# Patient Record
Sex: FEMALE | Race: WHITE | NOT HISPANIC OR LATINO | Employment: OTHER | URBAN - METROPOLITAN AREA
[De-identification: names, ages, dates, MRNs, and addresses within clinical notes are randomized per-mention and may not be internally consistent; named-entity substitution may affect disease eponyms.]

---

## 2017-01-03 RX ORDER — LOSARTAN POTASSIUM 100 MG/1
100 TABLET ORAL DAILY
Qty: 90 TABLET | Refills: 0 | Status: SHIPPED | OUTPATIENT
Start: 2017-01-03 | End: 2017-03-30 | Stop reason: SDUPTHER

## 2017-01-05 ENCOUNTER — RESULTS ENCOUNTER (OUTPATIENT)
Dept: FAMILY MEDICINE CLINIC | Facility: CLINIC | Age: 67
End: 2017-01-05

## 2017-01-05 ENCOUNTER — HOSPITAL ENCOUNTER (OUTPATIENT)
Dept: CARDIOLOGY | Facility: HOSPITAL | Age: 67
Discharge: HOME | End: 2017-01-05
Attending: INTERNAL MEDICINE | Admitting: INTERNAL MEDICINE

## 2017-01-05 ENCOUNTER — APPOINTMENT (OUTPATIENT)
Dept: LAB | Facility: HOSPITAL | Age: 67
End: 2017-01-05

## 2017-01-05 VITALS
HEIGHT: 69 IN | SYSTOLIC BLOOD PRESSURE: 114 MMHG | BODY MASS INDEX: 26.66 KG/M2 | HEART RATE: 80 BPM | WEIGHT: 180 LBS | DIASTOLIC BLOOD PRESSURE: 72 MMHG

## 2017-01-05 DIAGNOSIS — I26.02 ACUTE SADDLE PULMONARY EMBOLISM WITH ACUTE COR PULMONALE (HCC): ICD-10-CM

## 2017-01-05 DIAGNOSIS — I10 HTN (HYPERTENSION), BENIGN: ICD-10-CM

## 2017-01-05 LAB
ANION GAP SERPL CALCULATED.3IONS-SCNC: 13.8 MMOL/L
BH CV ECHO MEAS - ACS: 1.7 CM
BH CV ECHO MEAS - AO MAX PG (FULL): 5.9 MMHG
BH CV ECHO MEAS - AO MAX PG: 9.5 MMHG
BH CV ECHO MEAS - AO MEAN PG (FULL): 3.5 MMHG
BH CV ECHO MEAS - AO MEAN PG: 5.5 MMHG
BH CV ECHO MEAS - AO ROOT AREA (BSA CORRECTED): 1.2
BH CV ECHO MEAS - AO ROOT AREA: 4.2 CM^2
BH CV ECHO MEAS - AO ROOT DIAM: 2.3 CM
BH CV ECHO MEAS - AO V2 MAX: 154.5 CM/SEC
BH CV ECHO MEAS - AO V2 MEAN: 109 CM/SEC
BH CV ECHO MEAS - AO V2 VTI: 30.4 CM
BH CV ECHO MEAS - AVA(I,A): 1.3 CM^2
BH CV ECHO MEAS - AVA(I,D): 1.3 CM^2
BH CV ECHO MEAS - AVA(V,A): 1.6 CM^2
BH CV ECHO MEAS - AVA(V,D): 1.6 CM^2
BH CV ECHO MEAS - BSA(HAYCOCK): 2 M^2
BH CV ECHO MEAS - BSA: 2 M^2
BH CV ECHO MEAS - BZI_BMI: 26.6 KILOGRAMS/M^2
BH CV ECHO MEAS - BZI_METRIC_HEIGHT: 175.3 CM
BH CV ECHO MEAS - BZI_METRIC_WEIGHT: 81.6 KG
BH CV ECHO MEAS - CONTRAST EF (2CH): 59.2 ML/M^2
BH CV ECHO MEAS - CONTRAST EF 4CH: 58.8 ML/M^2
BH CV ECHO MEAS - EDV(CUBED): 97.3 ML
BH CV ECHO MEAS - EDV(MOD-SP2): 76 ML
BH CV ECHO MEAS - EDV(MOD-SP4): 80 ML
BH CV ECHO MEAS - EDV(TEICH): 97.3 ML
BH CV ECHO MEAS - EF(CUBED): 72.3 %
BH CV ECHO MEAS - EF(MOD-SP2): 59.2 %
BH CV ECHO MEAS - EF(MOD-SP4): 58.8 %
BH CV ECHO MEAS - EF(TEICH): 64 %
BH CV ECHO MEAS - ESV(CUBED): 27 ML
BH CV ECHO MEAS - ESV(MOD-SP2): 31 ML
BH CV ECHO MEAS - ESV(MOD-SP4): 33 ML
BH CV ECHO MEAS - ESV(TEICH): 35 ML
BH CV ECHO MEAS - FS: 34.8 %
BH CV ECHO MEAS - IVS/LVPW: 1.1
BH CV ECHO MEAS - IVSD: 1 CM
BH CV ECHO MEAS - LAT PEAK E' VEL: 5 CM/SEC
BH CV ECHO MEAS - LV DIASTOLIC VOL/BSA (35-75): 40.5 ML/M^2
BH CV ECHO MEAS - LV MASS(C)D: 148.1 GRAMS
BH CV ECHO MEAS - LV MASS(C)DI: 75 GRAMS/M^2
BH CV ECHO MEAS - LV MAX PG: 3.6 MMHG
BH CV ECHO MEAS - LV MEAN PG: 2 MMHG
BH CV ECHO MEAS - LV SYSTOLIC VOL/BSA (12-30): 16.7 ML/M^2
BH CV ECHO MEAS - LV V1 MAX: 95.5 CM/SEC
BH CV ECHO MEAS - LV V1 MEAN: 60.2 CM/SEC
BH CV ECHO MEAS - LV V1 VTI: 15.9 CM
BH CV ECHO MEAS - LVIDD: 4.6 CM
BH CV ECHO MEAS - LVIDS: 3 CM
BH CV ECHO MEAS - LVLD AP2: 7.2 CM
BH CV ECHO MEAS - LVLD AP4: 7.8 CM
BH CV ECHO MEAS - LVLS AP2: 6.3 CM
BH CV ECHO MEAS - LVLS AP4: 6.8 CM
BH CV ECHO MEAS - LVOT AREA (M): 2.5 CM^2
BH CV ECHO MEAS - LVOT AREA: 2.5 CM^2
BH CV ECHO MEAS - LVOT DIAM: 1.8 CM
BH CV ECHO MEAS - LVPWD: 0.9 CM
BH CV ECHO MEAS - MED PEAK E' VEL: 4 CM/SEC
BH CV ECHO MEAS - MV A DUR: 0.1 SEC
BH CV ECHO MEAS - MV A MAX VEL: 72.6 CM/SEC
BH CV ECHO MEAS - MV DEC SLOPE: 212 CM/SEC^2
BH CV ECHO MEAS - MV DEC TIME: 0.26 SEC
BH CV ECHO MEAS - MV E MAX VEL: 53.8 CM/SEC
BH CV ECHO MEAS - MV E/A: 0.74
BH CV ECHO MEAS - MV MAX PG: 3.3 MMHG
BH CV ECHO MEAS - MV MEAN PG: 1 MMHG
BH CV ECHO MEAS - MV P1/2T MAX VEL: 56.3 CM/SEC
BH CV ECHO MEAS - MV P1/2T: 77.8 MSEC
BH CV ECHO MEAS - MV V2 MAX: 91.4 CM/SEC
BH CV ECHO MEAS - MV V2 MEAN: 49.3 CM/SEC
BH CV ECHO MEAS - MV V2 VTI: 22 CM
BH CV ECHO MEAS - MVA P1/2T LCG: 3.9 CM^2
BH CV ECHO MEAS - MVA(P1/2T): 2.8 CM^2
BH CV ECHO MEAS - MVA(VTI): 1.8 CM^2
BH CV ECHO MEAS - PA MAX PG (FULL): 4.1 MMHG
BH CV ECHO MEAS - PA MAX PG: 6.2 MMHG
BH CV ECHO MEAS - PA V2 MAX: 124 CM/SEC
BH CV ECHO MEAS - PULM A REVS DUR: 0.1 SEC
BH CV ECHO MEAS - PULM A REVS VEL: 41 CM/SEC
BH CV ECHO MEAS - PULM DIAS VEL: 34.1 CM/SEC
BH CV ECHO MEAS - PULM S/D: 1.4
BH CV ECHO MEAS - PULM SYS VEL: 48.4 CM/SEC
BH CV ECHO MEAS - PVA(V,A): 1.8 CM^2
BH CV ECHO MEAS - PVA(V,D): 1.8 CM^2
BH CV ECHO MEAS - QP/QS: 1
BH CV ECHO MEAS - RAP SYSTOLE: 3 MMHG
BH CV ECHO MEAS - RV MAX PG: 2.1 MMHG
BH CV ECHO MEAS - RV MEAN PG: 1 MMHG
BH CV ECHO MEAS - RV V1 MAX: 72.2 CM/SEC
BH CV ECHO MEAS - RV V1 MEAN: 48.8 CM/SEC
BH CV ECHO MEAS - RV V1 VTI: 13.2 CM
BH CV ECHO MEAS - RVOT AREA: 3.1 CM^2
BH CV ECHO MEAS - RVOT DIAM: 2 CM
BH CV ECHO MEAS - RVSP: 17.3 MMHG
BH CV ECHO MEAS - SI(AO): 63.9 ML/M^2
BH CV ECHO MEAS - SI(CUBED): 35.6 ML/M^2
BH CV ECHO MEAS - SI(LVOT): 20.5 ML/M^2
BH CV ECHO MEAS - SI(MOD-SP2): 22.8 ML/M^2
BH CV ECHO MEAS - SI(MOD-SP4): 23.8 ML/M^2
BH CV ECHO MEAS - SI(TEICH): 31.6 ML/M^2
BH CV ECHO MEAS - SV(AO): 126.3 ML
BH CV ECHO MEAS - SV(CUBED): 70.3 ML
BH CV ECHO MEAS - SV(LVOT): 40.5 ML
BH CV ECHO MEAS - SV(MOD-SP2): 45 ML
BH CV ECHO MEAS - SV(MOD-SP4): 47 ML
BH CV ECHO MEAS - SV(RVOT): 41.5 ML
BH CV ECHO MEAS - SV(TEICH): 62.3 ML
BH CV ECHO MEAS - TAPSE (>1.6): 1.9 CM2
BH CV ECHO MEAS - TR MAX VEL: 189 CM/SEC
BH CV XLRA - RV BASE: 3 CM
BH CV XLRA - TDI S': 12 CM/SEC
BUN BLD-MCNC: 15 MG/DL (ref 8–23)
BUN/CREAT SERPL: 22.1 (ref 7–25)
CALCIUM SPEC-SCNC: 9.9 MG/DL (ref 8.6–10.5)
CHLORIDE SERPL-SCNC: 94 MMOL/L (ref 98–107)
CO2 SERPL-SCNC: 28.2 MMOL/L (ref 22–29)
CREAT BLD-MCNC: 0.68 MG/DL (ref 0.57–1)
E/E' RATIO: 11.5
GFR SERPL CREATININE-BSD FRML MDRD: 87 ML/MIN/1.73
GLUCOSE BLD-MCNC: 104 MG/DL (ref 65–99)
LEFT ATRIUM VOLUME INDEX: 12 ML/M2
POTASSIUM BLD-SCNC: 3.8 MMOL/L (ref 3.5–5.2)
SODIUM BLD-SCNC: 136 MMOL/L (ref 136–145)

## 2017-01-05 PROCEDURE — 93306 TTE W/DOPPLER COMPLETE: CPT

## 2017-01-05 PROCEDURE — 93306 TTE W/DOPPLER COMPLETE: CPT | Performed by: INTERNAL MEDICINE

## 2017-01-05 PROCEDURE — 80048 BASIC METABOLIC PNL TOTAL CA: CPT | Performed by: INTERNAL MEDICINE

## 2017-01-05 PROCEDURE — 36415 COLL VENOUS BLD VENIPUNCTURE: CPT | Performed by: INTERNAL MEDICINE

## 2017-01-05 RX ORDER — CHLORTHALIDONE 25 MG/1
25 TABLET ORAL DAILY
Qty: 90 TABLET | Refills: 3 | Status: SHIPPED | OUTPATIENT
Start: 2017-01-05 | End: 2018-01-03 | Stop reason: SDUPTHER

## 2017-01-05 NOTE — MR AVS SNAPSHOT
[facility] OUTPATIENT ECHOCARDIOGRAPHY  [phone]                    Juany Carlin   1/5/2017 10:00 AM   ECHOCARDIOGRAM 2D COMPLETE WITH CONTRAST VISIT    Dept Phone:  [phone]   Encounter #:  [id number]    Provider:  JUDE MILES ECHO/VAS FRONT RM   Department:  [facility] OUTPATIENT ECHOCARDIOGRAPHY                Your Full Care Plan              Where to Get Your Medications      These medications were sent to [address] - [address] [place] [address] AT [facility] & ([place]) - [phone]  - [phone] FX  [address] [place] [address]     Phone:  [phone]     chlorthalidone 25 MG tablet            Your Updated Medication List      ASK your doctor about these medications     apixaban 5 MG tablet tablet   Commonly known as:  ELIQUIS   Take 1 tablet by mouth Every 12 (Twelve) Hours for 90 days. Start Eliquis 10 mg twice daily for 12 doses then 5 mg twice daily       B-100 COMPLEX PO       CALCIUM PO       chlorthalidone 25 MG tablet   Commonly known as:  HYGROTON   Take 1 tablet by mouth Daily.       CRANBERRY PO       famotidine 10 MG tablet   Commonly known as:  PEPCID       gabapentin 400 MG capsule   Commonly known as:  NEURONTIN       losartan 100 MG tablet   Commonly known as:  COZAAR   Take 1 tablet by mouth Daily.       MULTIVITAMIN PO       MYRBETRIQ 50 MG tablet sustained-release 24 hour   Generic drug:  Mirabegron ER       Vitamin D3 2000 UNITS tablet               We Performed the Following     Adult Transthoracic Echo Complete       You Were Diagnosed With        Codes Comments    Acute saddle pulmonary embolism with acute cor pulmonale     ICD-10-CM: I26.02  ICD-9-CM: 415.13, 415.0       Instructions     None    Patient Instructions History      Upcoming Appointments     Visit Type Date Time Department     JUDE ECHO 2D COMPLETE WITH CONTRAST VT 1/5/2017 10:00 AM  [facility]    LAB 1/5/2017 10:40 AM  [facility]     OFFICE V[facility] 1[facility]2017  [facility]    LAB NEW PT CBC JUDE 1/12/2017  7:20 AM [facility] ONC CBC LAB KRE    NEW PT - HEMATOLOGY 1/12/2017  8:20 AM [facility] ONC CBC [facility]    [facility]ISIT 2/2/2017 11:00 [facility]    FOLLOW UP 6/15/2017  1:40 PM [facility] [place]      MyChart Signup     Our records indicate that you have an active [employer] account.    You can view your After Visit Summary by going to [employer] and logging in with your Sky[employer] username and password.  If you don't have a SkyPhrase username and password but a parent or guardian has access to your record, the parent or guardian should login with their own SkyPhrase username and password and access your record to view the After Visit Summary.    If you have questions, you can email [email or web address] or call [phone] to talk to our [employer] staff.  Remember, [employer] is NOT to be used for urgent needs.  For medical emergencies, dial 911.               Other Info from Your Visit           Your Appointments     Jan 09, 2017  2:40 PM EST   Office Visit with Garo Bravo MD   [facility] INTERNAL MEDICINE (--)    [address] [place], [place] [id number]   [phone]           Arrive 15 minutes prior to appointment.            Jan 12, 2017  7:20 AM EST   LAB with LAB CHAIR 5 [facility] ONCOLOGY CBC LAB ([place])    [address] [place] [address]   [phone]            Jan 12, 2017  8:20 AM EST   NEW HEMATOLOGY with Vickie Bernstein MD   [facility] CBC GROUP: CONSULTANTS IN BLOOD DISORDERS AND CANCER (CBC [place])    [address] [place] [address]   [phone]            Feb 02, 2017 11:00 AM EST   Office Visit with Garo Bravo MD   [facility] INTERNAL MEDICINE (--)    [address] [place], [place] [address] [place] [address]   [phone]            "Arrive 15 minutes prior to appointment.            Richard 15, 2017  1:40 PM EDT   Follow Up with Real Ledesma MD   [facility] CARDIOLOGY (--)    [address]  [facility] [address] [phone]           Arrive 15 minutes prior to appointment.              Allergies     Contrast Dye      Suprax [Cefixime]  Diarrhea      Vital Signs     Blood Pressure Pulse Height Weight Body Mass Index Smoking Status    114/72 80 69\" (175.3 cm) 180 lb (81.6 kg) 26.58 kg/m2 Former Smoker      Problems and Diagnoses Noted     Acute saddle pulmonary embolism with acute cor pulmonale          Results     Adult Transthoracic Echo Complete      Component Value Standard Range & Units    TDI S' 12.00 cm/sec    RV Base 3.00 cm    E/E' ratio 11.5     LA Volume Index 12.0 mL/m2    Lat Peak E' Gilmer 5.0 cm/sec    Med Peak E' Gilmer 4.00 cm/sec    RAP systole 3.0 mmHg    RVSP(TR) 17.3 mmHg    TR max gilmer 189.0 cm/sec    TAPSE (>1.6) 1.90 cm2    BSA 2.0 m^2     CV ECHO MARZENA - BZI_BMI 26.6 kilograms/m^2     CV ECHO MARZENA - BSA(HAYCOCK) 2.0 m^2     CV ECHO MARZENA - BZI_METRIC_WEIGHT 81.6 kg     CV ECHO MARZENA - BZI_METRIC_HEIGHT 175.3 cm    IVSd 1.0 cm    LVIDd 4.6 cm    LVIDs 3.0 cm    LVPWd 0.9 cm    IVS/LVPW 1.1     FS 34.8 %    EDV(Teich) 97.3 ml    ESV(Teich) 35.0 ml    EF(Teich) 64.0 %    EDV(cubed) 97.3 ml    ESV(cubed) 27.0 ml    EF(cubed) 72.3 %    LV mass(C)d 148.1 grams    LV mass(C)dI 75.0 grams/m^2    SV(Teich) 62.3 ml    SI(Teich) 31.6 ml/m^2    SV(cubed) 70.3 ml    SI(cubed) 35.6 ml/m^2    Ao root diam 2.3 cm    Ao root area 4.2 cm^2    ACS 1.7 cm    LVOT diam 1.8 cm    LVOT area 2.5 cm^2    LVOT area(traced) 2.5 cm^2    RVOT diam 2.0 cm    RVOT area 3.1 cm^2    LVLd ap4 7.8 cm    EDV(MOD-sp4) 80.0 ml    LVLs ap4 6.8 cm    ESV(MOD-sp4) 33.0 ml    EF(MOD-sp4) 58.8 %    LVLd ap2 7.2 cm    EDV(MOD-sp2) 76.0 ml    LVLs ap2 6.3 cm    ESV(MOD-sp2) 31.0 ml    EF(MOD-sp2) 59.2 %    SV(MOD-sp4) " 47.0 ml    SI(MOD-sp4) 23.8 ml/m^2    SV(MOD-sp2) 45.0 ml    SI(MOD-sp2) 22.8 ml/m^2    Ao root area (BSA corrected) 1.2     Ao root area (BSA corrected) 59.2 ml/m^2    CONTRAST EF 4CH 58.8 ml/m^2    LV Diastolic corrected for BSA 40.5 ml/m^2    LV Systolic corrected for BSA 16.7 ml/m^2    MV A dur 0.1 sec    MV E max gilmer 53.8 cm/sec    MV A max gilmer 72.6 cm/sec    MV E/A 0.74     MV V2 max 91.4 cm/sec    MV max PG 3.3 mmHg    MV V2 mean 49.3 cm/sec    MV mean PG 1.0 mmHg    MV V2 VTI 22.0 cm    MVA(VTI) 1.8 cm^2    MV P1/2t max gilmer 56.3 cm/sec    MV P1/2t 77.8 msec    MVA(P1/2t) 2.8 cm^2    MV dec slope 212.0 cm/sec^2    MV dec time 0.26 sec    Ao pk gilmer 154.5 cm/sec    Ao max PG 9.5 mmHg    Ao max PG (full) 5.9 mmHg    Ao V2 mean 109.0 cm/sec    Ao mean PG 5.5 mmHg    Ao mean PG (full) 3.5 mmHg    Ao V2 VTI 30.4 cm    WILBERT(I,A) 1.3 cm^2    WILBERT(I,D) 1.3 cm^2    WILBERT(V,A) 1.6 cm^2    WILBERT(V,D) 1.6 cm^2    LV V1 max PG 3.6 mmHg    LV V1 mean PG 2.0 mmHg    LV V1 max 95.5 cm/sec    LV V1 mean 60.2 cm/sec    LV V1 VTI 15.9 cm    SV(Ao) 126.3 ml    SI(Ao) 63.9 ml/m^2    SV(LVOT) 40.5 ml    SV(RVOT) 41.5 ml    SI(LVOT) 20.5 ml/m^2    PA V2 max 124.0 cm/sec    PA max PG 6.2 mmHg    PA max PG (full) 4.1 mmHg    BH CV ECHO MARZENA - PVA(V,A) 1.8 cm^2    BH CV ECHO MARZENA - PVA(V,D) 1.8 cm^2    RV V1 max PG 2.1 mmHg    RV V1 mean PG 1.0 mmHg    RV V1 max 72.2 cm/sec    RV V1 mean 48.8 cm/sec    RV V1 VTI 13.2 cm    Pulm Sys Gilmer 48.4 cm/sec    Pulm Sanderson Gilmer 34.1 cm/sec    Pulm S/D 1.4     Qp/Qs 1.0     Pulm A Revs Dur 0.1 sec    Pulm A Revs Gilmer 41.0 cm/sec    MVA P1/2T LCG 3.9 cm^2

## 2017-01-09 ENCOUNTER — TELEPHONE (OUTPATIENT)
Dept: CARDIOLOGY | Facility: CLINIC | Age: 67
End: 2017-01-09

## 2017-01-09 ENCOUNTER — OFFICE VISIT (OUTPATIENT)
Dept: FAMILY MEDICINE CLINIC | Facility: CLINIC | Age: 67
End: 2017-01-09

## 2017-01-09 VITALS
DIASTOLIC BLOOD PRESSURE: 76 MMHG | OXYGEN SATURATION: 98 % | BODY MASS INDEX: 26.81 KG/M2 | WEIGHT: 181 LBS | SYSTOLIC BLOOD PRESSURE: 112 MMHG | HEIGHT: 69 IN | HEART RATE: 86 BPM

## 2017-01-09 DIAGNOSIS — I10 HTN (HYPERTENSION), BENIGN: Primary | ICD-10-CM

## 2017-01-09 PROCEDURE — 99212 OFFICE O/P EST SF 10 MIN: CPT | Performed by: INTERNAL MEDICINE

## 2017-01-09 NOTE — PROGRESS NOTES
Subjective   Juany Carlin is a 66 y.o. female who presents today for:    Hypertension (3 week f/u)    Hypertension   This is a chronic problem. The current episode started more than 1 year ago. Pertinent negatives include no chest pain, headaches or palpitations. Agents associated with hypertension include NSAIDs. Risk factors for coronary artery disease include family history and post-menopausal state.      She has chronic, essential, benign, arterial HTN for which she has been taking losartan with good benefit; an ACE-I had caused a cough.  Chlorthalidone was added at a cardiology follow-up visit in December, 2016.  Since then, she has noticed orthostatic symptoms on a regular basis.  PMH is significant for a saddle embolus diagnosed about 2 months ago.    Ms. Carlin  reports that she has quit smoking. Her smoking use included Cigarettes. She has a 5.00 pack-year smoking history. She has never used smokeless tobacco. She reports that she drinks about 1.2 oz of alcohol per week  She reports that she does not use illicit drugs.         Current Outpatient Prescriptions:   •  apixaban (ELIQUIS) 5 MG tablet tablet, Take 1 tablet by mouth Every 12 (Twelve) Hours for 90 days. Start Eliquis 10 mg twice daily for 12 doses then 5 mg twice daily, Disp: 180 tablet, Rfl: 0  •  B Complex-Biotin-FA (B-100 COMPLEX PO), Take 1 tablet by mouth Daily., Disp: , Rfl:   •  CALCIUM PO, Take 600 mg by mouth Daily., Disp: , Rfl:   •  chlorthalidone (HYGROTON) 25 MG tablet, Take 1 tablet by mouth Daily., Disp: 90 tablet, Rfl: 3  •  Cholecalciferol (VITAMIN D3) 2000 UNITS tablet, Take 2,000 Units by mouth Daily., Disp: , Rfl:   •  CRANBERRY PO, Take 1 tablet by mouth Daily., Disp: , Rfl:   •  famotidine (PEPCID) 10 MG tablet, Take 10 mg by mouth Every Night., Disp: , Rfl:   •  gabapentin (NEURONTIN) 400 MG capsule, Take 400 mg by mouth 3 (three) times a day as needed., Disp: , Rfl:   •  losartan (COZAAR) 100 MG tablet, Take 1 tablet by mouth  "Daily., Disp: 90 tablet, Rfl: 0  •  Multiple Vitamins-Minerals (MULTIVITAMIN PO), Take 1 tablet by mouth Daily., Disp: , Rfl:   •  MYRBETRIQ 50 MG tablet sustained-release 24 hour, Take 50 mg by mouth daily., Disp: , Rfl:       The following portions of the patient's history were reviewed and updated as appropriate: allergies, current medications, past social history and problem list.    Review of Systems   Constitutional: Negative for diaphoresis.   Eyes: Negative for visual disturbance.   Respiratory: Negative for cough and chest tightness.    Cardiovascular: Negative for chest pain, palpitations and leg swelling.   Gastrointestinal: Negative for abdominal pain.   Musculoskeletal: Positive for arthralgias (right hip, neck, knees; worse off Aleve). Negative for myalgias.   Neurological: Negative for dizziness, syncope, numbness and headaches.   Hematological: Does not bruise/bleed easily.         Objective   Vitals:    01/09/17 1500   BP: 112/76   BP Location: Left arm   Patient Position: Sitting   Cuff Size: Adult   Pulse: 86   SpO2: 98%   Weight: 181 lb (82.1 kg)   Height: 69\" (175.3 cm)     106/58 sitting; left arm.  98/58 standing; left arm    Physical Exam   Constitutional: She appears well-developed and well-nourished. No distress.   Cardiovascular: Normal rate, regular rhythm and normal heart sounds.    Pulmonary/Chest: Effort normal and breath sounds normal.       Vascular Status -  Her exam exhibits no right foot edema. Her exam exhibits no left foot edema.  With compression stockings in place      Assessment/Plan   Juany was seen today for hypertension.    Diagnoses and all orders for this visit:    HTN (hypertension), benign  Comments:  Decrease chlorthalidone to 1/2 tablet dialy and see of the orthostatic symptoms improve.    She will be seeing the hematologists and then her cardiologist in follow-up over the next several months.  We will keep tabs on her blood pressure through those office visits.  " Her recent labs showed normal renal function and a potassium level that was slightly lower than before, but acceptable at 3.8.     She should stay off the Aleve now that she is on Eliquis, and only take Tylenol or use topical remedies for her joint pains.

## 2017-01-09 NOTE — TELEPHONE ENCOUNTER
Patient left a message on the Results Line asking for the results of the echo done 1/5/17.  Results in EPIC. /LACI    Patient's phone number is [phone]

## 2017-01-09 NOTE — MR AVS SNAPSHOT
Juany Carlin   1/9/2017 2:40 PM   Office Visit    Dept Phone:  [phone]   Encounter #:  [id number]    Provider:  Garo Bravo MD   Department:  [facility] INTERNAL MEDICINE                Your Full Care Plan              Your Updated Medication List          This list is accurate as of: 1/9/17  3:49 PM.  Always use your most recent med list.                apixaban 5 MG tablet tablet   Commonly known as:  ELIQUIS   Take 1 tablet by mouth Every 12 (Twelve) Hours for 90 days. Start Eliquis 10 mg twice daily for 12 doses then 5 mg twice daily       B-100 COMPLEX PO       CALCIUM PO       chlorthalidone 25 MG tablet   Commonly known as:  HYGROTON   Take 1 tablet by mouth Daily.       CRANBERRY PO       famotidine 10 MG tablet   Commonly known as:  PEPCID       gabapentin 400 MG capsule   Commonly known as:  NEURONTIN       losartan 100 MG tablet   Commonly known as:  COZAAR   Take 1 tablet by mouth Daily.       MULTIVITAMIN PO       MYRBETRIQ 50 MG tablet sustained-release 24 hour   Generic drug:  Mirabegron ER       Vitamin D3 2000 UNITS tablet               You Were Diagnosed With        Codes Comments    HTN (hypertension), benign    -  Primary ICD-10-CM: I10  ICD-9-CM: 401.1 Decrease chlorthalidone to 1/2 tablet dialy and see of the orthostatic symptoms improve.      Instructions     None    Patient Instructions History      Upcoming Appointments     Visit Type Date Time Department    OFFICE VISIT 1/9/2017  2:40 PM [facility]    LAB NEW PT CBC JUDE 1/12/2017  7:20 AM [facility] ONC CBC LAB K[facility]    NEW PT - HEMATOLOGY 1/12/2017  8:20 AM [facility] ONC CBC [facility]    OFFICE VISIT 4/10/2017 10:40 AM [facility]    FOLLOW UP 6/15/2017  1:40 PM [facility] Signup     Our records indicate that you have an active [employer] [facility] [employer] account.    You can view your After Visit Summary by going to [employer] ([facility]) and logging in with  "your "[employer] username and password.  If you don't have a "[employer] username and password but a parent or guardian has access to your record, the parent or guardian should login with their own "[employer] username and password and access your record to view the After Visit Summary.    If you have questions, you can email [email or web address] or call [phone] to talk to our "[employer] staff.  Remember, "[employer] is NOT to be used for urgent needs.  For medical emergencies, dial 911.               Other Info from Your Visit           Your Appointments     Jan 12, 2017  7:20 AM EST   LAB with LAB CHAIR 5 [facility] ONCOLOGY CBC LAB ([place]    [address] [place] [address]   [phone]            Jan 12, 2017  8:20 AM EST   NEW HEMATOLOGY with Vickie Bernstein MD   [facility] CBC GROUP: CONSULTANTS IN BLOOD DISORDERS AND CANCER (CBC [place])    [address] [address]   [phone]            Apr 10, 2017 10:40 AM EDT   Office Visit with Garo Bravo MD   [facility] INTERNAL MEDICINE (--)    [address] [place] [address] [address]  [facility] [address] [phone]           Arrive 15 minutes prior to appointment.            Richard 15, 2017  1:40 PM EDT   Follow Up with Real Ledesma MD   [facility] CARDIOLOGY (--)    [address] [facility]  [facility] [address]   [phone]           Arrive 15 minutes prior to appointment.              Allergies     Contrast Dye      Suprax [Cefixime]  Diarrhea      Reason for Visit     Hypertension 3 week f/u      Vital Signs     Blood Pressure Pulse Height Weight Oxygen Saturation Breastfeeding?    112/76 (BP Location: Left arm, Patient Position: Sitting, Cuff Size: Adult) 86 69\" (175.3 cm) 181 lb (82.1 kg) 98% No    Body Mass Index Smoking Status                26.73 kg/m2 Former Smoker          Problems and Diagnoses Noted     " HTN (hypertension), benign

## 2017-01-12 ENCOUNTER — CONSULT (OUTPATIENT)
Dept: ONCOLOGY | Facility: CLINIC | Age: 67
End: 2017-01-12

## 2017-01-12 ENCOUNTER — LAB (OUTPATIENT)
Dept: LAB | Facility: HOSPITAL | Age: 67
End: 2017-01-12

## 2017-01-12 VITALS
RESPIRATION RATE: 12 BRPM | DIASTOLIC BLOOD PRESSURE: 90 MMHG | HEART RATE: 72 BPM | OXYGEN SATURATION: 98 % | BODY MASS INDEX: 27.16 KG/M2 | TEMPERATURE: 99 F | SYSTOLIC BLOOD PRESSURE: 148 MMHG | HEIGHT: 69 IN | WEIGHT: 183.4 LBS

## 2017-01-12 DIAGNOSIS — Z82.49 FAMILY HISTORY OF THROMBOSIS: ICD-10-CM

## 2017-01-12 DIAGNOSIS — I26.92 ACUTE SADDLE PULMONARY EMBOLISM WITHOUT ACUTE COR PULMONALE (HCC): Primary | ICD-10-CM

## 2017-01-12 LAB
BASOPHILS # BLD AUTO: 0.09 10*3/MM3 (ref 0–0.1)
BASOPHILS NFR BLD AUTO: 1.4 % (ref 0–1.1)
DEPRECATED RDW RBC AUTO: 38.1 FL (ref 37–49)
EOSINOPHIL # BLD AUTO: 0.14 10*3/MM3 (ref 0–0.36)
EOSINOPHIL NFR BLD AUTO: 2.2 % (ref 1–5)
ERYTHROCYTE [DISTWIDTH] IN BLOOD BY AUTOMATED COUNT: 12.5 % (ref 11.7–14.5)
HCT VFR BLD AUTO: 37.8 % (ref 34–45)
HGB BLD-MCNC: 13.3 G/DL (ref 11.5–14.9)
IMM GRANULOCYTES # BLD: 0.04 10*3/MM3 (ref 0–0.03)
IMM GRANULOCYTES NFR BLD: 0.6 % (ref 0–0.5)
LYMPHOCYTES # BLD AUTO: 2.91 10*3/MM3 (ref 1–3.5)
LYMPHOCYTES NFR BLD AUTO: 45.2 % (ref 20–49)
MCH RBC QN AUTO: 29.7 PG (ref 27–33)
MCHC RBC AUTO-ENTMCNC: 35.2 G/DL (ref 32–35)
MCV RBC AUTO: 84.4 FL (ref 83–97)
MONOCYTES # BLD AUTO: 0.73 10*3/MM3 (ref 0.25–0.8)
MONOCYTES NFR BLD AUTO: 11.3 % (ref 4–12)
NEUTROPHILS # BLD AUTO: 2.53 10*3/MM3 (ref 1.5–7)
NEUTROPHILS NFR BLD AUTO: 39.3 % (ref 39–75)
NRBC BLD MANUAL-RTO: 0 /100 WBC (ref 0–0)
PLATELET # BLD AUTO: 195 10*3/MM3 (ref 150–375)
PMV BLD AUTO: 10.7 FL (ref 8.9–12.1)
RBC # BLD AUTO: 4.48 10*6/MM3 (ref 3.9–5)
WBC NRBC COR # BLD: 6.44 10*3/MM3 (ref 4–10)

## 2017-01-12 PROCEDURE — 85025 COMPLETE CBC W/AUTO DIFF WBC: CPT

## 2017-01-12 PROCEDURE — 36416 COLLJ CAPILLARY BLOOD SPEC: CPT

## 2017-01-12 PROCEDURE — 99204 OFFICE O/P NEW MOD 45 MIN: CPT | Performed by: INTERNAL MEDICINE

## 2017-01-12 NOTE — PROGRESS NOTES
History:     Reason For Consultation:   Acute saddle pulmonary embolism 2016    Referring Provider:   Real Ledesma MD  [address] [address]    Records Obtained: Records of the patients history including those obtained from the referring provider were reviewed and summarized in detail.    HPI:   Juany Carlin presents for consultation of acute pulmonary embolism.  Patient presented to the emergency department on November 10, 2016 with complaints of progressive shortness of air.  She describes a 1 month history of swelling in her right leg and progressive shortness of air while walking up stairs.  When she presented to the hospital a CT angiogram revealed a bilateral saddle pulmonary embolism, considered submassive.  She was able to maintain oxygen saturations greater than 90%.  She also was diagnosed with a right lower extremity DVT.  She was given TPA, hypercoagulable workup was performed that included factor V Leyden, prothrombin gene mutation, lupus anticoagulant, protein C, protein S, anticardiolipin and antithrombin III.  The only abnormality was a slightly low antithrombin 3 that was felt potentially due to consumption.  She was initiated on Eliquis on discharge and has been on Eliquis since then without trouble.  She also reports that she was taking aspirin 2-3 times per week when she developed her thrombosis.  She notes that she did have some mild immobility related to vertigo but it sounds like she still was up and about throughout the day and was immobile at most 2-3 hours at a time.  Interestingly she has a very strong family history with both mother and father with thrombosis and a sister that  in her sleep and it was felt that she had a pulmonary embolism.  Patient does not have history of prior deep vein thrombosis; she has had superficial thrombosis and has had 2 superficial vein procedures performed in  and . Again, she's doing well on anticoagulation.  No bleeding or easy bruising. She is wearing compression hose bilaterally with nice decrease in the swelling. She is anxious about having another thrombosis. Her vertigo has resolved. She's up to date on her cancer screenings.     Past Medical   Past Medical History   Diagnosis Date   • Adrenal nodule    • Arthritis    • BPPV (benign paroxysmal positional vertigo) 5/10/2016   • Cervical stenosis of spine    • Chronic pain disorder    • DVT, lower extremity      right   • GERD without esophagitis 5/10/2016   • HTN (hypertension), benign 5/10/2016   • Neck pain    • Neuropathy    • PONV (postoperative nausea and vomiting)    • Pulmonary emboli      bilateral extensive PE with saddle emboli.   • Urge incontinence 5/10/2016   • Vitamin D deficiency 5/10/2016     Patient Active Problem List   Diagnosis   • HTN (hypertension), benign   • BPPV (benign paroxysmal positional vertigo)   • Vitamin D deficiency   • Urge incontinence   • GERD without esophagitis   • Cervical spinal stenosis   • Saddle pulmonary embolus   • Family history of thrombosis     Social History   Social History     Social History   • Marital status:      Spouse name: N/A   • Number of children: N/A   • Years of education: N/A     Occupational History   • Not on file.     Social History Main Topics   • Smoking status: Former Smoker     Packs/day: 0.50     Years: 10.00     Types: Cigarettes   • Smokeless tobacco: Former User   • Alcohol use 1.2 oz/week     2 Glasses of wine per week   • Drug use: No   • Sexual activity: Not on file     Other Topics Concern   • Not on file     Social History Narrative     Family History  Family History   Problem Relation Age of Onset   • Macular degeneration Mother    • Deep vein thrombosis Mother    • Heart failure Father    • Deep vein thrombosis Father    • Chiari malformation Sister    • Heart failure Sister    • Heart disease Maternal Grandfather    • Heart disease Paternal Grandfather    • Deep vein thrombosis  Sister      Medications    Current Outpatient Prescriptions:   •  apixaban (ELIQUIS) 5 MG tablet tablet, Take 1 tablet by mouth Every 12 (Twelve) Hours for 90 days. Start Eliquis 10 mg twice daily for 12 doses then 5 mg twice daily, Disp: 180 tablet, Rfl: 0  •  B Complex-Biotin-FA (B-100 COMPLEX PO), Take 1 tablet by mouth Daily., Disp: , Rfl:   •  CALCIUM PO, Take 600 mg by mouth Daily., Disp: , Rfl:   •  chlorthalidone (HYGROTON) 25 MG tablet, Take 1 tablet by mouth Daily., Disp: 90 tablet, Rfl: 3  •  Cholecalciferol (VITAMIN D3) 2000 UNITS tablet, Take 2,000 Units by mouth Daily., Disp: , Rfl:   •  CRANBERRY PO, Take 1 tablet by mouth Daily., Disp: , Rfl:   •  famotidine (PEPCID) 10 MG tablet, Take 10 mg by mouth Every Night., Disp: , Rfl:   •  gabapentin (NEURONTIN) 400 MG capsule, Take 400 mg by mouth 3 (three) times a day as needed., Disp: , Rfl:   •  losartan (COZAAR) 100 MG tablet, Take 1 tablet by mouth Daily., Disp: 90 tablet, Rfl: 0  •  Multiple Vitamins-Minerals (MULTIVITAMIN PO), Take 1 tablet by mouth Daily., Disp: , Rfl:   •  MYRBETRIQ 50 MG tablet sustained-release 24 hour, Take 50 mg by mouth daily., Disp: , Rfl:   Allergies  Allergies   Allergen Reactions   • Ciprocinonide [Fluocinolone]    • Contrast Dye    • Suprax [Cefixime] Diarrhea     Review of Systems  GENERAL: No change in appetite or weight; No fevers, chills, sweats.  Fatigue.   SKIN: No nonhealing lesions. No rashes.  HEME/LYMPH: No easy bruising, bleeding. No swollen nodes.   EYES: No vision changes or diplopia.   ENT: No tinnitus, hearing loss, gum bleeding, epistaxis, hoarseness or dysphagia.   RESPIRATORY: shortness of breath improving.  CVS: No chest pain, palpitations, orthopnea, dyspnea on exertion or PND.   GI: No melena or hematochezia. No abdominal pain. No nausea, vomiting, constipation, diarrhea; hemorrhoids  : urinary frequency  MUSCULOSKELETAL: joint pain  NEUROLOGICAL: No global weakness, loss of consciousness or  "seizures. dizziness  PSYCHIATRIC: No increased nervousness, mood changes or depression.      Objective     Objective:     Vitals:    01/12/17 0756   BP: 148/90   Pulse: 72   Resp: 12   Temp: 99 °F (37.2 °C)   TempSrc: Oral   SpO2: 98%   Weight: 183 lb 6.4 oz (83.2 kg)   Height: 68.9\" (175 cm)  Comment: new height   PainSc: 0-No pain     Current Status 1/12/2017   ECOG score 0     GENERAL:  Well-developed, well-nourished female in no acute distress.   SKIN:  Warm, dry without rashes, purpura or petechiae.  HEAD:  Normocephalic.  EYES:  Pupils equal, round and reactive to light.  EOMs intact.  Conjunctivae normal.  EARS:  Hearing intact.  NOSE:  Septum midline.  No excoriations or nasal discharge.  MOUTH:  Tongue is well-papillated; no stomatitis or ulcers.  Lips normal.  THROAT:  Oropharynx without lesions or exudates.  NECK:  Supple with good range of motion; no thyromegaly or masses  LYMPHATICS:  No cervical, supraclavicular, axillary adenopathy.  CHEST:  Lungs clear to percussion and auscultation. Good airflow.  CARDIAC:  Regular rate and rhythm without murmurs, rubs or gallops. Normal S1,S2.  ABDOMEN:  Soft, nontender, no hepatosplenomegaly, normal bowel sounds  EXTREMITIES:  No clubbing, cyanosis or edema. Bilateral compression stockings in place.  NEUROLOGICAL:  Cranial Nerves II-XII grossly intact.  No focal neurological deficits.  PSYCHIATRIC:  Normal affect and mood.     Labs and Imaging  Results for orders placed or performed in visit on 01/12/17   CBC Auto Differential   Result Value Ref Range    WBC 6.44 4.00 - 10.00 10*3/mm3    RBC 4.48 3.90 - 5.00 10*6/mm3    Hemoglobin 13.3 11.5 - 14.9 g/dL    Hematocrit 37.8 34.0 - 45.0 %    MCV 84.4 83.0 - 97.0 fL    MCH 29.7 27.0 - 33.0 pg    MCHC 35.2 (H) 32.0 - 35.0 g/dL    RDW 12.5 11.7 - 14.5 %    RDW-SD 38.1 37.0 - 49.0 fl    MPV 10.7 8.9 - 12.1 fL    Platelets 195 150 - 375 10*3/mm3    Neutrophil % 39.3 39.0 - 75.0 %    Lymphocyte % 45.2 20.0 - 49.0 %    " Monocyte % 11.3 4.0 - 12.0 %    Eosinophil % 2.2 1.0 - 5.0 %    Basophil % 1.4 (H) 0.0 - 1.1 %    Immature Grans % 0.6 (H) 0.0 - 0.5 %    Neutrophils, Absolute 2.53 1.50 - 7.00 10*3/mm3    Lymphocytes, Absolute 2.91 1.00 - 3.50 10*3/mm3    Monocytes, Absolute 0.73 0.25 - 0.80 10*3/mm3    Eosinophils, Absolute 0.14 0.00 - 0.36 10*3/mm3    Basophils, Absolute 0.09 0.00 - 0.10 10*3/mm3    Immature Grans, Absolute 0.04 (H) 0.00 - 0.03 10*3/mm3    nRBC 0.0 0.0 - 0.0 /100 WBC       Hypercoagulable workup detailed in HPI    Assessment/Plan   Assessment/Plan:   1. Acute saddle pulmonary embolism without cor pulmonale with right LE DVT diagnosed 11/10/2016   * Not real clear if there is a truly specific inciting event. She had some immobility, but I'm not convinced that it was enough to contribute to thrombosis. Her family history is very concerning with both parents with thrombosis history and a sister that potentially  from PE. Her AT III was low, but likely related to consumption during active thrombosis.    * Given all of the above circumstances, we agreed that at least one year of anticoagulation is warranted, but I lean towards lifelong AC if she does well on Eliquis because of the possibility that this was spontaneous, life threatening and she has very strong family history.     Follow-up in 9 months. I asked the patient to call for any questions, concerns, or new symptoms.

## 2017-03-30 RX ORDER — LOSARTAN POTASSIUM 100 MG/1
TABLET ORAL
Qty: 90 TABLET | Refills: 0 | Status: SHIPPED | OUTPATIENT
Start: 2017-03-30 | End: 2017-06-28 | Stop reason: SDUPTHER

## 2017-04-21 ENCOUNTER — OFFICE VISIT (OUTPATIENT)
Dept: FAMILY MEDICINE CLINIC | Facility: CLINIC | Age: 67
End: 2017-04-21

## 2017-04-21 VITALS
WEIGHT: 187.4 LBS | OXYGEN SATURATION: 98 % | SYSTOLIC BLOOD PRESSURE: 120 MMHG | HEIGHT: 69 IN | HEART RATE: 91 BPM | DIASTOLIC BLOOD PRESSURE: 78 MMHG | BODY MASS INDEX: 27.76 KG/M2

## 2017-04-21 DIAGNOSIS — I10 HTN (HYPERTENSION), BENIGN: Primary | ICD-10-CM

## 2017-04-21 DIAGNOSIS — G89.29 CHRONIC PAIN OF BOTH KNEES: ICD-10-CM

## 2017-04-21 DIAGNOSIS — M25.561 CHRONIC PAIN OF BOTH KNEES: ICD-10-CM

## 2017-04-21 DIAGNOSIS — I26.92 ACUTE SADDLE PULMONARY EMBOLISM WITHOUT ACUTE COR PULMONALE (HCC): ICD-10-CM

## 2017-04-21 DIAGNOSIS — M25.562 CHRONIC PAIN OF BOTH KNEES: ICD-10-CM

## 2017-04-21 PROCEDURE — 73562 X-RAY EXAM OF KNEE 3: CPT | Performed by: INTERNAL MEDICINE

## 2017-04-21 PROCEDURE — 99213 OFFICE O/P EST LOW 20 MIN: CPT | Performed by: INTERNAL MEDICINE

## 2017-05-01 ENCOUNTER — OFFICE VISIT (OUTPATIENT)
Dept: FAMILY MEDICINE CLINIC | Facility: CLINIC | Age: 67
End: 2017-05-01

## 2017-05-01 VITALS
OXYGEN SATURATION: 97 % | WEIGHT: 187.1 LBS | BODY MASS INDEX: 27.71 KG/M2 | HEIGHT: 69 IN | SYSTOLIC BLOOD PRESSURE: 126 MMHG | HEART RATE: 93 BPM | DIASTOLIC BLOOD PRESSURE: 84 MMHG

## 2017-05-01 DIAGNOSIS — M17.11 PRIMARY OSTEOARTHRITIS OF RIGHT KNEE: Primary | ICD-10-CM

## 2017-05-01 PROCEDURE — 20610 DRAIN/INJ JOINT/BURSA W/O US: CPT | Performed by: INTERNAL MEDICINE

## 2017-05-01 RX ORDER — TRIAMCINOLONE ACETONIDE 40 MG/ML
80 INJECTION, SUSPENSION INTRA-ARTICULAR; INTRAMUSCULAR ONCE
Status: COMPLETED | OUTPATIENT
Start: 2017-05-01 | End: 2017-05-01

## 2017-05-01 RX ADMIN — TRIAMCINOLONE ACETONIDE 80 MG: 40 INJECTION, SUSPENSION INTRA-ARTICULAR; INTRAMUSCULAR at 14:59

## 2017-06-26 ENCOUNTER — OFFICE VISIT (OUTPATIENT)
Dept: CARDIOLOGY | Facility: CLINIC | Age: 67
End: 2017-06-26

## 2017-06-26 VITALS
HEIGHT: 69 IN | SYSTOLIC BLOOD PRESSURE: 138 MMHG | WEIGHT: 186 LBS | HEART RATE: 88 BPM | DIASTOLIC BLOOD PRESSURE: 100 MMHG | BODY MASS INDEX: 27.55 KG/M2

## 2017-06-26 DIAGNOSIS — I26.09 OTHER ACUTE PULMONARY EMBOLISM WITH ACUTE COR PULMONALE (HCC): Primary | ICD-10-CM

## 2017-06-26 DIAGNOSIS — I10 HTN (HYPERTENSION), BENIGN: ICD-10-CM

## 2017-06-26 PROCEDURE — 93000 ELECTROCARDIOGRAM COMPLETE: CPT | Performed by: INTERNAL MEDICINE

## 2017-06-26 PROCEDURE — 99214 OFFICE O/P EST MOD 30 MIN: CPT | Performed by: INTERNAL MEDICINE

## 2017-06-26 NOTE — PROGRESS NOTES
Juany WALSH Carlin  1950  Date of Office Visit: 12/15/2016  Encounter Provider: Real Ledesma MD  Place of Service: [facility] CARDIOLOGY      CHIEF COMPLAINT:  Intermediate-high risk pulmonary embolus, submassive  Acute cor pulmonale  Pulmonary hypertension    HISTORY OF PRESENT ILLNESS: Dr. Bravo,   I had the pleasure of seeing your patient back in followup.  She had presented November 2016 with shortness of breath, lightheadedness and syncope.  On arrival to the emergency department she was found to have saddle pulmonary emboli and right ventricular dilation.  Her echocardiogram also showed severe right ventricular dilation and dysfunction.  Her right ventricular systolic pressure was 45-55 mmHg. at that time she was documented to have     Hypoxia and tachycardia along with severe right ventricular dilation and dysfunction.  She underwent catheter-directed localized tPA infusion through an Ekos catheter.  She clinically improved.  She was also documented on imaging to have a right lower extremity deep venous thrombosis involving the distal femoral, popliteal, and below-the-knee veins.  The left side just showed venous incompetence without evidence of thrombus.  She was seen back in follow-up and, overall, was doing much better.  She had a repeat transthoracic echocardiogram performed in 01/2017 with evidence of only mild dilation of her right ventricular cavity with normal systolic function.  Her right ventricular systolic pressure was only 17 mmHg at that point in time.       Since our last visit she has been doing very well.  She denies any significant lower extremity edema.  No dyspnea on exertion.  She is tolerating her medical regimen well without any bleeding complications         Review of Systems   Constitution: Positive for malaise/fatigue. Negative for fever and weakness.   HENT: Negative for nosebleeds and sore throat.    Eyes: Negative for blurred vision  and double vision.   Cardiovascular: Negative for chest pain, claudication, dyspnea on exertion, leg swelling, palpitations and syncope.   Respiratory: Positive for snoring. Negative for cough and shortness of breath.    Endocrine: Negative for cold intolerance, heat intolerance and polydipsia.   Skin: Negative for itching, poor wound healing and rash.   Musculoskeletal: Positive for joint pain and joint swelling. Negative for muscle weakness and myalgias.   Gastrointestinal: Negative for abdominal pain, melena, nausea and vomiting.   Neurological: Negative for light-headedness, loss of balance, seizures and vertigo.   Psychiatric/Behavioral: Negative for altered mental status and depression. The patient is nervous/anxious.           Past Medical History:   Diagnosis Date   • Adrenal nodule    • Arthritis    • BPPV (benign paroxysmal positional vertigo) 5/10/2016   • Cervical stenosis of spine    • Chronic pain disorder    • DVT, lower extremity     right   • GERD without esophagitis 5/10/2016   • HTN (hypertension), benign 5/10/2016   • Neck pain    • Neuropathy    • PONV (postoperative nausea and vomiting)    • Pulmonary emboli     bilateral extensive PE with saddle emboli.   • Urge incontinence 5/10/2016   • Vitamin D deficiency 5/10/2016       The following portions of the patient's history were reviewed and updated as appropriate: Social history , Family history and Surgical history     Current Outpatient Prescriptions on File Prior to Visit   Medication Sig Dispense Refill   • apixaban (ELIQUIS) 5 MG tablet tablet Take 1 tablet by mouth Every 12 (Twelve) Hours. 180 tablet 1   • B Complex-Biotin-FA (B-100 COMPLEX PO) Take 1 tablet by mouth Daily.     • CALCIUM PO Take 600 mg by mouth Daily.     • chlorthalidone (HYGROTON) 25 MG tablet Take 1 tablet by mouth Daily. 90 tablet 3   • Cholecalciferol (VITAMIN D3) 2000 UNITS tablet Take 2,000 Units by mouth Daily.     • CRANBERRY PO Take 1 tablet by mouth Daily.    "  • famotidine (PEPCID) 10 MG tablet Take 20 mg by mouth Every Night.     • gabapentin (NEURONTIN) 400 MG capsule Take 400 mg by mouth 3 (three) times a day as needed.     • losartan (COZAAR) 100 MG tablet TAKE ONE TABLET BY MOUTH DAILY 90 tablet 0   • Multiple Vitamins-Minerals (MULTIVITAMIN PO) Take 1 tablet by mouth Daily.     • MYRBETRIQ 50 MG tablet sustained-release 24 hour Take 50 mg by mouth daily.       No current facility-administered medications on file prior to visit.        Allergies   Allergen Reactions   • Ciprocinonide [Fluocinolone]    • Contrast Dye    • Suprax [Cefixime] Diarrhea       Vitals:    06/26/17 1154   BP: 138/100   Pulse: 88   Weight: 186 lb (84.4 kg)   Height: 69\" (175.3 cm)     Physical Exam   Constitutional: She is oriented to person, place, and time. She appears well-developed and well-nourished.   HENT:   Head: Normocephalic and atraumatic.   Eyes: Conjunctivae and EOM are normal. No scleral icterus.   Neck: Normal range of motion. Neck supple. Normal carotid pulses, no hepatojugular reflux and no JVD present. Carotid bruit is not present. No tracheal deviation present. No thyromegaly present.   Cardiovascular: Normal rate and regular rhythm.  Exam reveals no gallop and no friction rub.    No murmur heard.  Pulses:       Carotid pulses are 2+ on the right side, and 2+ on the left side.       Radial pulses are 2+ on the right side, and 2+ on the left side.        Femoral pulses are 2+ on the right side, and 2+ on the left side.       Dorsalis pedis pulses are 2+ on the right side, and 2+ on the left side.        Posterior tibial pulses are 2+ on the right side, and 2+ on the left side.   Pulmonary/Chest: Breath sounds normal. No respiratory distress. She has no decreased breath sounds. She has no wheezes. She has no rhonchi. She has no rales. She exhibits no tenderness.   Abdominal: Soft. Bowel sounds are normal. She exhibits no distension. There is no tenderness. There is no " rebound.   Musculoskeletal: She exhibits edema (trace bilateral LE). She exhibits no deformity.   Neurological: She is alert and oriented to person, place, and time. She has normal strength. No sensory deficit.   Skin: No rash noted. No erythema.   Psychiatric: She has a normal mood and affect. Her behavior is normal.           No components found for: CBC  No results found for: CMP  No components found for: LIPID  No results found for: BMP      ECG 12 Lead  Date/Time: 6/26/2017 12:04 PM  Performed by: CARYL CHARLTON  Authorized by: CARYL CHARLTON   Comparison: compared with previous ECG from 12/15/2016  Similar to previous ECG  Rhythm: sinus rhythm  Rate: normal  Conduction: conduction normal  ST Segments: ST segments normal  T Waves: T waves normal  QRS axis: normal  Clinical impression: normal ECG             · 1/5/17  · TTE  · Left ventricular function is normal. Calculated EF = 58.8%.  · Left ventricular diastolic dysfunction is noted (grade I) consistent with impaired relaxation  · Normal systolic function noted. Right ventricular cavity is mildly dilated.  · Left atrial cavity size is mildly dilated.  · Mild aortic valve regurgitation is present  · Trace tricuspid valve regurgitation is present.  · The calculated RVSP is 17 mm Hg (normal).  · There is a trivial pericardial effusion.        DISCUSSION/SUMMARY67-year-old female with a medical history of intermediate-high risk pulmonary embolus, submassive, right lower extremity DVT, acute cor pulmonale, pulmonary hypertension and essential hypertension who presents back for followup.   Since our last visit she is documented now to only have mild right ventricular dilation with normal systolic function and no evidence of elevation of her right ventricular systolic pressure.     1. Pulmonary embolus, bilateral; with acute cor pulmonale.  Status post localized catheter directed thrombolysis  2. Essential hypertension.  Continue her low-dose  chlorthalidone.  At this point in time I would not recommend increases in that therapy.  I have recommended that she continue to monitor her blood pressure and if she notices multiple measurements in the 140s to 150 mmHg systolic range, she can give us a call or you a call.  3. Right ventricular dysfunction; Mild.  No additional evaluation indicated at this point in time    Follow-up as needed

## 2017-06-28 RX ORDER — LOSARTAN POTASSIUM 100 MG/1
TABLET ORAL
Qty: 90 TABLET | Refills: 0 | Status: SHIPPED | OUTPATIENT
Start: 2017-06-28 | End: 2017-09-21 | Stop reason: SDUPTHER

## 2017-07-12 ENCOUNTER — OFFICE VISIT (OUTPATIENT)
Dept: FAMILY MEDICINE CLINIC | Facility: CLINIC | Age: 67
End: 2017-07-12

## 2017-07-12 VITALS
TEMPERATURE: 98.6 F | HEIGHT: 69 IN | WEIGHT: 185.5 LBS | DIASTOLIC BLOOD PRESSURE: 70 MMHG | HEART RATE: 106 BPM | OXYGEN SATURATION: 95 % | SYSTOLIC BLOOD PRESSURE: 86 MMHG | BODY MASS INDEX: 27.47 KG/M2

## 2017-07-12 DIAGNOSIS — R07.9 CHEST PAIN, UNSPECIFIED TYPE: Primary | ICD-10-CM

## 2017-07-12 PROCEDURE — 93000 ELECTROCARDIOGRAM COMPLETE: CPT | Performed by: NURSE PRACTITIONER

## 2017-07-12 PROCEDURE — 99214 OFFICE O/P EST MOD 30 MIN: CPT | Performed by: NURSE PRACTITIONER

## 2017-07-12 NOTE — PROGRESS NOTES
Subjective   Juany Carlin is a 67 y.o. female who presents today for:    Chest Pain (That radiated around to back and lasted about 15 sec) and Ankle Problem (Bump on Lt ankle pt thinks is a ganglion cyst)    Chest Pain    This is a new problem. The current episode started yesterday. The onset quality is sudden (only lasted a few seconds). The problem has been resolved. The pain is present in the epigastric region and substernal region. The pain is at a severity of 6/10. The pain is moderate. The quality of the pain is described as tightness, pressure and squeezing. The pain radiates to the mid back. Pertinent negatives include no dizziness, fever, irregular heartbeat, malaise/fatigue, near-syncope, numbness, palpitations, shortness of breath, syncope or vomiting. Associated symptoms comments: Arms felt heavy and she could not use them. Risk factors include post-menopausal.   Her past medical history is significant for DVT (November 2016) and PE (November 2016).      I have reviewed the patient's medical history in detail and updated the computerized patient record.    Ms. Carlin  reports that she has quit smoking. Her smoking use included Cigarettes. She has a 5.00 pack-year smoking history. She has never used smokeless tobacco. She reports that she drinks about 1.2 oz of alcohol per week  She reports that she does not use illicit drugs.         Current Outpatient Prescriptions:   •  apixaban (ELIQUIS) 5 MG tablet tablet, Take 1 tablet by mouth Every 12 (Twelve) Hours., Disp: 180 tablet, Rfl: 1  •  B Complex-Biotin-FA (B-100 COMPLEX PO), Take 1 tablet by mouth Daily., Disp: , Rfl:   •  CALCIUM PO, Take 600 mg by mouth Daily., Disp: , Rfl:   •  chlorthalidone (HYGROTON) 25 MG tablet, Take 1 tablet by mouth Daily., Disp: 90 tablet, Rfl: 3  •  Cholecalciferol (VITAMIN D3) 2000 UNITS tablet, Take 2,000 Units by mouth Daily., Disp: , Rfl:   •  CRANBERRY PO, Take 1 tablet by mouth Daily., Disp: , Rfl:   •  famotidine  "(PEPCID) 10 MG tablet, Take 20 mg by mouth Every Night., Disp: , Rfl:   •  gabapentin (NEURONTIN) 400 MG capsule, Take 400 mg by mouth 3 (three) times a day as needed., Disp: , Rfl:   •  losartan (COZAAR) 100 MG tablet, TAKE ONE TABLET BY MOUTH DAILY, Disp: 90 tablet, Rfl: 0  •  Multiple Vitamins-Minerals (MULTIVITAMIN PO), Take 1 tablet by mouth Daily., Disp: , Rfl:   •  MYRBETRIQ 50 MG tablet sustained-release 24 hour, Take 50 mg by mouth daily., Disp: , Rfl:       The following portions of the patient's history were reviewed and updated as appropriate: allergies, current medications, past social history and problem list.    Review of Systems   Constitutional: Negative.  Negative for fever and malaise/fatigue.   Respiratory: Negative for shortness of breath.    Cardiovascular: Positive for chest pain. Negative for palpitations, syncope and near-syncope.   Gastrointestinal: Negative for vomiting.   Neurological: Negative for dizziness and numbness.   All other systems reviewed and are negative.        Objective   Vitals:    07/12/17 1445   BP: (!) 86/70   BP Location: Left arm   Patient Position: Sitting   Cuff Size: Adult   Pulse: 106   Temp: 98.6 °F (37 °C)   TempSrc: Oral   SpO2: 95%   Weight: 185 lb 8 oz (84.1 kg)   Height: 69\" (175.3 cm)     Physical Exam   Constitutional: She is oriented to person, place, and time. She appears well-developed and well-nourished.   Cardiovascular: Normal rate, regular rhythm, S1 normal, S2 normal, normal heart sounds and intact distal pulses.  Exam reveals no gallop and no friction rub.    No murmur heard.  Pulmonary/Chest: Effort normal and breath sounds normal. No respiratory distress. She has no wheezes. She has no rales.   Musculoskeletal: She exhibits no edema.   Neurological: She is alert and oriented to person, place, and time.   Skin: Skin is warm and dry.   Psychiatric:   tearful   Vitals reviewed.        Assessment/Plan   Juany was seen today for chest pain and ankle " problem.    Diagnoses and all orders for this visit:    Chest pain, unspecified type  -     ECG 12 Lead    1. I have reviewed her EKG that was done today and compared it to the EKG done on 6/26/17. There are no changes noted, both are showing normal sinus rhythm.   2. I have reassured Ms. Carlin that her heart is good and that her lungs are clear.  I have instructed her that if she should have a similar episode again she should go to the ER.   3. She had started taking a full tablet of Chlorthalidone 2 days ago because her blood pressure was elevated at the cardiologist office. Today her blood pressure was 86/70. I advised her to go back to taking the Chlorthalidone the way Dr. Bravo had instructed her. I also advised her to see Dr. Bravo sooner than her next scheduled appointment about her blood pressure. But she said she will wait until her September appointment.

## 2017-08-28 ENCOUNTER — TELEPHONE (OUTPATIENT)
Dept: CARDIOLOGY | Facility: CLINIC | Age: 67
End: 2017-08-28

## 2017-08-28 NOTE — TELEPHONE ENCOUNTER
Pt states she has been released from care back to PCP with FU if needed. she needs to know what she should do about her med refills. She is taking Eliquis and Chlorthalidone should these be filled by PCP...Silvana

## 2017-08-29 ENCOUNTER — TELEPHONE (OUTPATIENT)
Dept: ONCOLOGY | Facility: HOSPITAL | Age: 67
End: 2017-08-29

## 2017-08-29 RX ORDER — APIXABAN 5 MG/1
TABLET, FILM COATED ORAL
Qty: 180 TABLET | Refills: 0 | OUTPATIENT
Start: 2017-08-29

## 2017-08-29 NOTE — TELEPHONE ENCOUNTER
Needing rf of eliquis. Doesn't see MD anymore that prescribed it,  asked her to stay on it and she will make call when she can stop. Sent in RF.

## 2017-09-01 ENCOUNTER — OFFICE VISIT (OUTPATIENT)
Dept: FAMILY MEDICINE CLINIC | Facility: CLINIC | Age: 67
End: 2017-09-01

## 2017-09-01 VITALS
OXYGEN SATURATION: 97 % | BODY MASS INDEX: 28.1 KG/M2 | HEART RATE: 90 BPM | HEIGHT: 69 IN | DIASTOLIC BLOOD PRESSURE: 80 MMHG | WEIGHT: 189.7 LBS | SYSTOLIC BLOOD PRESSURE: 122 MMHG

## 2017-09-01 DIAGNOSIS — R25.2 NOCTURNAL FOOT CRAMPS: ICD-10-CM

## 2017-09-01 DIAGNOSIS — I10 HTN (HYPERTENSION), BENIGN: Primary | ICD-10-CM

## 2017-09-01 DIAGNOSIS — R20.2 PARESTHESIA OF BOTH FEET: ICD-10-CM

## 2017-09-01 PROCEDURE — 99213 OFFICE O/P EST LOW 20 MIN: CPT | Performed by: INTERNAL MEDICINE

## 2017-09-01 RX ORDER — MULTIVITAMIN WITH IRON
2 TABLET ORAL NIGHTLY
COMMUNITY

## 2017-09-01 NOTE — PROGRESS NOTES
Subjective   Juany Carlin is a 67 y.o. female who presents today for:    Hypertension (4 month f/u ) and Leg Problem (Pain & cramps in legs, feet falling asleep)    History of Present Illness   She has chronic, essential, benign, arterial HTN for which she has been taking losartan with good benefit; an ACE-I had caused a cough. Chlorthalidone was added at a cardiology follow-up visit in December, 2016. She then noticed orthostatic symptoms on a regular basis, so we cut the chlorthalidone back and she has had fewer episodes.  Because of elevations, she now takes 3/4 tablet of chlorthalidone.    She developed the insidious onset of bilateral leg cramps, always at night.  She tried magnesium starting a month ago and has noticed a significant decline in her symptoms.    She still has intermittent numbness and tingling sensations to go down her legs, with a sensation that her feet are falling asleep.  This occurs while she is at rest; it improves with ambulation.  She denies any symptoms in her upper extremities.    Ms. Carlin  reports that she has quit smoking. Her smoking use included Cigarettes. She has a 5.00 pack-year smoking history. She has never used smokeless tobacco. She reports that she drinks about 1.2 oz of alcohol per week  She reports that she does not use illicit drugs.     Allergies   Allergen Reactions   • Ciprocinonide [Fluocinolone]    • Contrast Dye    • Suprax [Cefixime] Diarrhea       Current Outpatient Prescriptions:   •  apixaban (ELIQUIS) 5 MG tablet tablet, Take 1 tablet by mouth Every 12 (Twelve) Hours., Disp: 180 tablet, Rfl: 0  •  B Complex-Biotin-FA (B-100 COMPLEX PO), Take 1 tablet by mouth Daily., Disp: , Rfl:   •  CALCIUM PO, Take 600 mg by mouth Daily., Disp: , Rfl:   •  chlorthalidone (HYGROTON) 25 MG tablet, Take 1 tablet by mouth Daily. (Patient taking differently: Take 25 mg by mouth Daily. Patient is taking 3/4 tablet daily), Disp: 90 tablet, Rfl: 3  •  Cholecalciferol (VITAMIN D3)  "2000 UNITS tablet, Take 2,000 Units by mouth Daily., Disp: , Rfl:   •  CRANBERRY PO, Take 1 tablet by mouth Daily., Disp: , Rfl:   •  famotidine (PEPCID) 10 MG tablet, Take 20 mg by mouth Every Night., Disp: , Rfl:   •  losartan (COZAAR) 100 MG tablet, TAKE ONE TABLET BY MOUTH DAILY, Disp: 90 tablet, Rfl: 0  •  Magnesium 250 MG tablet, Take 1 tablet by mouth Daily., Disp: , Rfl:   •  Multiple Vitamins-Minerals (MULTIVITAMIN PO), Take 1 tablet by mouth Daily., Disp: , Rfl:   •  MYRBETRIQ 50 MG tablet sustained-release 24 hour, Take 50 mg by mouth daily., Disp: , Rfl:       Review of Systems   Constitutional: Negative for diaphoresis.   Eyes: Negative for visual disturbance.   Respiratory: Negative for cough and chest tightness.    Cardiovascular: Negative for chest pain, palpitations and leg swelling.   Gastrointestinal: Negative for abdominal pain, constipation and diarrhea.   Musculoskeletal: Positive for arthralgias. Negative for myalgias.        Muscle cramps as above.   Neurological: Negative for dizziness, syncope, numbness and headaches.   Hematological: Bruises/bleeds easily.       Objective   Vitals:    09/01/17 1310   BP: 122/80   BP Location: Right arm   Patient Position: Sitting   Cuff Size: Adult   Pulse: 90   SpO2: 97%   Weight: 189 lb 11.2 oz (86 kg)   Height: 69\" (175.3 cm)     Physical Exam     Overweight female in no acute distress.  Alert and oriented ×4, and answers all questions appropriately.  Mood is relatively upbeat, but sad when she discusses her sister's recent death.  In this regard, her affect is appropriate.  Sclerae are anicteric and the conjunctivae are pink.  No periorbital edema.  No thyromegaly or mass.  No carotid bruit.  Regular rate and rhythm.  No murmur.  No abdominal bruit.  No pedal edema.  Popliteal pulses and dorsalis pedis pulses are full bilaterally.  Feet are warm, dry.  Moderate bilateral lower extremity varicosities are present.  Station, gait, and coordination are " normal.  There is no gross motor neurologic deficit appreciated.    Assessment/Plan   Juany was seen today for hypertension and leg problem.    Diagnoses and all orders for this visit:    HTN (hypertension), benign  -     Comprehensive Metabolic Panel    Nocturnal foot cramps  -     Comprehensive Metabolic Panel  -     Magnesium  -     TSH    Paresthesia of both feet  -     Magnesium  -     TSH    Blood pressure looks very good today, so we will make no medication changes.  We will have her get labs drawn at the time of her CBC labs in September.    We will include a magnesium level to assess the foot cramps.  It is okay for her to continue taking a magnesium supplement once nightly.    For the paresthesias in her feet, I recommend she increase her activity.  She recognizes this is needed for other issues as well, and we'll try to do so this fall.

## 2017-09-21 ENCOUNTER — LAB (OUTPATIENT)
Dept: LAB | Facility: HOSPITAL | Age: 67
End: 2017-09-21

## 2017-09-21 DIAGNOSIS — I26.92 ACUTE SADDLE PULMONARY EMBOLISM WITHOUT ACUTE COR PULMONALE (HCC): ICD-10-CM

## 2017-09-21 LAB
ANION GAP SERPL CALCULATED.3IONS-SCNC: 13 MMOL/L
BASOPHILS # BLD AUTO: 0.07 10*3/MM3 (ref 0–0.1)
BASOPHILS NFR BLD AUTO: 1.1 % (ref 0–1.1)
BUN BLD-MCNC: 12 MG/DL (ref 6–20)
BUN/CREAT SERPL: 18.8 (ref 7.3–30)
CALCIUM SPEC-SCNC: 9.3 MG/DL (ref 8.5–10.2)
CHLORIDE SERPL-SCNC: 96 MMOL/L (ref 98–107)
CO2 SERPL-SCNC: 27 MMOL/L (ref 22–29)
CREAT BLD-MCNC: 0.64 MG/DL (ref 0.6–1.1)
DEPRECATED RDW RBC AUTO: 41.1 FL (ref 37–49)
EOSINOPHIL # BLD AUTO: 0.14 10*3/MM3 (ref 0–0.36)
EOSINOPHIL NFR BLD AUTO: 2.1 % (ref 1–5)
ERYTHROCYTE [DISTWIDTH] IN BLOOD BY AUTOMATED COUNT: 12.5 % (ref 11.7–14.5)
GFR SERPL CREATININE-BSD FRML MDRD: 93 ML/MIN/1.73
GLUCOSE BLD-MCNC: 99 MG/DL (ref 74–124)
HCT VFR BLD AUTO: 39.4 % (ref 34–45)
HGB BLD-MCNC: 13.5 G/DL (ref 11.5–14.9)
IMM GRANULOCYTES # BLD: 0.02 10*3/MM3 (ref 0–0.03)
IMM GRANULOCYTES NFR BLD: 0.3 % (ref 0–0.5)
LYMPHOCYTES # BLD AUTO: 2.62 10*3/MM3 (ref 1–3.5)
LYMPHOCYTES NFR BLD AUTO: 39.8 % (ref 20–49)
MAGNESIUM SERPL-MCNC: 1.6 MG/DL (ref 1.8–2.5)
MCH RBC QN AUTO: 30.6 PG (ref 27–33)
MCHC RBC AUTO-ENTMCNC: 34.3 G/DL (ref 32–35)
MCV RBC AUTO: 89.3 FL (ref 83–97)
MONOCYTES # BLD AUTO: 0.62 10*3/MM3 (ref 0.25–0.8)
MONOCYTES NFR BLD AUTO: 9.4 % (ref 4–12)
NEUTROPHILS # BLD AUTO: 3.12 10*3/MM3 (ref 1.5–7)
NEUTROPHILS NFR BLD AUTO: 47.3 % (ref 39–75)
NRBC BLD MANUAL-RTO: 0 /100 WBC (ref 0–0)
PLATELET # BLD AUTO: 223 10*3/MM3 (ref 150–375)
PMV BLD AUTO: 9.9 FL (ref 8.9–12.1)
POTASSIUM BLD-SCNC: 4 MMOL/L (ref 3.5–4.7)
RBC # BLD AUTO: 4.41 10*6/MM3 (ref 3.9–5)
SODIUM BLD-SCNC: 136 MMOL/L (ref 134–145)
WBC NRBC COR # BLD: 6.59 10*3/MM3 (ref 4–10)

## 2017-09-21 PROCEDURE — 85025 COMPLETE CBC W/AUTO DIFF WBC: CPT | Performed by: INTERNAL MEDICINE

## 2017-09-21 PROCEDURE — 80048 BASIC METABOLIC PNL TOTAL CA: CPT | Performed by: INTERNAL MEDICINE

## 2017-09-21 PROCEDURE — 36415 COLL VENOUS BLD VENIPUNCTURE: CPT | Performed by: INTERNAL MEDICINE

## 2017-09-21 PROCEDURE — 83735 ASSAY OF MAGNESIUM: CPT | Performed by: INTERNAL MEDICINE

## 2017-09-21 PROCEDURE — 85300 ANTITHROMBIN III ACTIVITY: CPT | Performed by: INTERNAL MEDICINE

## 2017-09-21 RX ORDER — LOSARTAN POTASSIUM 100 MG/1
TABLET ORAL
Qty: 90 TABLET | Refills: 0 | Status: SHIPPED | OUTPATIENT
Start: 2017-09-21 | End: 2017-12-30 | Stop reason: SDUPTHER

## 2017-09-22 LAB
AT III PPP CHRO-ACNC: 96 % (ref 90–134)
TSH SERPL-ACNC: 1.33 UIU/ML (ref 0.45–4.5)

## 2017-09-24 LAB
B2 GLYCOPROT1 IGA SER-ACNC: <9 GPI IGA UNITS (ref 0–25)
B2 GLYCOPROT1 IGG SER-ACNC: <9 GPI IGG UNITS (ref 0–20)
B2 GLYCOPROT1 IGM SER-ACNC: <9 GPI IGM UNITS (ref 0–32)

## 2017-09-28 ENCOUNTER — APPOINTMENT (OUTPATIENT)
Dept: LAB | Facility: HOSPITAL | Age: 67
End: 2017-09-28

## 2017-09-28 ENCOUNTER — OFFICE VISIT (OUTPATIENT)
Dept: ONCOLOGY | Facility: CLINIC | Age: 67
End: 2017-09-28

## 2017-09-28 VITALS
HEART RATE: 88 BPM | TEMPERATURE: 97.9 F | SYSTOLIC BLOOD PRESSURE: 156 MMHG | DIASTOLIC BLOOD PRESSURE: 86 MMHG | OXYGEN SATURATION: 98 % | BODY MASS INDEX: 28.89 KG/M2 | HEIGHT: 68 IN | RESPIRATION RATE: 16 BRPM | WEIGHT: 190.6 LBS

## 2017-09-28 DIAGNOSIS — Z79.01 CHRONIC ANTICOAGULATION: ICD-10-CM

## 2017-09-28 DIAGNOSIS — I26.92 SADDLE EMBOLUS OF PULMONARY ARTERY WITHOUT ACUTE COR PULMONALE, UNSPECIFIED CHRONICITY (HCC): Primary | ICD-10-CM

## 2017-09-28 PROCEDURE — G0463 HOSPITAL OUTPT CLINIC VISIT: HCPCS | Performed by: INTERNAL MEDICINE

## 2017-09-28 PROCEDURE — 99214 OFFICE O/P EST MOD 30 MIN: CPT | Performed by: INTERNAL MEDICINE

## 2017-09-28 NOTE — PROGRESS NOTES
History:     Reason for follow up:   Acute saddle pulmonary embolism 11/2016, on Eliquis       HPI:  Juany Carlin presents for follow-up of Pulmonary embolism.  She has been on Eliquis and tolerating it well.  She had a repeat antithrombin III and beta-2 glycoprotein studies performed that were normal. She is feeling well today without any new complaints.  She was having some leg cramping but is been initiated on magnesium and increase her diet recently which is helping.  No shortness of breath or chest pain.  No leg swelling.    Past Medical   Past Medical History:   Diagnosis Date   • Adrenal nodule    • Arthritis    • BPPV (benign paroxysmal positional vertigo) 5/10/2016   • Cervical stenosis of spine    • Chronic pain disorder    • DVT, lower extremity     right   • GERD without esophagitis 5/10/2016   • HTN (hypertension), benign 5/10/2016   • Neck pain    • Neuropathy    • PONV (postoperative nausea and vomiting)    • Pulmonary emboli     bilateral extensive PE with saddle emboli.   • Urge incontinence 5/10/2016   • Vitamin D deficiency 5/10/2016    and   Patient Active Problem List   Diagnosis   • HTN (hypertension), benign   • BPPV (benign paroxysmal positional vertigo)   • Vitamin D deficiency   • Urge incontinence   • GERD without esophagitis   • Cervical spinal stenosis   • Saddle pulmonary embolus   • Family history of thrombosis   • Pulmonary embolism with acute cor pulmonale     Social History   Social History     Social History   • Marital status:      Spouse name: Yasir   • Number of children: N/A   • Years of education: College     Occupational History   •  Retired     [employer]     Social History Main Topics   • Smoking status: Former Smoker     Packs/day: 0.50     Years: 10.00     Types: Cigarettes   • Smokeless tobacco: Never Used   • Alcohol use 1.2 oz/week     2 Glasses of wine per week   • Drug use: No   • Sexual activity: Not on file     Other Topics Concern   • Not  "on file     Social History Narrative     Family History  Family History   Problem Relation Age of Onset   • Macular degeneration Mother    • Deep vein thrombosis Mother    • Heart failure Father    • Deep vein thrombosis Father    • Chiari malformation Sister    • Heart disease Maternal Grandfather    • Heart disease Paternal Grandfather    • Deep vein thrombosis Sister    • Heart failure Sister    • Pancreatic cancer Sister      Allergies  Allergies   Allergen Reactions   • Ciprocinonide [Fluocinolone]    • Contrast Dye    • Suprax [Cefixime] Diarrhea       Medications: The current medication list was reviewed in the EMR.    Review of Systems  GENERAL: No change in appetite or weight; No fevers, chills, sweats.    SKIN: No nonhealing lesions. No rashes.  HEME/LYMPH: No easy bruising, bleeding. No swollen nodes.   EYES: No vision changes or diplopia.   RESPIRATORY: No cough, shortness of breath, hemoptysis or wheezing.   MUSCULOSKELETAL: No bone pain.No joint stiffness. Cramping in legs  NEUROLOGICAL: No global weakness, loss of consciousness or seizures.   PSYCHIATRIC: No increased nervousness, mood changes or depression.     Objective    Objective:     Vitals:    09/28/17 0924   BP: 156/86   Pulse: 88   Resp: 16   Temp: 97.9 °F (36.6 °C)   TempSrc: Oral   SpO2: 98%   Weight: 190 lb 9.6 oz (86.5 kg)   Height: 68.11\" (173 cm)  Comment: new height without shoes   PainSc: 0-No pain     Current Status 9/28/2017   ECOG score 0     GENERAL:  Well-developed, well-nourished female in no acute distress.   SKIN:  Warm, dry without rashes, purpura or petechiae.  HEAD:  Normocephalic.  EYES:  EOMs intact.  Conjunctivae normal.  EARS:  Hearing intact.  CHEST:  Lungs clear to percussion and auscultation. Good airflow.  CARDIAC:  Regular rate and rhythm without murmurs, rubs or gallops. Normal S1,S2.  ABDOMEN:  Soft, nontender, normal bowel sounds  EXTREMITIES:  No clubbing, cyanosis or edema.  NEUROLOGICAL: No focal neurological " deficits.  PSYCHIATRIC:  Normal affect and mood.         Labs and Imaging  Results for orders placed or performed in visit on 09/21/17   Basic Metabolic Panel   Result Value Ref Range    Glucose 99 74 - 124 mg/dL    BUN 12 6 - 20 mg/dL    Creatinine 0.64 0.60 - 1.10 mg/dL    Sodium 136 134 - 145 mmol/L    Potassium 4.0 3.5 - 4.7 mmol/L    Chloride 96 (L) 98 - 107 mmol/L    CO2 27.0 22.0 - 29.0 mmol/L    Calcium 9.3 8.5 - 10.2 mg/dL    eGFR Non African Amer 93 >60 mL/min/1.73    BUN/Creatinine Ratio 18.8 7.3 - 30.0    Anion Gap 13.0 mmol/L   Beta-2 Glycoprotein Antibodies   Result Value Ref Range    Beta-2 Glyco 1 IgG <9 0 - 20 GPI IgG units    Beta-2 Glyco 1 IgA <9 0 - 25 GPI IgA units    Beta-2 Glyco 1 IgM <9 0 - 32 GPI IgM units   Antithrombin III   Result Value Ref Range    Antithrombin Activity 96 90 - 134 %   CBC Auto Differential   Result Value Ref Range    WBC 6.59 4.00 - 10.00 10*3/mm3    RBC 4.41 3.90 - 5.00 10*6/mm3    Hemoglobin 13.5 11.5 - 14.9 g/dL    Hematocrit 39.4 34.0 - 45.0 %    MCV 89.3 83.0 - 97.0 fL    MCH 30.6 27.0 - 33.0 pg    MCHC 34.3 32.0 - 35.0 g/dL    RDW 12.5 11.7 - 14.5 %    RDW-SD 41.1 37.0 - 49.0 fl    MPV 9.9 8.9 - 12.1 fL    Platelets 223 150 - 375 10*3/mm3    Neutrophil % 47.3 39.0 - 75.0 %    Lymphocyte % 39.8 20.0 - 49.0 %    Monocyte % 9.4 4.0 - 12.0 %    Eosinophil % 2.1 1.0 - 5.0 %    Basophil % 1.1 0.0 - 1.1 %    Immature Grans % 0.3 0.0 - 0.5 %    Neutrophils, Absolute 3.12 1.50 - 7.00 10*3/mm3    Lymphocytes, Absolute 2.62 1.00 - 3.50 10*3/mm3    Monocytes, Absolute 0.62 0.25 - 0.80 10*3/mm3    Eosinophils, Absolute 0.14 0.00 - 0.36 10*3/mm3    Basophils, Absolute 0.07 0.00 - 0.10 10*3/mm3    Immature Grans, Absolute 0.02 0.00 - 0.03 10*3/mm3    nRBC 0.0 0.0 - 0.0 /100 WBC   Magnesium   Result Value Ref Range    Magnesium 1.6 (L) 1.8 - 2.5 mg/dL         Assessment/Plan   Assessment/Plan:   1. Acute saddle pulmonary embolism without cor pulmonale with right LE DVT diagnosed  11/10/2016   * Not real clear if there is a truly specific inciting event. She had some immobility, but I'm not convinced that it was enough to contribute to thrombosis. Her family history is very concerning with both parents with thrombosis history and a sister that potentially  from PE. Her hypercoagulable eval is negative. Repeat AT III normal.    * I have recommended lifelong AC because of the possibility that this was spontaneous, life threatening and she has very strong family history.    * Will change anticoagulation to Eliquis 2.5 mg bid.  We reviewed the risk of anticoagulation including significant bleeding.  She understands this is not reversible as well.      Follow-up in 12 months. I asked the patient to call for any questions, concerns, or new symptoms.

## 2017-11-06 ENCOUNTER — APPOINTMENT (OUTPATIENT)
Dept: WOMENS IMAGING | Facility: HOSPITAL | Age: 67
End: 2017-11-06

## 2017-11-06 PROCEDURE — G0202 SCR MAMMO BI INCL CAD: HCPCS | Performed by: RADIOLOGY

## 2018-01-02 RX ORDER — LOSARTAN POTASSIUM 100 MG/1
TABLET ORAL
Qty: 90 TABLET | Refills: 0 | Status: SHIPPED | OUTPATIENT
Start: 2018-01-02 | End: 2018-03-20 | Stop reason: SDUPTHER

## 2018-01-03 ENCOUNTER — OFFICE VISIT (OUTPATIENT)
Dept: FAMILY MEDICINE CLINIC | Facility: CLINIC | Age: 68
End: 2018-01-03

## 2018-01-03 VITALS
DIASTOLIC BLOOD PRESSURE: 68 MMHG | HEART RATE: 80 BPM | OXYGEN SATURATION: 97 % | HEIGHT: 68 IN | WEIGHT: 191.6 LBS | BODY MASS INDEX: 29.04 KG/M2 | TEMPERATURE: 98.4 F | SYSTOLIC BLOOD PRESSURE: 116 MMHG

## 2018-01-03 DIAGNOSIS — Z23 NEED FOR VACCINATION FOR PNEUMOCOCCUS: Primary | ICD-10-CM

## 2018-01-03 DIAGNOSIS — Z00.00 MEDICARE ANNUAL WELLNESS VISIT, SUBSEQUENT: ICD-10-CM

## 2018-01-03 PROCEDURE — 90732 PPSV23 VACC 2 YRS+ SUBQ/IM: CPT | Performed by: NURSE PRACTITIONER

## 2018-01-03 PROCEDURE — 96160 PT-FOCUSED HLTH RISK ASSMT: CPT | Performed by: NURSE PRACTITIONER

## 2018-01-03 PROCEDURE — G0009 ADMIN PNEUMOCOCCAL VACCINE: HCPCS | Performed by: NURSE PRACTITIONER

## 2018-01-03 PROCEDURE — G0439 PPPS, SUBSEQ VISIT: HCPCS | Performed by: NURSE PRACTITIONER

## 2018-01-03 NOTE — PATIENT INSTRUCTIONS
Medicare Wellness  Personal Prevention Plan of Service     Date of Office Visit:  2018  Encounter Provider:  ALEX Bragg  Place of Service:  [facility] INTERNAL MEDICINE  Patient Name: Juany Carlin  :  1950    As part of the Medicare Wellness portion of your visit today, we are providing you with this personalized preventive plan of services (PPPS). This plan is based upon recommendations of the [place] Preventive Services Task Force (USPSTF) and the Advisory Committee on Immunization Practices (ACIP).    This lists the preventive care services that should be considered, and provides dates of when you are due. Items listed as completed are up-to-date and do not require any further intervention.    Health Maintenance   Topic Date Due   • TDAP/TD VACCINES (1 - Tdap) 2008   • HEPATITIS C SCREENING  05/10/2016   • PNEUMOCOCCAL VACCINES (65+ LOW/MEDIUM RISK) (2 of 2 - PPSV23) 10/29/2016   • MEDICARE ANNUAL WELLNESS  2017   • MAMMOGRAM  2019   • COLONOSCOPY  2023   • INFLUENZA VACCINE  Completed   • ZOSTER VACCINE  Completed       Orders Placed This Encounter   Procedures   • Pneumococcal Polysaccharide Vaccine 23-Valent Greater Than or Equal To 3yo Subcutaneous / IM       No Follow-up on file.

## 2018-01-03 NOTE — PROGRESS NOTES
QUICK REFERENCE INFORMATION:  The ABCs of the Annual Wellness Visit    Subsequent Medicare Wellness Visit    HEALTH RISK ASSESSMENT    1950    Recent Hospitalizations:  No hospitalization(s) within the last year..        Current Medical Providers:  Patient Care Team:  Garo Bravo MD as PCP - General (Internal Medicine)  Andrea Farmer MD as Consulting Physician (Orthopedic Surgery)  Anitha Daugherty MD as Consulting Physician (Obstetrics and Gynecology)  Gera Rausch MD as Consulting Physician (Ophthalmology)  Joe Aaron MD as Surgeon (Vascular Surgery)  Ansley Rios MD as Consulting Physician (Otolaryngology)  Dada Lewis MD as Surgeon (General Surgery)  Vickie Bernstein MD as Consulting Physician (Hematology and Oncology)  Real Ledesma MD as Referring Physician (Cardiology)        Smoking Status:  History   Smoking Status   • Former Smoker   • Packs/day: 0.50   • Years: 10.00   • Types: Cigarettes   Smokeless Tobacco   • Never Used       Alcohol Consumption:  History   Alcohol Use   • 1.2 oz/week   • 2 Glasses of wine per week       Depression Screen:   PHQ-2/PHQ-9 Depression Screening 1/3/2018   Little interest or pleasure in doing things 0   Feeling down, depressed, or hopeless 0   Total Score 0       Health Habits and Functional and Cognitive Screening:  Functional & Cognitive Status 1/3/2018   Do you have difficulty preparing food and eating? No   Do you have difficulty bathing yourself, getting dressed or grooming yourself? No   Do you have difficulty using the toilet? No   Do you have difficulty moving around from place to place? No   Do you have trouble with steps or getting out of a bed or a chair? No   In the past year have you fallen or experienced a near fall? No   Current Diet Well Balanced Diet   Dental Exam Up to date   Eye Exam Up to date   Exercise (times per week) 5 times per week   Current Exercise Activities Include Housecleaning   Do you  need help using the phone?  No   Are you deaf or do you have serious difficulty hearing?  No   Do you need help with transportation? No   Do you need help shopping? No   Do you need help preparing meals?  No   Do you need help with housework?  No   Do you need help with laundry? No   Do you need help taking your medications? No   Do you need help managing money? No   Have you felt unusual stress, anger or loneliness in the last month? No   Who do you live with? Spouse   If you need help, do you have trouble finding someone available to you? No   Have you been bothered in the last four weeks by sexual problems? No   Do you have difficulty concentrating, remembering or making decisions? No           Does the patient have evidence of cognitive impairment? No    Aspirin use counseling: Contraindicated from taking ASA      Recent Lab Results:  CMP:  Lab Results   Component Value Date    GLU 90 10/25/2016    BUN 12 09/21/2017    CREATININE 0.64 09/21/2017    EGFRIFNONA 93 09/21/2017    EGFRIFAFRI 86 10/25/2016    BCR 18.8 09/21/2017     09/21/2017    K 4.0 09/21/2017    CO2 27.0 09/21/2017    CALCIUM 9.3 09/21/2017    PROTENTOTREF 6.5 10/25/2016    ALBUMIN 4.50 11/09/2016    LABGLOBREF 2.1 10/25/2016    LABIL2 1.7 11/09/2016    BILITOT 0.4 11/09/2016    ALKPHOS 79 11/09/2016    AST 26 11/09/2016    ALT 22 11/09/2016     Lipid Panel:  Lab Results   Component Value Date    TRIG 72 10/25/2016    HDL 76 10/25/2016    VLDL 14 10/25/2016     HbA1c:       Visual Acuity:  No exam data present    Age-appropriate Screening Schedule:  Refer to the list below for future screening recommendations based on patient's age, sex and/or medical conditions. Orders for these recommended tests are listed in the plan section. The patient has been provided with a written plan.    Health Maintenance   Topic Date Due   • TDAP/TD VACCINES (1 - Tdap) 01/02/2008   • MAMMOGRAM  05/10/2016   • PNEUMOCOCCAL VACCINES (65+ LOW/MEDIUM RISK) (2 of 2 -  PPSV23) 10/29/2016   • INFLUENZA VACCINE  08/01/2017   • COLONOSCOPY  11/07/2023   • ZOSTER VACCINE  Completed        Subjective   History of Present Illness    Juany Carlin is a 67 y.o. female who presents for an Subsequent Wellness Visit.    The following portions of the patient's history were reviewed and updated as appropriate: allergies, current medications, past family history, past medical history, past social history, past surgical history and problem list.    Outpatient Medications Prior to Visit   Medication Sig Dispense Refill   • apixaban (ELIQUIS) 2.5 MG tablet tablet Take 1 tablet by mouth 2 (Two) Times a Day. 60 tablet 11   • B Complex-Biotin-FA (B-100 COMPLEX PO) Take 1 tablet by mouth Daily.     • CALCIUM PO Take 600 mg by mouth Daily.     • chlorthalidone (HYGROTON) 25 MG tablet Take 1 tablet by mouth Daily. (Patient taking differently: Take 12.5 mg by mouth Daily. Patient is taking 3/4 tablet daily) 90 tablet 3   • Cholecalciferol (VITAMIN D3) 2000 UNITS tablet Take 2,000 Units by mouth Daily.     • CRANBERRY PO Take 1 tablet by mouth Daily.     • famotidine (PEPCID) 10 MG tablet Take 20 mg by mouth Every Night.     • gabapentin (NEURONTIN) 400 MG capsule Take 400 mg by mouth 3 (three) times a day as needed.     • losartan (COZAAR) 100 MG tablet TAKE ONE TABLET BY MOUTH DAILY 90 tablet 0   • Magnesium 250 MG tablet Take 2 tablets by mouth Daily.     • Multiple Vitamins-Minerals (MULTIVITAMIN PO) Take 1 tablet by mouth Daily.     • MYRBETRIQ 50 MG tablet sustained-release 24 hour Take 50 mg by mouth daily.       No facility-administered medications prior to visit.        Patient Active Problem List   Diagnosis   • HTN (hypertension), benign   • BPPV (benign paroxysmal positional vertigo)   • Vitamin D deficiency   • Urge incontinence   • GERD without esophagitis   • Cervical spinal stenosis   • Saddle pulmonary embolus   • Family history of thrombosis   • Pulmonary embolism with acute cor  "pulmonale       Advance Care Planning:  has NO advance directive - not interested in additional information    Identification of Risk Factors:  Risk factors include: weight , unhealthy diet and cardiovascular risk.    Review of Systems   Constitutional: Negative.    HENT: Negative.    Eyes: Negative.    Respiratory: Negative.    Cardiovascular: Negative.    Gastrointestinal: Negative.    Endocrine: Negative.    Genitourinary: Negative.    Musculoskeletal: Negative.    Skin: Negative.    Neurological: Negative.    Psychiatric/Behavioral: Negative.        Compared to one year ago, the patient feels her physical health is the same.  Compared to one year ago, the patient feels her mental health is the same.    Objective     Physical Exam   Constitutional: She is oriented to person, place, and time. She appears well-developed and well-nourished.   Neurological: She is alert and oriented to person, place, and time.   Skin: Skin is warm and dry.   Psychiatric:   No acute distress   Vitals reviewed.      Vitals:    01/03/18 1306   BP: 116/68   BP Location: Left arm   Patient Position: Sitting   Cuff Size: Adult   Pulse: 80   Temp: 98.4 °F (36.9 °C)   TempSrc: Oral   SpO2: 97%   Weight: 86.9 kg (191 lb 9.6 oz)   Height: 173 cm (68.11\")   PainSc: 0-No pain       Body mass index is 29.04 kg/(m^2).  Discussed the patient's BMI with her. BMI is above normal parameters. Follow-up plan includes:  exercise counseling.    Assessment/Plan   Patient Self-Management and Personalized Health Advice  The patient has been provided with information about: diet, exercise, weight management and prevention of cardiac or vascular disease and preventive services including:   · Advance directive, Counseling for cardiovascular disease risk reduction, Exercise counseling provided.    Visit Diagnoses:  No diagnosis found.    No orders of the defined types were placed in this encounter.      Outpatient Encounter Prescriptions as of 1/3/2018 "   Medication Sig Dispense Refill   • apixaban (ELIQUIS) 2.5 MG tablet tablet Take 1 tablet by mouth 2 (Two) Times a Day. 60 tablet 11   • B Complex-Biotin-FA (B-100 COMPLEX PO) Take 1 tablet by mouth Daily.     • CALCIUM PO Take 600 mg by mouth Daily.     • chlorthalidone (HYGROTON) 25 MG tablet Take 1 tablet by mouth Daily. (Patient taking differently: Take 12.5 mg by mouth Daily. Patient is taking 3/4 tablet daily) 90 tablet 3   • Cholecalciferol (VITAMIN D3) 2000 UNITS tablet Take 2,000 Units by mouth Daily.     • CRANBERRY PO Take 1 tablet by mouth Daily.     • famotidine (PEPCID) 10 MG tablet Take 20 mg by mouth Every Night.     • gabapentin (NEURONTIN) 400 MG capsule Take 400 mg by mouth 3 (three) times a day as needed.     • losartan (COZAAR) 100 MG tablet TAKE ONE TABLET BY MOUTH DAILY 90 tablet 0   • Magnesium 250 MG tablet Take 2 tablets by mouth Daily.     • Multiple Vitamins-Minerals (MULTIVITAMIN PO) Take 1 tablet by mouth Daily.     • MYRBETRIQ 50 MG tablet sustained-release 24 hour Take 50 mg by mouth daily.       No facility-administered encounter medications on file as of 1/3/2018.        Reviewed use of high risk medication in the elderly: yes  Reviewed for potential of harmful drug interactions in the elderly: yes    Follow Up:  No Follow-up on file.     An After Visit Summary and PPPS with all of these plans were given to the patient.

## 2018-01-04 RX ORDER — CHLORTHALIDONE 25 MG/1
25 TABLET ORAL DAILY
Qty: 90 TABLET | Refills: 0 | Status: SHIPPED | OUTPATIENT
Start: 2018-01-04 | End: 2018-10-22 | Stop reason: SDUPTHER

## 2018-01-22 ENCOUNTER — TELEPHONE (OUTPATIENT)
Dept: ONCOLOGY | Facility: HOSPITAL | Age: 68
End: 2018-01-22

## 2018-01-22 NOTE — TELEPHONE ENCOUNTER
Pt calling to see if she can split her Eliquis in half. She has an abundance of 5mg tablets and she would like to be able to split them in half. Called and s/w pt's pharmacist and they said there shouldn't be a problem with splitting them in half. Pt does have a pill splitter. Informed pt that she could split her tablets in half. She v/u.

## 2018-03-11 ENCOUNTER — APPOINTMENT (OUTPATIENT)
Dept: CT IMAGING | Facility: HOSPITAL | Age: 68
End: 2018-03-11

## 2018-03-11 ENCOUNTER — HOSPITAL ENCOUNTER (EMERGENCY)
Facility: HOSPITAL | Age: 68
Discharge: HOME | End: 2018-03-11
Attending: EMERGENCY MEDICINE | Admitting: EMERGENCY MEDICINE

## 2018-03-11 VITALS
OXYGEN SATURATION: 98 % | SYSTOLIC BLOOD PRESSURE: 147 MMHG | BODY MASS INDEX: 27.99 KG/M2 | DIASTOLIC BLOOD PRESSURE: 87 MMHG | WEIGHT: 189 LBS | HEIGHT: 69 IN | TEMPERATURE: 98.3 F | HEART RATE: 121 BPM | RESPIRATION RATE: 14 BRPM

## 2018-03-11 DIAGNOSIS — S09.90XA CLOSED HEAD INJURY, INITIAL ENCOUNTER: Primary | ICD-10-CM

## 2018-03-11 DIAGNOSIS — S01.01XA LACERATION OF SCALP, INITIAL ENCOUNTER: ICD-10-CM

## 2018-03-11 PROCEDURE — 70450 CT HEAD/BRAIN W/O DYE: CPT

## 2018-03-11 PROCEDURE — 99283 EMERGENCY DEPT VISIT LOW MDM: CPT

## 2018-03-11 RX ORDER — LIDOCAINE HYDROCHLORIDE 10 MG/ML
10 INJECTION, SOLUTION INFILTRATION; PERINEURAL ONCE
Status: COMPLETED | OUTPATIENT
Start: 2018-03-11 | End: 2018-03-11

## 2018-03-11 RX ADMIN — LIDOCAINE HYDROCHLORIDE 10 ML: 10 INJECTION, SOLUTION INFILTRATION; PERINEURAL at 19:35

## 2018-03-11 NOTE — ED PROVIDER NOTES
FAST TRACK EMERGENCY DEPARTMENT ENCOUNTER    CHIEF COMPLAINT  Chief Complaint: Head injury  History given by: Pt  History limited by: Nothing  CDU Room Number: 48/48  PMD: Garo Bravo MD      HPI:  Pt is a 68 y.o. female who presents complaining of head injury secondary to a slip and fall and running into a screen door. She also complains of headache, L elbow pain, and dizziness but denies LOC, or any other symptoms at this time.    Onset : sudden  Duration: PTA  Severity: moderate  Associated symptoms: headache and dizziness  Previous treatment: none    PAST MEDICAL HISTORY  Active Ambulatory Problems     Diagnosis Date Noted   • HTN (hypertension), benign 05/10/2016   • BPPV (benign paroxysmal positional vertigo) 05/10/2016   • Vitamin D deficiency 05/10/2016   • Urge incontinence 05/10/2016   • GERD without esophagitis 05/10/2016   • Cervical spinal stenosis 05/10/2016   • Saddle pulmonary embolus 11/09/2016   • Family history of thrombosis 01/12/2017   • Pulmonary embolism with acute cor pulmonale 06/26/2017     Resolved Ambulatory Problems     Diagnosis Date Noted   • No Resolved Ambulatory Problems     Past Medical History:   Diagnosis Date   • Adrenal nodule    • Arthritis    • BPPV (benign paroxysmal positional vertigo) 5/10/2016   • Cervical stenosis of spine    • Chronic pain disorder    • DVT, lower extremity    • GERD without esophagitis 5/10/2016   • HTN (hypertension), benign 5/10/2016   • Neck pain    • Neuropathy    • PONV (postoperative nausea and vomiting)    • Pulmonary emboli    • Urge incontinence 5/10/2016   • Vitamin D deficiency 5/10/2016       PAST SURGICAL HISTORY  Past Surgical History:   Procedure Laterality Date   • CARDIAC CATHETERIZATION N/A 11/10/2016    Procedure: Right Heart Cath;  Surgeon: Johnna Agrawal MD;  Location: [facility] INVASIVE LOCATION;  Service:    • CARDIAC CATHETERIZATION N/A 11/10/2016    Procedure: Thrombolytic Therapy;  Surgeon: Johnna Agrawal MD;   Location:  [facility] CATH INVASIVE LOCATION;  Service:    • CATARACT EXTRACTION Bilateral    • COLONOSCOPY W/ BIOPSIES  11/2013    hyperplastic polyp   • DILATATION AND CURETTAGE     • EPIDURAL BLOCK     • HEMORROIDECTOMY  2013   • HYSTERECTOMY  2001   • HYSTERECTOMY     • INTERVENTIONAL RADIOLOGY PROCEDURE Bilateral 11/10/2016    Procedure: Sp Smiley Cath Place Pulmonary Art;  Surgeon: Johnna Agrawal MD;  Location:  [facility] CATH INVASIVE LOCATION;  Service:    • VAGINAL DELIVERY  1985 and 1990   • VEIN SURGERY  [id number]    leg   • VENTRAL HERNIA REPAIR  1987       FAMILY HISTORY  Family History   Problem Relation Age of Onset   • Macular degeneration Mother    • Deep vein thrombosis Mother    • Heart failure Father    • Deep vein thrombosis Father    • Chiari malformation Sister    • Heart disease Maternal Grandfather    • Heart disease Paternal Grandfather    • Deep vein thrombosis Sister    • Heart failure Sister    • Pancreatic cancer Sister        SOCIAL HISTORY  Social History     Social History   • Marital status:      Spouse name: Yasir   • Number of children: N/A   • Years of education: College     Occupational History   •  Retired     [employer]     Social History Main Topics   • Smoking status: Former Smoker     Packs/day: 0.50     Years: 10.00     Types: Cigarettes   • Smokeless tobacco: Never Used   • Alcohol use 1.2 oz/week     2 Glasses of wine per week   • Drug use: No   • Sexual activity: Not on file     Other Topics Concern   • Not on file     Social History Narrative   • No narrative on file       ALLERGIES  Ciprocinonide [fluocinolone]; Contrast dye; and Suprax [cefixime]    REVIEW OF SYSTEMS  Review of Systems   Constitutional: Negative for fever.   Respiratory: Negative for shortness of breath.    Cardiovascular: Negative for chest pain.   Musculoskeletal: Positive for arthralgias (L elbow).   Skin: Positive for wound (head injury).   Neurological: Positive for dizziness and  headaches.       PHYSICAL EXAM  ED Triage Vitals [03/11/18 1849]   Temp Heart Rate Resp BP SpO2   98.3 °F (36.8 °C) (!) 121 14 147/87 98 %      Temp src Heart Rate Source Patient Position BP Location FiO2 (%)   Tympanic Monitor -- -- --       Physical Exam   Constitutional: She is oriented to person, place, and time and well-developed, well-nourished, and in no distress.   Eyes: EOM are normal.   Neck: Normal range of motion.   Cardiovascular: Normal rate and regular rhythm.    Pulmonary/Chest: Effort normal and breath sounds normal. No respiratory distress.   Neurological: She is alert and oriented to person, place, and time. She has normal sensation and normal strength. GCS score is 15.   Skin:   Pt has a 4cm L parietal scalp laceration. She has a scalp hematoma to the L side of her forehead at the hairline. There is soft tissue swelling to the L olecranon.   Nursing note and vitals reviewed.      RADIOLOGY  CT Head Without Contrast   Final Result   Left frontal scalp hematoma without evidence for an   underlying bony abnormality of the skull. There is no evidence for an   intracranial abnormality.       Radiation dose reduction techniques were utilized, including automated   exposure control and exposure modulation based on body size.       This report was finalized on 3/11/2018 7:54 PM by Dr. Satya Alcantar MD.               I ordered the above noted radiological studies. Interpreted by radiologist. Reviewed by me in PACS.       PROCEDURES  Laceration Repair  Date/Time: 3/11/2018 7:58 PM  Performed by: IBRAHIMA BARON  Authorized by: IBRAHIMA BARON     Consent:     Consent obtained:  Verbal    Consent given by:  Patient  Anesthesia (see MAR for exact dosages):     Anesthesia method:  Local infiltration    Local anesthetic:  Lidocaine 1% w/o epi (4ml)  Laceration details:     Location:  Scalp    Scalp location:  L parietal    Length (cm):  4  Pre-procedure details:     Preparation:  Patient was prepped and  draped in usual sterile fashion  Skin repair:     Repair method:  Staples    Number of staples:  5  Approximation:     Approximation:  Close    Vermilion border: well-aligned    Post-procedure details:     Patient tolerance of procedure:  Tolerated well, no immediate complications            PROGRESS AND CONSULTS  ED Course     1848 Ordered CT Head for further evaluation. CT was performed due to trauma to the head, pt on eliquis and has dizziness.    1945 Informed pt of negative imaging results. Will discharge with suggested PCP follow up. Suggested pt keep the effected area clean, warm, and dry. Pt understands and agrees with treatment. All questions and concerns were addressed at this time.    MEDICAL DECISION MAKING  Results were reviewed/discussed with the patient and they were also made aware of online access. Pt also made aware that some labs, such as cultures, will not be resulted during ER visit and follow up with PMD is necessary.     MDM  Number of Diagnoses or Management Options  Closed head injury, initial encounter:   Laceration of scalp, initial encounter:      Amount and/or Complexity of Data Reviewed  Tests in the radiology section of CPT®: reviewed and ordered (CT Head shows NAD.)  Decide to obtain previous medical records or to obtain history from someone other than the patient: yes  Review and summarize past medical records: yes  Independent visualization of images, tracings, or specimens: yes    Patient Progress  Patient progress: stable         DIAGNOSIS  Final diagnoses:   Closed head injury, initial encounter   Laceration of scalp, initial encounter       DISPOSITION  DISCHARGE    Patient discharged in stable condition.    Reviewed implications of results, diagnosis, meds, responsibility to follow up, warning signs and symptoms of possible worsening, potential complications and reasons to return to ER, including new or worsening symptoms.    Patient/Family voiced understanding of above  instructions.    Discussed plan for discharge, as there is no emergent indication for admission. Patient referred to primary care provider for BP management due to today's BP. Pt/family is agreeable and understands need for follow up and repeat testing.  Pt is aware that discharge does not mean that nothing is wrong but it indicates no emergency is present that requires admission and they must continue care with follow-up as given below or physician of their choice.     FOLLOW-UP  Garo Bravo MD  [address]  [address] [place], [place] [id number] [phone]    Call            Medication List      Changed    chlorthalidone 25 MG tablet  Commonly known as:  HYGROTON  Take 1 tablet by mouth Daily.  What changed:  how much to take            Latest Documented Vital Signs:  As of 8:06 PM  BP- 147/87 HR- (!) 121 Temp- 98.3 °F (36.8 °C) (Tympanic) O2 sat- 98%    --  Documentation assistance provided by reji Max for Dr. Beyer.  Information recorded by the scribe was done at my direction and has been verified and validated by me.     Susi Max  03/11/18 2006       Theodore Beyer MD  03/11/18 6921

## 2018-03-11 NOTE — ED TRIAGE NOTES
1.5 cm laceration to scalp - appears to havebeen caused by the barette that was in her hair - tripped & fell into a door frame

## 2018-03-11 NOTE — ED NOTES
Pt fell and hit head while jogging. On eliquis. No LOC. Pt with towel on head at this time.     Flora Byrne RN  03/11/18 5907

## 2018-03-14 ENCOUNTER — HOSPITAL ENCOUNTER (EMERGENCY)
Facility: HOSPITAL | Age: 68
Discharge: HOME | End: 2018-03-14
Attending: EMERGENCY MEDICINE | Admitting: EMERGENCY MEDICINE

## 2018-03-14 ENCOUNTER — TELEPHONE (OUTPATIENT)
Dept: FAMILY MEDICINE CLINIC | Facility: CLINIC | Age: 68
End: 2018-03-14

## 2018-03-14 VITALS
DIASTOLIC BLOOD PRESSURE: 96 MMHG | BODY MASS INDEX: 27.99 KG/M2 | SYSTOLIC BLOOD PRESSURE: 134 MMHG | HEIGHT: 69 IN | HEART RATE: 69 BPM | RESPIRATION RATE: 16 BRPM | WEIGHT: 189 LBS | TEMPERATURE: 97.9 F | OXYGEN SATURATION: 97 %

## 2018-03-14 DIAGNOSIS — S09.90XD CLOSED HEAD INJURY, SUBSEQUENT ENCOUNTER: ICD-10-CM

## 2018-03-14 DIAGNOSIS — S00.12XA PERIORBITAL ECCHYMOSIS, LEFT, INITIAL ENCOUNTER: Primary | ICD-10-CM

## 2018-03-14 PROCEDURE — 99283 EMERGENCY DEPT VISIT LOW MDM: CPT

## 2018-03-14 NOTE — ED NOTES
Pt c/o periorbital bruising that is new post fall. Pt is concerned because she is on blood thinners. Pt also c/o headache and ringing in the head.      Anat Miner RN  03/14/18 7927

## 2018-03-14 NOTE — TELEPHONE ENCOUNTER
"Patient called stating that she went to the ER on 03/11/2018. Diagnosed with a closed head injury. Patient stated that last night she started noticing a black eye and her headache increasing. I spoke with our APRN Keeley Mccain. She instructed patient to go to the emergency room to follow up on this. Patient stated that she is on a blood thinner and is feeling \"off\" in her mind. Patient agreed that she will go to [facility] for evaluation.   "

## 2018-03-14 NOTE — ED PROVIDER NOTES
" FAST TRACK EMERGENCY DEPARTMENT ENCOUNTER    CHIEF COMPLAINT  Chief Complaint: unexplained bruising  History given by: patient, spouse  History limited by: nothing  CDU Room Number: 48/48  PMD: Garo Bravo MD      HPI:  Pt is a 68 y.o. female who presents complaining of bruising along her left upper eyelid that she first noticed last night. Pt also complains of a stinging discomfort along her scalp hematoma. Pt also complains of a HA and \"ringing in her head\" since her head injury on 3/11/18. Pt is on Eliquis for history of P.E. in November 2016.    Onset: gradual  Duration: one day  Severity: moderate  Associated symptoms: HA, \"ringing in her head\", stinging discomfort along scalp hematoma  Previous treatment: Pt states that she was seen in the ED on 3/11/18 for these same symptoms.    PAST MEDICAL HISTORY  Active Ambulatory Problems     Diagnosis Date Noted   • HTN (hypertension), benign 05/10/2016   • BPPV (benign paroxysmal positional vertigo) 05/10/2016   • Vitamin D deficiency 05/10/2016   • Urge incontinence 05/10/2016   • GERD without esophagitis 05/10/2016   • Cervical spinal stenosis 05/10/2016   • Saddle pulmonary embolus 11/09/2016   • Family history of thrombosis 01/12/2017   • Pulmonary embolism with acute cor pulmonale 06/26/2017     Resolved Ambulatory Problems     Diagnosis Date Noted   • No Resolved Ambulatory Problems     Past Medical History:   Diagnosis Date   • Adrenal nodule    • Arthritis    • BPPV (benign paroxysmal positional vertigo) 5/10/2016   • Cervical stenosis of spine    • Chronic pain disorder    • DVT, lower extremity    • GERD without esophagitis 5/10/2016   • HTN (hypertension), benign 5/10/2016   • Neck pain    • Neuropathy    • PONV (postoperative nausea and vomiting)    • Pulmonary emboli    • Urge incontinence 5/10/2016   • Vitamin D deficiency 5/10/2016       PAST SURGICAL HISTORY  Past Surgical History:   Procedure Laterality Date   • CARDIAC CATHETERIZATION N/A " 11/10/2016    Procedure: Right Heart Cath;  Surgeon: Johnna Agrawal MD;  Location:  [facility] CATH INVASIVE LOCATION;  Service:    • CARDIAC CATHETERIZATION N/A 11/10/2016    Procedure: Thrombolytic Therapy;  Surgeon: Johnan Agrawal MD;  Location:  [facility] C[facility] INVASIVE LOCATION;  Service:    • CATARACT EXTRACTION Bilateral    • COLONOSCOPY W/ BIOPSIES  11/2013    hyperplastic polyp   • DILATATION AND CURETTAGE     • EPIDURAL BLOCK     • HEMORROIDECTOMY  2013   • HYSTERECTOMY  2001   • HYSTERECTOMY     • INTERVENTIONAL RADIOLOGY PROCEDURE Bilateral 11/10/2016    Procedure: Sp Smiley Cath Place Pulmonary Art;  Surgeon: Johnna Agrawal MD;  Location:  [facility] CATH INVASIVE LOCATION;  Service:    • VAGINAL DELIVERY  1985 and 1990   • VEIN SURGERY  [id number]    leg   • VENTRAL HERNIA REPAIR  1987       FAMILY HISTORY  Family History   Problem Relation Age of Onset   • Macular degeneration Mother    • Deep vein thrombosis Mother    • Heart failure Father    • Deep vein thrombosis Father    • Chiari malformation Sister    • Heart disease Maternal Grandfather    • Heart disease Paternal Grandfather    • Deep vein thrombosis Sister    • Heart failure Sister    • Pancreatic cancer Sister        SOCIAL HISTORY  Social History     Social History   • Marital status:      Spouse name: Yasir   • Number of children: N/A   • Years of education: College     Occupational History   •  Retired     [employer]     Social History Main Topics   • Smoking status: Former Smoker     Packs/day: 0.50     Years: 10.00     Types: Cigarettes   • Smokeless tobacco: Never Used   • Alcohol use 1.2 oz/week     2 Glasses of wine per week   • Drug use: No   • Sexual activity: Not on file     Other Topics Concern   • Not on file     Social History Narrative   • No narrative on file       ALLERGIES  Ciprocinonide [fluocinolone]; Contrast dye; and Suprax [cefixime]    REVIEW OF SYSTEMS  Review of Systems   Constitutional: Negative for chills  "and fever.   HENT: Negative for sore throat.    Respiratory: Negative for cough.    Gastrointestinal: Negative for nausea and vomiting.   Genitourinary: Negative for dysuria.   Musculoskeletal: Negative for back pain.        Stinging discomfort along scalp hematoma   Skin: Positive for color change (bruising on left upper eyelid).   Neurological: Positive for headaches.        \"ringing in head\"   Psychiatric/Behavioral: The patient is not nervous/anxious.        PHYSICAL EXAM  ED Triage Vitals   Temp Heart Rate Resp BP SpO2   03/14/18 1057 03/14/18 1057 03/14/18 1132 03/14/18 1135 03/14/18 1057   98.6 °F (37 °C) 89 18 150/89 97 %      Temp src Heart Rate Source Patient Position BP Location FiO2 (%)   -- 03/14/18 1057 03/14/18 1132 03/14/18 1132 --    Monitor Lying Left arm        Physical Exam   Constitutional: She is oriented to person, place, and time. No distress.   HENT:   Head: Normocephalic.   Right Ear: Tympanic membrane normal.   Left Ear: Tympanic membrane normal.   There is a 3x4cm hematoma on the L parietal scalp with mild infraorbital ecchymosis.    Eyes: EOM are normal. Pupils are equal, round, and reactive to light.   No hyphema   Neurological: She is alert and oriented to person, place, and time.   Normal neurologic exam   Skin:   Healing bruising to left triceps area     PROCEDURES  Procedures      PROGRESS AND CONSULTS  ED Course     1133: Notified pt and family that the pt's bruising is completely normal after the pt's head injury. D/w pt and family that the pt's bruising will likely become worse and move down her face in the next several days since she is on Eliquis. D/w pt and family that the pt's additional symptoms are c/w a mild concussion. I instructed the pt to ice her injuries and to take Tylenol as needed for HA. Discussed the plan to discharge the pt home. Pt understands and agrees with the plan, all questions answered.    MEDICAL DECISION MAKING  Results were reviewed/discussed with the " patient and they were also made aware of online access. Pt also made aware that follow up with PMD is necessary.     MDM  Number of Diagnoses or Management Options     Amount and/or Complexity of Data Reviewed  Decide to obtain previous medical records or to obtain history from someone other than the patient: yes  Obtain history from someone other than the patient: yes (spouse)  Review and summarize past medical records: yes (Pt was last seen in the ED on 3/11/18 for a head injury with a 4cm left parietal scalp laceration. Pt's CT Head was negative at that time. Pt called her PCP earlier today and was told to come to the ED for evaluation.)    Patient Progress  Patient progress: stable         DIAGNOSIS  Final diagnoses:   Periorbital ecchymosis, left, initial encounter   Closed head injury, subsequent encounter       DISPOSITION  DISCHARGE    Patient discharged in stable condition.    Reviewed implications of results, diagnosis, meds, responsibility to follow up, warning signs and symptoms of possible worsening, potential complications and reasons to return to ER, including fever, worsening pain or any concerns.    Patient/Family voiced understanding of above instructions.    Discussed plan for discharge, as there is no emergent indication for admission. Patient referred to primary care provider for BP management due to today's BP. Pt/family is agreeable and understands need for follow up and repeat testing.  Pt is aware that discharge does not mean that nothing is wrong but it indicates no emergency is present that requires admission and they must continue care with follow-up as given below or physician of their choice.     FOLLOW-UP  Garo Bravo MD  [address]  [address] [place] [id number] [phone]    In 3 days  If symptoms worsen         Medication List      Changed    chlorthalidone 25 MG tablet  Commonly known as:  HYGROTON  Take 1 tablet by mouth Daily.  What changed:  how much to  take              Latest Documented Vital Signs:  As of 11:55 AM  BP- 150/89 HR- 82 Temp- 98.6 °F (37 °C) O2 sat- 98%    --  Documentation assistance provided by reji Patel for Dr. Brizuela.  Information recorded by the scribe was done at my direction and has been verified and validated by me.     Nanda Patel  03/14/18 7867       Marcellus Brizuela MD  03/14/18 4584

## 2018-03-19 ENCOUNTER — OFFICE VISIT (OUTPATIENT)
Dept: FAMILY MEDICINE CLINIC | Facility: CLINIC | Age: 68
End: 2018-03-19

## 2018-03-19 VITALS
BODY MASS INDEX: 28.02 KG/M2 | HEIGHT: 69 IN | HEART RATE: 86 BPM | OXYGEN SATURATION: 99 % | TEMPERATURE: 98.5 F | SYSTOLIC BLOOD PRESSURE: 138 MMHG | DIASTOLIC BLOOD PRESSURE: 106 MMHG | WEIGHT: 189.2 LBS

## 2018-03-19 DIAGNOSIS — Z48.02: Primary | ICD-10-CM

## 2018-03-19 PROCEDURE — 99213 OFFICE O/P EST LOW 20 MIN: CPT | Performed by: NURSE PRACTITIONER

## 2018-03-19 NOTE — PROGRESS NOTES
Subjective   Juany Carlin is a 68 y.o. female who presents today for:    Suture / Staple Removal (Head) and Dizziness (Has had this before.  Woke up with this morning)    History of Present Illness       Ms. Carlin  reports that she has quit smoking. Her smoking use included Cigarettes. She has a 5.00 pack-year smoking history. She has never used smokeless tobacco. She reports that she drinks about 1.2 oz of alcohol per week . She reports that she does not use drugs.     Allergies   Allergen Reactions   • Ciprocinonide [Fluocinolone]    • Contrast Dye    • Suprax [Cefixime] Diarrhea       Current Outpatient Prescriptions:   •  apixaban (ELIQUIS) 2.5 MG tablet tablet, Take 1 tablet by mouth 2 (Two) Times a Day., Disp: 60 tablet, Rfl: 11  •  B Complex-Biotin-FA (B-100 COMPLEX PO), Take 1 tablet by mouth Daily., Disp: , Rfl:   •  CALCIUM PO, Take 600 mg by mouth Daily., Disp: , Rfl:   •  chlorthalidone (HYGROTON) 25 MG tablet, Take 1 tablet by mouth Daily. (Patient taking differently: Take 12.5 mg by mouth Daily.), Disp: 90 tablet, Rfl: 0  •  Cholecalciferol (VITAMIN D3) 2000 UNITS tablet, Take 2,000 Units by mouth Daily., Disp: , Rfl:   •  ciclopirox (PENLAC) 8 % solution, , Disp: , Rfl:   •  CRANBERRY PO, Take 1 tablet by mouth Daily., Disp: , Rfl:   •  famotidine (PEPCID) 10 MG tablet, Take 20 mg by mouth Every Night., Disp: , Rfl:   •  gabapentin (NEURONTIN) 400 MG capsule, Take 400 mg by mouth 3 (three) times a day as needed., Disp: , Rfl:   •  losartan (COZAAR) 100 MG tablet, TAKE ONE TABLET BY MOUTH DAILY, Disp: 90 tablet, Rfl: 0  •  Magnesium 250 MG tablet, Take 2 tablets by mouth Daily., Disp: , Rfl:   •  Multiple Vitamins-Minerals (MULTIVITAMIN PO), Take 1 tablet by mouth Daily., Disp: , Rfl:   •  MYRBETRIQ 50 MG tablet sustained-release 24 hour, Take 50 mg by mouth daily., Disp: , Rfl:       Review of Systems      Objective   Vitals:    03/19/18 1100   BP: (!) 138/106   BP Location: Left arm   Patient  "Position: Sitting   Cuff Size: Adult   Pulse: 86   Temp: 98.5 °F (36.9 °C)   TempSrc: Oral   SpO2: 99%   Weight: 85.8 kg (189 lb 3.2 oz)   Height: 175.3 cm (69.02\")     Physical Exam          {Assess/PlanSmartLinks:42746}  "

## 2018-03-19 NOTE — PROGRESS NOTES
Procedure   Staple removal   Date/Time: 3/19/2018 11:00 AM  Performed by: FRANCISCO ARRINGTON  Authorized by: FRANCISCO ARRINGTON   Body area: head/neck  Location details: scalp  Wound Appearance: clean  Staples Removed: 5  Post-removal: antibiotic ointment applied  Patient tolerance: Patient tolerated the procedure well with no immediate complications            .

## 2018-03-26 RX ORDER — LOSARTAN POTASSIUM 100 MG/1
TABLET ORAL
Qty: 90 TABLET | Refills: 0 | Status: SHIPPED | OUTPATIENT
Start: 2018-03-26 | End: 2018-07-05 | Stop reason: SDUPTHER

## 2018-04-10 DIAGNOSIS — K21.9 GERD WITHOUT ESOPHAGITIS: ICD-10-CM

## 2018-04-10 DIAGNOSIS — E55.9 VITAMIN D DEFICIENCY: ICD-10-CM

## 2018-04-10 DIAGNOSIS — E83.42 HYPOMAGNESEMIA: ICD-10-CM

## 2018-04-10 DIAGNOSIS — I10 HTN (HYPERTENSION), BENIGN: Primary | ICD-10-CM

## 2018-04-12 DIAGNOSIS — I10 HTN (HYPERTENSION), BENIGN: ICD-10-CM

## 2018-04-12 DIAGNOSIS — E55.9 VITAMIN D DEFICIENCY: Primary | ICD-10-CM

## 2018-04-18 LAB
25(OH)D3+25(OH)D2 SERPL-MCNC: 39 NG/ML (ref 30–100)
ALBUMIN SERPL-MCNC: 4.6 G/DL (ref 3.6–4.8)
ALBUMIN/GLOB SERPL: 2.1 {RATIO} (ref 1.2–2.2)
ALP SERPL-CCNC: 70 IU/L (ref 39–117)
ALT SERPL-CCNC: 17 IU/L (ref 0–32)
AST SERPL-CCNC: 18 IU/L (ref 0–40)
BASOPHILS # BLD AUTO: 0.1 X10E3/UL (ref 0–0.2)
BASOPHILS NFR BLD AUTO: 1 %
BILIRUB SERPL-MCNC: 0.6 MG/DL (ref 0–1.2)
BUN SERPL-MCNC: 14 MG/DL (ref 8–27)
BUN/CREAT SERPL: 18 (ref 12–28)
CALCIUM SERPL-MCNC: 9.9 MG/DL (ref 8.7–10.3)
CHLORIDE SERPL-SCNC: 99 MMOL/L (ref 96–106)
CO2 SERPL-SCNC: 25 MMOL/L (ref 18–29)
CREAT SERPL-MCNC: 0.77 MG/DL (ref 0.57–1)
EOSINOPHIL # BLD AUTO: 0.2 X10E3/UL (ref 0–0.4)
EOSINOPHIL NFR BLD AUTO: 3 %
ERYTHROCYTE [DISTWIDTH] IN BLOOD BY AUTOMATED COUNT: 13.6 % (ref 12.3–15.4)
GFR SERPLBLD CREATININE-BSD FMLA CKD-EPI: 80 ML/MIN/1.73
GFR SERPLBLD CREATININE-BSD FMLA CKD-EPI: 92 ML/MIN/1.73
GLOBULIN SER CALC-MCNC: 2.2 G/DL (ref 1.5–4.5)
GLUCOSE SERPL-MCNC: 94 MG/DL (ref 65–99)
HCT VFR BLD AUTO: 41.4 % (ref 34–46.6)
HGB BLD-MCNC: 13.8 G/DL (ref 11.1–15.9)
IMM GRANULOCYTES # BLD: 0 X10E3/UL (ref 0–0.1)
IMM GRANULOCYTES NFR BLD: 0 %
LYMPHOCYTES # BLD AUTO: 2.9 X10E3/UL (ref 0.7–3.1)
LYMPHOCYTES NFR BLD AUTO: 44 %
MCH RBC QN AUTO: 30.7 PG (ref 26.6–33)
MCHC RBC AUTO-ENTMCNC: 33.3 G/DL (ref 31.5–35.7)
MCV RBC AUTO: 92 FL (ref 79–97)
MONOCYTES # BLD AUTO: 0.7 X10E3/UL (ref 0.1–0.9)
MONOCYTES NFR BLD AUTO: 10 %
NEUTROPHILS # BLD AUTO: 2.7 X10E3/UL (ref 1.4–7)
NEUTROPHILS NFR BLD AUTO: 42 %
PLATELET # BLD AUTO: 219 X10E3/UL (ref 150–379)
POTASSIUM SERPL-SCNC: 4.6 MMOL/L (ref 3.5–5.2)
PROT SERPL-MCNC: 6.8 G/DL (ref 6–8.5)
RBC # BLD AUTO: 4.49 X10E6/UL (ref 3.77–5.28)
SODIUM SERPL-SCNC: 140 MMOL/L (ref 134–144)
WBC # BLD AUTO: 6.4 X10E3/UL (ref 3.4–10.8)

## 2018-04-25 ENCOUNTER — OFFICE VISIT (OUTPATIENT)
Dept: FAMILY MEDICINE CLINIC | Facility: CLINIC | Age: 68
End: 2018-04-25

## 2018-04-25 VITALS
HEART RATE: 90 BPM | SYSTOLIC BLOOD PRESSURE: 136 MMHG | OXYGEN SATURATION: 98 % | HEIGHT: 69 IN | BODY MASS INDEX: 28.61 KG/M2 | WEIGHT: 193.2 LBS | DIASTOLIC BLOOD PRESSURE: 86 MMHG

## 2018-04-25 DIAGNOSIS — M19.90 ARTHRITIS: ICD-10-CM

## 2018-04-25 DIAGNOSIS — I26.92 SADDLE EMBOLUS OF PULMONARY ARTERY WITHOUT ACUTE COR PULMONALE, UNSPECIFIED CHRONICITY (HCC): ICD-10-CM

## 2018-04-25 DIAGNOSIS — E55.9 VITAMIN D DEFICIENCY: ICD-10-CM

## 2018-04-25 DIAGNOSIS — R42 DIZZINESS: ICD-10-CM

## 2018-04-25 DIAGNOSIS — I10 HTN (HYPERTENSION), BENIGN: Primary | ICD-10-CM

## 2018-04-25 PROCEDURE — 99214 OFFICE O/P EST MOD 30 MIN: CPT | Performed by: INTERNAL MEDICINE

## 2018-05-04 PROBLEM — M19.90 ARTHRITIS: Status: ACTIVE | Noted: 2018-05-04

## 2018-05-25 ENCOUNTER — OFFICE VISIT (OUTPATIENT)
Dept: FAMILY MEDICINE CLINIC | Facility: CLINIC | Age: 68
End: 2018-05-25

## 2018-05-25 VITALS
OXYGEN SATURATION: 99 % | BODY MASS INDEX: 28.51 KG/M2 | HEIGHT: 69 IN | WEIGHT: 192.5 LBS | SYSTOLIC BLOOD PRESSURE: 134 MMHG | HEART RATE: 84 BPM | DIASTOLIC BLOOD PRESSURE: 72 MMHG

## 2018-05-25 DIAGNOSIS — M54.2 NECK PAIN: Primary | ICD-10-CM

## 2018-05-25 DIAGNOSIS — Z87.820 HISTORY OF CONCUSSION: ICD-10-CM

## 2018-05-25 PROCEDURE — 99213 OFFICE O/P EST LOW 20 MIN: CPT | Performed by: NURSE PRACTITIONER

## 2018-05-25 NOTE — PROGRESS NOTES
"Subjective   Juany Carlin is a 68 y.o. female.     Neck Pain     Ms. Carlin presents today because she had an episode a week ago were she became very weak in her arms and legs and she was dizzy. The episode only lasted a few minuetes and then she was back to normal. Just prior to this episode she reports doing neck exercises for her chronic neck pain. She reports that since that episode she feels like \"tiny electric shocks\" are in her forearms and hands. This is intermittent. She reports a history of cervical stenosis and it was recommended by Dr. Boston that she have cervical neck surgery. She declined because she was afraid to have it done. This was several years ago. She has a recent history of a concussion from a fall back in March of this year.     I have reviewed the patient's medical history in detail and updated the computerized patient record.    The following portions of the patient's history were reviewed and updated as appropriate: allergies, current medications, past family history, past medical history, past social history, past surgical history and problem list.       Current Outpatient Prescriptions:   •  apixaban (ELIQUIS) 2.5 MG tablet tablet, Take 1 tablet by mouth 2 (Two) Times a Day., Disp: 60 tablet, Rfl: 11  •  B Complex-Biotin-FA (B-100 COMPLEX PO), Take 1 tablet by mouth Daily., Disp: , Rfl:   •  CALCIUM PO, Take 600 mg by mouth Daily., Disp: , Rfl:   •  chlorthalidone (HYGROTON) 25 MG tablet, Take 1 tablet by mouth Daily. (Patient taking differently: Take 12.5 mg by mouth Daily.), Disp: 90 tablet, Rfl: 0  •  Cholecalciferol (VITAMIN D3) 2000 UNITS tablet, Take  by mouth Daily., Disp: , Rfl:   •  ciclopirox (PENLAC) 8 % solution, , Disp: , Rfl:   •  CRANBERRY PO, Take 1 tablet by mouth Daily., Disp: , Rfl:   •  famotidine (PEPCID) 10 MG tablet, Take 20 mg by mouth Every Night., Disp: , Rfl:   •  losartan (COZAAR) 100 MG tablet, TAKE ONE TABLET BY MOUTH DAILY, Disp: 90 tablet, Rfl: 0  •  " Magnesium 250 MG tablet, Take 2 tablets by mouth Daily., Disp: , Rfl:   •  Multiple Vitamins-Minerals (MULTIVITAMIN PO), Take 1 tablet by mouth Daily., Disp: , Rfl:   •  MYRBETRIQ 50 MG tablet sustained-release 24 hour, Take 50 mg by mouth daily., Disp: , Rfl:   •  gabapentin (NEURONTIN) 400 MG capsule, Take 400 mg by mouth 3 (three) times a day as needed., Disp: , Rfl:     Review of Systems   Constitutional: Negative.    Respiratory: Negative.    Cardiovascular: Negative.    Musculoskeletal: Positive for neck pain.   Neurological:        Tingling in forearms and hands       Objective    Vitals:    05/25/18 1459   BP: 134/72   Pulse: 84   SpO2: 99%     Physical Exam    Assessment/Plan   Juany was seen today for neck pain.    Diagnoses and all orders for this visit:    Neck pain  -     Ambulatory Referral to Neurosurgery    History of concussion    1. Her assessment is within normal limits today. She does have some bilateral weakness in her hands.   2. She would like to go to another neurosurgeon for a second opinion. I am referring her to Dr. Lake's office.   3. She is to go to the ER if her symptoms worsen.

## 2018-06-18 ENCOUNTER — OFFICE VISIT (OUTPATIENT)
Dept: NEUROSURGERY | Facility: CLINIC | Age: 68
End: 2018-06-18

## 2018-06-18 VITALS
HEART RATE: 77 BPM | SYSTOLIC BLOOD PRESSURE: 123 MMHG | WEIGHT: 193.8 LBS | DIASTOLIC BLOOD PRESSURE: 80 MMHG | HEIGHT: 69 IN | BODY MASS INDEX: 28.71 KG/M2

## 2018-06-18 DIAGNOSIS — M50.30 DEGENERATION OF CERVICAL INTERVERTEBRAL DISC: ICD-10-CM

## 2018-06-18 DIAGNOSIS — M48.02 CERVICAL SPINAL STENOSIS: Primary | ICD-10-CM

## 2018-06-18 PROCEDURE — 99203 OFFICE O/P NEW LOW 30 MIN: CPT | Performed by: PHYSICIAN ASSISTANT

## 2018-06-18 RX ORDER — CHLORAL HYDRATE 500 MG
1000 CAPSULE ORAL
COMMUNITY
End: 2019-10-03

## 2018-06-18 NOTE — PROGRESS NOTES
Subjective   Patient ID: Juany Carlin is a 68 y.o. female is being seen for consultation today at the request of ALEX Bragg  for neck and bilateral arm pain. She states she has chronic neck pain, however after fall last month pain became worse.  She had PT in the past with short term relief.  She takes Tylenol 500 mg PRN for pain.      Neck Pain    This is a chronic problem. The current episode started more than 1 year ago. The problem occurs constantly. The problem has been gradually worsening. The pain is associated with a fall. The quality of the pain is described as aching, shooting and stabbing. The pain is at a severity of 5/10. The pain is moderate. The symptoms are aggravated by position (turning head ). The pain is worse during the day. Associated symptoms include numbness and tingling. She has tried home exercises (PT ) for the symptoms. The treatment provided mild relief.   Arm Pain    The pain is present in the left elbow, right fingers and left fingers. The pain radiates to the left hand and right hand. The pain is at a severity of 3/10. The pain is mild. The pain has been intermittent since the incident. Associated symptoms include numbness and tingling. Treatments tried: PT, HEP        The following portions of the patient's history were reviewed and updated as appropriate: allergies, current medications, past family history, past medical history, past social history, past surgical history and problem list.    Review of Systems   Constitutional: Positive for fatigue.   HENT: Positive for tinnitus.    Genitourinary: Positive for urgency.   Musculoskeletal: Positive for arthralgias, myalgias, neck pain and neck stiffness.   Neurological: Positive for tingling and numbness.   All other systems reviewed and are negative.      Objective   Physical Exam   Constitutional: She is oriented to person, place, and time. She appears well-developed and well-nourished.   HENT:   Head: Normocephalic and  atraumatic.   Right Ear: External ear normal.   Left Ear: External ear normal.   Eyes: Conjunctivae and EOM are normal. Pupils are equal, round, and reactive to light. Right eye exhibits no discharge. Left eye exhibits no discharge.   Neck: Normal range of motion. Neck supple. No tracheal deviation present.   Pulmonary/Chest: Effort normal and breath sounds normal. No stridor. No respiratory distress.   Musculoskeletal: Normal range of motion. She exhibits no edema, tenderness or deformity.   Neurological: She is alert and oriented to person, place, and time. She has normal strength and normal reflexes. She displays no atrophy, no tremor and normal reflexes. No cranial nerve deficit or sensory deficit. She exhibits normal muscle tone. She displays a negative Romberg sign. She displays no seizure activity. Coordination and gait normal.   No long tract signs   Skin: Skin is warm and dry.   Psychiatric: She has a normal mood and affect. Her behavior is normal. Judgment and thought content normal.   Nursing note and vitals reviewed.    Neurologic Exam     Mental Status   Oriented to person, place, and time.     Cranial Nerves     CN III, IV, VI   Pupils are equal, round, and reactive to light.  Extraocular motions are normal.     Motor Exam     Strength   Strength 5/5 throughout.       Assessment/Plan   Independent Review of Radiographic Studies:    I did review the cervical spine MRI from February 2016 at [facility].  It did show multilevel degeneration as well as moderate to severe canal stenosis at C5-C6 and more moderate stenosis at C4-C5 and C6-C7.  There is left sided disc bulging and facet arthropathy at C4-C5 and left sided disc bulging at C2-C3 and C3-C4 is well, all contributing to left foraminal narrowing.  Medical Decision Making:    Ms Carlin was referred to us by ALEX Quiñones Ms. for an episode of acute weakness and numbness and tingling that occurred in both arms and hands about 5 weeks ago.  She has  a history of cervical degenerative disc disease and cervical stenosis and did see Dr. Cardona is ago.  She had physical therapy as well as epidural injections back in 2016.  She fell in March after tripping at home and did have some increased neck pain at the time.  However she went to the emergency room for laceration and was so distracted by that she did not discuss the neck pain.  She then had this episode of acute weakness and numbness and tingling after sitting on her porch with her arms over her head for about 20 minutes.  The symptoms lasted a brief period of time, perhaps 10 minutes and then resolved and has not recurred since.  She currently has only mild neck soreness but no real pain and no weakness or numbness or tingling.  She admits to mild chronic gait difficulties but nothing recent.  No incontinence.  Her exam does not reveal any deficits or long tract signs.    She is on Eliquis for an acute DVT that was diagnosed in November 2016.  She is followed by Dr. Bernstein with hematology for a coagulopathy and will be on the Eliquis lifelong.    We will go ahead and send her for a new cervical MRI and x-ray.  She will follow-up thereafter.      Juany was seen today for neck pain and arm pain.    Diagnoses and all orders for this visit:    Cervical spinal stenosis  -     MRI Cervical Spine Without Contrast; Future  -     XR spine cervical complete w flex ext; Future    Degeneration of cervical intervertebral disc  -     MRI Cervical Spine Without Contrast; Future  -     XR spine cervical complete w flex ext; Future      Return for follow up after radiology test.

## 2018-06-21 ENCOUNTER — OFFICE VISIT (OUTPATIENT)
Dept: FAMILY MEDICINE CLINIC | Facility: CLINIC | Age: 68
End: 2018-06-21

## 2018-06-21 VITALS
DIASTOLIC BLOOD PRESSURE: 82 MMHG | SYSTOLIC BLOOD PRESSURE: 130 MMHG | WEIGHT: 192.8 LBS | HEIGHT: 69 IN | HEART RATE: 96 BPM | OXYGEN SATURATION: 98 % | BODY MASS INDEX: 28.56 KG/M2

## 2018-06-21 DIAGNOSIS — M17.12 PRIMARY OSTEOARTHRITIS OF LEFT KNEE: Primary | ICD-10-CM

## 2018-06-21 DIAGNOSIS — Z79.01 LONG TERM CURRENT USE OF ANTICOAGULANT: ICD-10-CM

## 2018-06-21 DIAGNOSIS — I26.09 OTHER CHRONIC PULMONARY EMBOLISM WITH ACUTE COR PULMONALE (HCC): ICD-10-CM

## 2018-06-21 DIAGNOSIS — I27.82 OTHER CHRONIC PULMONARY EMBOLISM WITH ACUTE COR PULMONALE (HCC): ICD-10-CM

## 2018-06-21 PROCEDURE — 20610 DRAIN/INJ JOINT/BURSA W/O US: CPT | Performed by: INTERNAL MEDICINE

## 2018-06-21 PROCEDURE — 99213 OFFICE O/P EST LOW 20 MIN: CPT | Performed by: INTERNAL MEDICINE

## 2018-06-21 RX ORDER — TRIAMCINOLONE ACETONIDE 40 MG/ML
80 INJECTION, SUSPENSION INTRA-ARTICULAR; INTRAMUSCULAR ONCE
Status: COMPLETED | OUTPATIENT
Start: 2018-06-21 | End: 2018-06-21

## 2018-06-21 RX ADMIN — TRIAMCINOLONE ACETONIDE 80 MG: 40 INJECTION, SUSPENSION INTRA-ARTICULAR; INTRAMUSCULAR at 15:45

## 2018-06-21 NOTE — PROGRESS NOTES
Procedure: Steroid Injection of the left Knee    Indication: Persistent pain; osteoarthritis of the left knee    Diagnosis:    Encounter Diagnoses   Name Primary?   • Primary osteoarthritis of left knee Yes   • Other chronic pulmonary embolism with acute cor pulmonale    • Long term current use of anticoagulant          Description of procedure:    The risks, benefits, and alternatives of a steroid injection of the left knee were discussed with the patient and consent was obtained.  The anteromedial aspect of the left knee was prepped with Betadine ×3 and alcohol ×1.  Ethyl chloride spray was used for topical analgesia.  Under aseptic technique, the left knee was injected with 3 cc of Lidocaine 1 % without Epi and 2 cc of Kenalog- 40 mg/mL via the anteromedial approach.  The site was cleansed of Betadine and a bandage was applied.    The patient tolerated the procedure well and there were no complications.    The patient was instructed to use ice for postinjection flare and to return to the office for signs or symptoms of an infection.

## 2018-06-21 NOTE — PROGRESS NOTES
Subjective   Juany Carlin is a 68 y.o. female who presents today for:    Knee Pain (Left)    History of Present Illness     She has chronic bilateral knee pain that goes back at least 18 months.  When we saw her in April, 2017, we x-rayed both knees and saw moderate degenerative changes.  Her right knee was worse, so we injected it, and she has had good benefit since then.  Over the last 2-3 weeks, she has had worsening left knee pain.  It is stiff and sore first thing in the morning and after prolonged inactivity throughout the day.  Steps are particularly bothersome for her as well.  She denies any trauma, recent or remote.  She has not taken any NSAIDs for it.    Ms. Carlin  reports that she has quit smoking. Her smoking use included Cigarettes. She has a 5.00 pack-year smoking history. She has never used smokeless tobacco. She reports that she drinks about 1.2 oz of alcohol per week . She reports that she does not use drugs.     Allergies   Allergen Reactions   • Ciprocinonide [Fluocinolone] Other (See Comments)     Achilles tendonitis   • Suprax [Cefixime] Diarrhea   • Contrast Dye Rash     Rash many years ago when shell-fish derived contrast dye was used       Current Outpatient Prescriptions:   •  apixaban (ELIQUIS) 2.5 MG tablet tablet, Take 1 tablet by mouth 2 (Two) Times a Day., Disp: 60 tablet, Rfl: 11  •  B Complex-Biotin-FA (B-100 COMPLEX PO), Take 1 tablet by mouth Daily., Disp: , Rfl:   •  CALCIUM PO, Take 600 mg by mouth Daily., Disp: , Rfl:   •  chlorthalidone (HYGROTON) 25 MG tablet, Take 1 tablet by mouth Daily. (Patient taking differently: Take 12.5 mg by mouth Daily.), Disp: 90 tablet, Rfl: 0  •  Cholecalciferol (VITAMIN D3) 2000 UNITS tablet, Take  by mouth Daily., Disp: , Rfl:   •  ciclopirox (PENLAC) 8 % solution, , Disp: , Rfl:   •  CRANBERRY PO, Take 1 tablet by mouth Daily., Disp: , Rfl:   •  famotidine (PEPCID) 10 MG tablet, Take 20 mg by mouth Every Night., Disp: , Rfl:   •  gabapentin  "(NEURONTIN) 400 MG capsule, Take 400 mg by mouth 3 (three) times a day as needed., Disp: , Rfl:   •  losartan (COZAAR) 100 MG tablet, TAKE ONE TABLET BY MOUTH DAILY, Disp: 90 tablet, Rfl: 0  •  Magnesium 250 MG tablet, Take 2 tablets by mouth Daily., Disp: , Rfl:   •  Multiple Vitamins-Minerals (MULTIVITAMIN PO), Take 1 tablet by mouth Daily., Disp: , Rfl:   •  MYRBETRIQ 50 MG tablet sustained-release 24 hour, Take 50 mg by mouth daily., Disp: , Rfl:   •  Omega-3 Fatty Acids (FISH OIL) 1000 MG capsule capsule, Take  by mouth Daily With Breakfast., Disp: , Rfl:       Review of Systems   Constitutional: Negative for fever.   Cardiovascular: Negative for leg swelling.   Musculoskeletal: Positive for arthralgias.   Skin: Negative for color change.         Objective   Vitals:    06/21/18 1446   BP: 130/82   BP Location: Right arm   Patient Position: Sitting   Cuff Size: Adult   Pulse: 96   SpO2: 98%   Weight: 87.5 kg (192 lb 12.8 oz)   Height: 175.3 cm (69\")     Physical Exam  Overweight female in no acute distress.  Mood is upbeat and affect is appropriate.  No ecchymoses about the lower extremities.  No erythema, warmth, or effusion about either knee.  There is moderate crepitus with flexion and extension of both knees.  Palpation of the right knee reveals no pain.  There is tenderness at the margins of the patella and at the medial joint line of the left knee.  There is no ligamentous instability in either knee.       Juany was seen today for knee pain.    Diagnoses and all orders for this visit:    Primary osteoarthritis of left knee    Other chronic pulmonary embolism with acute cor pulmonale    Long term current use of anticoagulant    Review of the x-rays dated 4/21/2017 show medial and patellofemoral joint space narrowing and mild spurring at the margins c/w mild-moderate OA.  Again, because of her anticoagulant (Eliquis) use,     Oral NSAIDs are not a good option at this point.  We will try an intra-articular " steroid injection.  It seemed to have done well for her right knee in 4/2017.    Treatment options were discussed with the patient today, and she elects to proceed with an intra-articular steroid injection to see if it gives her the same benefit she had from her shot in the right knee 14 months ago.

## 2018-07-05 ENCOUNTER — HOSPITAL ENCOUNTER (OUTPATIENT)
Dept: MRI IMAGING | Facility: HOSPITAL | Age: 68
Discharge: HOME | End: 2018-07-05
Admitting: PHYSICIAN ASSISTANT

## 2018-07-05 ENCOUNTER — HOSPITAL ENCOUNTER (OUTPATIENT)
Dept: GENERAL RADIOLOGY | Facility: HOSPITAL | Age: 68
Discharge: HOME | End: 2018-07-05

## 2018-07-05 DIAGNOSIS — M48.02 CERVICAL SPINAL STENOSIS: ICD-10-CM

## 2018-07-05 DIAGNOSIS — M50.30 DEGENERATION OF CERVICAL INTERVERTEBRAL DISC: ICD-10-CM

## 2018-07-05 PROCEDURE — 72141 MRI NECK SPINE W/O DYE: CPT

## 2018-07-05 PROCEDURE — 72052 X-RAY EXAM NECK SPINE 6/>VWS: CPT

## 2018-07-05 RX ORDER — LOSARTAN POTASSIUM 100 MG/1
TABLET ORAL
Qty: 90 TABLET | Refills: 1 | Status: SHIPPED | OUTPATIENT
Start: 2018-07-05 | End: 2018-12-23 | Stop reason: SDUPTHER

## 2018-07-09 ENCOUNTER — TELEPHONE (OUTPATIENT)
Dept: ONCOLOGY | Facility: CLINIC | Age: 68
End: 2018-07-09

## 2018-07-09 NOTE — TELEPHONE ENCOUNTER
----- Message from Karuna Cummins RN sent at 7/9/2018  2:44 PM EDT -----      ----- Message -----  From: Vickie Bernstein MD  Sent: 7/9/2018   2:37 PM  To: [facility]    OK for patient to come off of Eliquis for epidurals, but she should hold Eliquis for 3 days prior to epidural and restart 24 hours after epidural, unless epidural was traumatic puncture, and then patient should wait 48 hours before resuming.    ThanksVickie  ----- Message -----  From: Amberly Decker RN  Sent: 7/9/2018   1:26 PM  To: Vickie Bernstein MD    Pt's Neurologists would like to know if she can come off eliquist if needed for an Epidural. If so, how many days before and when should she restart.     Thanks    Amberly Decker  ----- Message -----  From: Yolanda Panchal  Sent: 7/9/2018  12:40 PM  To: [facility]    [phone]  Ref: going off my eliquis for a short time

## 2018-07-09 NOTE — PROGRESS NOTES
Subjective   Patient ID: Juany Carlin is a 68 y.o. female is here today for follow-up for neck pain and one episode of arm pain after MRI and xray. She states her symptoms are unchanged since last visit. She takes Tylenol 500 mg PRN for pain .     Neck Pain    This is a chronic problem. The current episode started more than 1 year ago. The problem occurs constantly. The problem has been gradually worsening. The pain is associated with a fall. The quality of the pain is described as aching, shooting and stabbing. The pain is at a severity of 5/10. The pain is moderate. The symptoms are aggravated by position (turning head ). The pain is worse during the day. Associated symptoms include numbness and tingling. She has tried home exercises (PT ) for the symptoms. The treatment provided mild relief.   Arm Pain    The pain is present in the left elbow, right fingers and left fingers. The pain radiates to the left hand and right hand. The pain is at a severity of 3/10. The pain is mild. The pain has been intermittent since the incident. Associated symptoms include numbness and tingling. Treatments tried: PT, HEP        The following portions of the patient's history were reviewed and updated as appropriate: allergies, current medications, past family history, past medical history, past social history, past surgical history and problem list.    Review of Systems   Musculoskeletal: Positive for neck pain.   Neurological: Positive for tingling and numbness.   All other systems reviewed and are negative.      Objective   Physical Exam   Constitutional: She is oriented to person, place, and time. She appears well-developed and well-nourished.   HENT:   Head: Normocephalic and atraumatic.   Right Ear: External ear normal.   Left Ear: External ear normal.   Eyes: Conjunctivae and EOM are normal. Pupils are equal, round, and reactive to light. Right eye exhibits no discharge. Left eye exhibits no discharge.   Neck: Normal range  of motion. Neck supple. No tracheal deviation present.   Cardiovascular: Normal rate.    Pulmonary/Chest: Effort normal. No stridor. No respiratory distress.   Abdominal: Soft.   Musculoskeletal: Normal range of motion. She exhibits no edema, tenderness or deformity.   Neurological: She is alert and oriented to person, place, and time. She has normal strength and normal reflexes. She displays no atrophy, no tremor and normal reflexes. No cranial nerve deficit or sensory deficit. She exhibits normal muscle tone. She displays a negative Romberg sign. She displays no seizure activity. Coordination and gait normal.   No long tract signs   Skin: Skin is warm and dry.   Psychiatric: She has a normal mood and affect. Her behavior is normal. Judgment and thought content normal.   Nursing note and vitals reviewed.    Neurologic Exam     Mental Status   Oriented to person, place, and time.     Cranial Nerves     CN III, IV, VI   Pupils are equal, round, and reactive to light.  Extraocular motions are normal.     Motor Exam     Strength   Strength 5/5 throughout.       Assessment/Plan   Independent Review of Radiographic Studies:    I did review the cervical spine MRI from July 5.  It does show mild anterolisthesis at C4-C5 with a mild degenerative retrolisthesis at C5-C6 and anterolisthesis at C7-T1 with multilevel spondylosis.  She does have moderate stenosis at C5-C6 with milder stenosis at C6-C7 and moderate to severe foraminal narrowing at C5-C6 bilaterally.  Medical Decision Making:    Ms. Loco continues to complain of some neck pain.  No radicular pain or numbness or tingling or weakness.  Again she had had a one time very transient episode of arm numbness and weakness after holding her arms above her head for prolonged period of time.  That has not recurred since.  She denies any gait or balance issues or incontinence.    I did review the MRI findings with her.  I discussed the fact that her neck pain is more  degenerative.  She is currently really not symptomatic from the stenosis.  We talked about the possibility of injections or physical therapy.  She did have therapy in the past and did not find it very helpful.  She is going to think about pain management and will call back if she is interested in a referral.  Otherwise she intends on using her 's TENS unit or getting her own at a medical supply store in the interim.  She was also told to call immediately if she develops any recurrent arm weakness or numbness or tingling or gait or balance issues or incontinence.  Juany was seen today for neck pain and arm pain.    Diagnoses and all orders for this visit:    Degeneration of cervical intervertebral disc    Cervical spinal stenosis      Return if symptoms worsen or fail to improve.

## 2018-07-09 NOTE — TELEPHONE ENCOUNTER
Pt informed how to hold and restart Eliquis based on Dr. Bernstein's parameters. Pt had no further questions or concerns.

## 2018-07-09 NOTE — TELEPHONE ENCOUNTER
----- Message from Yolanda Panchal sent at 7/9/2018 12:40 PM EDT -----  [phone]  Ref: going off my eliquis for a short time

## 2018-07-10 ENCOUNTER — OFFICE VISIT (OUTPATIENT)
Dept: NEUROSURGERY | Facility: CLINIC | Age: 68
End: 2018-07-10

## 2018-07-10 VITALS
SYSTOLIC BLOOD PRESSURE: 133 MMHG | DIASTOLIC BLOOD PRESSURE: 79 MMHG | HEIGHT: 69 IN | HEART RATE: 83 BPM | WEIGHT: 192 LBS | BODY MASS INDEX: 28.44 KG/M2

## 2018-07-10 DIAGNOSIS — M48.02 CERVICAL SPINAL STENOSIS: ICD-10-CM

## 2018-07-10 DIAGNOSIS — M50.30 DEGENERATION OF CERVICAL INTERVERTEBRAL DISC: Primary | ICD-10-CM

## 2018-07-10 PROCEDURE — 99213 OFFICE O/P EST LOW 20 MIN: CPT | Performed by: PHYSICIAN ASSISTANT

## 2018-09-28 ENCOUNTER — OFFICE VISIT (OUTPATIENT)
Dept: ONCOLOGY | Facility: CLINIC | Age: 68
End: 2018-09-28

## 2018-09-28 ENCOUNTER — LAB (OUTPATIENT)
Dept: LAB | Facility: HOSPITAL | Age: 68
End: 2018-09-28

## 2018-09-28 VITALS
DIASTOLIC BLOOD PRESSURE: 80 MMHG | BODY MASS INDEX: 28.88 KG/M2 | WEIGHT: 195 LBS | OXYGEN SATURATION: 97 % | SYSTOLIC BLOOD PRESSURE: 130 MMHG | HEART RATE: 78 BPM | HEIGHT: 69 IN | RESPIRATION RATE: 14 BRPM | TEMPERATURE: 98.6 F

## 2018-09-28 DIAGNOSIS — Z79.01 CHRONIC ANTICOAGULATION: ICD-10-CM

## 2018-09-28 DIAGNOSIS — I26.92 SADDLE EMBOLUS OF PULMONARY ARTERY WITHOUT ACUTE COR PULMONALE, UNSPECIFIED CHRONICITY (HCC): Primary | ICD-10-CM

## 2018-09-28 DIAGNOSIS — I26.92 SADDLE EMBOLUS OF PULMONARY ARTERY WITHOUT ACUTE COR PULMONALE, UNSPECIFIED CHRONICITY (HCC): ICD-10-CM

## 2018-09-28 LAB
ANION GAP SERPL CALCULATED.3IONS-SCNC: 12 MMOL/L
BASOPHILS # BLD AUTO: 0.08 10*3/MM3 (ref 0–0.1)
BASOPHILS NFR BLD AUTO: 1.2 % (ref 0–1.1)
BUN BLD-MCNC: 14 MG/DL (ref 6–20)
BUN/CREAT SERPL: 24.1 (ref 7.3–30)
CALCIUM SPEC-SCNC: 9.2 MG/DL (ref 8.5–10.2)
CHLORIDE SERPL-SCNC: 96 MMOL/L (ref 98–107)
CO2 SERPL-SCNC: 28 MMOL/L (ref 22–29)
CREAT BLD-MCNC: 0.58 MG/DL (ref 0.6–1.1)
DEPRECATED RDW RBC AUTO: 40.5 FL (ref 37–49)
EOSINOPHIL # BLD AUTO: 0.12 10*3/MM3 (ref 0–0.36)
EOSINOPHIL NFR BLD AUTO: 1.8 % (ref 1–5)
ERYTHROCYTE [DISTWIDTH] IN BLOOD BY AUTOMATED COUNT: 12.3 % (ref 11.7–14.5)
GFR SERPL CREATININE-BSD FRML MDRD: 103 ML/MIN/1.73
GLUCOSE BLD-MCNC: 83 MG/DL (ref 74–124)
HCT VFR BLD AUTO: 40.6 % (ref 34–45)
HGB BLD-MCNC: 13.9 G/DL (ref 11.5–14.9)
IMM GRANULOCYTES # BLD: 0.02 10*3/MM3 (ref 0–0.03)
IMM GRANULOCYTES NFR BLD: 0.3 % (ref 0–0.5)
LYMPHOCYTES # BLD AUTO: 2.56 10*3/MM3 (ref 1–3.5)
LYMPHOCYTES NFR BLD AUTO: 38.8 % (ref 20–49)
MCH RBC QN AUTO: 30.5 PG (ref 27–33)
MCHC RBC AUTO-ENTMCNC: 34.2 G/DL (ref 32–35)
MCV RBC AUTO: 89 FL (ref 83–97)
MONOCYTES # BLD AUTO: 0.71 10*3/MM3 (ref 0.25–0.8)
MONOCYTES NFR BLD AUTO: 10.8 % (ref 4–12)
NEUTROPHILS # BLD AUTO: 3.11 10*3/MM3 (ref 1.5–7)
NEUTROPHILS NFR BLD AUTO: 47.1 % (ref 39–75)
NRBC BLD MANUAL-RTO: 0 /100 WBC (ref 0–0)
PLATELET # BLD AUTO: 222 10*3/MM3 (ref 150–375)
PMV BLD AUTO: 10.1 FL (ref 8.9–12.1)
POTASSIUM BLD-SCNC: 4.1 MMOL/L (ref 3.5–4.7)
RBC # BLD AUTO: 4.56 10*6/MM3 (ref 3.9–5)
SODIUM BLD-SCNC: 136 MMOL/L (ref 134–145)
WBC NRBC COR # BLD: 6.6 10*3/MM3 (ref 4–10)

## 2018-09-28 PROCEDURE — 99213 OFFICE O/P EST LOW 20 MIN: CPT | Performed by: INTERNAL MEDICINE

## 2018-09-28 PROCEDURE — 85025 COMPLETE CBC W/AUTO DIFF WBC: CPT | Performed by: INTERNAL MEDICINE

## 2018-09-28 PROCEDURE — 80048 BASIC METABOLIC PNL TOTAL CA: CPT | Performed by: INTERNAL MEDICINE

## 2018-09-28 PROCEDURE — 36415 COLL VENOUS BLD VENIPUNCTURE: CPT | Performed by: INTERNAL MEDICINE

## 2018-09-28 NOTE — PROGRESS NOTES
History:     Reason for follow up:   Acute saddle pulmonary embolism 11/2016, on Eliquis       HPI:  Juany Carlin presents for follow-up of Pulmonary embolism.  She remains on low dose Eliquis and tolerating it well without bleeding. No recurrent clotting; no bleeding. No chest pain, shortness of breath.       Reviewed, confirmed and updated history (past medical, social and family)   Past Medical   Past Medical History:   Diagnosis Date   • Adrenal nodule (CMS/HCC)    • Arthritis    • BPPV (benign paroxysmal positional vertigo) 5/10/2016   • Cervical stenosis of spine    • Chronic pain disorder    • DVT, lower extremity (CMS/HCC)     right   • GERD without esophagitis 5/10/2016   • Head injury 03/2018    [facility] HEMATOMA, [facility]   • HTN (hypertension), benign 5/10/2016   • Neck pain    • Neuropathy    • PONV (postoperative nausea and vomiting)    • Pulmonary emboli (CMS/HCC)     bilateral extensive PE with saddle emboli.   • Urge incontinence 5/10/2016   • Vitamin D deficiency 5/10/2016    and   Patient Active Problem List   Diagnosis   • HTN (hypertension), benign   • BPPV (benign paroxysmal positional vertigo)   • Vitamin D deficiency   • Urge incontinence   • GERD without esophagitis   • Cervical spinal stenosis   • Saddle pulmonary embolus (CMS/HCC)   • Family history of thrombosis   • Pulmonary embolism with acute cor pulmonale (CMS/HCC)   • Arthritis   • Degeneration of cervical intervertebral disc     Social History   Social History     Social History   • Marital status:      Spouse name: Yasir   • Number of children: N/A   • Years of education: College     Occupational History   •  Retired     [employer]     Social History Main Topics   • Smoking status: Former Smoker     Packs/day: 0.50     Years: 10.00     Types: Cigarettes   • Smokeless tobacco: Never Used   • Alcohol use 1.2 oz/week     2 Glasses of wine per week   • Drug use: No   • Sexual activity: Defer     Other Topics  "Concern   • Not on file     Social History Narrative    LIVES WITH SPOUSE     Family History  Family History   Problem Relation Age of Onset   • Macular degeneration Mother    • Deep vein thrombosis Mother    • Heart failure Father    • Deep vein thrombosis Father    • Chiari malformation Sister    • Heart disease Maternal Grandfather    • Heart disease Paternal Grandfather    • Deep vein thrombosis Sister    • Heart failure Sister    • Pancreatic cancer Sister      Allergies  Allergies   Allergen Reactions   • Ciprocinonide [Fluocinolone] Other (See Comments)     Achilles tendonitis   • Suprax [Cefixime] Diarrhea   • Contrast Dye Rash     Rash many years ago when shell-fish derived contrast dye was used       Medications: The current medication list was reviewed in the EMR.    Review of Systems  Review of Systems   Constitutional: Negative for activity change, appetite change, chills, diaphoresis, fatigue, fever and unexpected weight change.   Respiratory: Negative for cough, chest tightness and shortness of breath.    Cardiovascular: Negative for chest pain, palpitations and leg swelling.   Gastrointestinal: Negative for abdominal pain, blood in stool, constipation, diarrhea, nausea and vomiting.   Musculoskeletal: Negative for arthralgias, joint swelling and myalgias.   Hematological: Negative for adenopathy. Does not bruise/bleed easily.     Objective    Objective:     Vitals:    09/28/18 1043   BP: 130/80   Pulse: 78   Resp: 14   Temp: 98.6 °F (37 °C)   SpO2: 97%  Comment: at rest   Weight: 88.5 kg (195 lb)   Height: 176 cm (69.29\")  Comment: new ht.   PainSc: 0-No pain     Current Status 9/28/2018   ECOG score 0     GENERAL: female comfortable, no acute distress  SKIN:  Warm, without rashes, purpura or petechiae.   EYES:  EOMs intact.  Conjunctivae normal. Pupils equal and reactive to light.   EARS:  Hearing intact.  LYMPHATICS:  No cervical, supraclavicular, axillary adenopathy.  RESP:  Lungs clear to " percussion and auscultation. Good airflow. Normal effort.   CARDIAC:  Regular rate and rhythm without murmurs, rubs or gallops. Normal S1,S2. Lower extremity edema:  No  GI:  Soft, nontender, normal bowel sounds  PSYCHIATRIC:  Normal affect and mood; alert and oriented x 3; Insight and judgement appropriate         Labs and Imaging  Results for orders placed or performed in visit on 18   CBC Auto Differential   Result Value Ref Range    WBC 6.60 4.00 - 10.00 10*3/mm3    RBC 4.56 3.90 - 5.00 10*6/mm3    Hemoglobin 13.9 11.5 - 14.9 g/dL    Hematocrit 40.6 34.0 - 45.0 %    MCV 89.0 83.0 - 97.0 fL    MCH 30.5 27.0 - 33.0 pg    MCHC 34.2 32.0 - 35.0 g/dL    RDW 12.3 11.7 - 14.5 %    RDW-SD 40.5 37.0 - 49.0 fl    MPV 10.1 8.9 - 12.1 fL    Platelets 222 150 - 375 10*3/mm3    Neutrophil % 47.1 39.0 - 75.0 %    Lymphocyte % 38.8 20.0 - 49.0 %    Monocyte % 10.8 4.0 - 12.0 %    Eosinophil % 1.8 1.0 - 5.0 %    Basophil % 1.2 (H) 0.0 - 1.1 %    Immature Grans % 0.3 0.0 - 0.5 %    Neutrophils, Absolute 3.11 1.50 - 7.00 10*3/mm3    Lymphocytes, Absolute 2.56 1.00 - 3.50 10*3/mm3    Monocytes, Absolute 0.71 0.25 - 0.80 10*3/mm3    Eosinophils, Absolute 0.12 0.00 - 0.36 10*3/mm3    Basophils, Absolute 0.08 0.00 - 0.10 10*3/mm3    Immature Grans, Absolute 0.02 0.00 - 0.03 10*3/mm3    nRBC 0.0 0.0 - 0.0 /100 WBC         Assessment/Plan   Assessment/Plan:   1. Acute saddle pulmonary embolism without cor pulmonale with right LE DVT diagnosed 11/10/2016   * Not real clear if there is a truly specific inciting event. She had some immobility, but I'm not convinced that it was enough to contribute to thrombosis. Her family history is very concerning with both parents with thrombosis history and a sister that potentially  from PE. Her hypercoagulable eval is negative. Repeat AT III normal.    * I have recommended lifelong AC because of the possibility that this was spontaneous, life threatening and she has very strong family  history.    * Continue Eliquis 2.5 mg bid - tolerating well.      Follow-up in 12 months or if patient wants PCP to take over prescribing Eliquis we can see as needed. I asked the patient to call for any questions, concerns, or new symptoms.

## 2018-10-17 DIAGNOSIS — I26.92 SADDLE EMBOLUS OF PULMONARY ARTERY WITHOUT ACUTE COR PULMONALE, UNSPECIFIED CHRONICITY (HCC): ICD-10-CM

## 2018-10-17 DIAGNOSIS — E83.42 HYPOMAGNESEMIA: ICD-10-CM

## 2018-10-20 LAB
ALBUMIN SERPL-MCNC: 4.2 G/DL (ref 3.6–4.8)
ALBUMIN/GLOB SERPL: 1.9 {RATIO} (ref 1.2–2.2)
ALP SERPL-CCNC: 65 IU/L (ref 39–117)
ALT SERPL-CCNC: 21 IU/L (ref 0–32)
AMBIG ABBREV CMP14 DEFAULT: NORMAL
AST SERPL-CCNC: 23 IU/L (ref 0–40)
BASOPHILS # BLD AUTO: 0.1 X10E3/UL (ref 0–0.2)
BASOPHILS NFR BLD AUTO: 1 %
BILIRUB SERPL-MCNC: 0.6 MG/DL (ref 0–1.2)
BUN SERPL-MCNC: 13 MG/DL (ref 8–27)
BUN/CREAT SERPL: 19 (ref 12–28)
CALCIUM SERPL-MCNC: 9.7 MG/DL (ref 8.7–10.3)
CHLORIDE SERPL-SCNC: 99 MMOL/L (ref 96–106)
CO2 SERPL-SCNC: 24 MMOL/L (ref 20–29)
CREAT SERPL-MCNC: 0.69 MG/DL (ref 0.57–1)
EOSINOPHIL # BLD AUTO: 0.2 X10E3/UL (ref 0–0.4)
EOSINOPHIL NFR BLD AUTO: 2 %
ERYTHROCYTE [DISTWIDTH] IN BLOOD BY AUTOMATED COUNT: 13.3 % (ref 12.3–15.4)
GLOBULIN SER CALC-MCNC: 2.2 G/DL (ref 1.5–4.5)
GLUCOSE SERPL-MCNC: 100 MG/DL (ref 65–99)
HCT VFR BLD AUTO: 39 % (ref 34–46.6)
HGB BLD-MCNC: 13.2 G/DL (ref 11.1–15.9)
IMM GRANULOCYTES # BLD: 0 X10E3/UL (ref 0–0.1)
IMM GRANULOCYTES NFR BLD: 0 %
LYMPHOCYTES # BLD AUTO: 2.8 X10E3/UL (ref 0.7–3.1)
LYMPHOCYTES NFR BLD AUTO: 39 %
MAGNESIUM SERPL-MCNC: 1.8 MG/DL (ref 1.6–2.3)
MCH RBC QN AUTO: 30.7 PG (ref 26.6–33)
MCHC RBC AUTO-ENTMCNC: 33.8 G/DL (ref 31.5–35.7)
MCV RBC AUTO: 91 FL (ref 79–97)
MONOCYTES # BLD AUTO: 0.8 X10E3/UL (ref 0.1–0.9)
MONOCYTES NFR BLD AUTO: 11 %
NEUTROPHILS # BLD AUTO: 3.3 X10E3/UL (ref 1.4–7)
NEUTROPHILS NFR BLD AUTO: 47 %
PLATELET # BLD AUTO: 248 X10E3/UL (ref 150–379)
POTASSIUM SERPL-SCNC: 4.7 MMOL/L (ref 3.5–5.2)
PROT SERPL-MCNC: 6.4 G/DL (ref 6–8.5)
RBC # BLD AUTO: 4.3 X10E6/UL (ref 3.77–5.28)
SODIUM SERPL-SCNC: 137 MMOL/L (ref 134–144)
WBC # BLD AUTO: 7 X10E3/UL (ref 3.4–10.8)

## 2018-10-23 RX ORDER — CHLORTHALIDONE 25 MG/1
TABLET ORAL
Qty: 90 TABLET | Refills: 1 | Status: SHIPPED | OUTPATIENT
Start: 2018-10-23 | End: 2019-11-21 | Stop reason: SDUPTHER

## 2018-10-25 ENCOUNTER — OFFICE VISIT (OUTPATIENT)
Dept: FAMILY MEDICINE CLINIC | Facility: CLINIC | Age: 68
End: 2018-10-25

## 2018-10-25 VITALS
BODY MASS INDEX: 28.88 KG/M2 | OXYGEN SATURATION: 96 % | DIASTOLIC BLOOD PRESSURE: 88 MMHG | HEIGHT: 69 IN | WEIGHT: 195 LBS | HEART RATE: 98 BPM | SYSTOLIC BLOOD PRESSURE: 134 MMHG

## 2018-10-25 DIAGNOSIS — R10.9 LEFT FLANK PAIN: Primary | ICD-10-CM

## 2018-10-25 DIAGNOSIS — R35.0 URINARY FREQUENCY: ICD-10-CM

## 2018-10-25 DIAGNOSIS — E27.8 ADRENAL NODULE (HCC): ICD-10-CM

## 2018-10-25 PROBLEM — E27.9 ADRENAL NODULE: Status: ACTIVE | Noted: 2018-10-25

## 2018-10-25 LAB
BILIRUB BLD-MCNC: NEGATIVE MG/DL
CLARITY, POC: CLEAR
COLOR UR: YELLOW
GLUCOSE UR STRIP-MCNC: NEGATIVE MG/DL
KETONES UR QL: NEGATIVE
LEUKOCYTE EST, POC: NEGATIVE
NITRITE UR-MCNC: NEGATIVE MG/ML
PH UR: 7 [PH] (ref 5–8)
PROT UR STRIP-MCNC: NEGATIVE MG/DL
RBC # UR STRIP: NEGATIVE /UL
SP GR UR: 1.01 (ref 1–1.03)
UROBILINOGEN UR QL: NORMAL

## 2018-10-25 PROCEDURE — 99214 OFFICE O/P EST MOD 30 MIN: CPT | Performed by: INTERNAL MEDICINE

## 2018-10-25 PROCEDURE — 81003 URINALYSIS AUTO W/O SCOPE: CPT | Performed by: INTERNAL MEDICINE

## 2018-11-19 ENCOUNTER — HOSPITAL ENCOUNTER (OUTPATIENT)
Dept: CT IMAGING | Facility: HOSPITAL | Age: 68
Discharge: HOME | End: 2018-11-19
Attending: INTERNAL MEDICINE | Admitting: INTERNAL MEDICINE

## 2018-11-19 PROCEDURE — 74176 CT ABD & PELVIS W/O CONTRAST: CPT

## 2018-11-28 ENCOUNTER — APPOINTMENT (OUTPATIENT)
Dept: WOMENS IMAGING | Facility: HOSPITAL | Age: 68
End: 2018-11-28

## 2018-11-28 PROCEDURE — 77063 BREAST TOMOSYNTHESIS BI: CPT | Performed by: RADIOLOGY

## 2018-11-28 PROCEDURE — 77067 SCR MAMMO BI INCL CAD: CPT | Performed by: RADIOLOGY

## 2018-12-26 RX ORDER — LOSARTAN POTASSIUM 100 MG/1
TABLET ORAL
Qty: 90 TABLET | Refills: 3 | Status: SHIPPED | OUTPATIENT
Start: 2018-12-26 | End: 2019-12-26 | Stop reason: SDUPTHER

## 2019-01-21 ENCOUNTER — OFFICE VISIT (OUTPATIENT)
Dept: FAMILY MEDICINE CLINIC | Facility: CLINIC | Age: 69
End: 2019-01-21

## 2019-01-21 VITALS
DIASTOLIC BLOOD PRESSURE: 84 MMHG | OXYGEN SATURATION: 98 % | HEIGHT: 69 IN | SYSTOLIC BLOOD PRESSURE: 122 MMHG | BODY MASS INDEX: 29.18 KG/M2 | HEART RATE: 64 BPM | WEIGHT: 197 LBS | TEMPERATURE: 98.3 F

## 2019-01-21 DIAGNOSIS — J40 BRONCHITIS: Primary | ICD-10-CM

## 2019-01-21 DIAGNOSIS — J01.40 ACUTE NON-RECURRENT PANSINUSITIS: ICD-10-CM

## 2019-01-21 PROCEDURE — 99214 OFFICE O/P EST MOD 30 MIN: CPT | Performed by: NURSE PRACTITIONER

## 2019-01-21 RX ORDER — GUAIFENESIN AND CODEINE PHOSPHATE 100; 10 MG/5ML; MG/5ML
5 SOLUTION ORAL 4 TIMES DAILY PRN
Qty: 236 ML | Refills: 0 | Status: SHIPPED | OUTPATIENT
Start: 2019-01-21 | End: 2019-02-18

## 2019-01-21 RX ORDER — PREDNISONE 20 MG/1
20 TABLET ORAL 2 TIMES DAILY
Qty: 10 TABLET | Refills: 0 | Status: SHIPPED | OUTPATIENT
Start: 2019-01-21 | End: 2019-01-26

## 2019-01-21 RX ORDER — AMOXICILLIN 500 MG/1
1000 CAPSULE ORAL 2 TIMES DAILY
Qty: 40 CAPSULE | Refills: 0 | Status: SHIPPED | OUTPATIENT
Start: 2019-01-21 | End: 2019-01-31

## 2019-01-21 NOTE — PROGRESS NOTES
Subjective   Juany Carlin is a 69 y.o. female.     Chief Complaint   Patient presents with   • URI     Cough  Ear Pain   Sx X's 9 days     She is a patient of Dr. Bravo and this is a new problem to me.      URI    This is a new problem. The current episode started 1 to 4 weeks ago (started 9 days ago). The maximum temperature recorded prior to her arrival was 100.4 - 100.9 F. Associated symptoms include congestion, coughing, headaches, a plugged ear sensation, rhinorrhea, sinus pain and wheezing. Pertinent negatives include no sore throat. Treatments tried: OTC cough/cold medications. The treatment provided no relief.     I have reviewed the patient's medical history in detail and updated the computerized patient record.     The following portions of the patient's history were reviewed and updated as appropriate: allergies, current medications, past family history, past medical history, past social history, past surgical history and problem list.    Current Outpatient Medications on File Prior to Visit   Medication Sig   • apixaban (ELIQUIS) 2.5 MG tablet tablet Take 1 tablet by mouth Every 12 (Twelve) Hours.   • B Complex-Biotin-FA (B-100 COMPLEX PO) Take 1 tablet by mouth Daily.   • CALCIUM PO Take 600 mg by mouth Daily.   • chlorthalidone (HYGROTON) 25 MG tablet TAKE ONE TABLET BY MOUTH DAILY   • Cholecalciferol (VITAMIN D3) 2000 UNITS tablet Take 1 tablet by mouth Daily.   • ciclopirox (PENLAC) 8 % solution    • CRANBERRY PO Take 1 tablet by mouth Daily.   • famotidine (PEPCID) 10 MG tablet Take 20 mg by mouth Every Night.   • gabapentin (NEURONTIN) 400 MG capsule Take 400 mg by mouth 3 (three) times a day as needed.   • losartan (COZAAR) 100 MG tablet TAKE ONE TABLET BY MOUTH DAILY   • Magnesium 250 MG tablet Take 2 tablets by mouth Daily.   • Multiple Vitamins-Minerals (MULTIVITAMIN PO) Take 1 tablet by mouth Daily.   • MYRBETRIQ 50 MG tablet sustained-release 24 hour Take 50 mg by mouth daily.   •  Omega-3 Fatty Acids (FISH OIL) 1000 MG capsule capsule Take 1,000 mg by mouth Daily With Breakfast.     No current facility-administered medications on file prior to visit.      Review of Systems   Constitutional: Positive for fever (low grade at onset). Negative for chills.   HENT: Positive for congestion, postnasal drip and rhinorrhea. Negative for sore throat.    Respiratory: Positive for cough and wheezing.    Musculoskeletal: Negative for myalgias.   Neurological: Positive for headache. Negative for weakness.       Objective    Vitals:    01/21/19 1407   BP: 122/84   Pulse: 64   Temp: 98.3 °F (36.8 °C)   SpO2: 98%     Physical Exam   Constitutional: She is oriented to person, place, and time. She appears well-developed and well-nourished. She appears ill.   HENT:   Right Ear: Tympanic membrane is erythematous. Tympanic membrane is not bulging. A middle ear effusion is present.   Left Ear: Tympanic membrane is erythematous. Tympanic membrane is not bulging. A middle ear effusion is present.   Nose: Mucosal edema (red and swollen), rhinorrhea (clear) and congestion present. Right sinus exhibits maxillary sinus tenderness and frontal sinus tenderness. Left sinus exhibits maxillary sinus tenderness and frontal sinus tenderness.   Neck: Normal range of motion. Neck supple.   Cardiovascular: Normal rate, regular rhythm and normal heart sounds.   Pulmonary/Chest: Effort normal. No stridor. She has no wheezes. She has rhonchi in the right middle field and the right lower field. She has no rales.   Lymphadenopathy:     She has no cervical adenopathy.   Neurological: She is alert and oriented to person, place, and time.   Skin: Skin is warm and dry.   Vitals reviewed.        Assessment/Plan   Juany was seen today for uri.    Diagnoses and all orders for this visit:    Bronchitis    Acute non-recurrent pansinusitis    Other orders  -     guaifenesin-codeine (GUAIFENESIN AC) 100-10 MG/5ML liquid; Take 5 mL by mouth 4  (Four) Times a Day As Needed for Cough or Congestion.  -     predniSONE (DELTASONE) 20 MG tablet; Take 1 tablet by mouth 2 (Two) Times a Day for 5 days.  -     amoxicillin (AMOXIL) 500 MG capsule; Take 2 capsules by mouth 2 (Two) Times a Day for 10 days.      1. She has acute bronchitis. She is to start Guaifenesin -10 mg/5ml she can take 5 ml every 6 hours as needed for cough/congestion. She is to start Prednisone 20 mg twice daily x 5 days. She does not want an inhaler because it makes her feel anxious.  2. She also has acute sinusitis. She is to start Amoxicillin 1000 mg twice a day x 10 days.  3. Follow up as needed or if symptoms worsen or do not resolve.

## 2019-02-04 ENCOUNTER — OFFICE VISIT (OUTPATIENT)
Dept: FAMILY MEDICINE CLINIC | Facility: CLINIC | Age: 69
End: 2019-02-04

## 2019-02-04 VITALS
DIASTOLIC BLOOD PRESSURE: 68 MMHG | TEMPERATURE: 98.6 F | BODY MASS INDEX: 28.58 KG/M2 | WEIGHT: 193 LBS | RESPIRATION RATE: 14 BRPM | SYSTOLIC BLOOD PRESSURE: 118 MMHG | OXYGEN SATURATION: 98 % | HEIGHT: 69 IN | HEART RATE: 84 BPM

## 2019-02-04 DIAGNOSIS — R19.7 DIARRHEA, UNSPECIFIED TYPE: Primary | ICD-10-CM

## 2019-02-04 PROCEDURE — 99213 OFFICE O/P EST LOW 20 MIN: CPT | Performed by: NURSE PRACTITIONER

## 2019-02-04 NOTE — PROGRESS NOTES
Subjective   Juany Carlin is a 69 y.o. female.     Chief Complaint   Patient presents with   • Diarrhea     Diarrhea    This is a new problem. The current episode started today. The problem occurs less than 2 times per day. The problem has been resolved. The stool consistency is described as watery. The patient states that diarrhea does not awaken her from sleep. Associated symptoms include bloating (yesterday). Pertinent negatives include no abdominal pain, fever, sweats or vomiting. Nothing aggravates the symptoms. There are no known risk factors. She has tried nothing for the symptoms.      I have reviewed the patient's medical history in detail and updated the computerized patient record.    The following portions of the patient's history were reviewed and updated as appropriate: allergies, current medications, past family history, past medical history, past social history, past surgical history and problem list.       Current Outpatient Medications:   •  apixaban (ELIQUIS) 2.5 MG tablet tablet, Take 1 tablet by mouth Every 12 (Twelve) Hours., Disp: 180 tablet, Rfl: 3  •  B Complex-Biotin-FA (B-100 COMPLEX PO), Take 1 tablet by mouth Daily., Disp: , Rfl:   •  CALCIUM PO, Take 600 mg by mouth Daily., Disp: , Rfl:   •  chlorthalidone (HYGROTON) 25 MG tablet, TAKE ONE TABLET BY MOUTH DAILY, Disp: 90 tablet, Rfl: 1  •  Cholecalciferol (VITAMIN D3) 2000 UNITS tablet, Take 1 tablet by mouth Daily., Disp: , Rfl:   •  ciclopirox (PENLAC) 8 % solution, , Disp: , Rfl:   •  CRANBERRY PO, Take 1 tablet by mouth Daily., Disp: , Rfl:   •  famotidine (PEPCID) 10 MG tablet, Take 20 mg by mouth Every Night., Disp: , Rfl:   •  gabapentin (NEURONTIN) 400 MG capsule, Take 400 mg by mouth 3 (three) times a day as needed., Disp: , Rfl:   •  guaifenesin-codeine (GUAIFENESIN AC) 100-10 MG/5ML liquid, Take 5 mL by mouth 4 (Four) Times a Day As Needed for Cough or Congestion., Disp: 236 mL, Rfl: 0  •  losartan (COZAAR) 100 MG tablet,  "TAKE ONE TABLET BY MOUTH DAILY, Disp: 90 tablet, Rfl: 3  •  Magnesium 250 MG tablet, Take 2 tablets by mouth Daily., Disp: , Rfl:   •  Multiple Vitamins-Minerals (MULTIVITAMIN PO), Take 1 tablet by mouth Daily., Disp: , Rfl:   •  MYRBETRIQ 50 MG tablet sustained-release 24 hour, Take 50 mg by mouth daily., Disp: , Rfl:   •  Omega-3 Fatty Acids (FISH OIL) 1000 MG capsule capsule, Take 1,000 mg by mouth Daily With Breakfast., Disp: , Rfl:     Review of Systems   Constitutional: Negative.  Negative for fever.   Cardiovascular: Negative.    Gastrointestinal: Positive for bloating (yesterday) and diarrhea. Negative for abdominal distention, abdominal pain, nausea and vomiting.   Musculoskeletal: Negative.    Neurological: Negative.         Vitals:    02/04/19 0955   BP: 118/68   BP Location: Right arm   Patient Position: Sitting   Cuff Size: Adult   Pulse: 84   Resp: 14   Temp: 98.6 °F (37 °C)   TempSrc: Oral   SpO2: 98%   Weight: 87.5 kg (193 lb)   Height: 175.3 cm (69\")       Objective   Physical Exam   Constitutional: She is oriented to person, place, and time. She appears well-developed and well-nourished.   Abdominal: Soft. Bowel sounds are normal. She exhibits no distension. There is no tenderness. There is no guarding.   Neurological: She is alert and oriented to person, place, and time.   Skin: Skin is warm and dry.   Psychiatric:   No acute distress   Vitals reviewed.        Assessment/Plan   Juany was seen today for diarrhea.    Diagnoses and all orders for this visit:    Diarrhea, unspecified type    1. We discussed that the episode of diarrhea this am could be related to her eating out last night or that she has an intestinal virus. She does not have a fever.   2. She has been instructed to take an antidiarrheal medication only if she is having frequent watery stools. She has been instructed to eat a BRAT diet and advanced as tolerated.   3. Follow up as needed.            "

## 2019-02-18 ENCOUNTER — OFFICE VISIT (OUTPATIENT)
Dept: FAMILY MEDICINE CLINIC | Facility: CLINIC | Age: 69
End: 2019-02-18

## 2019-02-18 VITALS
HEART RATE: 84 BPM | HEIGHT: 69 IN | WEIGHT: 192.3 LBS | BODY MASS INDEX: 28.48 KG/M2 | SYSTOLIC BLOOD PRESSURE: 146 MMHG | DIASTOLIC BLOOD PRESSURE: 94 MMHG | OXYGEN SATURATION: 98 %

## 2019-02-18 DIAGNOSIS — R35.0 URINARY FREQUENCY: ICD-10-CM

## 2019-02-18 DIAGNOSIS — M25.561 CHRONIC PAIN OF BOTH KNEES: ICD-10-CM

## 2019-02-18 DIAGNOSIS — M25.562 CHRONIC PAIN OF BOTH KNEES: ICD-10-CM

## 2019-02-18 DIAGNOSIS — R53.83 MALAISE AND FATIGUE: ICD-10-CM

## 2019-02-18 DIAGNOSIS — I10 HTN (HYPERTENSION), BENIGN: Primary | ICD-10-CM

## 2019-02-18 DIAGNOSIS — R53.81 MALAISE AND FATIGUE: ICD-10-CM

## 2019-02-18 DIAGNOSIS — G89.29 CHRONIC PAIN OF BOTH KNEES: ICD-10-CM

## 2019-02-18 LAB
BILIRUB BLD-MCNC: NEGATIVE MG/DL
CLARITY, POC: CLEAR
COLOR UR: YELLOW
GLUCOSE UR STRIP-MCNC: NEGATIVE MG/DL
KETONES UR QL: NEGATIVE
LEUKOCYTE EST, POC: NEGATIVE
Lab: 7.5
NITRITE UR-MCNC: NEGATIVE MG/ML
PROT UR STRIP-MCNC: NEGATIVE MG/DL
RBC # UR STRIP: NEGATIVE /UL
SP GR UR: 1.02 (ref 1–1.03)
UROBILINOGEN UR QL: NORMAL

## 2019-02-18 PROCEDURE — 99214 OFFICE O/P EST MOD 30 MIN: CPT | Performed by: INTERNAL MEDICINE

## 2019-02-18 PROCEDURE — 81003 URINALYSIS AUTO W/O SCOPE: CPT | Performed by: INTERNAL MEDICINE

## 2019-02-19 LAB
ERYTHROCYTE [DISTWIDTH] IN BLOOD BY AUTOMATED COUNT: 13.4 % (ref 12.3–15.4)
HCT VFR BLD AUTO: 40.1 % (ref 34–46.6)
HGB BLD-MCNC: 13.4 G/DL (ref 11.1–15.9)
MCH RBC QN AUTO: 29.9 PG (ref 26.6–33)
MCHC RBC AUTO-ENTMCNC: 33.4 G/DL (ref 31.5–35.7)
MCV RBC AUTO: 90 FL (ref 79–97)
PLATELET # BLD AUTO: 233 X10E3/UL (ref 150–379)
RBC # BLD AUTO: 4.48 X10E6/UL (ref 3.77–5.28)
TSH SERPL DL<=0.005 MIU/L-ACNC: 2.03 UIU/ML (ref 0.45–4.5)
WBC # BLD AUTO: 6.8 X10E3/UL (ref 3.4–10.8)

## 2019-05-17 ENCOUNTER — OFFICE VISIT (OUTPATIENT)
Dept: ORTHOPEDIC SURGERY | Facility: CLINIC | Age: 69
End: 2019-05-17

## 2019-05-17 VITALS — HEIGHT: 69 IN | TEMPERATURE: 98.5 F | WEIGHT: 190 LBS | BODY MASS INDEX: 28.14 KG/M2

## 2019-05-17 DIAGNOSIS — M25.562 PAIN IN BOTH KNEES, UNSPECIFIED CHRONICITY: Primary | ICD-10-CM

## 2019-05-17 DIAGNOSIS — M25.561 PAIN IN BOTH KNEES, UNSPECIFIED CHRONICITY: Primary | ICD-10-CM

## 2019-05-17 DIAGNOSIS — M17.10 ARTHRITIS OF KNEE: ICD-10-CM

## 2019-05-17 PROCEDURE — 73562 X-RAY EXAM OF KNEE 3: CPT | Performed by: ORTHOPAEDIC SURGERY

## 2019-05-17 PROCEDURE — 20610 DRAIN/INJ JOINT/BURSA W/O US: CPT | Performed by: ORTHOPAEDIC SURGERY

## 2019-05-17 PROCEDURE — 99213 OFFICE O/P EST LOW 20 MIN: CPT | Performed by: ORTHOPAEDIC SURGERY

## 2019-05-17 RX ADMIN — METHYLPREDNISOLONE ACETATE 80 MG: 80 INJECTION, SUSPENSION INTRA-ARTICULAR; INTRALESIONAL; INTRAMUSCULAR; SOFT TISSUE at 17:07

## 2019-05-17 NOTE — PROGRESS NOTES
Patient: Juany Carlin    YOB: 1950    Medical Record Number: [id number]    Chief Complaints:  Bilateral knee pain    History of Present Illness:     69 y.o. female patient who presents for evaluation of both knees.  She reports a long history of worsening pain and dysfunction, dating back at least 3 years.  Current pain is described as moderate, constant and aching.  She has intermittent grinding pain as well.  She has noticed associated swelling.  Symptoms are worse with standing and walking.  Rest, anti-inflammatories and injections have helped in the past.  Her primary care physician was previously doing her injections for her.    Allergies:   Allergies   Allergen Reactions   • Ciprocinonide [Fluocinolone] Other (See Comments)     Achilles tendonitis   • Suprax [Cefixime] Diarrhea   • Contrast Dye Rash     Rash many years ago when shell-fish derived contrast dye was used       Medications:   Home Medications    Current Outpatient Medications:   •  apixaban (ELIQUIS) 2.5 MG tablet tablet, Take 1 tablet by mouth Every 12 (Twelve) Hours., Disp: 180 tablet, Rfl: 3  •  B Complex-Biotin-FA (B-100 COMPLEX PO), Take 1 tablet by mouth Daily., Disp: , Rfl:   •  CALCIUM PO, Take 600 mg by mouth Daily., Disp: , Rfl:   •  chlorthalidone (HYGROTON) 25 MG tablet, TAKE ONE TABLET BY MOUTH DAILY, Disp: 90 tablet, Rfl: 1  •  Cholecalciferol (VITAMIN D3) 2000 UNITS tablet, Take 1 tablet by mouth Daily., Disp: , Rfl:   •  ciclopirox (PENLAC) 8 % solution, , Disp: , Rfl:   •  CRANBERRY PO, Take 1 tablet by mouth Daily., Disp: , Rfl:   •  famotidine (PEPCID) 10 MG tablet, Take 20 mg by mouth Every Night., Disp: , Rfl:   •  losartan (COZAAR) 100 MG tablet, TAKE ONE TABLET BY MOUTH DAILY, Disp: 90 tablet, Rfl: 3  •  Magnesium 250 MG tablet, Take 2 tablets by mouth Daily., Disp: , Rfl:   •  Multiple Vitamins-Minerals (MULTIVITAMIN PO), Take 1 tablet by mouth Daily., Disp: , Rfl:   •  Omega-3 Fatty Acids (FISH OIL) 1000 MG  capsule capsule, Take 1,000 mg by mouth Daily With Breakfast., Disp: , Rfl:   •  MYRBETRIQ 50 MG tablet sustained-release 24 hour, Take 50 mg by mouth daily., Disp: , Rfl:     Past Medical History:   Diagnosis Date   • Adrenal nodule (CMS/HCC)    • Arthritis    • BPPV (benign paroxysmal positional vertigo) 5/10/2016   • Cervical stenosis of spine    • Chronic pain disorder    • DVT, lower extremity (CMS/HCC)     right   • GERD without esophagitis 5/10/2016   • Head injury 03/2018    SCALP HEMATOMA, [facility]   • HTN (hypertension), benign 5/10/2016   • Neck pain    • Neuropathy    • PONV (postoperative nausea and vomiting)    • Pulmonary emboli (CMS/HCC)     bilateral extensive PE with saddle emboli.   • Urge incontinence 5/10/2016   • Vitamin D deficiency 5/10/2016       Past Surgical History:   Procedure Laterality Date   • CARDIAC CATHETERIZATION N/A 11/10/2016    Procedure: Right Heart Cath;  Surgeon: Johnna Agrawal MD;  Location:  [facility];  Service:    • CARDIAC CATHETERIZATION N/A 11/10/2016    Procedure: Thrombolytic Therapy;  Surgeon: Johnna Agrawal MD;  Location:  [facility];  Service:    • CATARACT EXTRACTION Bilateral    • COLONOSCOPY W/ BIOPSIES  11/2013    hyperplastic polyp   • DILATATION AND CURETTAGE     • EPIDURAL BLOCK     • HEMORROIDECTOMY  2013   • HYSTERECTOMY  2001   • HYSTERECTOMY     • INTERVENTIONAL RADIOLOGY PROCEDURE Bilateral 11/10/2016    Procedure: Sp [facility] Cath Place Pulmonary Art;  Surgeon: Johnna Agrawal MD;  Location:  [facility];  Service:    • VAGINAL DELIVERY  1985 and 1990   • VEIN SURGERY  [id number]    leg   • VENTRAL HERNIA REPAIR  1987       Social History     Occupational History   • Occupation:      Employer: RETIRED     Comment: [employer]   Tobacco Use   • Smoking status: Former Smoker     Packs/day: 0.50     Years: 10.00     Pack years: 5.00     Types: Cigarettes   • Smokeless tobacco: Never Used  "  Substance and Sexual Activity   • Alcohol use: Yes     Alcohol/week: 1.2 oz     Types: 2 Glasses of wine per week   • Drug use: No   • Sexual activity: Defer      Social History     Social History Narrative    LIVES WITH SPOUSE       Family History   Problem Relation Age of Onset   • Macular degeneration Mother    • Deep vein thrombosis Mother    • Heart failure Father    • Deep vein thrombosis Father    • Chiari malformation Sister    • Heart disease Maternal Grandfather    • Heart disease Paternal Grandfather    • Deep vein thrombosis Sister    • Heart failure Sister    • Pancreatic cancer Sister        Review of Systems:      Constitutional: Denies fever, shaking or chills   Eyes: Denies change in visual acuity   HEENT: Denies nasal congestion or sore throat   Respiratory: Denies cough or shortness of breath   Cardiovascular: Denies chest pain or edema  Endocrine: Denies tremors, palpitations, intolerance of heat or cold, polyuria, polydipsia.  GI: Denies abdominal pain, nausea, vomiting, bloody stools or diarrhea  : Denies frequency, urgency, incontinence, retention, or nocturia.  Musculoskeletal: Denies numbness, tingling or loss of motor function except as above  Integument: Denies rash, lesion or ulceration   Neurologic: Denies headache or focal weakness, deficits  Heme: Denies spontaneous or excessive bleeding, epistaxis, hematuria, melena, fatigue, enlarged or tender lymph nodes.      All other pertinent positives and negatives as noted above in HPI.    Physical Exam: 69 y.o. female  Vitals:    05/17/19 0812   Temp: 98.5 °F (36.9 °C)   TempSrc: Temporal   Weight: 86.2 kg (190 lb)   Height: 175.3 cm (69\")     General:  Patient is awake and alert.  Appears in no acute distress or discomfort.    Psych:  Affect and demeanor are appropriate.    Eyes:  Conjunctiva and sclera appear grossly normal.  Eyes track well and EOM seem to be intact.    Ears:  No gross abnormalities.  Hearing adequate for the " exam.    Cardiovascular:  Regular rate and rhythm.    Lungs:  Good chest expansion.  Breathing unlabored.    Spine:  Back appears grossly normal.  No palpable masses or adenopathy.  Good motion.  Straight leg raise and crossed straight leg raise manuever are both negative for lower leg and/or knee pain.    Extremities:  Skin is benign.  No obvious gross abnormalities of the right knee.  No palpable masses or adenopathy.  Moderate tenderness noted over medial joint line.  Motion is to 115 degrees of flexion, 5 degrees shy of full extension.  No instability.  Strength is well preserved including hip flexion, knee extension, ankle and toe plantarflexion, ankle inversion and eversion.  Good sensory function throughout the leg and foot.  Palpable pulses.  Brisk capillary refill.  Good skin turgor.    Left knee exam is similar.  Moderate medial tenderness.  There is no flexion contracture on the left and she has full extension.  She has normal neurovascular function throughout the extremity.         Radiology:   Bilateral standing AP views, bilateral merchants views and a lateral view of both knees are ordered by myself and reviewed to evaluate the patient's complaint.  No comparison films are immediately available.  The x-rays show significant degenerative arthritis including joint space narrowing, osteophyte formation, and subchondral sclerosis.  The majority of the degenerative changes appear to involve the medial compartment bilaterally.    Assessment/Plan:  Bilateral knee osteoarthritis    We discussed treatment options in detail including the risks, benefits, and alternatives of conservative treatment versus surgical options.  Regarding conservative treatment, we discussed appropriate activity modifications, anti-inflammatories, injections (including both corticosteroids and viscosupplementation), and physical therapy.  We also discussed the option of an arthroplasty and all that would entail.  I have recommended  that we start with a conservative approach and the patient agrees.    The patient has acknowledged understanding of the information and elected for injections of both knees.  The risks, benefits and alternatives were discussed.  She consented.  The injections were performed without complication.  She will follow-up as needed.    Andrea Farmer MD    05/17/2019    CC to Garo Bravo MD    Large Joint Arthrocentesis: R knee  Date/Time: 5/17/2019 5:07 PM  Consent given by: patient  Site marked: site marked  Timeout: Immediately prior to procedure a time out was called to verify the correct patient, procedure, equipment, support staff and site/side marked as required   Supporting Documentation  Indications: pain and joint swelling   Procedure Details  Location: knee - R knee  Preparation: Patient was prepped and draped in the usual sterile fashion  Needle gauge: 21 G.  Approach: anterolateral  Medications administered: 2 mL lidocaine (cardiac); 80 mg methylPREDNISolone acetate 80 MG/ML  Patient tolerance: patient tolerated the procedure well with no immediate complications    Large Joint Arthrocentesis: L knee  Date/Time: 5/17/2019 5:07 PM  Consent given by: patient  Site marked: site marked  Timeout: Immediately prior to procedure a time out was called to verify the correct patient, procedure, equipment, support staff and site/side marked as required   Supporting Documentation  Indications: pain and joint swelling   Procedure Details  Location: knee - L knee  Preparation: Patient was prepped and draped in the usual sterile fashion  Needle gauge: 21 G.  Approach: anterolateral  Medications administered: 80 mg methylPREDNISolone acetate 80 MG/ML; 2 mL lidocaine (cardiac)  Patient tolerance: patient tolerated the procedure well with no immediate complications

## 2019-05-20 RX ORDER — METHYLPREDNISOLONE ACETATE 80 MG/ML
80 INJECTION, SUSPENSION INTRA-ARTICULAR; INTRALESIONAL; INTRAMUSCULAR; SOFT TISSUE
Status: COMPLETED | OUTPATIENT
Start: 2019-05-17 | End: 2019-05-17

## 2019-08-06 ENCOUNTER — OFFICE VISIT (OUTPATIENT)
Dept: FAMILY MEDICINE CLINIC | Facility: CLINIC | Age: 69
End: 2019-08-06

## 2019-08-06 VITALS
OXYGEN SATURATION: 98 % | SYSTOLIC BLOOD PRESSURE: 122 MMHG | DIASTOLIC BLOOD PRESSURE: 78 MMHG | WEIGHT: 188.5 LBS | HEART RATE: 90 BPM | RESPIRATION RATE: 14 BRPM | BODY MASS INDEX: 27.92 KG/M2 | HEIGHT: 69 IN

## 2019-08-06 DIAGNOSIS — I27.82 OTHER CHRONIC PULMONARY EMBOLISM WITH ACUTE COR PULMONALE (HCC): Primary | ICD-10-CM

## 2019-08-06 DIAGNOSIS — I26.09 OTHER CHRONIC PULMONARY EMBOLISM WITH ACUTE COR PULMONALE (HCC): Primary | ICD-10-CM

## 2019-08-06 PROCEDURE — 99213 OFFICE O/P EST LOW 20 MIN: CPT | Performed by: NURSE PRACTITIONER

## 2019-08-06 NOTE — PROGRESS NOTES
Subjective   Juany Carlin is a 69 y.o. female.     Chief Complaint   Patient presents with   • Hypertension     Ms. Carlin presents today to get refills on Eliquis. She states that she asked her cardiologist if her PCP could prescribe the Eliquis for her and also monitor her INR yearly.     I have reviewed the patient's medical history in detail and updated the computerized patient record.     The following portions of the patient's history were reviewed and updated as appropriate: allergies, current medications, past family history, past medical history, past social history, past surgical history and problem list.       Current Outpatient Medications:   •  apixaban (ELIQUIS) 2.5 MG tablet tablet, Take 1 tablet by mouth Every 12 (Twelve) Hours., Disp: 180 tablet, Rfl: 3  •  B Complex-Biotin-FA (B-100 COMPLEX PO), Take 1 tablet by mouth Daily., Disp: , Rfl:   •  CALCIUM PO, Take 600 mg by mouth Daily., Disp: , Rfl:   •  chlorthalidone (HYGROTON) 25 MG tablet, TAKE ONE TABLET BY MOUTH DAILY, Disp: 90 tablet, Rfl: 1  •  Cholecalciferol (VITAMIN D3) 2000 UNITS tablet, Take 1 tablet by mouth Daily., Disp: , Rfl:   •  ciclopirox (PENLAC) 8 % solution, , Disp: , Rfl:   •  CRANBERRY PO, Take 1 tablet by mouth Daily., Disp: , Rfl:   •  famotidine (PEPCID) 10 MG tablet, Take 20 mg by mouth Every Night., Disp: , Rfl:   •  losartan (COZAAR) 100 MG tablet, TAKE ONE TABLET BY MOUTH DAILY, Disp: 90 tablet, Rfl: 3  •  Magnesium 250 MG tablet, Take 2 tablets by mouth Daily., Disp: , Rfl:   •  Multiple Vitamins-Minerals (MULTIVITAMIN PO), Take 1 tablet by mouth Daily., Disp: , Rfl:   •  MYRBETRIQ 50 MG tablet sustained-release 24 hour, Take 50 mg by mouth daily., Disp: , Rfl:   •  Omega-3 Fatty Acids (FISH OIL) 1000 MG capsule capsule, Take 1,000 mg by mouth Daily With Breakfast., Disp: , Rfl:     Review of Systems   Constitutional: Negative.    Respiratory: Negative.    Cardiovascular: Negative.    Hematological: Negative.      "    Vitals:    08/06/19 1408   BP: 122/78   BP Location: Left arm   Patient Position: Sitting   Cuff Size: Adult   Pulse: 90   Resp: 14   SpO2: 98%   Weight: 85.5 kg (188 lb 8 oz)   Height: 175.3 cm (69\")       Objective   Physical Exam   Constitutional: She is oriented to person, place, and time. She appears well-developed and well-nourished.   Cardiovascular: Normal rate, regular rhythm, normal heart sounds and intact distal pulses.   Pulmonary/Chest: Effort normal and breath sounds normal.   Neurological: She is alert and oriented to person, place, and time.   Skin: Skin is warm and dry.   Psychiatric:   No acute distress   Vitals reviewed.        Assessment/Plan   Juany was seen today for hypertension.    Diagnoses and all orders for this visit:    Other chronic pulmonary embolism with acute cor pulmonale (CMS/HCC)    Other orders  -     apixaban (ELIQUIS) 2.5 MG tablet tablet; Take 1 tablet by mouth Every 12 (Twelve) Hours.      Ms Carlin presents today to get refills on Eliquis. She is to continue Eliquis 2.5 mg evry 12 hours as prescribed. She is to follow up as needed for now. She has an appointment with a new PCP in October.          "

## 2019-08-30 ENCOUNTER — OFFICE VISIT (OUTPATIENT)
Dept: FAMILY MEDICINE CLINIC | Facility: CLINIC | Age: 69
End: 2019-08-30

## 2019-08-30 VITALS
HEIGHT: 69 IN | SYSTOLIC BLOOD PRESSURE: 120 MMHG | HEART RATE: 89 BPM | WEIGHT: 188 LBS | BODY MASS INDEX: 27.85 KG/M2 | DIASTOLIC BLOOD PRESSURE: 70 MMHG | OXYGEN SATURATION: 96 %

## 2019-08-30 DIAGNOSIS — M25.512 LEFT SHOULDER PAIN, UNSPECIFIED CHRONICITY: Primary | ICD-10-CM

## 2019-08-30 PROCEDURE — 99213 OFFICE O/P EST LOW 20 MIN: CPT | Performed by: NURSE PRACTITIONER

## 2019-09-05 ENCOUNTER — OFFICE VISIT (OUTPATIENT)
Dept: FAMILY MEDICINE CLINIC | Facility: CLINIC | Age: 69
End: 2019-09-05

## 2019-09-05 VITALS
OXYGEN SATURATION: 98 % | HEART RATE: 83 BPM | DIASTOLIC BLOOD PRESSURE: 86 MMHG | WEIGHT: 192 LBS | SYSTOLIC BLOOD PRESSURE: 122 MMHG | HEIGHT: 69 IN | RESPIRATION RATE: 16 BRPM | BODY MASS INDEX: 28.44 KG/M2

## 2019-09-05 DIAGNOSIS — F41.8 ANTICIPATORY ANXIETY: Primary | ICD-10-CM

## 2019-09-05 PROCEDURE — 99213 OFFICE O/P EST LOW 20 MIN: CPT | Performed by: NURSE PRACTITIONER

## 2019-09-05 RX ORDER — ALPRAZOLAM 0.25 MG/1
0.25 TABLET ORAL 2 TIMES DAILY PRN
Qty: 12 TABLET | Refills: 0 | Status: SHIPPED | OUTPATIENT
Start: 2019-09-05 | End: 2020-08-07

## 2019-09-05 NOTE — PROGRESS NOTES
Subjective   Juany Carlin is a 69 y.o. female.     Chief Complaint   Patient presents with   • Anxiety     associated with traveling     Ms. Carlin presents today to get something for her anxiety. She states she becomes very anxious when traveling especially in a car for long periods of time. She reports she and her  are driving to [place] next week and she becomes very anxious when she travels. She has tried OTC products such as Dramamine but that does nothing for calming her down.     I have reviewed the patient's medical history in detail and updated the computerized patient record.     The following portions of the patient's history were reviewed and updated as appropriate: allergies, current medications, past family history, past medical history, past social history, past surgical history and problem list.       Current Outpatient Medications:   •  apixaban (ELIQUIS) 2.5 MG tablet tablet, Take 1 tablet by mouth Every 12 (Twelve) Hours., Disp: 180 tablet, Rfl: 3  •  B Complex-Biotin-FA (B-100 COMPLEX PO), Take 1 tablet by mouth Daily., Disp: , Rfl:   •  CALCIUM PO, Take 600 mg by mouth Daily., Disp: , Rfl:   •  chlorthalidone (HYGROTON) 25 MG tablet, TAKE ONE TABLET BY MOUTH DAILY, Disp: 90 tablet, Rfl: 1  •  Cholecalciferol (VITAMIN D3) 2000 UNITS tablet, Take 1 tablet by mouth Daily., Disp: , Rfl:   •  ciclopirox (PENLAC) 8 % solution, , Disp: , Rfl:   •  CRANBERRY PO, Take 1 tablet by mouth Daily., Disp: , Rfl:   •  diclofenac (VOLTAREN) 1 % gel gel, Apply 4 g topically to the appropriate area as directed 3 (Three) Times a Day., Disp: 100 g, Rfl: 1  •  famotidine (PEPCID) 10 MG tablet, Take 20 mg by mouth Every Night., Disp: , Rfl:   •  losartan (COZAAR) 100 MG tablet, TAKE ONE TABLET BY MOUTH DAILY, Disp: 90 tablet, Rfl: 3  •  Magnesium 250 MG tablet, Take 2 tablets by mouth Daily., Disp: , Rfl:   •  Multiple Vitamins-Minerals (MULTIVITAMIN PO), Take 1 tablet by mouth Daily., Disp: , Rfl:   •  Omega-3  "Fatty Acids (FISH OIL) 1000 MG capsule capsule, Take 1,000 mg by mouth Daily With Breakfast., Disp: , Rfl:   •  ALPRAZolam (XANAX) 0.25 MG tablet, Take 1 tablet by mouth 2 (Two) Times a Day As Needed for Anxiety., Disp: 12 tablet, Rfl: 0    Review of Systems   Constitutional: Negative.    Respiratory: Negative.    Cardiovascular: Negative.    Psychiatric/Behavioral: The patient is nervous/anxious (when traveling).         Vitals:    09/05/19 1128   BP: 122/86   BP Location: Right arm   Patient Position: Sitting   Cuff Size: Adult   Pulse: 83   Resp: 16   SpO2: 98%   Weight: 87.1 kg (192 lb)   Height: 175.3 cm (69\")       Objective   Physical Exam   Constitutional: She is oriented to person, place, and time. She appears well-developed and well-nourished.   Neurological: She is alert and oriented to person, place, and time.   Skin: Skin is warm and dry.   Psychiatric:   No acute distress   Vitals reviewed.        Assessment/Plan   Juany was seen today for anxiety.    Diagnoses and all orders for this visit:    Anticipatory anxiety    Other orders  -     ALPRAZolam (XANAX) 0.25 MG tablet; Take 1 tablet by mouth 2 (Two) Times a Day As Needed for Anxiety.      Ms. Carlin presents today to get medication she can take for her anxiety while she travels. She is to take Alprazolam 0.25 mg as needed twice a day as needed while she is traveling.   She is to follow up with her new PCP as scheduled next month.          "

## 2019-09-10 NOTE — PROGRESS NOTES
Subjective   Juany Carlin is a 69 y.o. female.     Chief Complaint   Patient presents with   • Shoulder Pain     Lt     Ms. Carlin presents today with complaints of left shoulder pain that has been going on for at least a month.  She denies injury and reports it is painful to at times with movement. She is here because she is limited in the use of OTC pain medications due to being on Elequis. She reports that Acetaminophen does not always help to relieve the pain.  She does have a history of cervical rediculopathy.     I have reviewed the patient's medical history in detail and updated the computerized patient record.     The following portions of the patient's history were reviewed and updated as appropriate: allergies, current medications, past family history, past medical history, past social history, past surgical history and problem list.      Current Outpatient Medications:   •  apixaban (ELIQUIS) 2.5 MG tablet tablet, Take 1 tablet by mouth Every 12 (Twelve) Hours., Disp: 180 tablet, Rfl: 3  •  B Complex-Biotin-FA (B-100 COMPLEX PO), Take 1 tablet by mouth Daily., Disp: , Rfl:   •  CALCIUM PO, Take 600 mg by mouth Daily., Disp: , Rfl:   •  chlorthalidone (HYGROTON) 25 MG tablet, TAKE ONE TABLET BY MOUTH DAILY, Disp: 90 tablet, Rfl: 1  •  Cholecalciferol (VITAMIN D3) 2000 UNITS tablet, Take 1 tablet by mouth Daily., Disp: , Rfl:   •  ciclopirox (PENLAC) 8 % solution, , Disp: , Rfl:   •  CRANBERRY PO, Take 1 tablet by mouth Daily., Disp: , Rfl:   •  famotidine (PEPCID) 10 MG tablet, Take 20 mg by mouth Every Night., Disp: , Rfl:   •  losartan (COZAAR) 100 MG tablet, TAKE ONE TABLET BY MOUTH DAILY, Disp: 90 tablet, Rfl: 3  •  Magnesium 250 MG tablet, Take 2 tablets by mouth Daily., Disp: , Rfl:   •  Multiple Vitamins-Minerals (MULTIVITAMIN PO), Take 1 tablet by mouth Daily., Disp: , Rfl:   •  ALPRAZolam (XANAX) 0.25 MG tablet, Take 1 tablet by mouth 2 (Two) Times a Day As Needed for Anxiety., Disp: 12 tablet,  Rfl: 0  •  diclofenac (VOLTAREN) 1 % gel gel, Apply 4 g topically to the appropriate area as directed 3 (Three) Times a Day., Disp: 100 g, Rfl: 1  •  Omega-3 Fatty Acids (FISH OIL) 1000 MG capsule capsule, Take 1,000 mg by mouth Daily With Breakfast., Disp: , Rfl:      Review of Systems   Constitutional: Negative.    Musculoskeletal: Positive for arthralgias (of left shoulder). Negative for joint swelling, myalgias, neck pain and neck stiffness.   Skin: Negative.    Neurological: Negative.  Negative for weakness and numbness.       Objective    Vitals:    08/30/19 1401   BP: 120/70   Pulse: 89   SpO2: 96%     Physical Exam   Constitutional: She is oriented to person, place, and time. She appears well-developed and well-nourished.   Neck: Normal range of motion.   Cardiovascular: Normal rate, regular rhythm, normal heart sounds and intact distal pulses.   Pulmonary/Chest: Effort normal and breath sounds normal.   Musculoskeletal:        Left shoulder: She exhibits decreased range of motion. She exhibits no tenderness, no swelling, no effusion, no pain, no spasm and normal strength.        Cervical back: Normal.   Neurological: She is alert and oriented to person, place, and time.   Skin: Skin is warm and dry.   Psychiatric:   No acute distress   Vitals reviewed.        Assessment/Plan   Juany was seen today for shoulder pain.    Diagnoses and all orders for this visit:    Left shoulder pain, unspecified chronicity    Other orders  -     diclofenac (VOLTAREN) 1 % gel gel; Apply 4 g topically to the appropriate area as directed 3 (Three) Times a Day.      Ms. Carlin presents today with complaints of left shoulder pain. She is unable to take oral NSAIDs due to being on Eliquis. She is wanting another alternative to help with the pain other than acetaminophen. I will start her on Diclofenac 1 % gel to apply 4 times a day to the affected area. She is to follow up with her new PCP if her symptoms worsen or do not resolve.

## 2019-09-11 ENCOUNTER — OFFICE VISIT (OUTPATIENT)
Dept: ORTHOPEDIC SURGERY | Facility: CLINIC | Age: 69
End: 2019-09-11

## 2019-09-11 VITALS — BODY MASS INDEX: 27.99 KG/M2 | TEMPERATURE: 97.9 F | WEIGHT: 189 LBS | HEIGHT: 69 IN

## 2019-09-11 DIAGNOSIS — M25.512 LEFT SHOULDER PAIN, UNSPECIFIED CHRONICITY: Primary | ICD-10-CM

## 2019-09-11 PROCEDURE — 99214 OFFICE O/P EST MOD 30 MIN: CPT | Performed by: ORTHOPAEDIC SURGERY

## 2019-09-11 PROCEDURE — 73030 X-RAY EXAM OF SHOULDER: CPT | Performed by: ORTHOPAEDIC SURGERY

## 2019-09-11 PROCEDURE — 20610 DRAIN/INJ JOINT/BURSA W/O US: CPT | Performed by: ORTHOPAEDIC SURGERY

## 2019-09-11 RX ADMIN — METHYLPREDNISOLONE ACETATE 80 MG: 80 INJECTION, SUSPENSION INTRA-ARTICULAR; INTRALESIONAL; INTRAMUSCULAR; SOFT TISSUE at 09:05

## 2019-09-11 NOTE — PROGRESS NOTES
Patient: Juany Carlin    YOB: 1950    Medical Record Number: [id number]    Chief Complaints:   Left shoulder pain    History of Present Illness:     69 y.o. female patient who comes in today for her left shoulder.  She reports a 3-week history of pain.  She does not recall any injury, precipitating event or factor.  Pain is moderate, constant and aching.  She gets occasional stabbing pains as well as occasional clicking and popping.  She has not noticed any alleviating factors.  Symptoms are somewhat worse with reaching and lifting.    Allergies:   Allergies   Allergen Reactions   • Ciprocinonide [Fluocinolone] Other (See Comments)     Achilles tendonitis   • Suprax [Cefixime] Diarrhea   • Contrast Dye Rash     Rash many years ago when shell-fish derived contrast dye was used       Home Medications:    Current Outpatient Medications:   •  ALPRAZolam (XANAX) 0.25 MG tablet, Take 1 tablet by mouth 2 (Two) Times a Day As Needed for Anxiety., Disp: 12 tablet, Rfl: 0  •  apixaban (ELIQUIS) 2.5 MG tablet tablet, Take 1 tablet by mouth Every 12 (Twelve) Hours., Disp: 180 tablet, Rfl: 3  •  B Complex-Biotin-FA (B-100 COMPLEX PO), Take 1 tablet by mouth Daily., Disp: , Rfl:   •  CALCIUM PO, Take 600 mg by mouth Daily., Disp: , Rfl:   •  chlorthalidone (HYGROTON) 25 MG tablet, TAKE ONE TABLET BY MOUTH DAILY, Disp: 90 tablet, Rfl: 1  •  Cholecalciferol (VITAMIN D3) 2000 UNITS tablet, Take 1 tablet by mouth Daily., Disp: , Rfl:   •  ciclopirox (PENLAC) 8 % solution, , Disp: , Rfl:   •  CRANBERRY PO, Take 1 tablet by mouth Daily., Disp: , Rfl:   •  diclofenac (VOLTAREN) 1 % gel gel, Apply 4 g topically to the appropriate area as directed 3 (Three) Times a Day., Disp: 100 g, Rfl: 1  •  famotidine (PEPCID) 10 MG tablet, Take 20 mg by mouth Every Night., Disp: , Rfl:   •  losartan (COZAAR) 100 MG tablet, TAKE ONE TABLET BY MOUTH DAILY, Disp: 90 tablet, Rfl: 3  •  Magnesium 250 MG tablet, Take 2 tablets by mouth  Daily., Disp: , Rfl:   •  Multiple Vitamins-Minerals (MULTIVITAMIN PO), Take 1 tablet by mouth Daily., Disp: , Rfl:   •  Omega-3 Fatty Acids (FISH OIL) 1000 MG capsule capsule, Take 1,000 mg by mouth Daily With Breakfast., Disp: , Rfl:     Past Medical History:   Diagnosis Date   • Adrenal nodule (CMS/HCC)    • Arthritis    • BPPV (benign paroxysmal positional vertigo) 5/10/2016   • Cervical stenosis of spine    • Chronic pain disorder    • DVT, lower extremity (CMS/HCC)     right   • GERD without esophagitis 5/10/2016   • Head injury 03/2018    [facility]P HEMATOMA, [facility]   • HTN (hypertension), benign 5/10/2016   • Neck pain    • Neuropathy    • PONV (postoperative nausea and vomiting)    • Pulmonary emboli (CMS/HCC)     bilateral extensive PE with saddle emboli.   • Urge incontinence 5/10/2016   • Vitamin D deficiency 5/10/2016       Past Surgical History:   Procedure Laterality Date   • CARDIAC CATHETERIZATION N/A 11/10/2016    Procedure: Right Heart Cath;  Surgeon: Johnna Agrawal MD;  Location:  [facility];  Service:    • CARDIAC CATHETERIZATION N/A 11/10/2016    Procedure: Thrombolytic Therapy;  Surgeon: Johnna Agrawal MD;  Location:  [facility];  Service:    • CATARACT EXTRACTION Bilateral    • COLONOSCOPY W/ BIOPSIES  11/2013    hyperplastic polyp   • DILATATION AND CURETTAGE     • EPIDURAL BLOCK     • HEMORROIDECTOMY  2013   • HYSTERECTOMY  2001   • HYSTERECTOMY     • INTERVENTIONAL RADIOLOGY PROCEDURE Bilateral 11/10/2016    Procedure: Sp Sm[facility] Cath Place Pulmonary Art;  Surgeon: Johnna Agrawal MD;  Location:  [facility];  Service:    • VAGINAL DELIVERY  1985 and 1990   • VEIN SURGERY  [id number]    leg   • VENTRAL HERNIA REPAIR  1987       Social History     Occupational History   • Occupation:      Employer: RETIRED     Comment: [employer]   Tobacco Use   • Smoking status: Former Smoker     Packs/day: 0.50     Years: 10.00     Pack years:  "5.00     Types: Cigarettes   • Smokeless tobacco: Never Used   Substance and Sexual Activity   • Alcohol use: Yes     Alcohol/week: 1.2 oz     Types: 2 Glasses of wine per week   • Drug use: No   • Sexual activity: Defer      Social History     Social History Narrative    LIVES WITH SPOUSE       Family History   Problem Relation Age of Onset   • Macular degeneration Mother    • Deep vein thrombosis Mother    • Heart failure Father    • Deep vein thrombosis Father    • Chiari malformation Sister    • Heart disease Maternal Grandfather    • Heart disease Paternal Grandfather    • Deep vein thrombosis Sister    • Heart failure Sister    • Pancreatic cancer Sister        Review of Systems:      Constitutional: Denies fever, shaking or chills   Eyes: Denies change in visual acuity   HEENT: Denies nasal congestion or sore throat   Respiratory: Denies cough or shortness of breath   Cardiovascular: Denies chest pain or edema  Endocrine: Denies tremors, palpitations, intolerance of heat or cold, polyuria, polydipsia.  GI: Denies abdominal pain, nausea, vomiting, bloody stools or diarrhea  : Denies frequency, urgency, incontinence, retention, or nocturia.  Musculoskeletal: Denies numbness, tingling or loss of motor function except as above  Integument: Denies rash, lesion or ulceration   Neurologic: Denies headache or focal weakness, deficits  Heme: Denies spontaneous or excessive bleeding, epistaxis, hematuria, melena, fatigue, enlarged or tender lymph nodes.      All other pertinent positives and negatives as noted above in HPI.    Physical Exam: 69 y.o. female    Vitals:    09/11/19 0832   Temp: 97.9 °F (36.6 °C)   TempSrc: Temporal   Weight: 85.7 kg (189 lb)   Height: 175.3 cm (69\")     General:  Patient is awake and alert.  Appears in no acute distress or discomfort.    Psych:  Affect and demeanor are appropriate.    Eyes:  Conjunctiva and sclera appear grossly normal.  Eyes track well and EOM seem to be " intact.    Ears:  No gross abnormalities.  Hearing adequate for the exam.    Cardiovascular:  Regular rate and rhythm.    Lungs:  Good chest expansion.  Breathing unlabored.    Lymph:  No palpable adenopathy about neck or axilla.    Neck:  Supple.  Normal ROM.  Negative Spurling's for shoulder or arm pain.    Left upper extremity:  Skin is benign.  No gross abnormalities on inspection including any atrophy, swellings, or masses.  No palpable masses or adenopathy.  Mild tenderness over the acromioclavicular joint.  Full shoulder motion.  No evident instability or apprehension.  Positive Mcdermott.  Mild discomfort with and active compression maneuver.  Negative Speeds and Yergasons maneuver.  Negative belly press maneuver.  Some discomfort and subtle weakness with elevation in the scapular plane.  Good strength with internal and external rotation.  Good strength in the deltoid, biceps, triceps, and .  Intact sensation throughout the arm.  Brisk cap refill.  Palpable radial pulse.  Good skin turgor.         Radiology:   AP, scapular Y, and axillary views of the left shoulder are ordered by myself and reviewed to evaluate the patient's complaint.  No comparison films are immediately available.  The x-rays show mild to moderate acromioclavicular arthritis.  There are no obvious acute abnormalities, lesions, masses, significant glenohumeral degenerative changes, or other concerning findings.  The acromiohumeral interval is normal.  Glenoid version appears normal as well.    Assessment/Plan:   Left shoulder impingement and mildly symptomatic acromioclavicular arthritis    Discussed options in detail including conservative versus surgical options. I have recommended that we start with a conservative approach. With regards to conservative options, we discussed appropriate activity modifications, icing as needed, anti-inflammatories, physical therapy, and injections.  Patient has acknowledged understanding of this  information and elected for an injection.  The risks, benefits and alternatives were discussed.  She consented and the injection was performed as described below without complication.  I have also referred her to therapy.  I recommended that she follow-up with me if the symptoms persist or recur.    Andrea Farmer MD    09/11/2019    CC to Garo Bravo MD    Large Joint Arthrocentesis: L subacromial bursa  Date/Time: 9/11/2019 9:05 AM  Consent given by: patient  Site marked: site marked  Timeout: Immediately prior to procedure a time out was called to verify the correct patient, procedure, equipment, support staff and site/side marked as required   Supporting Documentation  Indications: pain and joint swelling   Procedure Details  Location: shoulder - L subacromial bursa  Preparation: Patient was prepped and draped in the usual sterile fashion  Needle gauge: 21g.  Approach: posterior  Medications administered: 80 mg methylPREDNISolone acetate 80 MG/ML; 4 mL lidocaine (cardiac)  Patient tolerance: patient tolerated the procedure well with no immediate complications

## 2019-09-12 RX ORDER — METHYLPREDNISOLONE ACETATE 80 MG/ML
80 INJECTION, SUSPENSION INTRA-ARTICULAR; INTRALESIONAL; INTRAMUSCULAR; SOFT TISSUE
Status: COMPLETED | OUTPATIENT
Start: 2019-09-11 | End: 2019-09-11

## 2019-09-30 ENCOUNTER — HOSPITAL ENCOUNTER (OUTPATIENT)
Dept: PHYSICAL THERAPY | Facility: HOSPITAL | Age: 69
Setting detail: THERAPIES SERIES
Discharge: HOME | End: 2019-09-30

## 2019-09-30 DIAGNOSIS — M25.512 ACUTE PAIN OF LEFT SHOULDER: Primary | ICD-10-CM

## 2019-09-30 DIAGNOSIS — R29.898 SHOULDER WEAKNESS: ICD-10-CM

## 2019-10-02 ENCOUNTER — HOSPITAL ENCOUNTER (OUTPATIENT)
Dept: PHYSICAL THERAPY | Facility: HOSPITAL | Age: 69
Setting detail: THERAPIES SERIES
Discharge: HOME | End: 2019-10-02

## 2019-10-02 DIAGNOSIS — R29.898 SHOULDER WEAKNESS: ICD-10-CM

## 2019-10-02 DIAGNOSIS — M25.512 ACUTE PAIN OF LEFT SHOULDER: Primary | ICD-10-CM

## 2019-10-02 PROCEDURE — 97140 MANUAL THERAPY 1/> REGIONS: CPT

## 2019-10-02 PROCEDURE — 97110 THERAPEUTIC EXERCISES: CPT

## 2019-10-02 NOTE — THERAPY TREATMENT NOTE
Outpatient Physical Therapy Ortho Treatment Note  [facility]     Patient Name: Juany Carlin  : 1950  MRN: [id number]  Today's Date: 10/2/2019      Visit Date: 10/02/2019    Visit Dx:    ICD-10-CM ICD-9-CM   1. Acute pain of left shoulder M25.512 719.41   2. Shoulder weakness R29.898 719.61       Patient Active Problem List   Diagnosis   • HTN (hypertension), benign   • BPPV (benign paroxysmal positional vertigo)   • Vitamin D deficiency   • Urge incontinence   • GERD without esophagitis   • Cervical spinal stenosis   • Saddle pulmonary embolus (CMS/HCC)   • Family history of thrombosis   • Pulmonary embolism with acute cor pulmonale (CMS/HCC)   • Arthritis   • Degeneration of cervical intervertebral disc   • Adrenal nodule (CMS/HCC)        Past Medical History:   Diagnosis Date   • Adrenal nodule (CMS/HCC)    • Arthritis    • BPPV (benign paroxysmal positional vertigo) 5/10/2016   • Cervical stenosis of spine    • Chronic pain disorder    • DVT, lower extremity (CMS/HCC)     right   • GERD without esophagitis 5/10/2016   • Head injury 2018    SC[facility] HEMATOMA, [facility]   • HTN (hypertension), benign 5/10/2016   • Neck pain    • Neuropathy    • PONV (postoperative nausea and vomiting)    • Pulmonary emboli (CMS/HCC)     bilateral extensive PE with saddle emboli.   • Urge incontinence 5/10/2016   • Vitamin D deficiency 5/10/2016        Past Surgical History:   Procedure Laterality Date   • CARDIAC CATHETERIZATION N/A 11/10/2016    Procedure: Right Heart Cath;  Surgeon: Johnna Agrawal MD;  Location: [facility] INVASIVE LOCATION;  Service:    • CARDIAC CATHETERIZATION N/A 11/10/2016    Procedure: Thrombolytic Therapy;  Surgeon: Johnna Agrawal MD;  Location:  [facility] CATH INVASIVE LOCATION;  Service:    • CATARACT EXTRACTION Bilateral    • COLONOSCOPY W/ BIOPSIES  2013    hyperplastic polyp   • DILATATION AND CURETTAGE     • EPIDURAL BLOCK     • HEMORROIDECTOMY     • HYSTERECTOMY     •  HYSTERECTOMY     • INTERVENTIONAL RADIOLOGY PROCEDURE Bilateral 11/10/2016    Procedure: Sp [facility] Cath Place Pulmonary Art;  Surgeon: Johnna Agrawal MD;  Location:  [facility] CATH INVASIVE LOCATION;  Service:    • VAGINAL DELIVERY  1985 and 1990   • VEIN SURGERY  [id number]    leg   • VENTRAL HERNIA REPAIR  1987       PT Ortho     Row Name 09/30/19 0800       Posture/Observations    Alignment Options  Cervical lordosis;Rounded shoulders  -RS    Cervical Lordosis  Moderate  -RS    Rounded Shoulders  Moderate  -RS       Cervical/Shoulder ROM Screen    Cervical flexion  Normal  -RS    Cervical extension  Normal no tingling or pain, approx 70% WNL  -RS    Cervical lateral flexion  Impaired decreased ROM bbilaterally by 50%, has increased stretch in   -RS    Cervical rotation  Impaired 40% rotation L, 70% R, increased upper trap stretch no tingl  -RS       Special Tests/Palpation    Special Tests/Palpation  Shoulder  -RS       Shoulder Girdle Accessory Motions    Shoulder Girdle Accessory Motions Tested?  Yes  -RS    Posterior glide of humerus  Left:;Hypomobile;Left pain  -RS    Inferior glide of humerus  Left:;Hypomobile  -RS    A-P glide of AC joint  Left:;Hypomobile;Left pain  -RS    A-P glide of SC joint  Left:;Hypomobile;Left pain  -RS       Shoulder Impingement/Rotator Cuff Special Tests    Mcdermott-Pwaan Test (RC Lesion vs. Bursitis)  Left:;Positive  -RS    Neer Impingement Test (RC Lesion vs. Bursitis)  Left:;Positive  -RS    Drop Arm Test (Full Thickness RC Lesion)  Left:;Negative  -RS    Lift-Off Test (Subscapularis Lesion)  Left:;Positive painful/weakunable to lift  -RS    Belly Press (Subscapularis Lesion)  Left:;Negative  -RS    Speed's Test (LH of Biceps Lesion)  Left:;Negative  -RS    Yergason Test (LH of Biceps Lesion)  Left:;Negative  -RS       Shoulder Girdle Palpation    Shoulder Girdle Palpation?  Yes  -RS    Supraspinatus Insertion  Left:;Tender  -RS    AC Joint  Left:;Tender  -RS    Infraspinatus   Left:;Tender  -RS       General ROM    LT Upper Ext  Lt Shoulder ABduction;Lt Shoulder Flexion;Lt Shoulder External Rotation;Lt Shoulder Internal Rotation  -RS    GENERAL ROM COMMENTS  R UE WNL based on observed functional mobility  -RS       Left Upper Ext    Lt Shoulder Abduction AROM  0-160 with elbow flexed and reports of pain  -RS    Lt Shoulder Flexion AROM  0-180 with elbow flexed and reports of pain  -RS    Lt Shoulder External Rotation AROM  0-80  -RS    Lt Shoulder Internal Rotation AROM  0-55 with pain  -RS       MMT (Manual Muscle Testing)    Rt Upper Ext  Rt Shoulder Flexion;Rt Shoulder ABduction;Rt Shoulder Internal Rotation;Rt Shoulder External Rotation;Rt Elbow/Forearm WNL  -RS    Lt Upper Ext  Lt Shoulder Flexion;Lt Shoulder ABduction;Lt Shoulder Internal Rotation;Lt Shoulder External Rotation;Lt Elbow Extension;Lt Elbow Flexion  -RS       MMT Right Upper Ext    Rt Shoulder Flexion MMT, Gross Movement  (4+/5) good plus  -RS    Rt Shoulder ABduction MMT, Gross Movement  (4/5) good  -RS    Rt Shoulder Internal Rotation MMT, Gross Movement  (4+/5) good plus  -RS    Rt Shoulder External Rotation MMT, Gross Movement  (4+/5) good plus  -RS       MMT Left Upper Ext    Lt Shoulder Flexion MMT, Gross Movement  (4-/5) good minus  -RS    Lt Shoulder ABduction MMT, Gross Movement  (3+/5) fair plus  -RS    Lt Shoulder Internal Rotation MMT, Gross Movement  (4/5) good  -RS    Lt Shoulder External Rotation MMT, Gross Movement  (4-/5) good minus  -RS    Lt Elbow Flexion MMT, Gross Movement  (4+/5) good plus  -RS    Lt Elbow Extension MMT, Gross Movement  (4+/5) good plus  -RS       Sensation    Sensation WNL?  WNL  -RS      User Key  (r) = Recorded By, (t) = Taken By, (c) = Cosigned By    Initials Name Provider Type    RS Melissa Booth, PT Physical Therapist                      PT Assessment/Plan     Row Name 10/02/19 1000          PT Assessment    Assessment Comments  Ms. Carlin returns for first follow up  after initial eval with reports of some pain withs/l ER, reviewed technique and instructed to perfor in a smaller ROM with good tolerance. Added scap clock, supie HA, shoulder extension, low load/ long duration ER/IR isometrics with good tolerance. Also, initiated manual therapy focused on posterior glide of GH joint with good tolerance.  -RS        PT Plan    PT Plan Comments  Update HEP, assess tolerance for HEP progression and manual therapy.  -RS       User Key  (r) = Recorded By, (t) = Taken By, (c) = Cosigned By    Initials Name Provider Type    RS Melissa Booth, PT Physical Therapist            OP Exercises     Row Name 10/02/19 1000 10/02/19 0900          Subjective Comments    Subjective Comments  The pt reports she had marlo pain uring the rtating exercise.  -RS  --        Subjective Pain    Able to rate subjective pain?  yes  -RS  --     Pre-Treatment Pain Level  3  -RS  --        Total Minutes    27637 - PT Therapeutic Exercise Minutes  25  -RS  --     95736 - PT Manual Therapy Minutes  --  15  -RS        Exercise 1    Exercise Name 1  s/l ER  -RS  --     Cueing 1  Verbal  -RS  --     Sets 1  2  -RS  --     Reps 1  10  -RS  --     Additional Comments  small ROM  -RS  --        Exercise 2    Exercise Name 2  rows  -RS  --     Cueing 2  Verbal  -RS  --     Reps 2  10  -RS  --     Additional Comments  RTB  -RS  --        Exercise 3    Exercise Name 3  shoulder rolls  -RS  --     Cueing 3  Demo  -RS  --     Reps 3  15  -RS  --        Exercise 4    Exercise Name 4  chin tuck  -RS  --     Cueing 4  Verbal  -RS  --     Reps 4  10  -RS  --     Time 4  5s  -RS  --        Exercise 5    Exercise Name 5  supine HA  -RS  --     Cueing 5  Demo;Verbal  -RS  --     Reps 5  10  -RS  --     Additional Comments  YTB  -RS  --        Exercise 6    Exercise Name 6  s/l scap clock  -RS  --     Cueing 6  Tactile;Verbal  -RS  --     Reps 6  5 CW/CCW  -RS  --        Exercise 7    Exercise Name 7  shoulder extension with TB  -RS   --     Cueing 7  Demo  -RS  --     Reps 7  10  -RS  --     Additional Comments  RTB  -RS  --        Exercise 8    Exercise Name 8  ER/IR isometric walkout with TB  -RS  --     Cueing 8  Demo  -RS  --     Reps 8  4 laps each  -RS  --     Additional Comments  RTB  -RS  --       User Key  (r) = Recorded By, (t) = Taken By, (c) = Cosigned By    Initials Name Provider Type    RS Melissa Booth, PT Physical Therapist                      Manual Rx (last 36 hours)      Manual Treatments     Row Name 10/02/19 0900             Total Minutes    68934 - PT Manual Therapy Minutes  15  -RS         Manual Rx 1    Manual Rx 1 Location  sidelying scapulothoracic mobs   -RS      Manual Rx 1 Type  post/ inferior  -RS         Manual Rx 2    Manual Rx 2 Location  ST pecs, supra ifra  -RS         Manual Rx 3    Manual Rx 3 Location  posterior GH mobs  -RS         Manual Rx 4    Manual Rx 4 Location  sup/inf clavicular mobs  -RS        User Key  (r) = Recorded By, (t) = Taken By, (c) = Cosigned By    Initials Name Provider Type    RS Melissa Booth, PT Physical Therapist          PT OP Goals     Row Name 10/02/19 1000          PT Short Term Goals    STG Date to Achieve  10/21/19  -RS     STG 1  Patient will be independent with initial HEP.  -RS     STG 1 Progress  Ongoing  -RS     STG 2  Patient will report >/=at least 70% improvement in sleeping for improved QOL.  -RS     STG 2 Progress  Ongoing  -RS     STG 3  The pt will demonstrate L shoulder AROM 0-160 flex and abduction without pain for improved functional reach.  -RS     STG 3 Progress  Ongoing  -RS        Long Term Goals    LTG Date to Achieve  11/24/19  -RS     LTG 1  Patient will be independent with progressive HEP for long term management of current condition.  -RS     LTG 1 Progress  Ongoing  -RS     LTG 2  The pt will score less than or equal to 20 disability on the QuickDASH to indicate improedperceived performance of ADLs.  -RS     LTG 2 Progress  Ongoing  -RS      LTG 3  The pt will return to self care ADLs including dressing, donning bra, and washing hair with pain no greater than 2/10 .  -RS     LTG 3 Progress  Ongoing  -RS     LTG 4  The pt will demonstrate L shoulder strength to at least 4/5 for improved functional strength and reach.  -RS     LTG 4 Progress  Ongoing  -RS       User Key  (r) = Recorded By, (t) = Taken By, (c) = Cosigned By    Initials Name Provider Type    RS Melissa Booth, PT Physical Therapist                         Time Calculation:   Start Time: 1000  Stop Time: 1045  Time Calculation (min): 45 min  Therapy Charges for Today     Code Description Service Date Service Provider Modifiers Qty    [id number]  PT THER PROC EA 15 MIN 10/2/2019 Melissa Booth, PT GP 2    [id number]  PT MANUAL THERAPY EA 15 MIN 10/2/2019 Melissa Booth, PT GP 1                    Melissa Booth PT  10/2/2019

## 2019-10-03 ENCOUNTER — OFFICE VISIT (OUTPATIENT)
Dept: INTERNAL MEDICINE | Facility: CLINIC | Age: 69
End: 2019-10-03

## 2019-10-03 VITALS
BODY MASS INDEX: 27.99 KG/M2 | HEIGHT: 69 IN | RESPIRATION RATE: 14 BRPM | WEIGHT: 189 LBS | DIASTOLIC BLOOD PRESSURE: 84 MMHG | SYSTOLIC BLOOD PRESSURE: 128 MMHG

## 2019-10-03 DIAGNOSIS — K21.9 GERD WITHOUT ESOPHAGITIS: ICD-10-CM

## 2019-10-03 DIAGNOSIS — I10 HTN (HYPERTENSION), BENIGN: ICD-10-CM

## 2019-10-03 DIAGNOSIS — Z00.00 HEALTH CARE MAINTENANCE: Primary | ICD-10-CM

## 2019-10-03 DIAGNOSIS — Z23 NEED FOR VACCINATION: ICD-10-CM

## 2019-10-03 DIAGNOSIS — E55.9 VITAMIN D DEFICIENCY: ICD-10-CM

## 2019-10-03 DIAGNOSIS — E83.42 HYPOMAGNESEMIA: ICD-10-CM

## 2019-10-03 PROBLEM — M17.0 PRIMARY OSTEOARTHRITIS OF BOTH KNEES: Status: ACTIVE | Noted: 2019-10-03

## 2019-10-03 PROBLEM — M19.012 LOCALIZED OSTEOARTHRITIS OF LEFT SHOULDER: Status: ACTIVE | Noted: 2019-10-03

## 2019-10-03 PROBLEM — M19.90 ARTHRITIS: Status: RESOLVED | Noted: 2018-05-04 | Resolved: 2019-10-03

## 2019-10-03 PROBLEM — I26.09 PULMONARY EMBOLISM WITH ACUTE COR PULMONALE: Status: RESOLVED | Noted: 2017-06-26 | Resolved: 2019-10-03

## 2019-10-03 PROCEDURE — 90662 IIV NO PRSV INCREASED AG IM: CPT | Performed by: INTERNAL MEDICINE

## 2019-10-03 PROCEDURE — 99397 PER PM REEVAL EST PAT 65+ YR: CPT | Performed by: INTERNAL MEDICINE

## 2019-10-03 PROCEDURE — 96160 PT-FOCUSED HLTH RISK ASSMT: CPT | Performed by: INTERNAL MEDICINE

## 2019-10-03 PROCEDURE — G0008 ADMIN INFLUENZA VIRUS VAC: HCPCS | Performed by: INTERNAL MEDICINE

## 2019-10-03 PROCEDURE — G0439 PPPS, SUBSEQ VISIT: HCPCS | Performed by: INTERNAL MEDICINE

## 2019-10-03 NOTE — PROGRESS NOTES
"Subjective   Juany Carlin is a 69 y.o. female.     History of Present Illness     Juany Carlin 69 y.o. female who is here to establish as a new patient. In a past had been to see .  Past medical and surgical history, family and social history was obtained by me in the interview and recorded in the EHR. In a past patient had been to see another [facility] provider. [facility] records reviewed, discussed with patient, and updated.    /84 (BP Location: Left arm, Patient Position: Sitting, Cuff Size: Adult)   Resp 14   Ht 175.3 cm (69\")   Wt 85.7 kg (189 lb)   BMI 27.91 kg/m²       Current Outpatient Medications:   •  apixaban (ELIQUIS) 2.5 MG tablet tablet, Take 1 tablet by mouth Every 12 (Twelve) Hours., Disp: 180 tablet, Rfl: 3  •  B Complex-Biotin-FA (B-100 COMPLEX PO), Take 1 tablet by mouth Daily., Disp: , Rfl:   •  CALCIUM PO, Take 600 mg by mouth Daily., Disp: , Rfl:   •  chlorthalidone (HYGROTON) 25 MG tablet, TAKE ONE TABLET BY MOUTH DAILY (Patient taking differently: 12.5 mg every other day), Disp: 90 tablet, Rfl: 1  •  Cholecalciferol (VITAMIN D3) 2000 UNITS tablet, Take 1 tablet by mouth Daily., Disp: , Rfl:   •  ciclopirox (PENLAC) 8 % solution, , Disp: , Rfl:   •  CRANBERRY PO, Take 1 tablet by mouth Daily., Disp: , Rfl:   •  famotidine (PEPCID) 10 MG tablet, Take 20 mg by mouth Every Night., Disp: , Rfl:   •  losartan (COZAAR) 100 MG tablet, TAKE ONE TABLET BY MOUTH DAILY, Disp: 90 tablet, Rfl: 3  •  Magnesium 250 MG tablet, Take 2 tablets by mouth Daily., Disp: , Rfl:   •  Multiple Vitamins-Minerals (MULTIVITAMIN PO), Take 1 tablet by mouth Daily., Disp: , Rfl:   •  ALPRAZolam (XANAX) 0.25 MG tablet, Take 1 tablet by mouth 2 (Two) Times a Day As Needed for Anxiety., Disp: 12 tablet, Rfl: 0  /84 (BP Location: Left arm, Patient Position: Sitting, Cuff Size: Adult)   Resp 14   Ht 175.3 cm (69\")   Wt 85.7 kg (189 lb)   BMI 27.91 kg/m²        Patient is being seen for " subsequent wellness visit.  Hospitalizations in a past: 3, none last year.  Once per lifetime Medicare screening tests: ECG - 07/12/2017    AAA risk assessment: 1. FH: none. 2.H/o tobacco smoking: in remote past, as per . 3. CV or PAD: none.    Tobacco use: in remote past, as per    Alcohol use: 2-3 days a week  Illicit drugs use: none  Diet: healthy heart  Exercise: walking, stationary bike and swimming    Anxiety screening: How many days in the last 2 weeks you were  1. Feeling anxious, nervous, on edge: none  2. Unable to stop worrying: none  3. Worrying too much about different things: none  4. Having problems relaxing:none  5. Feeling restless or unable to sit still:none  6. Feeling irritable or easely annoyed: none  7. Being afraid that something awful might happen: none    Depression screening PHQ9:  Over the past 2 weeks how often have you been bothered by  1. Lack of interest or pleasuer in usual activities: none  2. Feeling down, depressed, hopeless:none  3. Troubles falling asleep/sleeping too long:none  4. Feeling tired, having little energy: none  5. Poor appetite or overeating: none  6. Feeling bad about yourself:none.  7. Trouble concentrating: at the baseline.  8. Moving or speaking too slow or much faster than usual: none  9. Thoughts about harming yourself:none.    Cognitive impairment screening:   Do you have difficulties with:  1. Language: no  2. Behavior: no  3. Learning/retaining new information: no  4. Handling complex tasks: no  5. Reasoning: no  6. Spacial ability and orientation: no    Hearing: normal.  Driving: unrestricted  ADL: independent  ADL details: Does patient needs help with:  telephone use: no  Transportation: no  Housework: no  Shopping: no  meal preparation: no  laundry: no  managing medication:no  Participate in the social activities: Y    Fall risk factors:   1.Use of alcohol: no    2.Polypharmacy: no  3.H/o of previous fall:  no  4. Mobility impairment:  no  5. Visual  impairment:  no  6. Deconditioning: no  7. Use of antihypertensive medication:  yes  8. Use of sedatives: no  9. Use of antidepressant: no    Home safety:   1.loose rugs: no   2.unfamiliar environment: no   3. Uneven floors: no   4. No grab bars in the bathroom: yes  5. No banister on stairs: no      Advanced directives: has forms at home, but had never gotten to complete those. Encouraged to discuss with family and complete Living Will. Encouraged to contact us if any questions.    Co-managers: ophthalmology , dermatology  on [place], ortho , neurosurgery . In a past she used to see , but now will let me manage her hypercoagulable state.                                          The following portions of the patient's history were reviewed and updated as appropriate: allergies, current medications, past family history, past medical history, past social history, past surgical history and problem list.    Review of Systems   Constitutional: Negative for chills and fever.   HENT: Negative for postnasal drip, sinus pressure and sore throat.    Eyes: Negative for pain and itching.   Respiratory: Negative for cough and chest tightness.    Cardiovascular: Negative for chest pain and leg swelling.   Gastrointestinal: Negative for abdominal pain and blood in stool.   Endocrine: Negative for cold intolerance and heat intolerance.   Genitourinary: Negative for difficulty urinating and flank pain.   Musculoskeletal: Positive for neck pain. Negative for back pain.   Skin: Negative for color change and rash.   Neurological: Negative for dizziness, weakness and headaches.   Hematological: Negative for adenopathy. Does not bruise/bleed easily.   Psychiatric/Behavioral: Negative for sleep disturbance. The patient is not nervous/anxious.        Objective   Physical Exam   Constitutional: She is oriented to person, place, and time. Vital signs are normal. She appears well-developed  and well-nourished. No distress.   HENT:   Head: Normocephalic and atraumatic.   Right Ear: External ear normal.   Left Ear: External ear normal.   Nose: Nose normal. No mucosal edema. Right sinus exhibits no maxillary sinus tenderness and no frontal sinus tenderness. Left sinus exhibits no maxillary sinus tenderness and no frontal sinus tenderness.   Mouth/Throat: Oropharynx is clear and moist. No oropharyngeal exudate.   Eyes: Conjunctivae, EOM and lids are normal. Pupils are equal, round, and reactive to light. Right eye exhibits no discharge. Left eye exhibits no discharge. Right conjunctiva is not injected. Left conjunctiva is not injected. No scleral icterus. Right eye exhibits normal extraocular motion. Left eye exhibits normal extraocular motion.   Neck: Normal range of motion and full passive range of motion without pain. Neck supple. No JVD present. Carotid bruit is not present. No thyromegaly present.   Cardiovascular: Normal rate, regular rhythm, S1 normal, S2 normal, normal heart sounds and intact distal pulses. PMI is not displaced. Exam reveals no gallop and no friction rub.   No murmur heard.  Pulmonary/Chest: Effort normal and breath sounds normal. No accessory muscle usage. No respiratory distress. She has no decreased breath sounds. She has no wheezes. She has no rhonchi. She has no rales.   Abdominal: Soft. Bowel sounds are normal. She exhibits no distension, no pulsatile liver, no fluid wave, no abdominal bruit, no ascites and no mass. There is no tenderness. There is no rebound and no guarding.   Musculoskeletal: She exhibits no edema or deformity.   Lymphadenopathy:        Head (right side): No submental, no submandibular, no preauricular, no posterior auricular and no occipital adenopathy present.        Head (left side): No submental, no submandibular, no tonsillar, no preauricular, no posterior auricular and no occipital adenopathy present.     She has no cervical adenopathy.        Right  cervical: No superficial cervical, no deep cervical and no posterior cervical adenopathy present.       Left cervical: No superficial cervical, no deep cervical and no posterior cervical adenopathy present.        Right: No supraclavicular adenopathy present.        Left: No supraclavicular adenopathy present.   Neurological: She is alert and oriented to person, place, and time. She has normal strength and normal reflexes. She displays normal reflexes. No cranial nerve deficit. She exhibits normal muscle tone. Coordination normal.   Reflex Scores:       Bicep reflexes are 2+ on the right side and 2+ on the left side.       Patellar reflexes are 2+ on the right side and 2+ on the left side.  Skin: Skin is warm and dry. No rash noted. She is not diaphoretic. No erythema.   Psychiatric: She has a normal mood and affect. Her speech is normal and behavior is normal. Thought content normal.   Vitals reviewed.      Assessment/Plan   Juany was seen today for [facility], medicare wellness-subsequent and annual exam.    Diagnoses and all orders for this visit:    Health care maintenance  -     CBC & Differential; Future  -     Comprehensive Metabolic Panel; Future  -     Lipid Panel; Future  -     TSH; Future  -     Hepatitis C Antibody; Future    HTN (hypertension), benign  -     Comprehensive Metabolic Panel; Future  -     TSH; Future  -     Urinalysis With Microscopic If Indicated (No Culture) - Urine, Clean Catch; Future    Vitamin D deficiency  -     Vitamin D 25 Hydroxy; Future    GERD without esophagitis    Hypomagnesemia  -     Magnesium; Future         Annual wellness visit with preventive exam as well as age and risk appropriate councelling completed.    Cardiovascular disease councelling: current. Recommended: will check cholesterol   Diabetes screening and councelling: current. Recommended: Not at risk for diabetes.  Breast cancer screening: up to date. Recommended frequency and time of the next screening per  GYN..  Osteoporosis screening: up to date. Recommendations per GYN..  Glaucoma screening: up to date.   AAA screening: not needed..  Hep C screening (born between [birth year]40-9966) discussed and recommended, will proceed with testing..  Colorectal cancer screening: up to date. Recommended every 10 years. Next screening is due in 2023..    Immunizations:   1. Influenza vaccine  is up to date and recommended yearly and will be administered today.   2. Pneumococcal vaccines: completed.   3. Shingles prevention: completed.      Advanced directives planning: has forms at home, but had never gotten to complete those. Encouraged to discuss with family and complete Living Will. Encouraged to contact us if any questions..    Medications reviewed and medication list updated.   Potential harmful drug-disease interactions in the elderly:none.  High risk medication in elderly: none  ASA use: anticoagulated.    HTN - well controlled  with current medication. Reminded of the importance of low salt diet. Will check kidney tests and electrolytes. Continue same treatment.  Knee osteoarthritis - she is under the care of ortho, who had given her steroid injections.  Vit D deficiency - will check blood level. Patient had been taking Vit D3 OTC 2000 IU a day.  H/o massive PE/DVT - continue Eliquis.  H/o hypomagnesemia - on Mg supplement. Will check blood level.

## 2019-10-03 NOTE — PATIENT INSTRUCTIONS
Annual wellness visit with preventive exam as well as age and risk appropriate councelling completed.    Cardiovascular disease councelling: current. Recommended: will check cholesterol   Diabetes screening and councelling: current. Recommended: Not at risk for diabetes.  Breast cancer screening: up to date. Recommended frequency and time of the next screening per GYN..  Osteoporosis screening: up to date. Recommendations per GYN..  Glaucoma screening: up to date.   AAA screening: not needed..  Hep C screening (born between 4[birth year]-4435) discussed and recommended, will proceed with testing..  Colorectal cancer screening: up to date. Recommended every 10 years. Next screening is due in 2023..    Immunizations:   1. Influenza vaccine  is up to date and recommended yearly and will be administered today.   2. Pneumococcal vaccines: completed.   3. Shingles prevention: completed.      Advanced directives planning: has forms at home, but had never gotten to complete those. Encouraged to discuss with family and complete Living Will. Encouraged to contact us if any questions..    Medications reviewed and medication list updated.   Potential harmful drug-disease interactions in the elderly:none.  High risk medication in elderly: none  ASA use: anticoagulated.    HTN - well controlled  with current medication. Reminded of the importance of low salt diet. Will check kidney tests and electrolytes. Continue same treatment.  Knee osteoarthritis - she is under the care of ortho, who had given her steroid injections.  Vit D deficiency - will check blood level. Patient had been taking Vit D3 OTC 2000 IU a day.  H/o massive PE/DVT - continue Eliquis.  H/o hypomagnesemia - on Mg supplement. Will check blood level.

## 2019-10-07 ENCOUNTER — LAB (OUTPATIENT)
Dept: INTERNAL MEDICINE | Facility: CLINIC | Age: 69
End: 2019-10-07

## 2019-10-07 DIAGNOSIS — E83.42 HYPOMAGNESEMIA: ICD-10-CM

## 2019-10-07 DIAGNOSIS — Z00.00 HEALTH CARE MAINTENANCE: ICD-10-CM

## 2019-10-07 DIAGNOSIS — E55.9 VITAMIN D DEFICIENCY: ICD-10-CM

## 2019-10-07 DIAGNOSIS — I10 HTN (HYPERTENSION), BENIGN: ICD-10-CM

## 2019-10-07 LAB
BASOPHILS # BLD AUTO: 0.07 10*3/MM3 (ref 0–0.2)
BASOPHILS NFR BLD AUTO: 1.2 % (ref 0–1.5)
BILIRUB UR QL STRIP: NEGATIVE
CLARITY UR: CLEAR
COLOR UR: YELLOW
EOSINOPHIL # BLD AUTO: 0.2 10*3/MM3 (ref 0–0.4)
EOSINOPHIL # BLD AUTO: 3.4 % (ref 0.3–6.2)
ERYTHROCYTE [DISTWIDTH] IN BLOOD BY AUTOMATED COUNT: 11.8 % (ref 12.3–15.4)
GLUCOSE UR STRIP-MCNC: NEGATIVE MG/DL
HCT VFR BLD AUTO: 37.3 % (ref 34–46.6)
HGB BLD-MCNC: 12.4 G/DL (ref 12–15.9)
HGB UR QL STRIP.AUTO: NEGATIVE
IMM GRANULOCYTES # BLD: 0.04 10*3/MM3 (ref 0–0.05)
IMM GRANULOCYTES NFR BLD: 0.7 % (ref 0–0.5)
KETONES UR QL STRIP: NEGATIVE
LEUKOCYTE ESTERASE UR QL STRIP.AUTO: NEGATIVE
LYMPHOCYTES # BLD AUTO: 2.22 10*3/MM3 (ref 0.7–3.1)
LYMPHOCYTES NFR BLD AUTO: 37.9 % (ref 19.6–45.3)
MCH RBC QN AUTO: 30.9 PG (ref 26.6–33)
MCHC RBC AUTO-ENTMCNC: 33.2 G/DL (ref 31.5–35.7)
MCV RBC AUTO: 93 FL (ref 79–97)
MONOCYTES # BLD AUTO: 0.63 10*3/MM3 (ref 0.1–0.9)
MONOCYTES NFR BLD AUTO: 10.8 % (ref 5–12)
NEUTROPHILS # BLD AUTO: 2.69 10*3/MM3 (ref 1.7–7)
NEUTROPHILS NFR BLD AUTO: 46 % (ref 42.7–76)
NITRITE UR QL STRIP: NEGATIVE
NRBC BLD AUTO-RTO: 0 /100 WBC (ref 0–0.2)
PH UR STRIP.AUTO: 8.5 [PH] (ref 5–8)
PLATELET # BLD AUTO: 215 10*3/MM3 (ref 140–450)
PROT UR QL STRIP: NEGATIVE
RBC # BLD AUTO: 4.01 10*6/MM3 (ref 3.77–5.28)
SP GR UR STRIP: 1.01 (ref 1–1.03)
UROBILINOGEN UR QL STRIP: ABNORMAL
WBC # BLD AUTO: 5.85 10*3/MM3 (ref 3.4–10.8)

## 2019-10-07 PROCEDURE — 81003 URINALYSIS AUTO W/O SCOPE: CPT | Performed by: INTERNAL MEDICINE

## 2019-10-08 LAB
25(OH)D3 SERPL-MCNC: 47.9 NG/ML (ref 30–100)
ALBUMIN SERPL-MCNC: 4.3 G/DL (ref 3.5–5.2)
ALBUMIN/GLOB SERPL: 1.6 G/DL
ALP SERPL-CCNC: 62 U/L (ref 39–117)
ALT SERPL-CCNC: 18 U/L (ref 1–33)
AST SERPL-CCNC: 19 U/L (ref 1–32)
BILIRUB SERPL-MCNC: 0.8 MG/DL (ref 0.2–1.2)
BUN SERPL-MCNC: 14 MG/DL (ref 8–23)
BUN/CREAT SERPL: 18.7 (ref 7–25)
CALCIUM SERPL-MCNC: 9.8 MG/DL (ref 8.6–10.5)
CHLORIDE SERPL-SCNC: 99 MMOL/L (ref 98–107)
CHOLEST SERPL-MCNC: 189 MG/DL (ref 0–200)
CO2 SERPL-SCNC: 26.9 MMOL/L (ref 22–29)
CREAT SERPL-MCNC: 0.75 MG/DL (ref 0.57–1)
GLOBULIN SER CALC-MCNC: 2.7 GM/DL
GLUCOSE SERPL-MCNC: 88 MG/DL (ref 65–99)
HCV AB S/CO SERPL IA: <0.1 S/CO RATIO (ref 0–0.9)
HDLC SERPL-MCNC: 84 MG/DL (ref 40–60)
LDLC SERPL CALC-MCNC: 93 MG/DL (ref 0–100)
MAGNESIUM SERPL-MCNC: 1.9 MG/DL (ref 1.6–2.4)
POTASSIUM SERPL-SCNC: 4.4 MMOL/L (ref 3.5–5.2)
PROT SERPL-MCNC: 7 G/DL (ref 6–8.5)
SODIUM SERPL-SCNC: 140 MMOL/L (ref 136–145)
TRIGL SERPL-MCNC: 59 MG/DL (ref 0–150)
TSH SERPL-ACNC: 1.22 UIU/ML (ref 0.27–4.2)
VLDLC SERPL-MCNC: 11.8 MG/DL

## 2019-10-15 ENCOUNTER — APPOINTMENT (OUTPATIENT)
Dept: PHYSICAL THERAPY | Facility: HOSPITAL | Age: 69
End: 2019-10-15

## 2019-10-17 ENCOUNTER — HOSPITAL ENCOUNTER (OUTPATIENT)
Dept: PHYSICAL THERAPY | Facility: HOSPITAL | Age: 69
Setting detail: THERAPIES SERIES
Discharge: HOME | End: 2019-10-17

## 2019-10-17 DIAGNOSIS — R29.898 SHOULDER WEAKNESS: ICD-10-CM

## 2019-10-17 DIAGNOSIS — M25.512 ACUTE PAIN OF LEFT SHOULDER: Primary | ICD-10-CM

## 2019-10-17 PROCEDURE — 97140 MANUAL THERAPY 1/> REGIONS: CPT

## 2019-10-17 PROCEDURE — 97110 THERAPEUTIC EXERCISES: CPT

## 2019-10-17 NOTE — THERAPY TREATMENT NOTE
Outpatient Physical Therapy Ortho Treatment Note  [facility]     Patient Name: Juany Carlin  : 1950  MRN: [id number]  Today's Date: 10/17/2019      Visit Date: 10/17/2019    Visit Dx:    ICD-10-CM ICD-9-CM   1. Acute pain of left shoulder M25.512 719.41   2. Shoulder weakness R29.898 719.61       Patient Active Problem List   Diagnosis   • HTN (hypertension), benign   • Vitamin D deficiency   • Urge incontinence   • GERD without esophagitis   • Cervical spinal stenosis   • Saddle pulmonary embolus (CMS/HCC)   • Family history of thrombosis   • Degeneration of cervical intervertebral disc   • Adrenal nodule (CMS/HCC)   • Health care maintenance   • Localized osteoarthritis of left shoulder   • Primary osteoarthritis of both knees   • Hypomagnesemia        Past Medical History:   Diagnosis Date   • Adrenal nodule (CMS/HCC)    • BPPV (benign paroxysmal positional vertigo) 5/10/2016   • Cervical stenosis of spine    • DVT, lower extremity (CMS/HCC)     right   • GERD without esophagitis 5/10/2016   • Hyperplastic colon polyp 2013   • PONV (postoperative nausea and vomiting)    • Pregnancy     , miscarrige x1   • Pulmonary emboli (CMS/HCC)     bilateral extensive PE with saddle emboli.Negative hypercoagulable state w/u   • Pulmonary embolism with acute cor pulmonale (CMS/HCC) 2017   • Urge incontinence 5/10/2016   • Vitamin D deficiency 5/10/2016        Past Surgical History:   Procedure Laterality Date   • CARDIAC CATHETERIZATION N/A 11/10/2016    Procedure: Right Heart Cath;  Surgeon: Johnna Agrawal MD;  Location: [facility] CATH INVASIVE LOCATION;  Service:    • CARDIAC CATHETERIZATION N/A 11/10/2016    Procedure: Thrombolytic Therapy;  Surgeon: Johnna Agrawal MD;  Location:  [facility] CATH INVASIVE LOCATION;  Service:    • CATARACT EXTRACTION Bilateral    • COLONOSCOPY W/ BIOPSIES  2013    hyperplastic polyp, Dr.Russel Lewis   • DILATATION AND CURETTAGE     • EPIDURAL BLOCK     •  HEMORROIDECTOMY  2013   • INTERVENTIONAL RADIOLOGY PROCEDURE Bilateral 11/10/2016    Procedure: Sp [facility] Pulmonary Art;  Surgeon: Johnna Agrawal MD;  Location:  [facility] CATH INVASIVE LOCATION;  Service:    • TONSILLECTOMY     • TOTAL LAPAROSCOPIC HYSTERECTOMY  2001   • VEIN SURGERY Left [id number]    leg,    • VENTRAL HERNIA REPAIR  1987                       PT Assessment/Plan     Row Name 10/17/19 1100          PT Assessment    Assessment Comments  Ms. Carlin reports slight decrease in pain, continues to have L anterior shoudler pain. Noted continued TTP L pectoral and periclavicular muscles this date, added low, gentle doorway stretch with good tolerance. Updated HEP to include supine HA, low doorway stretch, and sidelying scap clock with reports of good understanding/ Reviewed tissue healing times and anatomy. Continues to be a good candidate for skilled PT.  -RS        PT Plan    PT Plan Comments  Assess tolerance for therex and HEP progression, continue to progress as tolerance allows.  -RS       User Key  (r) = Recorded By, (t) = Taken By, (c) = Cosigned By    Initials Name Provider Type    RS Melissa Booth, PT Physical Therapist            OP Exercises     Row Name 10/17/19 1000 10/17/19 0900          Subjective Comments    Subjective Comments  I think it is a little better but still hurts.  -RS  --        Subjective Pain    Able to rate subjective pain?  yes  -RS  --     Pre-Treatment Pain Level  3  -RS  --        Total Minutes    88404 - PT Therapeutic Exercise Minutes  25  -RS  --     13649 - PT Manual Therapy Minutes  --  15  -RS        Exercise 1    Exercise Name 1  s/l ER  -RS  --     Cueing 1  Verbal  -RS  --     Sets 1  2  -RS  --     Reps 1  10  -RS  --     Additional Comments  small ROM  -RS  --        Exercise 2    Exercise Name 2  rows  -RS  --     Cueing 2  Verbal  -RS  --     Reps 2  10  -RS  --     Additional Comments  RTB  -RS  --        Exercise 3    Exercise Name 3   shoulder rolls  -RS  --     Cueing 3  Demo  -RS  --     Reps 3  15  -RS  --        Exercise 4    Exercise Name 4  chin tuck  -RS  --     Cueing 4  Verbal  -RS  --     Reps 4  10  -RS  --     Time 4  5s  -RS  --     Additional Comments  supine  -RS  --        Exercise 5    Exercise Name 5  supine HA  -RS  --     Cueing 5  Demo;Verbal  -RS  --     Reps 5  10  -RS  --     Additional Comments  YTB  -RS  --        Exercise 6    Exercise Name 6  s/l scap clock  -RS  --     Cueing 6  Tactile;Verbal  -RS  --     Reps 6  5 CW/CCW  -RS  --        Exercise 7    Exercise Name 7  shoulder extension with TB  -RS  --     Cueing 7  Demo  -RS  --     Reps 7  10  -RS  --     Additional Comments  RTB  -RS  --        Exercise 8    Exercise Name 8  ER/IR isometric walkout with TB  -RS  --     Cueing 8  Demo  -RS  --     Reps 8  4 laps each  -RS  --     Additional Comments  RTB  -RS  --        Exercise 9    Exercise Name 9  low doorway stretch  -RS  --     Cueing 9  Demo  -RS  --     Reps 9  3  -RS  --     Time 9  20s  -RS  --       User Key  (r) = Recorded By, (t) = Taken By, (c) = Cosigned By    Initials Name Provider Type    RS Melissa Booth, PT Physical Therapist                      Manual Rx (last 36 hours)      Manual Treatments     Row Name 10/17/19 0900             Total Minutes    50376 - PT Manual Therapy Minutes  15  -RS         Manual Rx 1    Manual Rx 1 Location  sidelying scapulothoracic mobs   -RS      Manual Rx 1 Type  post/ inferior  -RS         Manual Rx 2    Manual Rx 2 Location  STM pecs, supra ifra  -RS         Manual Rx 3    Manual Rx 3 Location  posterior GH mobs  -RS      Manual Rx 3 Type  inf glides with flex  -RS         Manual Rx 4    Manual Rx 4 Location  sup/inf clavicular mobs  -RS        User Key  (r) = Recorded By, (t) = Taken By, (c) = Cosigned By    Initials Name Provider Type    RS Melissa Booth, PT Physical Therapist          PT OP Goals     Row Name 10/17/19 1100          PT Short Term Goals     STG Date to Achieve  10/21/19  -RS     STG 1  Patient will be independent with initial HEP.  -RS     STG 1 Progress  Met  -RS     STG 2  Patient will report >/=at least 70% improvement in sleeping for improved QOL.  -RS     STG 2 Progress  Ongoing  -RS     STG 3  The pt will demonstrate L shoulder AROM 0-160 flex and abduction without pain for improved functional reach.  -RS     STG 3 Progress  Ongoing  -RS        Long Term Goals    LTG Date to Achieve  11/24/19  -RS     LTG 1  Patient will be independent with progressive HEP for long term management of current condition.  -RS     LTG 1 Progress  Ongoing  -RS     LTG 2  The pt will score less than or equal to 20 disability on the QuickDASH to indicate improedperceived performance of ADLs.  -RS     LTG 2 Progress  Ongoing  -RS     LTG 3  The pt will return to self care ADLs including dressing, donning bra, and washing hair with pain no greater than 2/10 .  -RS     LTG 3 Progress  Ongoing  -RS     LTG 4  The pt will demonstrate L shoulder strength to at least 4/5 for improved functional strength and reach.  -RS     LTG 4 Progress  Ongoing  -RS       User Key  (r) = Recorded By, (t) = Taken By, (c) = Cosigned By    Initials Name Provider Type    RS Melissa Booth PT Physical Therapist          Therapy Education  Given: HEP  Program: Progressed  How Provided: Verbal, Demonstration, Written  Provided to: Patient  Level of Understanding: Verbalized              Time Calculation:   Start Time: 1045  Stop Time: 1130  Time Calculation (min): 45 min  Therapy Charges for Today     Code Description Service Date Service Provider Modifiers Qty    61312900593  PT THER PROC EA 15 MIN 10/17/2019 Melissa Booth, PT GP 2    [id number]  PT MANUAL THERAPY EA 15 MIN 10/17/2019 Melissa Booth PT GP 1                    Melissa Booth PT  10/17/2019

## 2019-10-22 ENCOUNTER — HOSPITAL ENCOUNTER (OUTPATIENT)
Dept: PHYSICAL THERAPY | Facility: HOSPITAL | Age: 69
Setting detail: THERAPIES SERIES
Discharge: HOME | End: 2019-10-22

## 2019-10-22 DIAGNOSIS — R29.898 SHOULDER WEAKNESS: ICD-10-CM

## 2019-10-22 DIAGNOSIS — M25.512 ACUTE PAIN OF LEFT SHOULDER: Primary | ICD-10-CM

## 2019-10-22 PROCEDURE — 97110 THERAPEUTIC EXERCISES: CPT

## 2019-10-22 PROCEDURE — 97140 MANUAL THERAPY 1/> REGIONS: CPT

## 2019-10-22 NOTE — THERAPY TREATMENT NOTE
Outpatient Physical Therapy Ortho Treatment Note  [facility]     Patient Name: Juany Carlin  : 1950  MRN: [id number]  Today's Date: 10/22/2019      Visit Date: 10/22/2019    Visit Dx:    ICD-10-CM ICD-9-CM   1. Acute pain of left shoulder M25.512 719.41   2. Shoulder weakness R29.898 719.61       Patient Active Problem List   Diagnosis   • HTN (hypertension), benign   • Vitamin D deficiency   • Urge incontinence   • GERD without esophagitis   • Cervical spinal stenosis   • Saddle pulmonary embolus (CMS/HCC)   • Family history of thrombosis   • Degeneration of cervical intervertebral disc   • Adrenal nodule (CMS/HCC)   • Health care maintenance   • Localized osteoarthritis of left shoulder   • Primary osteoarthritis of both knees   • Hypomagnesemia        Past Medical History:   Diagnosis Date   • Adrenal nodule (CMS/HCC)    • BPPV (benign paroxysmal positional vertigo) 5/10/2016   • Cervical stenosis of spine    • DVT, lower extremity (CMS/HCC)     right   • GERD without esophagitis 5/10/2016   • Hyperplastic colon polyp 2013   • PONV (postoperative nausea and vomiting)    • Pregnancy     , miscarrige x1   • Pulmonary emboli (CMS/HCC)     bilateral extensive PE with saddle emboli.Negative hypercoagulable state w/u   • Pulmonary embolism with acute cor pulmonale (CMS/HCC) 2017   • Urge incontinence 5/10/2016   • Vitamin D deficiency 5/10/2016        Past Surgical History:   Procedure Laterality Date   • CARDIAC CATHETERIZATION N/A 11/10/2016    Procedure: Right Heart Cath;  Surgeon: Johnna Agrawal MD;  Location: [facility] CATH INVASIVE LOCATION;  Service:    • CARDIAC CATHETERIZATION N/A 11/10/2016    Procedure: Thrombolytic Therapy;  Surgeon: Johnna Agrawal MD;  Location:  [facility] CATH INVASIVE LOCATION;  Service:    • CATARACT EXTRACTION Bilateral    • COLONOSCOPY W/ BIOPSIES  2013    hyperplastic polyp, Dr.Russel Lewis   • DILATATION AND CURETTAGE     • EPIDURAL BLOCK     •  HEMORROIDECTOMY  2013   • INTERVENTIONAL RADIOLOGY PROCEDURE Bilateral 11/10/2016    Procedure: Sp [address] [address] Pulmonary Art;  Surgeon: Johnna Agrawal MD;  Location:  [facility] CATH INVASIVE LOCATION;  Service:    • TONSILLECTOMY     • TOTAL LAPAROSCOPIC HYSTERECTOMY  2001   • VEIN SURGERY Left [id number]    leg,    • VENTRAL HERNIA REPAIR  1987                       PT Assessment/Plan     Row Name 10/22/19 1200          PT Assessment    Assessment Comments  Ms. Carlin reports decreased pain this date compared to previous dates. She has been performing HEP with god tolerance and some noted pain relief. Added supine serratus punch, supine alphabet, beach chair position with good tolerance. Also, increased resistance during rows and HA. She continues to progress toward goals.   -RS        PT Plan    PT Plan Comments  Assess tolerance for therex progression, consider standing rhythmic stab at 90 with ball, ER/ IR with TB. In 1-2 weeks consider overhead activities as progress allows.  -RS       User Key  (r) = Recorded By, (t) = Taken By, (c) = Cosigned By    Initials Name Provider Type    RS Melissa Booth, PT Physical Therapist            OP Exercises     Row Name 10/22/19 1200 10/22/19 1100          Subjective Comments    Subjective Comments  I think it is feeling better, acheing less today.  -RS  --        Subjective Pain    Able to rate subjective pain?  yes  -RS  --     Pre-Treatment Pain Level  2  -RS  --        Total Minutes    46548 - PT Therapeutic Exercise Minutes  28  -RS  --     98417 - PT Manual Therapy Minutes  --  12  -RS        Exercise 1    Exercise Name 1  s/l ER  -RS  --     Cueing 1  Verbal  -RS  --     Sets 1  2  -RS  --     Reps 1  10  -RS  --     Additional Comments  small ROM 1#  -RS  --        Exercise 2    Exercise Name 2  rows  -RS  --     Cueing 2  Verbal  -RS  --     Reps 2  15  -RS  --     Additional Comments  GTB  -RS  --        Exercise 3    Exercise Name 3  shoulder rolls  -RS   --     Cueing 3  Demo  -RS  --     Reps 3  15  -RS  --     Additional Comments  2#  -RS  --        Exercise 4    Exercise Name 4  chin tuck  -RS  --     Cueing 4  Verbal  -RS  --     Reps 4  10  -RS  --     Time 4  5s  -RS  --     Additional Comments  supine  -RS  --        Exercise 5    Exercise Name 5  supine HA  -RS  --     Cueing 5  Demo;Verbal  -RS  --     Sets 5  2  -RS  --     Reps 5  10  -RS  --     Additional Comments  RTB  -RS  --        Exercise 6    Exercise Name 6  s/l scap clock  -RS  --     Cueing 6  Tactile;Verbal  -RS  --     Reps 6  5 CW/CCW  -RS  --        Exercise 7    Exercise Name 7  shoulder extension with TB  -RS  --     Cueing 7  Demo  -RS  --     Reps 7  10  -RS  --     Additional Comments  GTB  -RS  --        Exercise 8    Exercise Name 8  ER/IR isometric walkout with TB  -RS  --     Cueing 8  Demo  -RS  --     Reps 8  4 laps each  -RS  --     Additional Comments  RTB  -RS  --        Exercise 9    Exercise Name 9  low doorway stretch  -RS  --     Cueing 9  Demo  -RS  --     Reps 9  3  -RS  --     Time 9  20s  -RS  --        Exercise 10    Exercise Name 10  beachchair ER/IR  -RS  --     Cueing 10  Demo  -RS  --     Reps 10  5  -RS  --     Time 10  5s ER hold  -RS  --        Exercise 11    Exercise Name 11  supine serratus punch  -RS  --     Cueing 11  Verbal;Demo  -RS  --     Reps 11  15  -RS  --     Additional Comments  2#  -RS  --        Exercise 12    Exercise Name 12  supine alphabet  -RS  --     Cueing 12  Verbal  -RS  --     Reps 12  1x A-Z  -RS  --     Additional Comments  2#  -RS  --       User Key  (r) = Recorded By, (t) = Taken By, (c) = Cosigned By    Initials Name Provider Type    RS Melissa Booth, PT Physical Therapist                      Manual Rx (last 36 hours)      Manual Treatments     Row Name 10/22/19 1100             Total Minutes    71050 - PT Manual Therapy Minutes  12  -RS         Manual Rx 1    Manual Rx 1 Location  --  -RS      Manual Rx 1 Type  --  -RS          Manual Rx 2    Manual Rx 2 Location  STM pecs, supra ifra  -RS         Manual Rx 3    Manual Rx 3 Location  posterior GH mobs  -RS      Manual Rx 3 Type  inf glides with flex  -RS         Manual Rx 4    Manual Rx 4 Location  sup/inf clavicular mobs  -RS        User Key  (r) = Recorded By, (t) = Taken By, (c) = Cosigned By    Initials Name Provider Type    RS Melissa Booth, PT Physical Therapist                             Time Calculation:   Start Time: 1215  Stop Time: 1258  Time Calculation (min): 43 min  Therapy Charges for Today     Code Description Service Date Service Provider Modifiers Qty    [id number]  PT THER PROC EA 15 MIN 10/22/2019 Melissa Booth, PT GP 2    [id number] HC PT MANUAL THERAPY EA 15 MIN 10/22/2019 Melissa Booth, PT GP 1                    Melissa Booth, LAXMI  10/22/2019

## 2019-10-24 ENCOUNTER — HOSPITAL ENCOUNTER (OUTPATIENT)
Dept: PHYSICAL THERAPY | Facility: HOSPITAL | Age: 69
Setting detail: THERAPIES SERIES
Discharge: HOME | End: 2019-10-24

## 2019-10-24 DIAGNOSIS — M25.512 ACUTE PAIN OF LEFT SHOULDER: Primary | ICD-10-CM

## 2019-10-24 DIAGNOSIS — R29.898 SHOULDER WEAKNESS: ICD-10-CM

## 2019-10-24 PROCEDURE — 97110 THERAPEUTIC EXERCISES: CPT | Performed by: PHYSICAL THERAPIST

## 2019-10-24 PROCEDURE — 97140 MANUAL THERAPY 1/> REGIONS: CPT | Performed by: PHYSICAL THERAPIST

## 2019-10-24 NOTE — THERAPY TREATMENT NOTE
Outpatient Physical Therapy Ortho Treatment Note  [facility]     Patient Name: Juany Carlin  : 1950  MRN: [id number]  Today's Date: 10/24/2019      Visit Date: 10/24/2019    Visit Dx:    ICD-10-CM ICD-9-CM   1. Acute pain of left shoulder M25.512 719.41   2. Shoulder weakness R29.898 719.61       Patient Active Problem List   Diagnosis   • HTN (hypertension), benign   • Vitamin D deficiency   • Urge incontinence   • GERD without esophagitis   • Cervical spinal stenosis   • Saddle pulmonary embolus (CMS/HCC)   • Family history of thrombosis   • Degeneration of cervical intervertebral disc   • Adrenal nodule (CMS/HCC)   • Health care maintenance   • Localized osteoarthritis of left shoulder   • Primary osteoarthritis of both knees   • Hypomagnesemia        Past Medical History:   Diagnosis Date   • Adrenal nodule (CMS/HCC)    • BPPV (benign paroxysmal positional vertigo) 5/10/2016   • Cervical stenosis of spine    • DVT, lower extremity (CMS/HCC)     right   • GERD without esophagitis 5/10/2016   • Hyperplastic colon polyp 2013   • PONV (postoperative nausea and vomiting)    • Pregnancy     , miscarrige x1   • Pulmonary emboli (CMS/HCC)     bilateral extensive PE with saddle emboli.Negative hypercoagulable state w/u   • Pulmonary embolism with acute cor pulmonale (CMS/HCC) 2017   • Urge incontinence 5/10/2016   • Vitamin D deficiency 5/10/2016        Past Surgical History:   Procedure Laterality Date   • CARDIAC CATHETERIZATION N/A 11/10/2016    Procedure: Right Heart Cath;  Surgeon: Johnna Agrawal MD;  Location: [facility] CATH INVASIVE LOCATION;  Service:    • CARDIAC CATHETERIZATION N/A 11/10/2016    Procedure: Thrombolytic Therapy;  Surgeon: Johnna Agrawal MD;  Location:  [facility] CATH INVASIVE LOCATION;  Service:    • CATARACT EXTRACTION Bilateral    • COLONOSCOPY W/ BIOPSIES  2013    hyperplastic polyp, Dr.Russel Lewis   • DILATATION AND CURETTAGE     • EPIDURAL BLOCK     •  HEMORROIDECTOMY  2013   • INTERVENTIONAL RADIOLOGY PROCEDURE Bilateral 11/10/2016    Procedure: Sp [facility] [address] Pulmonary Art;  Surgeon: Johnna Agrawal MD;  [facility];  Service:    • TONSILLECTOMY     • TOTAL LAPAROSCOPIC HYSTERECTOMY  2001   • VEIN SURGERY Left [id number]    leg,    • VENTRAL HERNIA REPAIR  1987       PT Ortho     Row Name 10/24/19 1000       Subjective Comments    Subjective Comments  Reports compliance with HEP.  Continued shoulder pain donning sweater/jacket, as well as during reaching. Over-all does note improvement with less pain.   -JS       Subjective Pain    Able to rate subjective pain?  yes  -JS    Pre-Treatment Pain Level  2  -JS      User Key  (r) = Recorded By, (t) = Taken By, (c) = Cosigned By    Initials Name Provider Type    Cassy Medrano, PT Physical Therapist                      PT Assessment/Plan     Row Name 10/24/19 1000          PT Assessment    Assessment Comments  Continues to reports over-all improvement in L shoulder pain, though continued symptoms with dressing & reaching.  Progression of exercises today to include theraband IR/ER without pain & Rhythmic stab with ball at mirror with mild symptoms of L shoulder pain.  Palpable tenderness remains L pecs during manual therapy.    -JS        PT Plan    PT Plan Comments  Continue PT for further shoulder/scapular strengthening & stabilization, L shoulder ROM.  Progress to overhead activities as tolerated in subsequent treatments to assist with reaching, dressing.  -JS       User Key  (r) = Recorded By, (t) = Taken By, (c) = Cosigned By    Initials Name Provider Type    Cassy Medrano, PT Physical Therapist            OP Exercises     Row Name 10/24/19 1000             Subjective Comments    Subjective Comments  Reports compliance with HEP.  Continued shoulder pain donning sweater/jacket, as well as during reaching. Over-all does note improvement with less pain.   -JS          Subjective Pain    Able to rate subjective pain?  yes  -JS      Pre-Treatment Pain Level  2  -JS         Total Minutes    36374 - PT Therapeutic Exercise Minutes  31  -JS      73068 - PT Manual Therapy Minutes  12  -JS         Exercise 1    Exercise Name 1  s/l ER  -JS      Cueing 1  Verbal  -JS      Sets 1  2  -JS      Reps 1  10  -JS      Additional Comments  1#  -JS         Exercise 2    Exercise Name 2  rows  -JS      Cueing 2  Verbal  -JS      Reps 2  15  -JS      Additional Comments  GTB  -JS         Exercise 3    Exercise Name 3  shoulder rolls  -JS      Cueing 3  Demo  -JS      Reps 3  15  -JS      Additional Comments  2#  -JS         Exercise 4    Exercise Name 4  chin tuck  -JS      Cueing 4  Verbal cues to slow movement  -JS      Reps 4  10  -JS      Time 4  5s  -JS      Additional Comments  supine  -JS         Exercise 5    Exercise Name 5  supine HA  -JS      Cueing 5  Demo;Verbal  -JS      Sets 5  2  -JS      Reps 5  10  -JS      Additional Comments  RTB  -JS         Exercise 6    Exercise Name 6  s/l scap clock  -JS      Cueing 6  Tactile;Verbal  -JS      Reps 6  5 CW/CCW  -JS         Exercise 7    Exercise Name 7  shoulder extension with TB  -JS      Cueing 7  Demo  -JS      Reps 7  15  -JS      Additional Comments  GTB  -JS         Exercise 8    Exercise Name 8  Theraband ER/IR  -JS      Cueing 8  Verbal;Demo  -JS      Reps 8  10  -JS      Additional Comments  RTB  -JS         Exercise 9    Exercise Name 9  low doorway stretch  -JS      Cueing 9  Demo  -JS      Reps 9  3  -JS      Time 9  20s  -JS         Exercise 10    Exercise Name 10  beachchair ER/IR  -JS      Cueing 10  Demo  -JS      Reps 10  5  -JS      Time 10  5s ER hold  -JS         Exercise 11    Exercise Name 11  supine serratus punch  -JS      Cueing 11  Verbal;Demo  -JS      Reps 11  15  -JS      Additional Comments  2#  -JS         Exercise 12    Exercise Name 12  supine alphabet  -JS      Cueing 12  Verbal;Demo  -JS      Reps 12  1x  A-Z  -JS      Additional Comments  2#  -JS         Exercise 13    Exercise Name 13  Rhythmic stab 90 degrees flexion with ball at mirror- small clockwise/counterclockwise motion  -JS      Cueing 13  Verbal;Demo  -JS      Reps 13  10 each direction  -JS        User Key  (r) = Recorded By, (t) = Taken By, (c) = Cosigned By    Initials Name Provider Type    JS Cassy Arzola, PT Physical Therapist                      Manual Rx (last 36 hours)      Manual Treatments     Row Name 10/24/19 1000             Total Minutes    28791 - PT Manual Therapy Minutes  12  -JS         Manual Rx 2    Manual Rx 2 Location  STM pecs, supra/infra  -JS         Manual Rx 3    Manual Rx 3 Location  posterior GH mobs, oscillation GH joint  -JS      Manual Rx 3 Type  inf glides with flex  -JS         Manual Rx 4    Manual Rx 4 Location  sup/inf clavicular mobs  -JS        User Key  (r) = Recorded By, (t) = Taken By, (c) = Cosigned By    Initials Name Provider Type    JS Cassy Arzola, PT Physical Therapist              Therapy Education  Education Details: Review HEP, with intermittent cueing for positioning & posture.  Given: HEP  Program: Reinforced  How Provided: Verbal, Demonstration  Provided to: Patient  Level of Understanding: Teach back education performed, Verbalized, Demonstrated              Time Calculation:   Start Time: 1000  Stop Time: 1043  Time Calculation (min): 43 min  Therapy Charges for Today     Code Description Service Date Service Provider Modifiers Qty    [id number]  PT THER PROC EA 15 MIN 10/24/2019 Cassy Arzola PT GP 2    [id number]  PT MANUAL THERAPY EA 15 MIN 10/24/2019 Cassy Arzola PT GP 1                    Cassy Arzola PT  10/24/2019

## 2019-10-29 ENCOUNTER — HOSPITAL ENCOUNTER (OUTPATIENT)
Dept: PHYSICAL THERAPY | Facility: HOSPITAL | Age: 69
Setting detail: THERAPIES SERIES
Discharge: HOME | End: 2019-10-29

## 2019-10-29 DIAGNOSIS — R29.898 SHOULDER WEAKNESS: ICD-10-CM

## 2019-10-29 DIAGNOSIS — M25.512 ACUTE PAIN OF LEFT SHOULDER: Primary | ICD-10-CM

## 2019-10-29 PROCEDURE — 97140 MANUAL THERAPY 1/> REGIONS: CPT

## 2019-10-29 PROCEDURE — 97110 THERAPEUTIC EXERCISES: CPT

## 2019-10-29 NOTE — THERAPY PROGRESS REPORT/RE-CERT
Outpatient Physical Therapy Ortho Progress Note  [facility]     Patient Name: Juany Carlin  : 1950  MRN: [id number]  Today's Date: 10/29/2019      Visit Date: 10/29/2019    Visit Dx:    ICD-10-CM ICD-9-CM   1. Acute pain of left shoulder M25.512 719.41   2. Shoulder weakness R29.898 719.61       Patient Active Problem List   Diagnosis   • HTN (hypertension), benign   • Vitamin D deficiency   • Urge incontinence   • GERD without esophagitis   • Cervical spinal stenosis   • Saddle pulmonary embolus (CMS/HCC)   • Family history of thrombosis   • Degeneration of cervical intervertebral disc   • Adrenal nodule (CMS/HCC)   • Health care maintenance   • Localized osteoarthritis of left shoulder   • Primary osteoarthritis of both knees   • Hypomagnesemia        Past Medical History:   Diagnosis Date   • Adrenal nodule (CMS/HCC)    • BPPV (benign paroxysmal positional vertigo) 5/10/2016   • Cervical stenosis of spine    • DVT, lower extremity (CMS/HCC)     right   • GERD without esophagitis 5/10/2016   • Hyperplastic colon polyp 2013   • PONV (postoperative nausea and vomiting)    • Pregnancy     , miscarrige x1   • Pulmonary emboli (CMS/HCC)     bilateral extensive PE with saddle emboli.Negative hypercoagulable state w/u   • Pulmonary embolism with acute cor pulmonale (CMS/HCC) 2017   • Urge incontinence 5/10/2016   • Vitamin D deficiency 5/10/2016        Past Surgical History:   Procedure Laterality Date   • CARDIAC CATHETERIZATION N/A 11/10/2016    Procedure: Right Heart Cath;  Surgeon: Johnna Agrawal MD;  Location:  [facility];  Service:    • CARDIAC CATHETERIZATION N/A 11/10/2016    Procedure: Thrombolytic Therapy;  Surgeon: Johnna Agrawal MD;  Location:  [facility];  Service:    • CATARACT EXTRACTION Bilateral    • COLONOSCOPY W/ BIOPSIES  2013    hyperplastic polyp, Dr.Russel Lewis   • DILATATION AND CURETTAGE     • EPIDURAL BLOCK     •  HEMORROIDECTOMY  2013   • INTERVENTIONAL RADIOLOGY PROCEDURE Bilateral 11/10/2016    Procedure: Sp [facility] Pulmonary Art;  Surgeon: Johnna Agrawal MD;  Location:  [facility];  Service:    • TONSILLECTOMY     • TOTAL LAPAROSCOPIC HYSTERECTOMY  2001   • VEIN SURGERY Left [id number]    leg,    • VENTRAL HERNIA REPAIR  1987       PT Ortho     Row Name 10/29/19 1000       Left Upper Ext    Lt Shoulder Abduction AROM  0-160 arm straight  -RS    Lt Shoulder Flexion AROM  0-180 with arm straight, mild pain begins around 160  -RS    Lt Shoulder External Rotation AROM  0-85  -RS    Lt Shoulder Internal Rotation AROM  0-73 with pain  -RS       MMT Left Upper Ext    Lt Shoulder Flexion MMT, Gross Movement  (4/5) good  -RS    Lt Shoulder ABduction MMT, Gross Movement  (4-/5) good minus  -RS    Lt Shoulder Internal Rotation MMT, Gross Movement  (4+/5) good plus  -RS    Lt Shoulder External Rotation MMT, Gross Movement  (4-/5) good minus  -RS    Lt Elbow Flexion MMT, Gross Movement  (4+/5) good plus  -RS    Lt Elbow Extension MMT, Gross Movement  (4+/5) good plus  -RS      User Key  (r) = Recorded By, (t) = Taken By, (c) = Cosigned By    Initials Name Provider Type    RS Melissa Booth, PT Physical Therapist                      PT Assessment/Plan     Row Name 10/29/19 1100          PT Assessment    Functional Limitations  Limitations in functional capacity and performance;Performance in self-care ADL;Limitation in home management  -RS     Impairments  Range of motion;Posture;Poor body mechanics;Pain;Muscle strength  -RS     Assessment Comments  Ms. Carlin demonstrates good progress toward all functional goals. She has met most STG with IND with initial HEP and improved sleep performance by 90%, she demonstrates improved functional reach and shoulder AROM compared to initial eval, however continues to be limited, specifically into abduction. Noted improved L shoulder flex strength, continue  weakness in abduction and ER. QuickDASH score improved from 47 to 29 disability. Overall, she continue to progress well toward functional goals and is a good candidate for continue skilled PT.  -RS     Please refer to paper survey for additional self-reported information  Yes  -RS     Rehab Potential  Good  -RS     Patient/caregiver participated in establishment of treatment plan and goals  Yes  -RS     Patient would benefit from skilled therapy intervention  Yes  -RS        PT Plan    PT Frequency  1x/week;2x/week  -RS     Predicted Duration of Therapy Intervention (Therapy Eval)  4 weeks  -RS     PT Plan Comments  Continue PT focused on scap strengthening, progress functional reach activities as tolerance allows.  -RS       User Key  (r) = Recorded By, (t) = Taken By, (c) = Cosigned By    Initials Name Provider Type    RS Melissa Booth, PT Physical Therapist            OP Exercises     Row Name 10/29/19 1000 10/29/19 0900          Subjective Comments    Subjective Comments  No pain at rest, overall doing better. Still has some pain putting on bra and reaching to the side.  -RS  --        Subjective Pain    Able to rate subjective pain?  yes  -RS  --     Pre-Treatment Pain Level  0  -RS  --     Subjective Pain Comment  at rest  -RS  --        Total Minutes    40988 - PT Therapeutic Exercise Minutes  31  -RS  --     14203 - PT Manual Therapy Minutes  --  12  -RS        Exercise 1    Exercise Name 1  s/l ER  -RS  --     Cueing 1  Verbal  -RS  --     Sets 1  2  -RS  --     Reps 1  10  -RS  --     Additional Comments  1#  -RS  --        Exercise 2    Exercise Name 2  rows  -RS  --     Cueing 2  Verbal  -RS  --     Reps 2  15  -RS  --     Additional Comments  GTB  -RS  --        Exercise 3    Exercise Name 3  shoulder rolls  -RS  --     Cueing 3  Demo  -RS  --     Reps 3  15  -RS  --     Additional Comments  2#  -RS  --        Exercise 4    Exercise Name 4  chin tuck  -RS  --     Cueing 4  Verbal cues to slow  movement  -RS  --     Reps 4  10  -RS  --     Time 4  5s  -RS  --        Exercise 5    Exercise Name 5  supine HA  -RS  --     Cueing 5  Demo;Verbal  -RS  --     Sets 5  2  -RS  --     Reps 5  10  -RS  --     Additional Comments  RTB  -RS  --        Exercise 6    Exercise Name 6  s/l scap clock  -RS  --     Cueing 6  Tactile;Verbal  -RS  --     Reps 6  5 CW/CCW  -RS  --        Exercise 7    Exercise Name 7  shoulder extension with TB  -RS  --     Cueing 7  Demo  -RS  --     Reps 7  15  -RS  --     Additional Comments  GTB  -RS  --        Exercise 8    Exercise Name 8  Theraband ER/IR  -RS  --     Cueing 8  Verbal;Demo  -RS  --     Reps 8  10  -RS  --     Additional Comments  RTB  -RS  --        Exercise 9    Exercise Name 9  low doorway stretch  -RS  --     Cueing 9  Demo  -RS  --     Reps 9  3  -RS  --     Time 9  20s  -RS  --        Exercise 10    Exercise Name 10  beachchair ER/IR  -RS  --     Cueing 10  Demo  -RS  --     Reps 10  5  -RS  --     Time 10  5s ER hold  -RS  --        Exercise 11    Exercise Name 11  --  -RS  --     Cueing 11  --  -RS  --     Reps 11  --  -RS  --        Exercise 12    Exercise Name 12  standing alphabet  -RS  --     Cueing 12  Verbal;Demo  -RS  --     Reps 12  1x A-Z  -RS  --     Additional Comments  L1 ball  -RS  --        Exercise 13    Exercise Name 13  Rhythmic stab 90 degrees flexion with ball at mirror- small clockwise/counterclockwise motion  -RS  --     Cueing 13  Verbal;Demo  -RS  --     Reps 13  10 each direction  -RS  --        Exercise 14    Exercise Name 14  wall wash flex/ scap  -RS  --     Cueing 14  Demo  -RS  --     Reps 14  10 ea  -RS  --     Time 14  5s  -RS  --     Additional Comments  plus rainbows  -RS  --        Exercise 15    Exercise Name 15  add D2 flex  -RS  --       User Key  (r) = Recorded By, (t) = Taken By, (c) = Cosigned By    Initials Name Provider Type    RS Melissa Booth PT Physical Therapist                      Manual Rx (last 36 hours)       Manual Treatments     Row Name 10/29/19 0900             Total Minutes    75592 - PT Manual Therapy Minutes  12  -RS         Manual Rx 2    Manual Rx 2 Location  STM pecs, supra/infra  -RS         Manual Rx 3    Manual Rx 3 Location  posterior GH mobs, oscillation GH joint  -RS      Manual Rx 3 Type  inf glides with flex  -RS         Manual Rx 4    Manual Rx 4 Location  sup/inf clavicular mobs  -RS        User Key  (r) = Recorded By, (t) = Taken By, (c) = Cosigned By    Initials Name Provider Type    RS Melissa Booth, PT Physical Therapist          PT OP Goals     Row Name 10/29/19 1000          PT Short Term Goals    STG Date to Achieve  10/21/19  -RS     STG 1  Patient will be independent with initial HEP.  -RS     STG 1 Progress  Met  -RS     STG 2  Patient will report >/=at least 70% improvement in sleeping for improved QOL.  -RS     STG 2 Progress  Met  -RS     STG 2 Progress Comments  90%  -RS     STG 3  The pt will demonstrate L shoulder AROM 0-160 flex and abduction without pain for improved functional reach.  -RS     STG 3 Progress  Partially Met  -RS     STG 3 Progress Comments  improved AROM, slight pain with abduction  -RS        Long Term Goals    LTG Date to Achieve  11/24/19  -RS     LTG 1  Patient will be independent with progressive HEP for long term management of current condition.  -RS     LTG 1 Progress  Progressing  -RS     LTG 2  The pt will score less than or equal to 20 disability on the QuickDASH to indicate improedperceived performance of ADLs.  -RS     LTG 2 Progress  Progressing  -RS     LTG 2 Progress Comments  scores 29 compared to 47 at eval  -RS     LTG 3  The pt will return to self care ADLs including dressing, donning bra, and washing hair with pain no greater than 2/10 .  -RS     LTG 3 Progress  Progressing  -RS     LTG 3 Progress Comments  3/10 donning bra  -RS     LTG 4  The pt will demonstrate L shoulder strength to at least 4/5 for improved functional strength and reach.   -RS     LTG 4 Progress  Progressing  -RS     LTG 4 Progress Comments  see objective  -RS       User Key  (r) = Recorded By, (t) = Taken By, (c) = Cosigned By    Initials Name Provider Type    Melissa Pretty PT Physical Therapist               Outcome Measure Options: Quick DASH  Quick DASH  Open a tight or new jar.: No Difficulty  Do heavy household chores (e.g., wash walls, wash floors): Moderate Difficulty  Carry a shopping bag or briefcase: Mild Difficulty  Wash your back: Moderate Difficulty  Use a knife to cut food: Mild Difficulty  Recreational activities in which you take some force or impact through your arm, should or hand (e.g. golf, hammering, tennis, etc.): Severe Difficulty  During the past week, to what extent has your arm, shoulder, or hand problem interfered with your normal social activites with family, friends, neighbors or groups?: Slightly  During the past week, were you limited in your work or other regular daily activities as a result of your arm, shoulder or hand problem?: Moderately Limited  Arm, Shoulder, or hand pain: Mild  Tingling (pins and needles) in your arm, shoulder, or hand: None  During the past week, how much difficulty have you had sleeping because of the pain in your arm, shoulder or hand?: No difficulty  Number of Questions Answered: 11  Quick DASH Score: 29.55         Time Calculation:   Start Time: 1045  Stop Time: 1130  Time Calculation (min): 45 min  Therapy Charges for Today     Code Description Service Date Service Provider Modifiers Qty    90052654499 HC PT THER PROC EA 15 MIN 10/29/2019 Melissa Booth, PT GP 2    67901177389 HC PT MANUAL THERAPY EA 15 MIN 10/29/2019 Melissa Booth, PT GP 1          PT G-Codes  Outcome Measure Options: Quick DASH  Quick DASH Score: 29.55         Melissa Booth PT  10/29/2019

## 2019-10-31 ENCOUNTER — HOSPITAL ENCOUNTER (OUTPATIENT)
Dept: PHYSICAL THERAPY | Facility: HOSPITAL | Age: 69
Setting detail: THERAPIES SERIES
Discharge: HOME | End: 2019-10-31

## 2019-10-31 DIAGNOSIS — M25.512 ACUTE PAIN OF LEFT SHOULDER: Primary | ICD-10-CM

## 2019-10-31 DIAGNOSIS — R29.898 SHOULDER WEAKNESS: ICD-10-CM

## 2019-10-31 PROCEDURE — 97110 THERAPEUTIC EXERCISES: CPT | Performed by: PHYSICAL THERAPIST

## 2019-10-31 PROCEDURE — 97140 MANUAL THERAPY 1/> REGIONS: CPT | Performed by: PHYSICAL THERAPIST

## 2019-11-05 ENCOUNTER — HOSPITAL ENCOUNTER (OUTPATIENT)
Dept: PHYSICAL THERAPY | Facility: HOSPITAL | Age: 69
Setting detail: THERAPIES SERIES
Discharge: HOME | End: 2019-11-05

## 2019-11-05 DIAGNOSIS — M25.512 ACUTE PAIN OF LEFT SHOULDER: Primary | ICD-10-CM

## 2019-11-05 DIAGNOSIS — R29.898 SHOULDER WEAKNESS: ICD-10-CM

## 2019-11-05 PROCEDURE — 97140 MANUAL THERAPY 1/> REGIONS: CPT

## 2019-11-05 PROCEDURE — 97110 THERAPEUTIC EXERCISES: CPT

## 2019-11-05 NOTE — THERAPY TREATMENT NOTE
Outpatient Physical Therapy Ortho Treatment Note  [facility]     Patient Name: Juany Carlin  : 1950  MRN: [id number]  Today's Date: 2019      Visit Date: 2019    Visit Dx:    ICD-10-CM ICD-9-CM   1. Acute pain of left shoulder M25.512 719.41   2. Shoulder weakness R29.898 719.61       Patient Active Problem List   Diagnosis   • HTN (hypertension), benign   • Vitamin D deficiency   • Urge incontinence   • GERD without esophagitis   • Cervical spinal stenosis   • Saddle pulmonary embolus (CMS/HCC)   • Family history of thrombosis   • Degeneration of cervical intervertebral disc   • Adrenal nodule (CMS/HCC)   • Health care maintenance   • Localized osteoarthritis of left shoulder   • Primary osteoarthritis of both knees   • Hypomagnesemia        Past Medical History:   Diagnosis Date   • Adrenal nodule (CMS/HCC)    • BPPV (benign paroxysmal positional vertigo) 5/10/2016   • Cervical stenosis of spine    • DVT, lower extremity (CMS/HCC)     right   • GERD without esophagitis 5/10/2016   • Hyperplastic colon polyp 2013   • PONV (postoperative nausea and vomiting)    • Pregnancy     , miscarrige x1   • Pulmonary emboli (CMS/HCC)     bilateral extensive PE with saddle emboli.Negative hypercoagulable state w/u   • Pulmonary embolism with acute cor pulmonale (CMS/HCC) 2017   • Urge incontinence 5/10/2016   • Vitamin D deficiency 5/10/2016        Past Surgical History:   Procedure Laterality Date   • CARDIAC CATHETERIZATION N/A 11/10/2016    Procedure: Right Heart Cath;  Surgeon: Johnna Agrawal MD;  Location: [facility] CATH INVASIVE LOCATION;  Service:    • CARDIAC CATHETERIZATION N/A 11/10/2016    Procedure: Thrombolytic Therapy;  Surgeon: Johnna Agrawal MD;  Location:  [facility];  Service:    • CATARACT EXTRACTION Bilateral    • COLONOSCOPY W/ BIOPSIES  2013    hyperplastic polyp, Dr.Russel Lewis   • DILATATION AND CURETTAGE     • EPIDURAL BLOCK     •  HEMORROIDECTOMY  2013   • INTERVENTIONAL RADIOLOGY PROCEDURE Bilateral 11/10/2016    Procedure: Sp [facility] Pulmonary Art;  Surgeon: Johnna Agrawal MD;  Location:  [facility];  Service:    • TONSILLECTOMY     • TOTAL LAPAROSCOPIC HYSTERECTOMY  2001   • VEIN SURGERY Left [id number]    leg,    • VENTRAL HERNIA REPAIR  1987                       PT Assessment/Plan     Row Name 11/05/19 1400          PT Assessment    Assessment Comments  Progressed therex program to include pront I,T,Y,wall wash with lift off, standing serratu punch, D2 with resistance all with good toleranc. Pt reports decreased pain overall, with continued pain when using LUE to cook. Noted WNL AROM without pain into flexion with improved functional reach. Overall, pt progressing well toward functional goals.  -RS        PT Plan    PT Plan Comments  Cont PT focused on improved L RC strength and endurance, decrease POC to 1x per week.  -RS       User Key  (r) = Recorded By, (t) = Taken By, (c) = Cosigned By    Initials Name Provider Type    RS Melissa Booth, PT Physical Therapist            OP Exercises     Row Name 11/05/19 1300             Subjective Comments    Subjective Comments  Been doing pretty well overall, pain is 2/10 at highest, no pain right now. If she is going to hae pain it is usually when she is cooking or something in the kitchen.  -RS         Subjective Pain    Able to rate subjective pain?  yes  -RS      Pre-Treatment Pain Level  0  -RS         Total Minutes    06947 - PT Therapeutic Exercise Minutes  35  -RS      55166 - PT Manual Therapy Minutes  10  -RS         Exercise 1    Exercise Name 1  s/l ER  -RS      Cueing 1  Verbal  -RS      Sets 1  2  -RS      Reps 1  15  -RS      Additional Comments  2# ful ROM  -RS         Exercise 2    Exercise Name 2  rows  -RS      Cueing 2  Verbal  -RS      Sets 2  2  -RS      Reps 2  15  -RS      Additional Comments  BTB  -RS         Exercise 3    Exercise  Name 3  shoulder rolls  -RS      Cueing 3  Demo  -RS      Reps 3  15  -RS      Additional Comments  2#  -RS         Exercise 4    Exercise Name 4  chin tuck  -RS      Cueing 4  Verbal cues to slow movement  -RS      Reps 4  10  -RS      Time 4  5s  -RS         Exercise 5    Exercise Name 5  supine HA  -RS      Cueing 5  Demo;Verbal  -RS      Sets 5  2  -RS      Reps 5  10  -RS      Additional Comments  GTB  -RS         Exercise 6    Exercise Name 6  prone I,T,Y  -RS      Cueing 6  Tactile;Verbal  -RS      Reps 6  10 ea  -RS         Exercise 7    Exercise Name 7  shoulder extension with TB  -RS      Cueing 7  Demo  -RS      Sets 7  2  -RS      Reps 7  15  -RS      Additional Comments  BTB  -RS         Exercise 8    Exercise Name 8  Theraband ER/IR  -RS      Cueing 8  Verbal;Demo  -RS      Sets 8  2  -RS      Reps 8  10  -RS      Additional Comments  GTB  -RS         Exercise 9    Exercise Name 9  low doorway stretch  -RS      Cueing 9  Demo  -RS         Exercise 11    Exercise Name 11  standing serratus punch  -RS      Cueing 11  Verbal;Demo  -RS      Sets 11  2  -RS      Reps 11  15  -RS      Additional Comments  10#  -RS         Exercise 12    Exercise Name 12  standing alphabet  -RS      Cueing 12  Verbal;Demo  -RS      Reps 12  1x A-Z  -RS      Additional Comments  L1 ball  -RS         Exercise 13    Exercise Name 13  Rhythmic stab 90 degrees flexion with ball at mirror- small clockwise/counterclockwise motion  -RS      Cueing 13  Verbal;Demo  -RS      Reps 13  10 each direction  -RS         Exercise 14    Exercise Name 14  wall wash flex/ scap  -RS      Cueing 14  Demo  -RS      Reps 14  10 ea  -RS      Time 14  5s  -RS      Additional Comments  plus small lift off  -RS         Exercise 15    Exercise Name 15  D2 flex L   -RS      Cueing 15  Verbal;Tactile;Demo  -RS      Reps 15  10  -RS      Additional Comments  RTB  -RS         Exercise 16    Exercise Name 16  Sidelying thoracic rotation  -RS      Cueing 16   Verbal;Demo  -RS      Reps 16  5  -RS      Time 16  5 sec   -RS        User Key  (r) = Recorded By, (t) = Taken By, (c) = Cosigned By    Initials Name Provider Type    RS Melissa Booth, PT Physical Therapist                      Manual Rx (last 36 hours)      Manual Treatments     Row Name 11/05/19 1300             Total Minutes    95044 - PT Manual Therapy Minutes  10  -RS         Manual Rx 2    Manual Rx 2 Location  STM pecs, supra/infra  -RS         Manual Rx 3    Manual Rx 3 Location  posterior GH mobs, oscillation GH joint  -RS      Manual Rx 3 Type  inf glides with flex  -RS        User Key  (r) = Recorded By, (t) = Taken By, (c) = Cosigned By    Initials Name Provider Type    RS Melissa Booth, PT Physical Therapist          PT OP Goals     Row Name 11/05/19 1300          PT Short Term Goals    STG Date to Achieve  10/21/19  -RS     STG 1  Patient will be independent with initial HEP.  -RS     STG 1 Progress  Met  -RS     STG 2  Patient will report >/=at least 70% improvement in sleeping for improved QOL.  -RS     STG 2 Progress  Met  -RS     STG 3  The pt will demonstrate L shoulder AROM 0-160 flex and abduction without pain for improved functional reach.  -RS     STG 3 Progress  Partially Met  -RS        Long Term Goals    LTG Date to Achieve  11/24/19  -RS     LTG 1  Patient will be independent with progressive HEP for long term management of current condition.  -RS     LTG 1 Progress  Progressing  -RS     LTG 2  The pt will score less than or equal to 20 disability on the QuickDASH to indicate improedperceived performance of ADLs.  -RS     LTG 2 Progress  Progressing  -RS     LTG 3  The pt will return to self care ADLs including dressing, donning bra, and washing hair with pain no greater than 2/10 .  -RS     LTG 3 Progress  Progressing  -RS     LTG 4  The pt will demonstrate L shoulder strength to at least 4/5 for improved functional strength and reach.  -RS     LTG 4 Progress  Progressing  -RS        User Key  (r) = Recorded By, (t) = Taken By, (c) = Cosigned By    Initials Name Provider Type    RS Melissa Booth, PT Physical Therapist                         Time Calculation:   Start Time: 1345  Stop Time: 1431  Time Calculation (min): 46 min  Therapy Charges for Today     Code Description Service Date Service Provider Modifiers Qty    08662914559  PT THER PROC EA 15 MIN 11/5/2019 Melissa Booth, PT GP 2    [id number]  PT MANUAL THERAPY EA 15 MIN 11/5/2019 Melissa Booth, PT GP 1                    Melissa Booth PT  11/5/2019

## 2019-11-14 ENCOUNTER — HOSPITAL ENCOUNTER (OUTPATIENT)
Dept: PHYSICAL THERAPY | Facility: HOSPITAL | Age: 69
Setting detail: THERAPIES SERIES
Discharge: HOME | End: 2019-11-14

## 2019-11-14 DIAGNOSIS — M25.512 ACUTE PAIN OF LEFT SHOULDER: Primary | ICD-10-CM

## 2019-11-14 DIAGNOSIS — R29.898 SHOULDER WEAKNESS: ICD-10-CM

## 2019-11-14 PROCEDURE — 97110 THERAPEUTIC EXERCISES: CPT

## 2019-11-14 PROCEDURE — 97140 MANUAL THERAPY 1/> REGIONS: CPT

## 2019-11-14 NOTE — THERAPY TREATMENT NOTE
Outpatient Physical Therapy Ortho Treatment Note  [facility]     Patient Name: Juany Carlin  : 1950  MRN: [id number]  Today's Date: 2019      Visit Date: 2019    Visit Dx:    ICD-10-CM ICD-9-CM   1. Acute pain of left shoulder M25.512 719.41   2. Shoulder weakness R29.898 719.61       Patient Active Problem List   Diagnosis   • HTN (hypertension), benign   • Vitamin D deficiency   • Urge incontinence   • GERD without esophagitis   • Cervical spinal stenosis   • Saddle pulmonary embolus (CMS/HCC)   • Family history of thrombosis   • Degeneration of cervical intervertebral disc   • Adrenal nodule (CMS/HCC)   • Health care maintenance   • Localized osteoarthritis of left shoulder   • Primary osteoarthritis of both knees   • Hypomagnesemia        Past Medical History:   Diagnosis Date   • Adrenal nodule (CMS/HCC)    • BPPV (benign paroxysmal positional vertigo) 5/10/2016   • Cervical stenosis of spine    • DVT, lower extremity (CMS/HCC)     right   • GERD without esophagitis 5/10/2016   • Hyperplastic colon polyp 2013   • PONV (postoperative nausea and vomiting)    • Pregnancy     , miscarrige x1   • Pulmonary emboli (CMS/HCC)     bilateral extensive PE with saddle emboli.Negative hypercoagulable state w/u   • Pulmonary embolism with acute cor pulmonale (CMS/HCC) 2017   • Urge incontinence 5/10/2016   • Vitamin D deficiency 5/10/2016        Past Surgical History:   Procedure Laterality Date   • CARDIAC CATHETERIZATION N/A 11/10/2016    Procedure: Right Heart Cath;  Surgeon: Johnna Agrawal MD;  Location: [facility] CATH INVASIVE LOCATION;  Service:    • CARDIAC CATHETERIZATION N/A 11/10/2016    Procedure: Thrombolytic Therapy;  Surgeon: Johnna Agrawal MD;  Location:  [facility] CATH INVASIVE LOCATION;  Service:    • CATARACT EXTRACTION Bilateral    • COLONOSCOPY W/ BIOPSIES  2013    hyperplastic polyp, Dr.Russel Lewis   • DILATATION AND CURETTAGE     • EPIDURAL BLOCK     •  HEMORROIDECTOMY  2013   • INTERVENTIONAL RADIOLOGY PROCEDURE Bilateral 11/10/2016    Procedure: Sp [facility] Pulmonary Art;  Surgeon: Johnna Agrawal MD;  Location:  [facility] CATH INVASIVE LOCATION;  Service:    • TONSILLECTOMY     • TOTAL LAPAROSCOPIC HYSTERECTOMY  2001   • VEIN SURGERY Left 1453-5144    leg,    • VENTRAL HERNIA REPAIR  1987                       PT Assessment/Plan     Row Name 11/14/19 1401          PT Assessment    Assessment Comments  Pt cont to tolerate progression of exercise w/o issue to focus on strength and endurance around shoulder. importance of stabilization exercises in HEP emphasized w/ pt reporting good compliance.   -        PT Plan    PT Plan Comments  cont to progress L shoulder strength and endurance as tolerated  -       User Key  (r) = Recorded By, (t) = Taken By, (c) = Cosigned By    Initials Name Provider Type    Claudia Murphy, PT Physical Therapist            OP Exercises     Row Name 11/14/19 1300             Subjective Pain    Able to rate subjective pain?  yes  -         Total Minutes    65428 - PT Therapeutic Exercise Minutes  32  -JH      80925 - PT Manual Therapy Minutes  8  -JH         Exercise 1    Exercise Name 1  s/l ER  -JH      Cueing 1  Verbal  -      Sets 1  2  -JH      Reps 1  15  -JH      Additional Comments  2#  -JH         Exercise 2    Exercise Name 2  rows  -JH      Cueing 2  Verbal  -JH      Sets 2  2  -JH      Reps 2  15  -JH      Additional Comments  BTB  -JH         Exercise 3    Exercise Name 3  shoulder rolls  -JH      Cueing 3  Demo  -JH      Reps 3  15  -JH      Additional Comments  5#  -JH         Exercise 4    Exercise Name 4  chin tuck  -JH      Cueing 4  Verbal cues to slow movement  -      Reps 4  10  -JH      Time 4  5s  -JH         Exercise 5    Exercise Name 5  supine HA  -JH      Cueing 5  Demo;Verbal  -JH      Sets 5  2  -JH      Reps 5  10  -JH      Additional Comments  GTB  -JH         Exercise 6    Exercise  Name 6  prone I,T,Y  -JH      Cueing 6  Tactile;Verbal  -JH      Reps 6  10 ea  -JH         Exercise 7    Exercise Name 7  shoulder extension with TB  -JH      Cueing 7  Demo  -JH      Sets 7  2  -JH      Reps 7  15  -JH      Additional Comments  BTB  -JH         Exercise 8    Exercise Name 8  Theraband ER/IR  -JH      Cueing 8  Verbal;Demo  -JH      Sets 8  2  -JH      Reps 8  10  -JH      Additional Comments  GTB  -JH         Exercise 9    Exercise Name 9  low doorway stretch  -JH      Cueing 9  Demo  -JH         Exercise 10    Exercise Name 10  ER walkout at CC  -JH      Reps 10  4L  -JH      Time 10  10#  -JH      Additional Comments  + punch at end  -JH         Exercise 11    Exercise Name 11  standing serratus punch  -JH      Cueing 11  Verbal;Demo  -JH      Sets 11  2  -JH      Reps 11  15  -JH      Additional Comments  10#  -JH         Exercise 12    Exercise Name 12  standing alphabet  -JH      Cueing 12  Verbal;Demo  -JH      Reps 12  1x A-Z  -JH      Additional Comments  L1 ball  -JH         Exercise 13    Exercise Name 13  Rhythmic stab 90 degrees flexion with ball at mirror- small clockwise/counterclockwise motion  -JH      Cueing 13  Verbal;Demo  -JH      Reps 13  10 each direction  -JH         Exercise 14    Exercise Name 14  wall wash flex/ scap  -JH      Cueing 14  Demo  -JH      Reps 14  10 ea  -JH      Time 14  5s  -JH      Additional Comments  lift off a top  -JH         Exercise 15    Exercise Name 15  D2 flex L   -JH      Cueing 15  Verbal;Tactile;Demo  -JH      Reps 15  15  -JH      Additional Comments  RTB  -JH         Exercise 16    Exercise Name 16  Sidelying thoracic rotation  -JH      Cueing 16  Verbal;Demo  -JH      Reps 16  5  -JH      Time 16  5 sec   -JH        User Key  (r) = Recorded By, (t) = Taken By, (c) = Cosigned By    Initials Name Provider Type    Claudia Murphy PT Physical Therapist                      Manual Rx (last 36 hours)      Manual Treatments     Row Name  11/14/19 1300             Total Minutes    17820 - PT Manual Therapy Minutes  8  -         Manual Rx 2    Manual Rx 2 Location  STM pecs, supra/infra  -         Manual Rx 3    Manual Rx 3 Location  posterior GH mobs, oscillation GH joint  -      Manual Rx 3 Type  inf glides with flex  -        User Key  (r) = Recorded By, (t) = Taken By, (c) = Cosigned By    Initials Name Provider Type    Claudia Murphy, PT Physical Therapist          PT OP Goals     Row Name 11/14/19 1300          PT Short Term Goals    STG Date to Achieve  10/21/19  -     STG 1  Patient will be independent with initial HEP.  -     STG 1 Progress  Met  -     STG 2  Patient will report >/=at least 70% improvement in sleeping for improved QOL.  -     STG 2 Progress  Met  -     STG 3  The pt will demonstrate L shoulder AROM 0-160 flex and abduction without pain for improved functional reach.  -     STG 3 Progress  Partially Met  -        Long Term Goals    LTG Date to Achieve  11/24/19  -     LTG 1  Patient will be independent with progressive HEP for long term management of current condition.  -     LTG 1 Progress  Progressing  -     LTG 2  The pt will score less than or equal to 20 disability on the QuickDASH to indicate improedperceived performance of ADLs.  -     LTG 2 Progress  Progressing  -     LTG 3  The pt will return to self care ADLs including dressing, donning bra, and washing hair with pain no greater than 2/10 .  -     LTG 3 Progress  Progressing  -     LTG 4  The pt will demonstrate L shoulder strength to at least 4/5 for improved functional strength and reach.  -     LTG 4 Progress  Progressing  -       User Key  (r) = Recorded By, (t) = Taken By, (c) = Cosigned By    Initials Name Provider Type    Claudia Murphy, PT Physical Therapist          Therapy Education  Given: HEP, Symptoms/condition management, Pain management, Posture/body mechanics  Program: Reinforced  How Provided:  Verbal, Demonstration  Provided to: Patient  Level of Understanding: Teach back education performed, Verbalized, Demonstrated              Time Calculation:   Start Time: 1345  Stop Time: 1425  Time Calculation (min): 40 min  Therapy Charges for Today     Code Description Service Date Service Provider Modifiers Qty    24490344597  PT THER PROC EA 15 MIN 11/14/2019 Claudia Howe, PT GP 2    [id number]  PT MANUAL THERAPY EA 15 MIN 11/14/2019 Claudia Howe, PT GP 1                    Claudia Howe, PT  11/14/2019

## 2019-11-21 ENCOUNTER — HOSPITAL ENCOUNTER (OUTPATIENT)
Dept: PHYSICAL THERAPY | Facility: HOSPITAL | Age: 69
Setting detail: THERAPIES SERIES
Discharge: HOME | End: 2019-11-21

## 2019-11-21 ENCOUNTER — TELEPHONE (OUTPATIENT)
Dept: INTERNAL MEDICINE | Facility: CLINIC | Age: 69
End: 2019-11-21

## 2019-11-21 DIAGNOSIS — M25.512 ACUTE PAIN OF LEFT SHOULDER: Primary | ICD-10-CM

## 2019-11-21 DIAGNOSIS — R29.898 SHOULDER WEAKNESS: ICD-10-CM

## 2019-11-21 PROCEDURE — 97140 MANUAL THERAPY 1/> REGIONS: CPT

## 2019-11-21 PROCEDURE — 97110 THERAPEUTIC EXERCISES: CPT

## 2019-11-21 RX ORDER — CHLORTHALIDONE 25 MG/1
25 TABLET ORAL DAILY
Qty: 90 TABLET | Refills: 1 | Status: SHIPPED | OUTPATIENT
Start: 2019-11-21 | End: 2020-09-21 | Stop reason: SDUPTHER

## 2019-11-21 NOTE — THERAPY TREATMENT NOTE
Outpatient Physical Therapy Ortho / Discharge/Treatment Note  [facility]     Patient Name: Juany Carlin  : 1950  MRN: [id number]  Today's Date: 2019      Visit Date: 2019    Visit Dx:    ICD-10-CM ICD-9-CM   1. Acute pain of left shoulder M25.512 719.41   2. Shoulder weakness R29.898 719.61       Patient Active Problem List   Diagnosis   • HTN (hypertension), benign   • Vitamin D deficiency   • Urge incontinence   • GERD without esophagitis   • Cervical spinal stenosis   • Saddle pulmonary embolus (CMS/HCC)   • Family history of thrombosis   • Degeneration of cervical intervertebral disc   • Adrenal nodule (CMS/HCC)   • Health care maintenance   • Localized osteoarthritis of left shoulder   • Primary osteoarthritis of both knees   • Hypomagnesemia        Past Medical History:   Diagnosis Date   • Adrenal nodule (CMS/HCC)    • BPPV (benign paroxysmal positional vertigo) 5/10/2016   • Cervical stenosis of spine    • DVT, lower extremity (CMS/HCC)     right   • GERD without esophagitis 5/10/2016   • Hyperplastic colon polyp 2013   • PONV (postoperative nausea and vomiting)    • Pregnancy     , miscarrige x1   • Pulmonary emboli (CMS/HCC)     bilateral extensive PE with saddle emboli.Negative hypercoagulable state w/u   • Pulmonary embolism with acute cor pulmonale (CMS/HCC) 2017   • Urge incontinence 5/10/2016   • Vitamin D deficiency 5/10/2016        Past Surgical History:   Procedure Laterality Date   • CARDIAC CATHETERIZATION N/A 11/10/2016    Procedure: Right Heart Cath;  Surgeon: Johnna Agrawal MD;  Location: [facility] CATH INVASIVE LOCATION;  Service:    • CARDIAC CATHETERIZATION N/A 11/10/2016    Procedure: Thrombolytic Therapy;  Surgeon: Johnna Agrawal MD;  Location:  [facility] CATH INVASIVE LOCATION;  Service:    • CATARACT EXTRACTION Bilateral    • COLONOSCOPY W/ BIOPSIES  2013    hyperplastic polyp, Dr.Russel Lewis   • DILATATION AND CURETTAGE     • EPIDURAL BLOCK      • HEMORROIDECTOMY  2013   • INTERVENTIONAL RADIOLOGY PROCEDURE Bilateral 11/10/2016    Procedure: Sp [facility] Cath Place Pulmonary Art;  Surgeon: Johnna Agrawal MD;  Location:  [facility] CATH INVASIVE LOCATION;  Service:    • TONSILLECTOMY     • TOTAL LAPAROSCOPIC HYSTERECTOMY  2001   • VEIN SURGERY Left 5438-5342    leg,    • VENTRAL HERNIA REPAIR  1987       PT Ortho     Row Name 11/21/19 1300       Left Upper Ext    Lt Shoulder Abduction AROM  0-165  -RS    Lt Shoulder Flexion AROM  0-180  -RS       MMT Left Upper Ext    Lt Shoulder Flexion MMT, Gross Movement  (4/5) good  -RS    Lt Shoulder ABduction MMT, Gross Movement  (4/5) good  -RS    Lt Shoulder Internal Rotation MMT, Gross Movement  (4+/5) good plus  -RS    Lt Shoulder External Rotation MMT, Gross Movement  (4/5) good  -RS    Lt Elbow Flexion MMT, Gross Movement  (4+/5) good plus  -RS    Lt Elbow Extension MMT, Gross Movement  (4+/5) good plus  -RS      User Key  (r) = Recorded By, (t) = Taken By, (c) = Cosigned By    Initials Name Provider Type    RS Melissa Booth, PT Physical Therapist                            OP Exercises     Row Name 11/21/19 1300             Subjective Comments    Subjective Comments  I think I am feeling ready to handle it on my own, feeling 80% better.   -RS         Subjective Pain    Able to rate subjective pain?  yes  -RS      Pre-Treatment Pain Level  0  -RS         Total Minutes    12963 - PT Therapeutic Exercise Minutes  30  -RS      97325 - PT Manual Therapy Minutes  10  -RS         Exercise 1    Exercise Name 1  s/l ER  -RS      Cueing 1  Verbal  -RS      Sets 1  2  -RS      Reps 1  15  -RS      Additional Comments  2#  -RS         Exercise 2    Exercise Name 2  rows  -RS      Cueing 2  Verbal  -RS      Sets 2  2  -RS      Reps 2  15  -RS      Additional Comments  BTB  -RS         Exercise 3    Exercise Name 3  shoulder rolls  -RS      Cueing 3  Demo  -RS      Reps 3  15  -RS      Additional Comments  5#  -RS          Exercise 4    Exercise Name 4  chin tuck  -RS      Cueing 4  Verbal cues to slow movement  -RS      Reps 4  10  -RS      Time 4  5s  -RS         Exercise 5    Exercise Name 5  supine HA  -RS      Cueing 5  Demo;Verbal  -RS      Sets 5  2  -RS      Reps 5  10  -RS      Additional Comments  GTB  -RS         Exercise 6    Exercise Name 6  prone I,T,Y  -RS      Cueing 6  Tactile;Verbal  -RS      Reps 6  10 ea  -RS         Exercise 7    Exercise Name 7  shoulder extension with TB  -RS      Cueing 7  Demo  -RS      Sets 7  2  -RS      Reps 7  15  -RS         Exercise 8    Exercise Name 8  Theraband ER/IR  -RS      Cueing 8  Verbal;Demo  -RS      Sets 8  2  -RS      Reps 8  10  -RS         Exercise 9    Exercise Name 9  low doorway stretch  -RS      Cueing 9  Demo  -RS      Reps 9  3  -RS      Time 9  20s  -RS         Exercise 10    Exercise Name 10  --  -RS      Reps 10  --  -RS      Time 10  --  -RS         Exercise 11    Exercise Name 11  standing serratus punch  -RS      Cueing 11  Verbal;Demo  -RS      Sets 11  2  -RS      Reps 11  15  -RS      Additional Comments  10#  -RS         Exercise 12    Exercise Name 12  --  -RS      Cueing 12  --  -RS      Reps 12  --  -RS         Exercise 13    Exercise Name 13  Rhythmic stab 90 degrees flexion with ball at mirror- small clockwise/counterclockwise motion  -RS      Cueing 13  Verbal;Demo  -RS      Reps 13  10 each direction  -RS         Exercise 14    Exercise Name 14  wall wash flex/ scap  -RS      Cueing 14  Demo  -RS      Reps 14  10 ea  -RS      Time 14  5s  -RS      Additional Comments  lift off a top  -RS         Exercise 15    Exercise Name 15  D2 flex L   -RS      Cueing 15  Verbal;Tactile;Demo  -RS      Reps 15  15  -RS      Additional Comments  RTB  -RS         Exercise 16    Exercise Name 16  Sidelying thoracic rotation  -RS      Cueing 16  Verbal;Demo  -RS      Reps 16  5  -RS      Time 16  5 sec   -RS        User Key  (r) = Recorded By, (t) = Taken By, (c) =  Cosigned By    Initials Name Provider Type    RS Melissa Booth, PT Physical Therapist                      Manual Rx (last 36 hours)      Manual Treatments     Row Name 11/21/19 1300             Total Minutes    37356 - PT Manual Therapy Minutes  10  -RS         Manual Rx 2    Manual Rx 2 Location  STM pecs, supra/infra  -RS         Manual Rx 3    Manual Rx 3 Location  posterior GH mobs, oscillation GH joint  -RS      Manual Rx 3 Type  inf glides with flex  -RS        User Key  (r) = Recorded By, (t) = Taken By, (c) = Cosigned By    Initials Name Provider Type    RS Melissa Booth, PT Physical Therapist          PT OP Goals     Row Name 11/21/19 1300          PT Short Term Goals    STG Date to Achieve  10/21/19  -RS     STG 1  Patient will be independent with initial HEP.  -RS     STG 1 Progress  Met  -RS     STG 2  Patient will report >/=at least 70% improvement in sleeping for improved QOL.  -RS     STG 2 Progress  Met  -RS     STG 3  The pt will demonstrate L shoulder AROM 0-160 flex and abduction without pain for improved functional reach.  -RS     STG 3 Progress  Met  -RS        Long Term Goals    LTG Date to Achieve  11/24/19  -RS     LTG 1  Patient will be independent with progressive HEP for long term management of current condition.  -RS     LTG 1 Progress  Met  -RS     LTG 2  The pt will score less than or equal to 20 disability on the QuickDASH to indicate improedperceived performance of ADLs.  -RS     LTG 2 Progress  Met  -RS     LTG 2 Progress Comments  18  -RS     LTG 3  The pt will return to self care ADLs including dressing, donning bra, and washing hair with pain no greater than 2/10 .  -RS     LTG 3 Progress  Met  -RS     LTG 4  The pt will demonstrate L shoulder strength to at least 4/5 for improved functional strength and reach.  -RS     LTG 4 Progress  Met  -RS       User Key  (r) = Recorded By, (t) = Taken By, (c) = Cosigned By    Initials Name Provider Type    Melissa Pretty PT  Physical Therapist               Outcome Measure Options: Quick DASH  Quick DASH  Open a tight or new jar.: No Difficulty  Do heavy household chores (e.g., wash walls, wash floors): Mild Difficulty  Carry a shopping bag or briefcase: Mild Difficulty  Wash your back: Mild Difficulty  Use a knife to cut food: Mild Difficulty  Recreational activities in which you take some force or impact through your arm, should or hand (e.g. golf, hammering, tennis, etc.): Moderate Difficulty  During the past week, to what extent has your arm, shoulder, or hand problem interfered with your normal social activites with family, friends, neighbors or groups?: Slightly  During the past week, were you limited in your work or other regular daily activities as a result of your arm, shoulder or hand problem?: Not limited at all  Arm, Shoulder, or hand pain: Mild  Tingling (pins and needles) in your arm, shoulder, or hand: None  During the past week, how much difficulty have you had sleeping because of the pain in your arm, shoulder or hand?: No difficulty  Number of Questions Answered: 11  Quick DASH Score: 18.18         Time Calculation:   Start Time: 1300  Stop Time: 1345  Time Calculation (min): 45 min  Therapy Charges for Today     Code Description Service Date Service Provider Modifiers Qty    30318854830 HC PT THER PROC EA 15 MIN 11/21/2019 Melissa Booth, PT GP 2    64442243166 HC PT MANUAL THERAPY EA 15 MIN 11/21/2019 Melissa Booth, PT GP 1          PT G-Codes  Outcome Measure Options: Quick DASH  Quick DASH Score: 18.18         Melissa Booth PT  11/21/2019

## 2019-11-21 NOTE — TELEPHONE ENCOUNTER
Patient LVM on Hub  requesting a refill. Patient would like to be notified when filled.     Pt call back   [phone]  Kaylynn hernandez

## 2019-12-05 ENCOUNTER — APPOINTMENT (OUTPATIENT)
Dept: WOMENS IMAGING | Facility: HOSPITAL | Age: 69
End: 2019-12-05

## 2019-12-05 PROCEDURE — 77067 SCR MAMMO BI INCL CAD: CPT | Performed by: RADIOLOGY

## 2019-12-05 PROCEDURE — 77063 BREAST TOMOSYNTHESIS BI: CPT | Performed by: RADIOLOGY

## 2019-12-24 ENCOUNTER — DOCUMENTATION (OUTPATIENT)
Dept: PHYSICAL THERAPY | Facility: HOSPITAL | Age: 69
End: 2019-12-24

## 2019-12-24 DIAGNOSIS — R29.898 SHOULDER WEAKNESS: ICD-10-CM

## 2019-12-24 DIAGNOSIS — M25.512 ACUTE PAIN OF LEFT SHOULDER: Primary | ICD-10-CM

## 2019-12-24 NOTE — THERAPY DISCHARGE NOTE
Outpatient Physical Therapy Discharge Summary         Patient Name: Juany Carlin  : 1950  MRN: [id number]    Today's Date: 2019    Visit Dx:    ICD-10-CM ICD-9-CM   1. Acute pain of left shoulder M25.512 719.41   2. Shoulder weakness R29.898 719.61       PT OP Goals     Row Name 19 0700          PT Short Term Goals    STG Date to Achieve  10/21/19  -RS     STG 1  Patient will be independent with initial HEP.  -RS     STG 1 Progress  Met  -RS     STG 2  Patient will report >/=at least 70% improvement in sleeping for improved QOL.  -RS     STG 2 Progress  Met  -RS     STG 3  The pt will demonstrate L shoulder AROM 0-160 flex and abduction without pain for improved functional reach.  -RS     STG 3 Progress  Met  -RS        Long Term Goals    LTG Date to Achieve  19  -RS     LTG 1  Patient will be independent with progressive HEP for long term management of current condition.  -RS     LTG 1 Progress  Met  -RS     LTG 2  The pt will score less than or equal to 20 disability on the QuickDASH to indicate improedperceived performance of ADLs.  -RS     LTG 2 Progress  Met  -RS     LTG 3  The pt will return to self care ADLs including dressing, donning bra, and washing hair with pain no greater than 2/10 .  -RS     LTG 3 Progress  Met  -RS     LTG 4  The pt will demonstrate L shoulder strength to at least 4/5 for improved functional strength and reach.  -RS     LTG 4 Progress  Met  -RS       User Key  (r) = Recorded By, (t) = Taken By, (c) = Cosigned By    Initials Name Provider Type    Melissa Pretty PT Physical Therapist          OP PT Discharge Summary  Date of Discharge: 19  Reason for Discharge: All goals achieved  Outcomes Achieved: Able to achieve all goals within established timeline  Discharge Destination: Home with home program      Time Calculation:                    Melissa Booth PT  2019

## 2019-12-26 ENCOUNTER — TELEPHONE (OUTPATIENT)
Dept: INTERNAL MEDICINE | Facility: CLINIC | Age: 69
End: 2019-12-26

## 2019-12-26 RX ORDER — LOSARTAN POTASSIUM 100 MG/1
100 TABLET ORAL DAILY
Qty: 90 TABLET | Refills: 3 | Status: SHIPPED | OUTPATIENT
Start: 2019-12-26 | End: 2020-12-22 | Stop reason: SDUPTHER

## 2019-12-26 NOTE — TELEPHONE ENCOUNTER
Pt called and wanted to request to refill  losartan (COZAAR) 100 MG tablet to be filled.    [facility] pharmacy confirmed.     Pt contact# [phone].

## 2020-01-27 ENCOUNTER — TELEPHONE (OUTPATIENT)
Dept: INTERNAL MEDICINE | Facility: CLINIC | Age: 70
End: 2020-01-27

## 2020-01-27 NOTE — TELEPHONE ENCOUNTER
PT  STATES SHE IS HAVING AN [employer] MIGRAINE THAT HAVE RECURRING SINCE  YESTERDAY    PT  WOULD LIKE TO KNOW WHAT SHE NEEDS TO DO ABOUT THE HEADACHES     PT  STATES IF SHE NEEDS TO MAKE AN APPOINTMENT SHE CAN BUT WANTED TO ASK FIRST     PLEASE ADVISE AND GIVE CALL [phone] (M)

## 2020-01-27 NOTE — TELEPHONE ENCOUNTER
Patient scheduled for tomorrow at 8 am to see Dr Doran, patient notified if symptoms worsen go to ER

## 2020-01-28 ENCOUNTER — OFFICE VISIT (OUTPATIENT)
Dept: INTERNAL MEDICINE | Facility: CLINIC | Age: 70
End: 2020-01-28

## 2020-01-28 VITALS
WEIGHT: 190 LBS | BODY MASS INDEX: 28.14 KG/M2 | DIASTOLIC BLOOD PRESSURE: 82 MMHG | RESPIRATION RATE: 14 BRPM | HEIGHT: 69 IN | SYSTOLIC BLOOD PRESSURE: 132 MMHG

## 2020-01-28 DIAGNOSIS — G43.109 MIGRAINE WITH AURA AND WITHOUT STATUS MIGRAINOSUS, NOT INTRACTABLE: Primary | ICD-10-CM

## 2020-01-28 PROCEDURE — 99214 OFFICE O/P EST MOD 30 MIN: CPT | Performed by: INTERNAL MEDICINE

## 2020-01-28 RX ORDER — RIZATRIPTAN BENZOATE 10 MG/1
10 TABLET, ORALLY DISINTEGRATING ORAL ONCE AS NEEDED
Qty: 12 TABLET | Refills: 5 | Status: SHIPPED | OUTPATIENT
Start: 2020-01-28 | End: 2020-07-06

## 2020-01-28 NOTE — PROGRESS NOTES
"Subjective   Juany Carlin is a 70 y.o. female.     History of Present Illness   /82 (BP Location: Left arm, Patient Position: Sitting, Cuff Size: Adult)   Resp 14   Ht 175.3 cm (69\")   Wt 86.2 kg (190 lb)   BMI 28.06 kg/m²      Juany Carlin 70 y.o. female presents for evaluation of headache. The headache is located in right-side of the head. Symptoms began about many decades ago. Patient had been havingmultiple episodes of headache recently. According to patient she had 3 headaches in the last week. The headaches are usually throbbing.  Recently, the headaches have been increasing in frequency. Precipitating factors include: none which have been determined.   The headaches are usually preceded by an aura consisting of a peculiar odor and visual scintillations. Patient complains of photo- and phonophobia, and complains of nausea.   Work attendance and other daily activities has had been affected by the headaches lately. Usually those happen in the afternoons, rest and putting a wet cloth on her head usually helps. Aura lasts about 20 min, and the  headache takes about an hour and resolved spontaneously, but leaves patient very fatigued. Sometimes she can tell a day ahead that the headache is coming.  Patient has family history of migraines in her mother, daughter, and 2 siblings.   Previously tried therapies include none.    Current Outpatient Medications:   •  ALPRAZolam (XANAX) 0.25 MG tablet, Take 1 tablet by mouth 2 (Two) Times a Day As Needed for Anxiety., Disp: 12 tablet, Rfl: 0  •  apixaban (ELIQUIS) 2.5 MG tablet tablet, Take 1 tablet by mouth Every 12 (Twelve) Hours., Disp: 180 tablet, Rfl: 3  •  B Complex-Biotin-FA (B-100 COMPLEX PO), Take 1 tablet by mouth Daily., Disp: , Rfl:   •  CALCIUM PO, Take 600 mg by mouth Daily., Disp: , Rfl:   •  chlorthalidone (HYGROTON) 25 MG tablet, Take 1 tablet by mouth Daily., Disp: 90 tablet, Rfl: 1  •  Cholecalciferol (VITAMIN D3) 2000 UNITS tablet, Take 1 " tablet by mouth Daily., Disp: , Rfl:   •  ciclopirox (PENLAC) 8 % solution, , Disp: , Rfl:   •  CRANBERRY PO, Take 1 tablet by mouth Daily., Disp: , Rfl:   •  famotidine (PEPCID) 10 MG tablet, Take 20 mg by mouth Every Night., Disp: , Rfl:   •  losartan (COZAAR) 100 MG tablet, Take 1 tablet by mouth Daily., Disp: 90 tablet, Rfl: 3  •  Magnesium 250 MG tablet, Take 2 tablets by mouth Daily., Disp: , Rfl:   •  Multiple Vitamins-Minerals (MULTIVITAMIN PO), Take 1 tablet by mouth Daily., Disp: , Rfl:       The following portions of the patient's history were reviewed and updated as appropriate: allergies, current medications, past family history, past medical history, past social history, past surgical history and problem list.    Review of Systems   Constitutional: Negative for chills and fever.   Eyes: Positive for visual disturbance. Negative for pain and redness.   Respiratory: Negative for cough and shortness of breath.    Cardiovascular: Negative for chest pain and leg swelling.   Neurological: Positive for headaches. Negative for dizziness.       Objective   Physical Exam   Constitutional: She is oriented to person, place, and time. She appears well-developed and well-nourished.   HENT:   Head: Normocephalic and atraumatic.   Right Ear: Tympanic membrane, external ear and ear canal normal.   Left Ear: Tympanic membrane, external ear and ear canal normal.   Nose: Nose normal. Right sinus exhibits no maxillary sinus tenderness and no frontal sinus tenderness. Left sinus exhibits no maxillary sinus tenderness and no frontal sinus tenderness.   Mouth/Throat: Uvula is midline, oropharynx is clear and moist and mucous membranes are normal.   Eyes: Pupils are equal, round, and reactive to light. Conjunctivae and EOM are normal. Right eye exhibits no discharge. Left eye exhibits no discharge. No scleral icterus.   Neck: Neck supple. No JVD present.   Cardiovascular: Normal rate, regular rhythm and normal heart sounds.  Exam reveals no gallop and no friction rub.   No murmur heard.  Pulmonary/Chest: Effort normal and breath sounds normal. She has no wheezes. She has no rales.   Musculoskeletal: She exhibits no edema.   Lymphadenopathy:     She has no cervical adenopathy.   Neurological: She is alert and oriented to person, place, and time. No cranial nerve deficit.   Skin: Skin is warm and dry. No rash noted.   Psychiatric: She has a normal mood and affect. Her behavior is normal.   Vitals reviewed.      Assessment/Plan   Juany was seen today for migraines.    Diagnoses and all orders for this visit:    Migraine with aura and without status migrainosus, not intractable  -     rizatriptan MLT (MAXALT-MLT) 10 MG disintegrating tablet; Take 1 tablet by mouth 1 (One) Time As Needed for Migraine for up to 1 dose. May repeat in 2 hours if needed    Migraine headaches with aura - will try to use Maxalt 10 mg at the onset of the headache.Rest and hydration recommended.

## 2020-01-30 ENCOUNTER — OFFICE VISIT (OUTPATIENT)
Dept: ORTHOPEDIC SURGERY | Facility: CLINIC | Age: 70
End: 2020-01-30

## 2020-01-30 VITALS — HEIGHT: 69 IN | WEIGHT: 190 LBS | BODY MASS INDEX: 28.14 KG/M2 | TEMPERATURE: 97.3 F

## 2020-01-30 DIAGNOSIS — M16.11 ARTHRITIS OF RIGHT HIP: Primary | ICD-10-CM

## 2020-01-30 PROCEDURE — 73502 X-RAY EXAM HIP UNI 2-3 VIEWS: CPT | Performed by: ORTHOPAEDIC SURGERY

## 2020-01-30 PROCEDURE — 99214 OFFICE O/P EST MOD 30 MIN: CPT | Performed by: ORTHOPAEDIC SURGERY

## 2020-01-30 NOTE — PROGRESS NOTES
Patient Name: Juany Carlin   YOB: 1950  Referring Primary Care Physician: Lori Doran MD  BMI: Body mass index is 28.06 kg/m².    Chief Complaint:    Chief Complaint   Patient presents with   • Right Hip - Establish Care, Pain        HPI:     Juany Carlin is a 70 y.o. female who presents today for evaluation of   Chief Complaint   Patient presents with   • Right Hip - Establish Care, Pain   .  It hurts, and posterior to the trochanter and is stiff and hard for her to get around.  Hurts her on stairs it does give out.  She is not on anti-inflammatories that she is on chronic Eliquis for DVTs and PEs in the past and it makes her limp does bother her in her sleep     this problem is new to this examiner.     Subjective   Medications:   Home Medications:  Current Outpatient Medications on File Prior to Visit   Medication Sig   • ALPRAZolam (XANAX) 0.25 MG tablet Take 1 tablet by mouth 2 (Two) Times a Day As Needed for Anxiety.   • apixaban (ELIQUIS) 2.5 MG tablet tablet Take 1 tablet by mouth Every 12 (Twelve) Hours.   • B Complex-Biotin-FA (B-100 COMPLEX PO) Take 1 tablet by mouth Daily.   • CALCIUM PO Take 600 mg by mouth Daily.   • chlorthalidone (HYGROTON) 25 MG tablet Take 1 tablet by mouth Daily.   • Cholecalciferol (VITAMIN D3) 2000 UNITS tablet Take 1 tablet by mouth Daily.   • ciclopirox (PENLAC) 8 % solution    • CRANBERRY PO Take 1 tablet by mouth Daily.   • famotidine (PEPCID) 10 MG tablet Take 20 mg by mouth Every Night.   • losartan (COZAAR) 100 MG tablet Take 1 tablet by mouth Daily.   • Magnesium 250 MG tablet Take 2 tablets by mouth Daily.   • Multiple Vitamins-Minerals (MULTIVITAMIN PO) Take 1 tablet by mouth Daily.   • rizatriptan MLT (MAXALT-MLT) 10 MG disintegrating tablet Take 1 tablet by mouth 1 (One) Time As Needed for Migraine for up to 1 dose. May repeat in 2 hours if needed     No current facility-administered medications on file prior to visit.      Current  Medications:  Scheduled Meds:  Continuous Infusions:  No current facility-administered medications for this visit.   PRN Meds:.    I have reviewed the patient's medical history in detail and updated the computerized patient record.  Review and summarization of old records includes:    Past Medical History:   Diagnosis Date   • Adrenal nodule (CMS/HCC)    • BPPV (benign paroxysmal positional vertigo) 5/10/2016   • Cervical stenosis of spine    • DVT, lower extremity (CMS/HCC)     right   • GERD without esophagitis 5/10/2016   • Hyperplastic colon polyp 2013   • PONV (postoperative nausea and vomiting)    • Pregnancy     , miscarrige x1   • Pulmonary emboli (CMS/HCC)     bilateral extensive PE with saddle emboli.Negative hypercoagulable state w/u   • Pulmonary embolism with acute cor pulmonale (CMS/HCC) 2017   • Urge incontinence 5/10/2016   • Vitamin D deficiency 5/10/2016        Past Surgical History:   Procedure Laterality Date   • CARDIAC CATHETERIZATION N/A 11/10/2016    Procedure: Right Heart Cath;  Surgeon: Johnna Agrawal MD;  Location:  [facility];  Service:    • CARDIAC CATHETERIZATION N/A 11/10/2016    Procedure: Thrombolytic Therapy;  Surgeon: Johnna Agrawal MD;  Location:  [facility];  Service:    • CATARACT EXTRACTION Bilateral    • COLONOSCOPY W/ BIOPSIES  2013    hyperplastic polyp, Dr.Russel Lewis   • DILATATION AND CURETTAGE     • EPIDURAL BLOCK     • HEMORROIDECTOMY     • INTERVENTIONAL RADIOLOGY PROCEDURE Bilateral 11/10/2016    Procedure: Sp Smiley Cath Place Pulmonary Art;  Surgeon: Johnna Agrawal MD;  Location:  [facility];  Service:    • TONSILLECTOMY     • TOTAL LAPAROSCOPIC HYSTERECTOMY     • VEIN SURGERY Left [id number]    leg,    • VENTRAL HERNIA REPAIR          Social History     Occupational History   • Occupation:      Employer: RETIRED     Comment: [employer]   Tobacco Use   •  Smoking status: Former Smoker     Packs/day: 0.50     Years: 10.00     Pack years: 5.00     Types: Cigarettes     Last attempt to quit:      Years since quittin.1   • Smokeless tobacco: Never Used   Substance and Sexual Activity   • Alcohol use: Yes     Alcohol/week: 2.0 standard drinks     Types: 2 Glasses of wine per week   • Drug use: No   • Sexual activity: Defer    Social History     Social History Narrative    LIVES WITH SPOUSE        Family History   Problem Relation Age of Onset   • Macular degeneration Mother    • Deep vein thrombosis Mother    • Heart failure Father    • Deep vein thrombosis Father    • Chiari malformation Sister    • Heart disease Maternal Grandfather    • Heart disease Paternal Grandfather    • Deep vein thrombosis Sister    • Heart failure Sister    • Pancreatic cancer Sister    • Thyroid disease Daughter        ROS: 14 point review of systems was performed and all other systems were reviewed and are negative except for documented findings in HPI and today's encounter.     Allergies:   Allergies   Allergen Reactions   • Ciprocinonide [Fluocinolone] Other (See Comments)     Achilles tendonitis   • Suprax [Cefixime] Diarrhea   • Contrast Dye Rash     Rash many years ago when shell-fish derived contrast dye was used     Constitutional:  Denies fever, shaking or chills   Eyes:  Denies change in visual acuity   HENT:  Denies nasal congestion or sore throat   Respiratory:  Denies cough or shortness of breath   Cardiovascular:  Denies chest pain or severe LE edema   GI:  Denies abdominal pain, nausea, vomiting, bloody stools or diarrhea   Musculoskeletal:  Numbness, tingling, pain, or loss of motor function only as noted above in history of present illness.  : Denies painful urination or hematuria  Integument:  Denies rash, lesion or ulceration   Neurologic:  Denies headache or focal weakness  Endocrine:  Denies lymphadenopathy  Psych:  Denies confusion or change in mental  "status   Hem:  Denies active bleeding    OBJECTIVE:  Physical Exam: 70 y.o. female  Wt Readings from Last 3 Encounters:   01/30/20 86.2 kg (190 lb)   01/28/20 86.2 kg (190 lb)   10/03/19 85.7 kg (189 lb)     Ht Readings from Last 1 Encounters:   01/30/20 175.3 cm (69\")     Body mass index is 28.06 kg/m².  Vitals:    01/30/20 0929   Temp: 97.3 °F (36.3 °C)     Vital signs reviewed.     General Appearance:    Alert, cooperative, in no acute distress                  Eyes: conjunctiva clear  ENT: external ears and nose atraumatic  CV: no peripheral edema  Resp: normal respiratory effort  Skin: no rashes or wounds; normal turgor  Psych: mood and affect appropriate  Lymph: no nodes appreciated  Neuro: gross sensation intact  Vascular:  Palpable peripheral pulse in noted extremity  Musculoskeletal Extremities: Exam today shows pleasant lady she gets up with a limp she has minimal internal and external rotation of her hip and limited abduction she is tender more posteriorly than in the \"groin\" and she has diffuse lower lumbosacral back pain.  [facility]'s positive    Radiology:   AP of the hips lateral right hip taken the office today comparison view readily available shows advanced arthritis of the hip    Assessment:     ICD-10-CM ICD-9-CM   1. Arthritis of right hip M16.11 716.95        Procedures       Plan: Biomechanics of pertinent body area discussed.  Risks, benefits, alternatives, comparisons, and complications of accepted medicines, injections, recommendations, surgical procedures, and therapies explained and education provided in laymen's terms. Natural history and expected course of this patient's diagnosis discussed along with evaluation of therapies. Questions answered. When appropriate I also discussed proper use of cane, walker, trekking poles.   EXERCISES:  Advice on benefits of, and types of regular/moderate exercise including biomechanical forces involved as it pertains to this complaint.  MEDICATIONS:  " Prescription, OTC and Monitoring of Medications per orders to address ortho complaints; Evaluation and discussion of safety, precautions, side effects, and warnings given especially of long term NSAID or steroid therapy.    RICE: Rest, ice, compression, and elevation therapy, Cryotherapy/brachy therapy, and or OTC linaments as indicated with instructions.   PT referral.  MEDICAL RECORDS reviewed from other provider(s) for past and current medical history pertinent to this complaint.  She is averse to surgery because she does not want him to come off of her Eliquis.  I explained her it may be possible to do if her symptoms got bad enough with the filter other measures.  Suggest she try a round of physical therapy and see how that goes and/or consider an injection if she is not ready to contemplate surgery.  See her back as necessary      1/30/2020    Much of this encounter note is an electronic transcription/translation of spoken language to printed text. The electronic translation of spoken language may permit erroneous, or at times, nonsensical words or phrases to be inadvertently transcribed; Although I have reviewed the note for such errors, some may still exist

## 2020-02-12 ENCOUNTER — HOSPITAL ENCOUNTER (OUTPATIENT)
Dept: PHYSICAL THERAPY | Facility: HOSPITAL | Age: 70
Setting detail: THERAPIES SERIES
Discharge: HOME | End: 2020-02-12

## 2020-02-12 DIAGNOSIS — M16.11 PRIMARY OSTEOARTHRITIS OF RIGHT HIP: Primary | ICD-10-CM

## 2020-02-12 DIAGNOSIS — R26.89 IMPAIRED GAIT AND MOBILITY: ICD-10-CM

## 2020-02-12 PROCEDURE — 97110 THERAPEUTIC EXERCISES: CPT

## 2020-02-12 PROCEDURE — 97162 PT EVAL MOD COMPLEX 30 MIN: CPT

## 2020-02-12 NOTE — THERAPY EVALUATION
Outpatient Physical Therapy Ortho Initial Evaluation  [facility]     Patient Name: Juany Carlin  : 1950  MRN: [id number]  Today's Date: 2020      Visit Date: 2020    Patient Active Problem List   Diagnosis   • HTN (hypertension), benign   • Vitamin D deficiency   • Urge incontinence   • GERD without esophagitis   • Cervical spinal stenosis   • Saddle pulmonary embolus (CMS/HCC)   • Family history of thrombosis   • Degeneration of cervical intervertebral disc   • Adrenal nodule (CMS/HCC)   • Health care maintenance   • Localized osteoarthritis of left shoulder   • Primary osteoarthritis of both knees   • Hypomagnesemia   • Migraine with aura and without status migrainosus, not intractable        Past Medical History:   Diagnosis Date   • Adrenal nodule (CMS/HCC)    • BPPV (benign paroxysmal positional vertigo) 5/10/2016   • Cervical stenosis of spine    • DVT, lower extremity (CMS/HCC)     right   • GERD without esophagitis 5/10/2016   • Hyperplastic colon polyp 2013   • PONV (postoperative nausea and vomiting)    • Pregnancy     , miscarrige x1   • Pulmonary emboli (CMS/HCC)     bilateral extensive PE with saddle emboli.Negative hypercoagulable state w/u   • Pulmonary embolism with acute cor pulmonale (CMS/HCC) 2017   • Urge incontinence 5/10/2016   • Vitamin D deficiency 5/10/2016        Past Surgical History:   Procedure Laterality Date   • CARDIAC CATHETERIZATION N/A 11/10/2016    Procedure: Right Heart Cath;  Surgeon: Johnna Agrawal MD;  Location: [facility] INVASIVE LOCATION;  Service:    • CARDIAC CATHETERIZATION N/A 11/10/2016    Procedure: Thrombolytic Therapy;  Surgeon: Johnna Agrawal MD;  Location: [facility] CATH INVASIVE LOCATION;  Service:    • CATARACT EXTRACTION Bilateral    • COLONOSCOPY W/ BIOPSIES  2013    hyperplastic polyp, Dr.Russel Lewis   • DILATATION AND CURETTAGE     • EPIDURAL BLOCK     • HEMORROIDECTOMY     • INTERVENTIONAL RADIOLOGY  "PROCEDURE Bilateral 11/10/2016    Procedure: Sp Smiley Cath Place Pulmonary Art;  Surgeon: Johnna Agrawal MD;  Location:  [facility] CATH INVASIVE LOCATION;  Service:    • TONSILLECTOMY     • TOTAL LAPAROSCOPIC HYSTERECTOMY  2001   • VEIN SURGERY Left [id number]    leg,    • VENTRAL HERNIA REPAIR  1987       Visit Dx:     ICD-10-CM ICD-9-CM   1. Primary osteoarthritis of right hip M16.11 715.15   2. Impaired gait and mobility R26.89 781.2         Patient History     Row Name 02/12/20 1600             History    Chief Complaint  Pain;Difficulty Walking;Difficulty with daily activities;Joint stiffness;Muscle weakness Simultaneous filing. User may not have seen previous data.  -CJ      Type of Pain  Hip pain  -CJ (r) MH (t) CJ (c)      Date Current Problem(s) Began  11/01/19  -CJ (r) MH (t) CJ (c)      Brief Description of Current Complaint  Pt. is a 70 year old female who presents with complaints of right hip pain secondary to osetoarthritis. She reports feeling okay today. \"Right now I don't have any pain but it usually hurts right over the joint area. It hurts the worst when I have to go down steps and turning in bed. The doctor wanted me to come to therapy and then maybe get injections in my hip if it doesn't work. Right now I can't have surgery because of my medication.\"  -CJ (r) MH (t) CJ (c)      Patient/Caregiver Goals  Know what to do to help the symptoms;Improve mobility;Relieve pain  -CJ      Current Tobacco Use  NO  -CJ (r) MH (t) CJ (c)      Patient's Rating of General Health  Fair  -CJ      Hand Dominance  right-handed  -CJ      Occupation/sports/leisure activities  gardening  -CJ      What clinical tests have you had for this problem?  X-ray  -CJ      Results of Clinical Tests  OA  -CJ (r) MH (t) CJ (c)      Are you or can you be pregnant  No  -CJ         Pain     Pain Location  Hip  -CJ (r) MH (t) CJ (c)      Pain at Present  6  -CJ (r) MH (t) CJ (c)      Pain at Worst  10  -CJ      Pain Frequency  " Constant/continuous  -CJ (r) MH (t) CJ (c)      Pain Description  Stabbing  -CJ      What Performance Factors Make the Current Problem(s) WORSE?  stairs, rolling in bed  -CJ (r) MH (t) CJ (c)      What Performance Factors Make the Current Problem(s) BETTER?  standing, walking  -CJ      Tolerance Time- Standing  hurts to stand on right  -CJ (r) MH (t) CJ (c)      Is your sleep disturbed?  Yes  -CJ (r) MH (t) CJ (c)      Difficulties with ADL's?  yes  -CJ      Difficulties with recreational activities?  yes, unable  -CJ         Fall Risk Assessment    Any falls in the past year:  No  -CJ (r) MH (t) CJ (c)         Services    Prior Rehab/Home Health Experiences  Yes  -CJ (r) MH (t) CJ (c)      When was the prior experience with Rehab/Home Health  4 months ago  -CJ (r) MH (t) CJ (c)      Where was the prior experience with Rehab/Home Health  outpatient  -CJ (r) MH (t) CJ (c)      Are you currently receiving Home Health services  No  -CJ (r) MH (t) CJ (c)      Do you plan to receive Home Health services in the near future  No  -CJ (r) MH (t) CJ (c)         Daily Activities    Primary Language  English  -CJ (r) MH (t) CJ (c)      Are you able to read  Yes  -CJ (r) MH (t) CJ (c)      Are you able to write  Yes  -CJ (r) MH (t) CJ (c)      How does patient learn best?  Listening;Reading;Demonstration  -CJ (r) MH (t) CJ (c)      Teaching needs identified  Home Exercise Program;Management of Condition  -CJ      Patient is concerned about/has problems with  Climbing Stairs;Standing;Walking;Transfers (getting out of a chair, bed);Performing home management (household chores, shopping, care of dependents)  -CJ      Does patient have problems with the following?  None  -CJ      Barriers to learning  None  -CJ      Pt Participated in POC and Goals  Yes  -CJ (r) MH (t) CJ (c)         Safety    Are you being hurt, hit, or frightened by anyone at home or in your life?  No  -CJ (r) MH (t) CJ (c)      Are you being neglected by a  caregiver  No  -CJ (r) MH (t) CJ (c)        User Key  (r) = Recorded By, (t) = Taken By, (c) = Cosigned By    Initials Name Provider Type    CJ Petros Jenkins, PT Physical Therapist    Daphne Shankar, PT Student PT Student          PT Ortho     Row Name 02/12/20 1200       Subjective Comments    Subjective Comments  I am feeling okay today. Right now I don't have any pain but it usually hurts right over the joint area. It hurts the worst when I have to go down steps and turning in bed. The doctor wanted me to come to therapy and then maybe get injections in my hip if it doesn't work. Right now I can't have surgery because of my medication.  -CJ (r) MH (t) CJ (c)       Subjective Pain    Able to rate subjective pain?  yes  -CJ (r) MH (t) CJ (c)    Pre-Treatment Pain Level  0  -CJ (r) MH (t) CJ (c)       Posture/Observations    Thoracic Kyphosis  Moderate  -CJ (r) MH (t) CJ (c)    Rounded Shoulders  Moderate  -CJ (r) MH (t) CJ (c)    Scoliosis  Moderate  -CJ (r) MH (t) CJ (c)    Posture/Observations Comments  shifts to left side/leg when standing  -CJ (r) MH (t) CJ (c)       Myotomal Screen- Lower Quarter Clearing    Hip flexion (L2)  Bilateral:;3+ (Fair +)  -CJ (r) MH (t) CJ (c)    Knee extension (L3)  Bilateral:;4+ (Good +)  -CJ (r) MH (t) CJ (c)    Ankle DF (L4)  Bilateral:;5 (Normal)  -CJ (r) MH (t) CJ (c)    Ankle PF (S1)  Bilateral:;5 (Normal)  -CJ (r) MH (t) CJ (c)    Knee flexion (S2)  Bilateral:;4- (Good -)  -CJ (r) MH (t) CJ (c)       SI/Hip Screen- Lower Quarter Clearing    Red's/Niranjan's test  Right:;Positive  -CJ       Lumbosacral Palpation    SI  Tender  -CJ (r) MH (t) CJ (c)    Spinous Process  Tender  -CJ (r) MH (t) CJ (c)    Piriformis  Tender;Guarded/taut  -CJ (r) MH (t) CJ (c)    Gluteus Leeroy  Tender  -CJ (r) MH (t) CJ (c)    Erector Spinae (Paraspinals)  Tender;Guarded/taut  -CJ (r) MH (t) CJ (c)       Hip/Thigh Palpation    Greater Trochanter  Tender  -CJ (r) MH (t) CJ (c)    Piriformis   Guarded/taut;Tender  -CJ (r) MH (t) CJ (c)       Hip Special Tests    Ely’s test (rectus femoris tightness)  Bilateral:;Positive  -CJ (r) MH (t) CJ (c)    Stinchfield test (hip vs back pathology)  Right:;Positive  -CJ       Right Lower Ext    Rt Hip Flexion AROM  WFL  -CJ (r) MH (t) CJ (c)    Rt Hip External Rotation AROM  22  -CJ (r) MH (t) CJ (c)    Rt Hip Internal Rotation AROM  20  -CJ (r) MH (t) CJ (c)       Left Lower Ext    Lt Hip Flexion AROM  WFL  -CJ (r) MH (t) CJ (c)    Lt Hip External Rotation AROM  50  -CJ (r) MH (t) CJ (c)    Lt Hip Internal Rotation AROM  30  -CJ (r) MH (t) CJ (c)       MMT Right Lower Ext    Rt Hip Flexion ABduction & Ext Rotation MMT, Gross Movement  (3+/5) fair plus  -CJ (r) MH (t) CJ (c)       Lower Extremity Flexibility    Hamstrings  Bilateral:;Mildly limited  -CJ (r) MH (t) CJ (c)    Hip Flexors  Bilateral:;Moderately limited  -CJ (r) MH (t) CJ (c)    Hip External Rotators  Bilateral:;Moderately limited  -CJ (r) MH (t) CJ (c)    Hip Internal Rotators  Bilateral:;Moderately limited  -CJ (r) MH (t) CJ (c)       Balance Skills Training    SLS  unable on R, too painful  -CJ       Gait/Stairs Assessment/Training    Deviations/Abnormal Patterns (Gait)  antalgic;stride length decreased;gait speed decreased decreased bilateral hip extension at terminal stance  -CJ (r) MH (t) CJ (c)    Handrail Location (Stairs)  left side (ascending)  -CJ (r) MH (t) CJ (c)    Number of Steps (Stairs)  14  -CJ (r) MH (t) CJ (c)    Ascending Technique (Stairs)  step-to-step  -CJ (r) MH (t) CJ (c)    Descending Technique (Stairs)  step-to-step  -CJ (r) MH (t) CJ (c)    Comment (Gait/Stairs)  lack of full hip extension observed with ambulation  -CJ      User Key  (r) = Recorded By, (t) = Taken By, (c) = Cosigned By    Initials Name Provider Type    Petros Solomon, PT Physical Therapist    Daphne Shankar, PT Student PT Student                      Therapy Education  Education Details: Patient  provided with HEP. Educated on condition and the benefits of physical therapy.   Given: HEP, Symptoms/condition management, Posture/body mechanics  Program: New  How Provided: Verbal, Demonstration, Written  Provided to: Patient  Level of Understanding: Verbalized, Demonstrated     PT OP Goals     Row Name 02/12/20 1300          PT Short Term Goals    STG Date to Achieve  02/26/20  -CJ (r) MH (t) CJ (c)     STG 1  Patient will demonstrate independence with HEP to improve self-management of condition.  -CJ (r) MH (t) CJ (c)     STG 2  Patient will report decreased pain by 50% when descending stairs at home to improve daily function.  -CJ (r) MH (t) CJ (c)        Long Term Goals    LTG 1  Patient will report 50% improvement in functional tasks without an increase in pain to improve quality of life.  -CJ (r) MH (t) CJ (c)     LTG 2  Patient will demonstrate an increase in 4+/5 bilateral LE strength to increase stability and improve function.  -CJ (r) MH (t) CJ (c)     LTG 3  Patient will improve HOOS outcome score from 35% to 20%  to improve ability to participate in daily activities and decrease limitations.   -CJ (r) MH (t) CJ (c)     LTG 4  Patient will improve in R. hip IR and ER by 10 degrees to improve mobility and decrease pain.   -CJ (r) MH (t) CJ (c)     LTG 5  Patient will demonstrate understanding of long-term education of approprirate exercises (swimming, walking, etc.) that will not exacerbate symptoms to improve independence and self-management of condition.  - (r)  (t) CJ (c)       User Key  (r) = Recorded By, (t) = Taken By, (c) = Cosigned By    Initials Name Provider Type    Petros Solomon, PT Physical Therapist     Daphne Camarena, PT Student PT Student          PT Assessment/Plan     Row Name 02/12/20 1633          PT Assessment    Functional Limitations  Impaired gait;Limitations in functional capacity and performance;Performance in leisure activities;Limitation in home  management;Limitations in community activities;Performance in self-care ADL  -CJ     Impairments  Gait;Impaired flexibility;Muscle strength;Impaired postural alignment;Joint integrity;Impaired muscle endurance;Joint mobility;Poor body mechanics;Impaired muscle length;Posture;Endurance;Motor function;Range of motion;Pain  -CJ     Assessment Comments  Juany Carlin is a 70 y.o. year-old female referred to physical therapy for chronic R hip pain, related to osteoarthritis . She presents with a evolving clinical presentation.  She has comorbidities and personal factors of of previous history of DVT/PE,  BPPV, knee arthritis with some relief from injections, hip impingement, scoliosis, aura headaches, and anxiety that may affect her progress in the plan of care. Due to history of DVT/PE patient prefers no surgical intervention secondary to risks of coming off her Eliquis. She may attempt injections in the future. Self scored disability measure of HOOS was a 35% with 0% indicating no impairment, limitation, or restriction. She demonstrated significant decrease in right hip IR/ER, decreased hip extension during terminal stance of gait, weakness in hip musculature, and decreased extensibility of hip flexors. In standing patient has forward flexed psoture and frequntly shifts to left. Scolosis is noted in standing.  Signs and symptoms are consistent with physical therapy diagnosis of hip OA. She is appropriate for skilled therapy services at this time to address deficits and improve ease with functional activities.  -CJ     Please refer to paper survey for additional self-reported information  Yes  -CJ (r) VERONICA (teo) RELL (c)     Rehab Potential  Fair  -RELL (r) VERONICA (t) RELL (c)     Patient/caregiver participated in establishment of treatment plan and goals  Yes  -RELL (r) VERONICA (t) RELL (c)     Patient would benefit from skilled therapy intervention  Yes  -CJ (r) VERONICA (teo) RELL (c)        PT Plan    PT Frequency  2x/week  -RELL (r) VERONICA (t) RELL (c)      Predicted Duration of Therapy Intervention (Therapy Eval)  4 weeks  -RELL (r)  (t) RELL (c)     Planned CPT's?  PT EVAL MOD COMPLELITY: 49587;PT THER PROC EA 15 MIN: 01401;PT THER ACT EA 15 MIN: 65864;PT MANUAL THERAPY EA 15 MIN: 54480;PT NEUROMUSC RE-EDUCATION EA 15 MIN: 01361;PT GAIT TRAINING EA 15 MIN: 00271;PT SELF CARE/HOME MGMT/TRAIN EA 15: 22019;PT AQUATIC THERAPY EA 15 MIN: 09308;PT HOT OR COLD PACK TREAT MCARE;PT ULTRASOUND EA 15 MIN: 68879;PT ELECTRICAL STIM UNATTEND:   -     Physical Therapy Interventions (Optional Details)  home exercise program;joint mobilization;patient/family education;balance training;lumbar stabilization;modalities;dry needling;strengthening;stretching;stair training;swiss ball techniques;gait training;manual therapy techniques;ROM (Range of Motion)  -RELL (r)  (t) RELL (c)       User Key  (r) = Recorded By, (t) = Taken By, (c) = Cosigned By    Initials Name Provider Type     Petros Jenkins, PT Physical Therapist     Daphne Camarena, PT Student PT Student            OP Exercises     Row Name 02/12/20 1300 02/12/20 1200          Subjective Comments    Subjective Comments  --  I am feeling okay today. Right now I don't have any pain but it usually hurts right over the joint area. It hurts the worst when I have to go down steps and turning in bed. The doctor wanted me to come to therapy and then maybe get injections in my hip if it doesn't work. Right now I can't have surgery because of my medication.  -RELL (r)  (t) RELL (c)        Subjective Pain    Able to rate subjective pain?  --  yes  -RELL (r)  (t) RELL (c)     Pre-Treatment Pain Level  --  0  -RELL (r)  (t) RELL (c)        Total Minutes    57850 - PT Therapeutic Exercise Minutes  15  -RELL (r)  (t) RELL (c)  --        Exercise 1    Exercise Name 1  piriformis stretch (both ways)  -RELL (r)  (t) RELL (c)  --     Cueing 1  Verbal;Tactile  -RELL (r)  (t) RELL (c)  --     Reps 1  2  -RELL (r) VERONICA (t) RELL (c)  --     Time 1  20 seconds    -CJ (r) MH (t) CJ (c)  --        Exercise 2    Exercise Name 2  glute sets  -CJ (r) MH (t) CJ (c)  --     Cueing 2  Verbal  -CJ (r) MH (t) CJ (c)  --     Reps 2  10  -CJ (r) MH (t) CJ (c)  --     Time 2  5 seconds  -CJ (r) MH (t) CJ (c)  --        Exercise 3    Exercise Name 3  PPT  -CJ (r) MH (t) CJ (c)  --     Cueing 3  Verbal;Tactile  -CJ (r) MH (t) CJ (c)  --     Reps 3  10  -CJ (r) MH (t) CJ (c)  --     Time 3  5 seconds  -CJ (r) MH (t) CJ (c)  --        Exercise 4    Exercise Name 4  hip flexor stretch at stair  -CJ (r) MH (t) CJ (c)  --     Reps 4  2  -CJ (r) MH (t) CJ (c)  --     Time 4  20 seconds  -CJ (r) MH (t) CJ (c)  --     Additional Comments  modified to have leg on stool due to reports of pain  -CJ (r) MH (t) CJ (c)  --       User Key  (r) = Recorded By, (t) = Taken By, (c) = Cosigned By    Initials Name Provider Type    Petros Solomon, PT Physical Therapist     Daphne Camarena, PT Student PT Student                        Outcome Measure Options: Other Outcome Measure(HOOS  35%)         Time Calculation:     Start Time: 1130  Stop Time: 1220  Time Calculation (min): 50 min     Therapy Charges for Today     Code Description Service Date Service Provider Modifiers Qty    [id number]  PT THER PROC EA 15 MIN 2/12/2020 Daphne Camarena, PT Student GP 1    [id number]  PT EVAL MOD COMPLEXITY 2 2/12/2020 Daphne Camarena, PT Student GP 1          PT G-Codes  Outcome Measure Options: Other Outcome Measure(HOOS  35%)         LAXMI Kowalski  2/12/2020

## 2020-02-24 ENCOUNTER — HOSPITAL ENCOUNTER (OUTPATIENT)
Dept: PHYSICAL THERAPY | Facility: HOSPITAL | Age: 70
Setting detail: THERAPIES SERIES
Discharge: HOME | End: 2020-02-24

## 2020-02-24 DIAGNOSIS — R26.89 IMPAIRED GAIT AND MOBILITY: ICD-10-CM

## 2020-02-24 DIAGNOSIS — M16.11 PRIMARY OSTEOARTHRITIS OF RIGHT HIP: Primary | ICD-10-CM

## 2020-02-24 PROCEDURE — 97110 THERAPEUTIC EXERCISES: CPT

## 2020-02-25 NOTE — THERAPY TREATMENT NOTE
Outpatient Physical Therapy Ortho Treatment Note  [facility]     Patient Name: Juany Carlin  : 1950  MRN: [id number]  Today's Date: 2020      Visit Date: 2020    Visit Dx:    ICD-10-CM ICD-9-CM   1. Primary osteoarthritis of right hip M16.11 715.15   2. Impaired gait and mobility R26.89 781.2       Patient Active Problem List   Diagnosis   • HTN (hypertension), benign   • Vitamin D deficiency   • Urge incontinence   • GERD without esophagitis   • Cervical spinal stenosis   • Saddle pulmonary embolus (CMS/HCC)   • Family history of thrombosis   • Degeneration of cervical intervertebral disc   • Adrenal nodule (CMS/HCC)   • Health care maintenance   • Localized osteoarthritis of left shoulder   • Primary osteoarthritis of both knees   • Hypomagnesemia   • Migraine with aura and without status migrainosus, not intractable        Past Medical History:   Diagnosis Date   • Adrenal nodule (CMS/HCC)    • BPPV (benign paroxysmal positional vertigo) 5/10/2016   • Cervical stenosis of spine    • DVT, lower extremity (CMS/HCC)     right   • GERD without esophagitis 5/10/2016   • Hyperplastic colon polyp 2013   • PONV (postoperative nausea and vomiting)    • Pregnancy     , miscarrige x1   • Pulmonary emboli (CMS/HCC)     bilateral extensive PE with saddle emboli.Negative hypercoagulable state w/u   • Pulmonary embolism with acute cor pulmonale (CMS/HCC) 2017   • Urge incontinence 5/10/2016   • Vitamin D deficiency 5/10/2016        Past Surgical History:   Procedure Laterality Date   • CARDIAC CATHETERIZATION N/A 11/10/2016    Procedure: Right Heart Cath;  Surgeon: Johnna Agrawal MD;  Location: [facility] INVASIVE LOCATION;  Service:    • CARDIAC CATHETERIZATION N/A 11/10/2016    Procedure: Thrombolytic Therapy;  Surgeon: Johnna Agrawal MD;  Location:  [facility] CATH INVASIVE LOCATION;  Service:    • CATARACT EXTRACTION Bilateral    • COLONOSCOPY W/ BIOPSIES  2013    hyperplastic polyp,  Dr.Russel Lewis   • DILATATION AND CURETTAGE     • EPIDURAL BLOCK     • HEMORROIDECTOMY  2013   • INTERVENTIONAL RADIOLOGY PROCEDURE Bilateral 11/10/2016    Procedure: Sp Smiley Cath Place Pulmonary Art;  Surgeon: Johnna Agrawal MD;  Location:  [facility]ATH INVASIVE LOCATION;  Service:    • TONSILLECTOMY     • TOTAL LAPAROSCOPIC HYSTERECTOMY  2001   • VEIN SURGERY Left [id number]    leg,    • VENTRAL HERNIA REPAIR  1987                       PT Assessment/Plan     Row Name 02/24/20 1027          PT Assessment    Assessment Comments  Pt returns for initial follow up after evaluation reporting no change in symptoms to date. Increased pain overall today likely due to spending weekend with daughter and 4 month old grandaughter. Added several supine hip strengthening exerises without exacerbation of symptoms.   -CN        PT Plan    PT Plan Comments  Assess response to added exercises and progress HEP next visit if able. Progress toward sitting and standing exercises as able.   -CN       User Key  (r) = Recorded By, (t) = Taken By, (c) = Cosigned By    Initials Name Provider Type    Swathi Barajas, PT Physical Therapist            OP Exercises     Row Name 02/24/20 0900             Subjective Comments    Subjective Comments  It is hurting today and I don't know if I did too much or what. I have had the flu since I was here last, but I tried to keep up with the exercises.   -CN         Subjective Pain    Able to rate subjective pain?  yes  -CN      Pre-Treatment Pain Level  5  -CN         Total Minutes    76140 - PT Therapeutic Exercise Minutes  40  -CN         Exercise 1    Exercise Name 1  piriformis stretch (both ways)  -CN      Cueing 1  Verbal;Tactile  -CN      Reps 1  3  -CN      Time 1  20 sec  -CN         Exercise 2    Exercise Name 2  glute sets  -CN      Cueing 2  Verbal  -CN      Reps 2  10  -CN      Time 2  5 seconds  -CN         Exercise 3    Exercise Name 3  PPT  -CN      Cueing 3   Verbal;Tactile  -CN      Reps 3  10  -CN      Time 3  5 seconds  -CN         Exercise 5    Exercise Name 5  Nustep, L5   -CN      Cueing 5  Verbal  -CN      Time 5  5 min  -CN         Exercise 6    Exercise Name 6  Supine TA with adduction  -CN      Cueing 6  Verbal  -CN      Reps 6  15  -CN      Time 6  5 sec  -CN         Exercise 7    Exercise Name 7  HL TA with abd  -CN      Cueing 7  Verbal  -CN      Reps 7  15  -CN      Additional Comments  RTB  -CN         Exercise 8    Exercise Name 8  Bridges with PPT and GS  -CN      Cueing 8  Verbal  -CN      Reps 8  10  -CN         Exercise 9    Exercise Name 9  SL clamshells  -CN      Cueing 9  Verbal  -CN      Reps 9  10 B  -CN        User Key  (r) = Recorded By, (t) = Taken By, (c) = Cosigned By    Initials Name Provider Type    Swathi Barajas, PT Physical Therapist                       PT OP Goals     Row Name 02/24/20 0900          PT Short Term Goals    STG Date to Achieve  02/26/20  -CN     STG 1  Patient will demonstrate independence with HEP to improve self-management of condition.  -CN     STG 2  Patient will report decreased pain by 50% when descending stairs at home to improve daily function.  -CN     STG 2 Progress Comments  No change in symptoms with WBing activities.  -CN        Long Term Goals    LTG 1  Patient will report 50% improvement in functional tasks without an increase in pain to improve quality of life.  -CN     LTG 2  Patient will demonstrate an increase in 4+/5 bilateral LE strength to increase stability and improve function.  -CN     LTG 3  Patient will improve HOOS outcome score from 35% to 20%  to improve ability to participate in daily activities and decrease limitations.   -CN     LTG 4  Patient will improve in R. hip IR and ER by 10 degrees to improve mobility and decrease pain.   -CN     LTG 5  Patient will demonstrate understanding of long-term education of approprirate exercises (swimming, walking, etc.) that will not  exacerbate symptoms to improve independence and self-management of condition.  -RAMY       User Key  (r) = Recorded By, (t) = Taken By, (c) = Cosigned By    Initials Name Provider Type    Swathi Barajas, PT Physical Therapist          Therapy Education  Given: HEP, Symptoms/condition management, Posture/body mechanics  Program: Progressed  How Provided: Verbal, Demonstration  Provided to: Patient  Level of Understanding: Verbalized, Demonstrated              Time Calculation:   Start Time: 0947  Stop Time: 1027  Time Calculation (min): 40 min  Therapy Charges for Today     Code Description Service Date Service Provider Modifiers Qty    [id number]  PT THER PROC EA 15 MIN 2/24/2020 Swathi Galeana, PT GP 3                    Swathi Galeana PT  2/24/2020

## 2020-02-27 ENCOUNTER — HOSPITAL ENCOUNTER (OUTPATIENT)
Dept: PHYSICAL THERAPY | Facility: HOSPITAL | Age: 70
Setting detail: THERAPIES SERIES
Discharge: HOME | End: 2020-02-27

## 2020-02-27 DIAGNOSIS — R26.89 IMPAIRED GAIT AND MOBILITY: ICD-10-CM

## 2020-02-27 DIAGNOSIS — M16.11 PRIMARY OSTEOARTHRITIS OF RIGHT HIP: Primary | ICD-10-CM

## 2020-02-27 PROCEDURE — 97110 THERAPEUTIC EXERCISES: CPT

## 2020-02-27 NOTE — THERAPY TREATMENT NOTE
Outpatient Physical Therapy Ortho Treatment Note  [facility]     Patient Name: Juany Carlin  : 1950  MRN: [id number]  Today's Date: 2020      Visit Date: 2020    Visit Dx:    ICD-10-CM ICD-9-CM   1. Primary osteoarthritis of right hip M16.11 715.15   2. Impaired gait and mobility R26.89 781.2       Patient Active Problem List   Diagnosis   • HTN (hypertension), benign   • Vitamin D deficiency   • Urge incontinence   • GERD without esophagitis   • Cervical spinal stenosis   • Saddle pulmonary embolus (CMS/HCC)   • Family history of thrombosis   • Degeneration of cervical intervertebral disc   • Adrenal nodule (CMS/HCC)   • Health care maintenance   • Localized osteoarthritis of left shoulder   • Primary osteoarthritis of both knees   • Hypomagnesemia   • Migraine with aura and without status migrainosus, not intractable        Past Medical History:   Diagnosis Date   • Adrenal nodule (CMS/HCC)    • BPPV (benign paroxysmal positional vertigo) 5/10/2016   • Cervical stenosis of spine    • DVT, lower extremity (CMS/HCC)     right   • GERD without esophagitis 5/10/2016   • Hyperplastic colon polyp 2013   • PONV (postoperative nausea and vomiting)    • Pregnancy     , miscarrige x1   • Pulmonary emboli (CMS/HCC)     bilateral extensive PE with saddle emboli.Negative hypercoagulable state w/u   • Pulmonary embolism with acute cor pulmonale (CMS/HCC) 2017   • Urge incontinence 5/10/2016   • Vitamin D deficiency 5/10/2016        Past Surgical History:   Procedure Laterality Date   • CARDIAC CATHETERIZATION N/A 11/10/2016    Procedure: Right Heart Cath;  Surgeon: Johnna Agrawal MD;  Location: [facility] INVASIVE LOCATION;  Service:    • CARDIAC CATHETERIZATION N/A 11/10/2016    Procedure: Thrombolytic Therapy;  Surgeon: Johnna Agrawal MD;  Location:  [facility] INVASIVE LOCATION;  Service:    • CATARACT EXTRACTION Bilateral    • COLONOSCOPY W/ BIOPSIES  2013    hyperplastic polyp,  Dr.Russel Lewis   • DILATATION AND CURETTAGE     • EPIDURAL BLOCK     • HEMORROIDECTOMY  2013   • INTERVENTIONAL RADIOLOGY PROCEDURE Bilateral 11/10/2016    Procedure: Sp Smiley Cath Place Pulmonary Art;  Surgeon: Johnna Agrawal MD;  [facility];  Service:    • TONSILLECTOMY     • TOTAL LAPAROSCOPIC HYSTERECTOMY  2001   • VEIN SURGERY Left [id number]    leg,    • VENTRAL HERNIA REPAIR  1987       PT Ortho     Row Name 02/27/20 1100       Subjective Comments    Subjective Comments  Right lateral hip and groin pain  -SI       Subjective Pain    Able to rate subjective pain?  yes  -SI       Gait/Stairs Assessment/Training    Comment (Gait/Stairs)  F posture with gait and decrease stride/hip ext  -SI      User Key  (r) = Recorded By, (t) = Taken By, (c) = Cosigned By    Initials Name Provider Type    Shannon Mcdonald, JOSE Physical Therapy Assistant                      PT Assessment/Plan     Row Name 02/27/20 1210          PT Assessment    Assessment Comments  Pt frustrated she can't hike and walk like she used to do.  Sttates she wishes she could have SRIDHAR but cannot due to bloodthinner med she is taking.  She dos report she is altready walking more upright since started PT  -SI       User Key  (r) = Recorded By, (t) = Taken By, (c) = Cosigned By    Initials Name Provider Type    Shannon Mcdonald PTA Physical Therapy Assistant            OP Exercises     Row Name 02/27/20 1100             Subjective Comments    Subjective Comments  Right lateral hip and groin pain  -SI         Subjective Pain    Able to rate subjective pain?  yes  -SI         Total Minutes    02274 - PT Therapeutic Exercise Minutes  40  -SI         Exercise 1    Exercise Name 1  piriformis stretch (both ways)  -SI      Cueing 1  Verbal;Tactile  -SI      Reps 1  3  -SI      Time 1  20 sec  -SI         Exercise 2    Exercise Name 2  glute sets  -SI      Cueing 2  Verbal  -SI      Reps 2  10  -SI      Time 2  5  seconds  -SI         Exercise 3    Exercise Name 3  PPT with alt knee  -SI      Cueing 3  Verbal;Tactile  -SI      Reps 3  10  -SI      Time 3  5 seconds  -SI         Exercise 4    Exercise Name 4  supine heel slide with TA  -SI      Reps 4  20  -SI         Exercise 5    Exercise Name 5  Nustep, L5   -SI      Cueing 5  Verbal  -SI      Time 5  5 min  -SI         Exercise 6    Exercise Name 6  Supine TA with adduction  -SI      Cueing 6  Verbal  -SI      Reps 6  15  -SI      Time 6  5 sec  -SI         Exercise 7    Exercise Name 7  HL TA with abd  -SI      Cueing 7  Verbal  -SI      Reps 7  15  -SI         Exercise 8    Exercise Name 8  Bridges with PPT and GS  -SI      Cueing 8  Verbal  -SI      Reps 8  10  -SI      Additional Comments  Painful after 5 reps  -SI         Exercise 9    Exercise Name 9  SL clamshells  -SI      Cueing 9  Verbal  -SI      Reps 9  10 B  -SI         Exercise 10    Exercise Name 10  supiine hip abd  -SI      Reps 10  20  -SI      Additional Comments  plasti  under foot  -SI         Exercise 11    Exercise Name 11  HS stretch sit side of mat  -SI      Sets 11  3  -SI      Reps 11  20  -SI         Exercise 12    Exercise Name 12  standing alternate high knee march with UE support  -SI      Reps 12  10  -SI         Exercise 13    Exercise Name 13  standing gastroc stretch (and gets hip flexor stretch on right.  -SI      Sets 13  3  -SI      Reps 13  20  -SI        User Key  (r) = Recorded By, (t) = Taken By, (c) = Cosigned By    Initials Name Provider Type    Shannon Mcdonald, PTA Physical Therapy Assistant                           Therapy Education  Given: HEP, Symptoms/condition management  Program: Reinforced  How Provided: Verbal, Demonstration, Written  Provided to: Patient  Level of Understanding: Teach back education performed              Time Calculation:   Start Time: 1130  Stop Time: 1212  Time Calculation (min): 42 min  Total Timed Code Minutes- PT: 40 minute(s)  Therapy Charges  for Today     Code Description Service Date Service Provider Modifiers Qty    68342267982 HC PT THER PROC EA 15 MIN 2/27/2020 Shannon Almaguer, PTA GP 3                    Shannon Almaguer PTA  2/27/2020

## 2020-03-02 ENCOUNTER — HOSPITAL ENCOUNTER (OUTPATIENT)
Dept: PHYSICAL THERAPY | Facility: HOSPITAL | Age: 70
Setting detail: THERAPIES SERIES
Discharge: HOME | End: 2020-03-02

## 2020-03-02 DIAGNOSIS — R26.89 IMPAIRED GAIT AND MOBILITY: ICD-10-CM

## 2020-03-02 DIAGNOSIS — M16.11 PRIMARY OSTEOARTHRITIS OF RIGHT HIP: Primary | ICD-10-CM

## 2020-03-02 PROCEDURE — 97110 THERAPEUTIC EXERCISES: CPT

## 2020-03-02 NOTE — THERAPY TREATMENT NOTE
Outpatient Physical Therapy Ortho Treatment Note  [facility]     Patient Name: Juany Carlin  : 1950  MRN: [id number]  Today's Date: 3/2/2020      Visit Date: 2020    Visit Dx:    ICD-10-CM ICD-9-CM   1. Primary osteoarthritis of right hip M16.11 715.15   2. Impaired gait and mobility R26.89 781.2       Patient Active Problem List   Diagnosis   • HTN (hypertension), benign   • Vitamin D deficiency   • Urge incontinence   • GERD without esophagitis   • Cervical spinal stenosis   • Saddle pulmonary embolus (CMS/HCC)   • Family history of thrombosis   • Degeneration of cervical intervertebral disc   • Adrenal nodule (CMS/HCC)   • Health care maintenance   • Localized osteoarthritis of left shoulder   • Primary osteoarthritis of both knees   • Hypomagnesemia   • Migraine with aura and without status migrainosus, not intractable        Past Medical History:   Diagnosis Date   • Adrenal nodule (CMS/HCC)    • BPPV (benign paroxysmal positional vertigo) 5/10/2016   • Cervical stenosis of spine    • DVT, lower extremity (CMS/HCC)     right   • GERD without esophagitis 5/10/2016   • Hyperplastic colon polyp 2013   • PONV (postoperative nausea and vomiting)    • Pregnancy     , miscarrige x1   • Pulmonary emboli (CMS/HCC)     bilateral extensive PE with saddle emboli.Negative hypercoagulable state w/u   • Pulmonary embolism with acute cor pulmonale (CMS/HCC) 2017   • Urge incontinence 5/10/2016   • Vitamin D deficiency 5/10/2016        Past Surgical History:   Procedure Laterality Date   • CARDIAC CATHETERIZATION N/A 11/10/2016    Procedure: Right Heart Cath;  Surgeon: Johnna Agrawal MD;  Location: [facility] INVASIVE LOCATION;  Service:    • CARDIAC CATHETERIZATION N/A 11/10/2016    Procedure: Thrombolytic Therapy;  Surgeon: Johnna Agrawal MD;  Location:  [facility];  Service:    • CATARACT EXTRACTION Bilateral    • COLONOSCOPY W/ BIOPSIES  2013    hyperplastic polyp,  Dr.Russel Lewis   • DILATATION AND CURETTAGE     • EPIDURAL BLOCK     • HEMORROIDECTOMY  2013   • INTERVENTIONAL RADIOLOGY PROCEDURE Bilateral 11/10/2016    Procedure: Sp Smiley Cath Place Pulmonary Art;  Surgeon: Johnna Agrawal MD;  Location:  [facility] LOC[facility];  Service:    • TONSILLECTOMY     • TOTAL LAPAROSCOPIC HYSTERECTOMY  2001   • VEIN SURGERY Left [id number]    leg,    • VENTRAL HERNIA REPAIR  1987                       PT Assessment/Plan     Row Name 03/02/20 1234          PT Assessment    Assessment Comments  Pt reports compliance with current home program and with increased groin pain today. Tolerated current program with cues for hip placement for supine hip abd and SL clamshells in order to reduce rotation through lumbar spine.   -CN        PT Plan    PT Plan Comments  Continue with current program and progress flexibility and stability exercises as tolerated. Consider small range MS and heel raises next visit.   -CN       User Key  (r) = Recorded By, (t) = Taken By, (c) = Cosigned By    Initials Name Provider Type    Swathi Barajas, PT Physical Therapist            OP Exercises     Row Name 03/02/20 1000             Subjective Comments    Subjective Comments  It really hurts when I go to move at night. That is the worst part. It is not as bad when I am sitting here though.   -CN         Subjective Pain    Able to rate subjective pain?  yes  -CN      Pre-Treatment Pain Level  3  -CN         Total Minutes    65453 - PT Therapeutic Exercise Minutes  40  -CN         Exercise 1    Exercise Name 1  piriformis stretch (both ways)  -CN      Cueing 1  Verbal;Tactile  -CN      Reps 1  3  -CN      Time 1  20 sec  -CN         Exercise 2    Exercise Name 2  glute sets  -CN      Cueing 2  Verbal  -CN      Reps 2  10  -CN      Time 2  5 seconds  -CN         Exercise 3    Exercise Name 3  PPT with alt knee  -CN      Cueing 3  Verbal;Tactile  -CN      Reps 3  10  -CN      Time 3  5  seconds  -CN         Exercise 4    Exercise Name 4  supine heel slide with TA  -CN      Reps 4  20  -CN         Exercise 5    Exercise Name 5  Nustep, L5   -CN      Cueing 5  Verbal  -CN      Time 5  5 min  -CN         Exercise 6    Exercise Name 6  Supine TA with adduction  -CN      Cueing 6  Verbal  -CN      Reps 6  15  -CN      Time 6  5 sec  -CN         Exercise 7    Exercise Name 7  HL TA with abd  -CN      Cueing 7  Verbal  -CN      Reps 7  15  -CN      Additional Comments  RTB  -CN         Exercise 8    Exercise Name 8  Bridges with PPT and GS  -CN      Cueing 8  Verbal  -CN      Reps 8  10  -CN      Additional Comments  slight cramp, however no pain  -CN         Exercise 9    Exercise Name 9  SL clamshells  -CN      Cueing 9  Verbal  -CN      Reps 9  15 B  -CN         Exercise 11    Exercise Name 11  HS stretch sit side of mat  -CN      Sets 11  3  -CN      Reps 11  20  -CN        User Key  (r) = Recorded By, (t) = Taken By, (c) = Cosigned By    Initials Name Provider Type    Swathi Barajas, PT Physical Therapist                       PT OP Goals     Row Name 03/02/20 1100          PT Short Term Goals    STG Date to Achieve  02/26/20  -CN     STG 1  Patient will demonstrate independence with HEP to improve self-management of condition.  -CN     STG 1 Progress  Progressing  -CN     STG 1 Progress Comments  Pt reports compliance with current home program.   -CN     STG 2  Patient will report decreased pain by 50% when descending stairs at home to improve daily function.  -CN     STG 2 Progress  Ongoing  -CN     STG 2 Progress Comments  Contiues with pain in WBing positions.   -CN        Long Term Goals    LTG 1  Patient will report 50% improvement in functional tasks without an increase in pain to improve quality of life.  -CN     LTG 1 Progress  Ongoing  -CN     LTG 2  Patient will demonstrate an increase in 4+/5 bilateral LE strength to increase stability and improve function.  -CN     LTG 2  Progress  Ongoing  -CN     LTG 3  Patient will improve HOOS outcome score from 35% to 20%  to improve ability to participate in daily activities and decrease limitations.   -CN     LTG 3 Progress  Ongoing  -CN     LTG 4  Patient will improve in R. hip IR and ER by 10 degrees to improve mobility and decrease pain.   -CN     LTG 4 Progress  Ongoing  -CN     LTG 5  Patient will demonstrate understanding of long-term education of approprirate exercises (swimming, walking, etc.) that will not exacerbate symptoms to improve independence and self-management of condition.  -CN     LTG 5 Progress  Ongoing  -CN       User Key  (r) = Recorded By, (t) = Taken By, (c) = Cosigned By    Initials Name Provider Type    Swathi Barajas, PT Physical Therapist          Therapy Education  Given: HEP, Symptoms/condition management  Program: Reinforced  How Provided: Verbal, Demonstration  Provided to: Patient  Level of Understanding: Teach back education performed              Time Calculation:   Start Time: 1032  Stop Time: 1112  Time Calculation (min): 40 min  Therapy Charges for Today     Code Description Service Date Service Provider Modifiers Qty    [id number]  PT THER PROC EA 15 MIN 3/2/2020 Swathi Galeana, PT GP 3                    Swathi Galeana PT  3/2/2020

## 2020-03-03 ENCOUNTER — OFFICE VISIT (OUTPATIENT)
Dept: INTERNAL MEDICINE | Facility: CLINIC | Age: 70
End: 2020-03-03

## 2020-03-03 VITALS
OXYGEN SATURATION: 100 % | HEART RATE: 82 BPM | HEIGHT: 69 IN | DIASTOLIC BLOOD PRESSURE: 68 MMHG | BODY MASS INDEX: 27.4 KG/M2 | SYSTOLIC BLOOD PRESSURE: 112 MMHG | WEIGHT: 185 LBS

## 2020-03-03 DIAGNOSIS — M25.571 ACUTE RIGHT ANKLE PAIN: ICD-10-CM

## 2020-03-03 DIAGNOSIS — I10 HTN (HYPERTENSION), BENIGN: ICD-10-CM

## 2020-03-03 DIAGNOSIS — R06.83 SNORING: ICD-10-CM

## 2020-03-03 DIAGNOSIS — G43.109 MIGRAINE WITH AURA AND WITHOUT STATUS MIGRAINOSUS, NOT INTRACTABLE: Primary | ICD-10-CM

## 2020-03-03 DIAGNOSIS — E83.42 HYPOMAGNESEMIA: ICD-10-CM

## 2020-03-03 PROCEDURE — 99214 OFFICE O/P EST MOD 30 MIN: CPT | Performed by: INTERNAL MEDICINE

## 2020-03-03 NOTE — PROGRESS NOTES
"Subjective   Juany Carlin is a 70 y.o. female.     History of Present Illness   Juany Carlin 70 y.o. female presents today for migraine headache f/u. Last was seen on 01/30/3020 and on that visit medication was changed due to inadequate control.We had started Maxalt on as needed basis..  Patient is compliant with treatment and denies  side effects. Patient states that the emedication had helped the pain and aura, but left her tired. Not much betetr compare to Acetaminophen.  Patient had been dealing with the hip pain, had been seen by Dr.Makk Gracie miller OA, was referred to PT. Walks with the limp. For the last week, she had developed mild swelling and pain over the medial malleolus right ankle.   Patient c/o unrestful sleep and waking up feeling tired, ready to go back to sleep. This had been going on for at last a year. Her  had noticed loud snoring.No reported apneic episodes. 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    /68 (BP Location: Left arm, Patient Position: Sitting, Cuff Size: Adult)   Pulse 82   Ht 175.3 cm (69\")   Wt 83.9 kg (185 lb)   SpO2 100%   BMI 27.32 kg/m²     Current Outpatient Medications:   •  ALPRAZolam (XANAX) 0.25 MG tablet, Take 1 tablet by mouth 2 (Two) Times a Day As Needed for Anxiety., Disp: 12 tablet, Rfl: 0  •  apixaban (ELIQUIS) 2.5 MG tablet tablet, Take 1 tablet by mouth Every 12 (Twelve) Hours., Disp: 180 tablet, Rfl: 3  •  B Complex-Biotin-FA (B-100 COMPLEX PO), Take 1 tablet by mouth Daily., Disp: , Rfl:   •  CALCIUM PO, Take 600 mg by mouth Daily., Disp: , Rfl:   •  chlorthalidone (HYGROTON) 25 MG tablet, Take 1 tablet by mouth Daily., Disp: 90 tablet, Rfl: 1  •  Cholecalciferol (VITAMIN D3) 2000 UNITS tablet, Take 1 tablet by mouth Daily., Disp: , Rfl:   •  ciclopirox (PENLAC) 8 % solution, , Disp: , Rfl:   •  CRANBERRY PO, Take 1 tablet by mouth Daily., Disp: , Rfl:   •  famotidine (PEPCID) 10 MG tablet, Take 20 mg by mouth " Every Night., Disp: , Rfl:   •  losartan (COZAAR) 100 MG tablet, Take 1 tablet by mouth Daily., Disp: 90 tablet, Rfl: 3  •  Magnesium 250 MG tablet, Take 2 tablets by mouth Daily., Disp: , Rfl:   •  Multiple Vitamins-Minerals (MULTIVITAMIN PO), Take 1 tablet by mouth Daily., Disp: , Rfl:   •  rizatriptan MLT (MAXALT-MLT) 10 MG disintegrating tablet, Take 1 tablet by mouth 1 (One) Time As Needed for Migraine for up to 1 dose. May repeat in 2 hours if needed, Disp: 12 tablet, Rfl: 5  The following portions of the patient's history were reviewed and updated as appropriate: allergies, current medications, past family history, past medical history, past social history, past surgical history and problem list.    Review of Systems   Constitutional: Positive for chills. Negative for fever.   Eyes: Negative for pain and redness.   Respiratory: Positive for shortness of breath. Negative for cough.         A little short of breath    Cardiovascular: Positive for leg swelling. Negative for chest pain.        Right ankle   Neurological: Positive for dizziness and headaches.        Got dizzy twice yesterday       Objective   Physical Exam   Constitutional: She is oriented to person, place, and time. She appears well-developed and well-nourished.   HENT:   Head: Normocephalic and atraumatic.   Right Ear: Tympanic membrane, external ear and ear canal normal.   Left Ear: Tympanic membrane, external ear and ear canal normal.   Nose: Nose normal. Right sinus exhibits no maxillary sinus tenderness and no frontal sinus tenderness. Left sinus exhibits no maxillary sinus tenderness and no frontal sinus tenderness.   Mouth/Throat: Uvula is midline, oropharynx is clear and moist and mucous membranes are normal.   Eyes: Pupils are equal, round, and reactive to light. Conjunctivae and EOM are normal. Right eye exhibits no discharge. Left eye exhibits no discharge. No scleral icterus.   Neck: Neck supple. No JVD present.   Cardiovascular:  Normal rate, regular rhythm and normal heart sounds. Exam reveals no gallop and no friction rub.   No murmur heard.  Pulmonary/Chest: Effort normal and breath sounds normal. She has no wheezes. She has no rales.   Musculoskeletal: She exhibits no edema.   Lymphadenopathy:     She has no cervical adenopathy.   Neurological: She is alert and oriented to person, place, and time. No cranial nerve deficit.   Skin: Skin is warm and dry. No rash noted.   Psychiatric: She has a normal mood and affect. Her behavior is normal.   Vitals reviewed.      Assessment/Plan   Juany was seen today for migraine, snoring and ankle pain.    Diagnoses and all orders for this visit:    Migraine with aura and without status migrainosus, not intractable    Snoring  -     Ambulatory Referral to Sleep Lab    Acute right ankle pain    HTN (hypertension), benign  -     Comprehensive Metabolic Panel; Future    Hypomagnesemia  -     Magnesium; Future    Occasional migraine headaches - as usually her migraines respond to OTC medication, and use of Maxalt does not provide much difference, will stay with OTC medication.  Restless sleep and snoring - will refer for sleep study.  Ankle pain - most likely tendonitis due to limp and PT - asked patient to raise this concern with her physical therapist, reassured.

## 2020-03-05 ENCOUNTER — HOSPITAL ENCOUNTER (OUTPATIENT)
Dept: PHYSICAL THERAPY | Facility: HOSPITAL | Age: 70
Setting detail: THERAPIES SERIES
Discharge: HOME | End: 2020-03-05

## 2020-03-05 DIAGNOSIS — R26.89 IMPAIRED GAIT AND MOBILITY: ICD-10-CM

## 2020-03-05 DIAGNOSIS — M16.11 PRIMARY OSTEOARTHRITIS OF RIGHT HIP: Primary | ICD-10-CM

## 2020-03-05 PROCEDURE — 97110 THERAPEUTIC EXERCISES: CPT

## 2020-03-05 NOTE — THERAPY TREATMENT NOTE
Outpatient Physical Therapy Ortho Treatment Note  [facility]     Patient Name: Juany Carlin  : 1950  MRN: [id number]  Today's Date: 3/5/2020      Visit Date: 2020    Visit Dx:    ICD-10-CM ICD-9-CM   1. Primary osteoarthritis of right hip M16.11 715.15   2. Impaired gait and mobility R26.89 781.2       Patient Active Problem List   Diagnosis   • HTN (hypertension), benign   • Vitamin D deficiency   • Urge incontinence   • GERD without esophagitis   • Cervical spinal stenosis   • Saddle pulmonary embolus (CMS/HCC)   • Family history of thrombosis   • Degeneration of cervical intervertebral disc   • Adrenal nodule (CMS/HCC)   • Health care maintenance   • Localized osteoarthritis of left shoulder   • Primary osteoarthritis of both knees   • Hypomagnesemia   • Migraine with aura and without status migrainosus, not intractable   • Snoring   • Acute right ankle pain        Past Medical History:   Diagnosis Date   • Adrenal nodule (CMS/HCC)    • BPPV (benign paroxysmal positional vertigo) 5/10/2016   • Cervical stenosis of spine    • DVT, lower extremity (CMS/HCC)     right   • GERD without esophagitis 5/10/2016   • Hyperplastic colon polyp 2013   • PONV (postoperative nausea and vomiting)    • Pregnancy     , miscarrige x1   • Pulmonary emboli (CMS/HCC)     bilateral extensive PE with saddle emboli.Negative hypercoagulable state w/u   • Pulmonary embolism with acute cor pulmonale (CMS/HCC) 2017   • Urge incontinence 5/10/2016   • Vitamin D deficiency 5/10/2016        Past Surgical History:   Procedure Laterality Date   • CARDIAC CATHETERIZATION N/A 11/10/2016    Procedure: Right Heart Cath;  Surgeon: Johnna Agrawal MD;  Location: [facility] CATH INVASIVE LOCATION;  Service:    • CARDIAC CATHETERIZATION N/A 11/10/2016    Procedure: Thrombolytic Therapy;  Surgeon: Johnna Agrawal MD;  Location:  [facility] CATH INVASIVE LOCATION;  Service:    • CATARACT EXTRACTION Bilateral    • COLONOSCOPY W/  BIOPSIES  11/2013    hyperplastic polyp, Dr.Russel Lewis   • DILATATION AND CURETTAGE     • EPIDURAL BLOCK     • HEMORROIDECTOMY  2013   • INTERVENTIONAL RADIOLOGY PROCEDURE Bilateral 11/10/2016    Procedure: Sp [facility] Cath Place Pulmonary Art;  Surgeon: Johnna Agrawal MD;  Location:  [facility] CATH INVASIVE LOCATION;  Service:    • TONSILLECTOMY     • TOTAL LAPAROSCOPIC HYSTERECTOMY  2001   • VEIN SURGERY Left [id number]    leg,    • VENTRAL HERNIA REPAIR  1987       PT Ortho     Row Name 03/05/20 1000       Subjective Comments    Subjective Comments  Less pain and walking better pt reports.  -SI       Subjective Pain    Able to rate subjective pain?  yes  -SI    Post-Treatment Pain Level  -- c/o right distal medial achilles tenderness.  -SI    Subjective Pain Comment  pain right posterior lateral hip and groin   -SI       Posture/Observations    Observations  -- tender right greater left medial achilles just above calcane  -SI      User Key  (r) = Recorded By, (t) = Taken By, (c) = Cosigned By    Initials Name Provider Type    Shannon Mcdonald PTA Physical Therapy Assistant                      PT Assessment/Plan     Row Name 03/05/20 1122          PT Assessment    Assessment Comments  Pt withmultiple areas of pain and discomfort.  Specific to right hip orders for PT and tx improving.  TTP right greater left medial achilles.  Shhe stretches regularly and told rahel to ice.  Has hS PF and orthotics but not reccently using  -SI       User Key  (r) = Recorded By, (t) = Taken By, (c) = Cosigned By    Initials Name Provider Type    Shannon Mcdonald PTA Physical Therapy Assistant            OP Exercises     Row Name 03/05/20 1100 03/05/20 1000          Subjective Comments    Subjective Comments  --  Less pain and walking better pt reports.  -SI        Subjective Pain    Able to rate subjective pain?  --  yes  -SI     Post-Treatment Pain Level  --  -- c/o right distal medial achilles tenderness.  -SI      Subjective Pain Comment  --  pain right posterior lateral hip and groin   -SI        Total Minutes    31742 - PT Therapeutic Exercise Minutes  45  -SI  --        Exercise 1    Exercise Name 1  piriformis stretch (both ways)  -SI  --     Cueing 1  Verbal;Tactile  -SI  --     Reps 1  3  -SI  --     Time 1  20 sec  -SI  --        Exercise 2    Exercise Name 2  glute sets  -SI  --     Cueing 2  Verbal  -SI  --     Reps 2  10  -SI  --     Time 2  5 seconds  -SI  --        Exercise 3    Exercise Name 3  PPT with alt knee  -SI  --     Cueing 3  Verbal;Tactile  -SI  --     Reps 3  10  -SI  --     Time 3  5 seconds  -SI  --        Exercise 4    Exercise Name 4  supine heel slide with TA  -SI  --     Reps 4  20  -SI  --        Exercise 5    Exercise Name 5  Nustep, L5   -SI  --     Cueing 5  Verbal  -SI  --     Time 5  5 min  -SI  --        Exercise 6    Exercise Name 6  Supine TA with adduction  -SI  --     Cueing 6  Verbal  -SI  --     Reps 6  15  -SI  --     Time 6  5 sec  -SI  --        Exercise 7    Exercise Name 7  HL TA with abd  -SI  --     Cueing 7  Verbal  -SI  --     Reps 7  15  -SI  --     Additional Comments  GTB  -SI  --        Exercise 8    Exercise Name 8  Bridges with PPT and GS  -SI  --     Cueing 8  Verbal  -SI  --     Reps 8  10  -SI  --        Exercise 9    Exercise Name 9  SL clamshells  -SI  --     Cueing 9  Verbal  -SI  --     Reps 9  15 B  -SI  --        Exercise 10    Exercise Name 10  gasstroc stretch standing  -SI  --     Reps 10  3  -SI  --     Time 10  20  -SI  --     Additional Comments  both and ice achilles BID on right  10 min  -SI  --        Exercise 11    Exercise Name 11  HS stretch sit side of mat  -SI  --     Sets 11  3  -SI  --     Reps 11  20  -SI  --       User Key  (r) = Recorded By, (t) = Taken By, (c) = Cosigned By    Initials Name Provider Type    Shannon Mcdonald, PTA Physical Therapy Assistant                           Therapy Education  Given: HEP, Symptoms/condition  management(ice right achilles 10 min BID)  Program: Progressed  How Provided: Verbal, Demonstration, Written  Provided to: Patient  Level of Understanding: Teach back education performed              Time Calculation:   Start Time: 1045  Stop Time: 1130  Time Calculation (min): 45 min  Total Timed Code Minutes- PT: 45 minute(s)  Therapy Charges for Today     Code Description Service Date Service Provider Modifiers Qty    91602017259 HC PT THER PROC EA 15 MIN 3/5/2020 Shannon Almaguer, PTA GP 45    32045716676 HC PT THER PROC EA 15 MIN 3/5/2020 Shannon Almaguer PTA GP 3                    Shannon Almaguer PTA  3/5/2020

## 2020-03-05 NOTE — THERAPY TREATMENT NOTE
Outpatient Physical Therapy Ortho Treatment Note  [facility]     Patient Name: Juany Carlin  : 1950  MRN: [id number]  Today's Date: 3/5/2020      Visit Date: 2020    Visit Dx:    ICD-10-CM ICD-9-CM   1. Primary osteoarthritis of right hip M16.11 715.15   2. Impaired gait and mobility R26.89 781.2       Patient Active Problem List   Diagnosis   • HTN (hypertension), benign   • Vitamin D deficiency   • Urge incontinence   • GERD without esophagitis   • Cervical spinal stenosis   • Saddle pulmonary embolus (CMS/HCC)   • Family history of thrombosis   • Degeneration of cervical intervertebral disc   • Adrenal nodule (CMS/HCC)   • Health care maintenance   • Localized osteoarthritis of left shoulder   • Primary osteoarthritis of both knees   • Hypomagnesemia   • Migraine with aura and without status migrainosus, not intractable   • Snoring   • Acute right ankle pain        Past Medical History:   Diagnosis Date   • Adrenal nodule (CMS/HCC)    • BPPV (benign paroxysmal positional vertigo) 5/10/2016   • Cervical stenosis of spine    • DVT, lower extremity (CMS/HCC)     right   • GERD without esophagitis 5/10/2016   • Hyperplastic colon polyp 2013   • PONV (postoperative nausea and vomiting)    • Pregnancy     , miscarrige x1   • Pulmonary emboli (CMS/HCC)     bilateral extensive PE with saddle emboli.Negative hypercoagulable state w/u   • Pulmonary embolism with acute cor pulmonale (CMS/HCC) 2017   • Urge incontinence 5/10/2016   • Vitamin D deficiency 5/10/2016        Past Surgical History:   Procedure Laterality Date   • CARDIAC CATHETERIZATION N/A 11/10/2016    Procedure: Right Heart Cath;  Surgeon: Johnna Agrawal MD;  Location: [facility] CATH INVASIVE LOCATION;  Service:    • CARDIAC CATHETERIZATION N/A 11/10/2016    Procedure: Thrombolytic Therapy;  Surgeon: Johnna Agrawal MD;  Location:  [facility] CATH INVASIVE LOCATION;  Service:    • CATARACT EXTRACTION Bilateral    • COLONOSCOPY W/  BIOPSIES  11/2013    hyperplastic polyp, Dr.Russel Lewis   • DILATATION AND CURETTAGE     • EPIDURAL BLOCK     • HEMORROIDECTOMY  2013   • INTERVENTIONAL RADIOLOGY PROCEDURE Bilateral 11/10/2016    Procedure: Sp [facility] Pulmonary Art;  Surgeon: Johnna Agrawal MD;  Location:  [facility] CATH INVASIVE LOCATION;  Service:    • TONSILLECTOMY     • TOTAL LAPAROSCOPIC HYSTERECTOMY  2001   • VEIN SURGERY Left [id number]    leg,    • VENTRAL HERNIA REPAIR  1987       PT Ortho     Row Name 03/05/20 1000       Subjective Comments    Subjective Comments  Less pain and walking better pt reports.  -SI       Subjective Pain    Able to rate subjective pain?  yes  -SI    Post-Treatment Pain Level  -- c/o right distal medial achilles tenderness.  -SI    Subjective Pain Comment  pain right posterior lateral hip and groin   -SI       Posture/Observations    Observations  -- tender right greater left medial achilles just above calcane  -SI      User Key  (r) = Recorded By, (t) = Taken By, (c) = Cosigned By    Initials Name Provider Type    Shannon Mcdonald PTA Physical Therapy Assistant                      PT Assessment/Plan     Row Name 03/05/20 1122          PT Assessment    Assessment Comments  Pt withmultiple areas of pain and discomfort.  Specific to right hip orders for PT and tx improving.  TTP right greater left medial achilles.  Shhe stretches regularly and told rahel to ice.  Has hS PF and orthotics but not reccently using  -SI       User Key  (r) = Recorded By, (t) = Taken By, (c) = Cosigned By    Initials Name Provider Type    Shannon Mcdonald PTA Physical Therapy Assistant            OP Exercises     Row Name 03/05/20 1100 03/05/20 1000          Subjective Comments    Subjective Comments  --  Less pain and walking better pt reports.  -SI        Subjective Pain    Able to rate subjective pain?  --  yes  -SI     Post-Treatment Pain Level  --  -- c/o right distal medial achilles tenderness.  -SI      Subjective Pain Comment  --  pain right posterior lateral hip and groin   -SI        Total Minutes    61161 - PT Therapeutic Exercise Minutes  45  -SI  --        Exercise 1    Exercise Name 1  piriformis stretch (both ways)  -SI  --     Cueing 1  Verbal;Tactile  -SI  --     Reps 1  3  -SI  --     Time 1  20 sec  -SI  --        Exercise 2    Exercise Name 2  glute sets  -SI  --     Cueing 2  Verbal  -SI  --     Reps 2  10  -SI  --     Time 2  5 seconds  -SI  --        Exercise 3    Exercise Name 3  PPT with alt knee  -SI  --     Cueing 3  Verbal;Tactile  -SI  --     Reps 3  10  -SI  --     Time 3  5 seconds  -SI  --        Exercise 4    Exercise Name 4  supine heel slide with TA  -SI  --     Reps 4  20  -SI  --        Exercise 5    Exercise Name 5  Nustep, L5   -SI  --     Cueing 5  Verbal  -SI  --     Time 5  5 min  -SI  --        Exercise 6    Exercise Name 6  Supine TA with adduction  -SI  --     Cueing 6  Verbal  -SI  --     Reps 6  15  -SI  --     Time 6  5 sec  -SI  --        Exercise 7    Exercise Name 7  HL TA with abd  -SI  --     Cueing 7  Verbal  -SI  --     Reps 7  15  -SI  --     Additional Comments  GTB  -SI  --        Exercise 8    Exercise Name 8  Bridges with PPT and GS  -SI  --     Cueing 8  Verbal  -SI  --     Reps 8  10  -SI  --        Exercise 9    Exercise Name 9  SL clamshells  -SI  --     Cueing 9  Verbal  -SI  --     Reps 9  15 B  -SI  --        Exercise 10    Exercise Name 10  gasstroc stretch standing  -SI  --     Reps 10  3  -SI  --     Time 10  20  -SI  --     Additional Comments  both and ice achilles BID on right  10 min  -SI  --        Exercise 11    Exercise Name 11  HS stretch sit side of mat  -SI  --     Sets 11  3  -SI  --     Reps 11  20  -SI  --       User Key  (r) = Recorded By, (t) = Taken By, (c) = Cosigned By    Initials Name Provider Type    Shannon Mcdonald, PTA Physical Therapy Assistant                           Therapy Education  Given: HEP, Symptoms/condition  management(ice right achilles 10 min BID)  Program: Progressed  How Provided: Verbal, Demonstration, Written  Provided to: Patient  Level of Understanding: Teach back education performed              Time Calculation:   Start Time: 1045  Stop Time: 1130  Time Calculation (min): 45 min  Total Timed Code Minutes- PT: 45 minute(s)  Therapy Charges for Today     Code Description Service Date Service Provider Modifiers Qty    53279664634 HC PT THER PROC EA 15 MIN 3/5/2020 Shannon Almaguer PTA GP 45                    Shannon Almaguer PTA  3/5/2020

## 2020-03-09 ENCOUNTER — HOSPITAL ENCOUNTER (OUTPATIENT)
Dept: PHYSICAL THERAPY | Facility: HOSPITAL | Age: 70
Setting detail: THERAPIES SERIES
Discharge: HOME | End: 2020-03-09

## 2020-03-09 DIAGNOSIS — M16.11 PRIMARY OSTEOARTHRITIS OF RIGHT HIP: Primary | ICD-10-CM

## 2020-03-09 DIAGNOSIS — R26.89 IMPAIRED GAIT AND MOBILITY: ICD-10-CM

## 2020-03-09 PROCEDURE — 97110 THERAPEUTIC EXERCISES: CPT

## 2020-03-09 NOTE — THERAPY TREATMENT NOTE
Outpatient Physical Therapy Ortho Treatment Note  [facility]     Patient Name: Juany Carlin  : 1950  MRN: [id number]  Today's Date: 3/9/2020      Visit Date: 2020    Visit Dx:    ICD-10-CM ICD-9-CM   1. Primary osteoarthritis of right hip M16.11 715.15   2. Impaired gait and mobility R26.89 781.2       Patient Active Problem List   Diagnosis   • HTN (hypertension), benign   • Vitamin D deficiency   • Urge incontinence   • GERD without esophagitis   • Cervical spinal stenosis   • Saddle pulmonary embolus (CMS/HCC)   • Family history of thrombosis   • Degeneration of cervical intervertebral disc   • Adrenal nodule (CMS/HCC)   • Health care maintenance   • Localized osteoarthritis of left shoulder   • Primary osteoarthritis of both knees   • Hypomagnesemia   • Migraine with aura and without status migrainosus, not intractable   • Snoring   • Acute right ankle pain        Past Medical History:   Diagnosis Date   • Adrenal nodule (CMS/HCC)    • BPPV (benign paroxysmal positional vertigo) 5/10/2016   • Cervical stenosis of spine    • DVT, lower extremity (CMS/HCC)     right   • GERD without esophagitis 5/10/2016   • Hyperplastic colon polyp 2013   • PONV (postoperative nausea and vomiting)    • Pregnancy     , miscarrige x1   • Pulmonary emboli (CMS/HCC)     bilateral extensive PE with saddle emboli.Negative hypercoagulable state w/u   • Pulmonary embolism with acute cor pulmonale (CMS/HCC) 2017   • Urge incontinence 5/10/2016   • Vitamin D deficiency 5/10/2016        Past Surgical History:   Procedure Laterality Date   • CARDIAC CATHETERIZATION N/A 11/10/2016    Procedure: Right Heart Cath;  Surgeon: Johnna Agrawal MD;  Location: [facility] CATH INVASIVE LOCATION;  Service:    • CARDIAC CATHETERIZATION N/A 11/10/2016    Procedure: Thrombolytic Therapy;  Surgeon: Johnna Agrawal MD;  Location:  [facility] CATH INVASIVE LOCATION;  Service:    • CATARACT EXTRACTION Bilateral    • COLONOSCOPY W/  BIOPSIES  11/2013    hyperplastic polyp, Dr.Russel Lewis   • DILATATION AND CURETTAGE     • EPIDURAL BLOCK     • HEMORROIDECTOMY  2013   • INTERVENTIONAL RADIOLOGY PROCEDURE Bilateral 11/10/2016    Procedure: Sp [facility] Cath Place Pulmonary Art;  Surgeon: Johnna Agrawal MD;  Location:  [facility] CATH INVASIVE LOCATION;  Service:    • TONSILLECTOMY     • TOTAL LAPAROSCOPIC HYSTERECTOMY  2001   • VEIN SURGERY Left [id number]    leg,    • VENTRAL HERNIA REPAIR  1987                       PT Assessment/Plan     Row Name 03/09/20 1028          PT Assessment    Assessment Comments  Pt reports slight increase in symptoms since last visit without known cause and compliant with current home program. Initiated trial of stm to R piriformis/glute tissues and pt reports no pain upon standing up from mat at end of session. May consider trial of DDN pending results of stm.   -CN        PT Plan    PT Plan Comments  Asess stm with noodle to R hip tissues and consider DDN next visit if beneficial. Progress toward functional strengthening as tolerated (AR, resisted sidestepping, shd ext).   -CN       User Key  (r) = Recorded By, (t) = Taken By, (c) = Cosigned By    Initials Name Provider Type    CN Swathi Galeana, PT Physical Therapist            OP Exercises     Row Name 03/09/20 0900             Subjective Comments    Subjective Comments  It is hurting a little more today than normal. It felt pretty good when I was here last week.   -CN         Subjective Pain    Able to rate subjective pain?  yes  -CN      Pre-Treatment Pain Level  3  -CN         Total Minutes    76152 - PT Therapeutic Exercise Minutes  45  -CN         Exercise 1    Exercise Name 1  piriformis stretch (both ways)  -CN      Cueing 1  Verbal;Tactile  -CN      Reps 1  3  -CN      Time 1  20 sec  -CN         Exercise 3    Exercise Name 3  PPT with alt knee  -CN      Cueing 3  Verbal;Tactile  -CN      Reps 3  10  -CN      Time 3  5 seconds  -CN          Exercise 5    Exercise Name 5  Nustep, L5   -CN      Cueing 5  Verbal  -CN      Time 5  5 min  -CN         Exercise 6    Exercise Name 6  Supine TA with adduction  -CN      Cueing 6  Verbal  -CN      Reps 6  15  -CN      Time 6  5 sec  -CN         Exercise 7    Exercise Name 7  HL TA with abd  -CN      Cueing 7  Verbal  -CN      Reps 7  15  -CN      Additional Comments  GTB  -CN         Exercise 8    Exercise Name 8  Bridges with PPT and GS  -CN      Cueing 8  Verbal  -CN      Reps 8  10  -CN         Exercise 9    Exercise Name 9  SL clamshells  -CN      Cueing 9  Verbal  -CN      Reps 9  15 B  -CN         Exercise 10    Exercise Name 10  gasstroc stretch standing  -CN      Reps 10  3  -CN      Time 10  20  -CN         Exercise 11    Exercise Name 11  HS stretch sit side of mat  -CN      Sets 11  3  -CN      Reps 11  20  -CN         Exercise 12    Exercise Name 12  stm to R lateral hip tissues (piriformis, glutes)  -CN      Time 12  5 min  -CN      Additional Comments  pt in L SLing with pillow between knees  -CN        User Key  (r) = Recorded By, (t) = Taken By, (c) = Cosigned By    Initials Name Provider Type    Swathi Barajas, PT Physical Therapist                       PT OP Goals     Row Name 03/09/20 1000          PT Short Term Goals    STG Date to Achieve  02/26/20  -CN     STG 1  Patient will demonstrate independence with HEP to improve self-management of condition.  -CN     STG 1 Progress  Progressing  -CN     STG 2  Patient will report decreased pain by 50% when descending stairs at home to improve daily function.  -CN     STG 2 Progress  Ongoing  -CN     STG 2 Progress Comments  No change in pain with stairs.   -CN        Long Term Goals    LTG 1  Patient will report 50% improvement in functional tasks without an increase in pain to improve quality of life.  -CN     LTG 1 Progress  Ongoing  -CN     LTG 2  Patient will demonstrate an increase in 4+/5 bilateral LE strength to increase stability  and improve function.  -CN     LTG 2 Progress  Ongoing  -CN     LTG 3  Patient will improve HOOS outcome score from 35% to 20%  to improve ability to participate in daily activities and decrease limitations.   -CN     LTG 3 Progress  Ongoing  -CN     LTG 4  Patient will improve in R. hip IR and ER by 10 degrees to improve mobility and decrease pain.   -CN     LTG 4 Progress  Ongoing  -CN     LTG 5  Patient will demonstrate understanding of long-term education of approprirate exercises (swimming, walking, etc.) that will not exacerbate symptoms to improve independence and self-management of condition.  -CN     LTG 5 Progress  Ongoing  -CN       User Key  (r) = Recorded By, (t) = Taken By, (c) = Cosigned By    Initials Name Provider Type    Swathi Barajas, PT Physical Therapist          Therapy Education  Given: HEP, Symptoms/condition management  Program: Progressed  How Provided: Verbal, Demonstration  Provided to: Patient  Level of Understanding: Teach back education performed              Time Calculation:   Start Time: 0945  Stop Time: 1030  Time Calculation (min): 45 min  Therapy Charges for Today     Code Description Service Date Service Provider Modifiers Qty    79852937149  PT THER PROC EA 15 MIN 3/9/2020 Swathi Galeana, PT GP 3                    Swathi Galeana PT  3/9/2020

## 2020-03-12 ENCOUNTER — APPOINTMENT (OUTPATIENT)
Dept: PHYSICAL THERAPY | Facility: HOSPITAL | Age: 70
End: 2020-03-12

## 2020-03-17 ENCOUNTER — APPOINTMENT (OUTPATIENT)
Dept: PHYSICAL THERAPY | Facility: HOSPITAL | Age: 70
End: 2020-03-17

## 2020-03-19 ENCOUNTER — APPOINTMENT (OUTPATIENT)
Dept: PHYSICAL THERAPY | Facility: HOSPITAL | Age: 70
End: 2020-03-19

## 2020-03-24 ENCOUNTER — APPOINTMENT (OUTPATIENT)
Dept: PHYSICAL THERAPY | Facility: HOSPITAL | Age: 70
End: 2020-03-24

## 2020-03-26 ENCOUNTER — HOSPITAL ENCOUNTER (OUTPATIENT)
Dept: PHYSICAL THERAPY | Facility: HOSPITAL | Age: 70
Setting detail: THERAPIES SERIES
Discharge: HOME | End: 2020-03-26

## 2020-03-26 DIAGNOSIS — M16.11 PRIMARY OSTEOARTHRITIS OF RIGHT HIP: Primary | ICD-10-CM

## 2020-03-26 DIAGNOSIS — R26.89 IMPAIRED GAIT AND MOBILITY: ICD-10-CM

## 2020-03-27 ENCOUNTER — APPOINTMENT (OUTPATIENT)
Dept: SLEEP MEDICINE | Facility: HOSPITAL | Age: 70
End: 2020-03-27

## 2020-04-10 ENCOUNTER — OFFICE VISIT (OUTPATIENT)
Dept: INTERNAL MEDICINE | Facility: CLINIC | Age: 70
End: 2020-04-10

## 2020-04-10 DIAGNOSIS — N30.00 ACUTE CYSTITIS WITHOUT HEMATURIA: Primary | ICD-10-CM

## 2020-04-10 PROBLEM — M25.571 ACUTE RIGHT ANKLE PAIN: Status: RESOLVED | Noted: 2020-03-03 | Resolved: 2020-04-10

## 2020-04-10 PROCEDURE — 99442 PR PHYS/QHP TELEPHONE EVALUATION 11-20 MIN: CPT | Performed by: INTERNAL MEDICINE

## 2020-04-10 RX ORDER — SULFAMETHOXAZOLE AND TRIMETHOPRIM 800; 160 MG/1; MG/1
1 TABLET ORAL 2 TIMES DAILY
Qty: 10 TABLET | Refills: 0 | Status: SHIPPED | OUTPATIENT
Start: 2020-04-10 | End: 2020-04-15

## 2020-04-10 NOTE — PROGRESS NOTES
Subjective   Juany Carlin is a 70 y.o. female. This is telephone visit at the time of COVID 19 pandemia. Patient is aware and agreeable to have telemedicine encounter.    History of Present Illness   Juany Carlin 70 y.o. female presents complaining of urgency of urination, frequency of urination and burning on urination. Symptoms started 2 days ago. Juany Carlin denies  flank pain, suprapubic pain and lower back pain. Juany Carlin denies  fever. Patient rates dyscomfort as moderate.No blood in urine.  Patient's past medical history is significant for bladder hyperactivity..    The following portions of the patient's history were reviewed and updated as appropriate: allergies, current medications, past family history, past medical history, past social history, past surgical history and problem list.    Review of Systems   Constitutional: Negative for chills and fever.   Eyes: Negative for pain and redness.   Respiratory: Negative for cough and shortness of breath.    Cardiovascular: Negative for chest pain and leg swelling.   Genitourinary: Positive for difficulty urinating, dysuria, frequency and urgency. Negative for hematuria and pelvic pain.   Neurological: Negative for dizziness and headaches.       Objective   Physical Exam  Patient sounds well on the phone, no respiratory difficulties, appropriate mood.  Assessment/Plan   Juany was seen today for urinary frequency.    Diagnoses and all orders for this visit:    Acute cystitis without hematuria  -     sulfamethoxazole-trimethoprim (Bactrim DS) 800-160 MG per tablet; Take 1 tablet by mouth 2 (Two) Times a Day for 5 days.      UTI - will treat with Bactrum. Increase fluids intake. Urine tests had not been done in effort to support social isolation and to limit patients exposure to healthcare facilities.  Time in the phone visit 12 min.

## 2020-04-27 ENCOUNTER — OFFICE VISIT (OUTPATIENT)
Dept: INTERNAL MEDICINE | Facility: CLINIC | Age: 70
End: 2020-04-27

## 2020-04-27 DIAGNOSIS — K21.9 GERD WITHOUT ESOPHAGITIS: ICD-10-CM

## 2020-04-27 DIAGNOSIS — E27.8 ADRENAL NODULE (HCC): ICD-10-CM

## 2020-04-27 DIAGNOSIS — E55.9 VITAMIN D DEFICIENCY: ICD-10-CM

## 2020-04-27 DIAGNOSIS — I10 HTN (HYPERTENSION), BENIGN: Primary | ICD-10-CM

## 2020-04-27 DIAGNOSIS — E83.42 HYPOMAGNESEMIA: ICD-10-CM

## 2020-04-27 PROBLEM — N30.00 ACUTE CYSTITIS WITHOUT HEMATURIA: Status: RESOLVED | Noted: 2020-04-10 | Resolved: 2020-04-27

## 2020-04-27 PROCEDURE — 99442 PR PHYS/QHP TELEPHONE EVALUATION 11-20 MIN: CPT | Performed by: INTERNAL MEDICINE

## 2020-04-27 NOTE — PROGRESS NOTES
This is Telemedicine visit conducted over the phone at the time of COVID -19 pandemia in order to avoid patient exposure in the actual office setting.    Juany WALSH Carlin is aware that this is telephone visit, and agrees to proceed. Consent granted. You have chosen to receive care through a telephone visit.   Do you consent to use a telephone visit for your medical care today? Yes.    I had reviewed patient's problems list, allergies, medication list, PMH, FH and SH.    HPI  Current Outpatient Medications:   •  ALPRAZolam (XANAX) 0.25 MG tablet, Take 1 tablet by mouth 2 (Two) Times a Day As Needed for Anxiety., Disp: 12 tablet, Rfl: 0  •  apixaban (ELIQUIS) 2.5 MG tablet tablet, Take 1 tablet by mouth Every 12 (Twelve) Hours., Disp: 180 tablet, Rfl: 3  •  B Complex-Biotin-FA (B-100 COMPLEX PO), Take 1 tablet by mouth Daily., Disp: , Rfl:   •  CALCIUM PO, Take 600 mg by mouth Daily., Disp: , Rfl:   •  chlorthalidone (HYGROTON) 25 MG tablet, Take 1 tablet by mouth Daily., Disp: 90 tablet, Rfl: 1  •  Cholecalciferol (VITAMIN D3) 2000 UNITS tablet, Take 1 tablet by mouth Daily., Disp: , Rfl:   •  ciclopirox (PENLAC) 8 % solution, , Disp: , Rfl:   •  CRANBERRY PO, Take 1 tablet by mouth Daily., Disp: , Rfl:   •  famotidine (PEPCID) 10 MG tablet, Take 20 mg by mouth Every Night., Disp: , Rfl:   •  losartan (COZAAR) 100 MG tablet, Take 1 tablet by mouth Daily., Disp: 90 tablet, Rfl: 3  •  Magnesium 250 MG tablet, Take 2 tablets by mouth Daily., Disp: , Rfl:   •  Multiple Vitamins-Minerals (MULTIVITAMIN PO), Take 1 tablet by mouth Daily., Disp: , Rfl:   •  rizatriptan MLT (MAXALT-MLT) 10 MG disintegrating tablet, Take 1 tablet by mouth 1 (One) Time As Needed for Migraine for up to 1 dose. May repeat in 2 hours if needed, Disp: 12 tablet, Rfl: 5    Patient has long-standing benign essential HTN.  BP is usually well controlled with daily use of thiazide diuretic and ARB. On low salt diet with good compliance. Takes  medication regularly. Denies chest pain, dyspnea,lightheadedness,  lower extremity edema. Patient checks blood pressure at home regularly. Reports that all numbers are in the normal range. Had no more headaches.    Patient had been diagnosed with Vitamin D deficiency. Takes none. Patient does not have complaints of muscle or bone aches. Balance is good. No recent falls.       Review of Systems - Patient denies fever or chills. Patient has no cough or dyspnea. Patient denies diarrhea. Patient denies any skin changes, rashes. No blood in stool or black color to the stools. No dizziness/lightheadedness. No numbness or tingling. No arthralgias or joint swelling. No nasal congestion or runny nose. Patient is understandably concerned, but mood is stable. No insomnia.  While on a phone, patient speaks in full sentences, speech is coherent and fluent, no respiratory distress or cough. Normal voice. Patient is oriented x 3, mood is appropriate.     Based on symptoms provided, I suspect:     Juany was seen today for hypertension and vitamin d deficiency.    Diagnoses and all orders for this visit:    HTN (hypertension), benign  -     Comprehensive Metabolic Panel; Future  -     Lipid Panel; Future  -     TSH; Future    Vitamin D deficiency  -     Comprehensive Metabolic Panel; Future  -     Vitamin D 25 Hydroxy; Future    Hypomagnesemia  -     Magnesium; Future    Adrenal nodule (CMS/HCC)  -     CBC & Differential; Future    GERD without esophagitis  -     CBC & Differential; Future    1. HTN-well-controlled per patient's report these current medication including angiotensin receptor blocker and diuretic.  Continue current medication and continue low-salt diet.  2.  History of vitamin D deficiency-patient had discontinued your supplement.  Last time vitamin D blood level had been checked in October and was excellent at 47.  We will keep monitoring.  No need for over-the-counter supplements during this is jose part of the  year.      Diagnosis and plan is based upon information provided by patient.   If symptoms worsen or does not improve, patient was instructed to call us or to proceed to ED.    Cumulative time:  15 min.  This includes review of s-ms, patient records, development of plan/treatment.    Yennifer Doran MD  04/27/20

## 2020-06-24 ENCOUNTER — TELEPHONE (OUTPATIENT)
Dept: INTERNAL MEDICINE | Facility: CLINIC | Age: 70
End: 2020-06-24

## 2020-06-24 NOTE — TELEPHONE ENCOUNTER
There are no forms as new MD working from [facility] and has everything he needs in [facility].

## 2020-06-24 NOTE — TELEPHONE ENCOUNTER
PATIENT IS REQUESTING NEW PATIENT FORMS ( IF NEEDED) FOR THE TRANSFER OF CARE FROM DR MARTINEZ TO [facility] TO BE SENT TO HER HOME ADDRESS PRIOR TO HER APPOINTMENT ON July 6           GOOD CONTACT NUMBER   [phone] (STEVE)

## 2020-07-06 ENCOUNTER — OFFICE VISIT (OUTPATIENT)
Dept: INTERNAL MEDICINE | Facility: CLINIC | Age: 70
End: 2020-07-06

## 2020-07-06 VITALS
HEIGHT: 69 IN | RESPIRATION RATE: 14 BRPM | BODY MASS INDEX: 29.18 KG/M2 | HEART RATE: 82 BPM | OXYGEN SATURATION: 98 % | DIASTOLIC BLOOD PRESSURE: 90 MMHG | SYSTOLIC BLOOD PRESSURE: 130 MMHG | WEIGHT: 197 LBS

## 2020-07-06 DIAGNOSIS — Z00.00 MEDICARE ANNUAL WELLNESS VISIT, SUBSEQUENT: ICD-10-CM

## 2020-07-06 DIAGNOSIS — G43.109 MIGRAINE WITH AURA AND WITHOUT STATUS MIGRAINOSUS, NOT INTRACTABLE: ICD-10-CM

## 2020-07-06 DIAGNOSIS — I26.92 SADDLE EMBOLUS OF PULMONARY ARTERY WITHOUT ACUTE COR PULMONALE, UNSPECIFIED CHRONICITY (HCC): ICD-10-CM

## 2020-07-06 DIAGNOSIS — Z13.220 SCREENING FOR LIPID DISORDERS: ICD-10-CM

## 2020-07-06 DIAGNOSIS — E61.2 MAGNESIUM DEFICIENCY: ICD-10-CM

## 2020-07-06 DIAGNOSIS — I10 HTN (HYPERTENSION), BENIGN: Primary | ICD-10-CM

## 2020-07-06 DIAGNOSIS — Z13.0 SCREENING FOR DEFICIENCY ANEMIA: ICD-10-CM

## 2020-07-06 DIAGNOSIS — Z13.1 SCREENING FOR DIABETES MELLITUS: ICD-10-CM

## 2020-07-06 DIAGNOSIS — Z13.29 SCREENING FOR THYROID DISORDER: ICD-10-CM

## 2020-07-06 PROCEDURE — 99214 OFFICE O/P EST MOD 30 MIN: CPT | Performed by: FAMILY MEDICINE

## 2020-07-06 NOTE — PATIENT INSTRUCTIONS
Migraine Headache  A migraine headache is a very strong throbbing pain on one side or both sides of your head. This type of headache can also cause other symptoms. It can last from 4 hours to 3 days. Talk with your doctor about what things may bring on (trigger) this condition.  What are the causes?  The exact cause of this condition is not known. This condition may be triggered or caused by:  · Drinking alcohol.  · Smoking.  · Taking medicines, such as:  ? Medicine used to treat chest pain (nitroglycerin).  ? Birth control pills.  ? Estrogen.  ? Some blood pressure medicines.  · Eating or drinking certain products.  · Doing physical activity.  Other things that may trigger a migraine headache include:  · Having a menstrual period.  · Pregnancy.  · Hunger.  · Stress.  · Not getting enough sleep or getting too much sleep.  · Weather changes.  · Tiredness (fatigue).  What increases the risk?  · Being 25-55 years old.  · Being female.  · Having a family history of migraine headaches.  · Being .  · Having depression or anxiety.  · Being very overweight.  What are the signs or symptoms?  · A throbbing pain. This pain may:  ? Happen in any area of the head, such as on one side or both sides.  ? Make it hard to do daily activities.  ? Get worse with physical activity.  ? Get worse around bright lights or loud noises.  · Other symptoms may include:  ? Feeling sick to your stomach (nauseous).  ? Vomiting.  ? Dizziness.  ? Being sensitive to bright lights, loud noises, or smells.  · Before you get a migraine headache, you may get warning signs (an aura). An aura may include:  ? Seeing flashing lights or having blind spots.  ? Seeing bright spots, halos, or zigzag lines.  ? Having tunnel vision or blurred vision.  ? Having numbness or a tingling feeling.  ? Having trouble talking.  ? Having weak muscles.  · Some people have symptoms after a migraine headache (postdromal phase), such as:  ? Tiredness.  ? Trouble  thinking (concentrating).  How is this treated?  · Taking medicines that:  ? Relieve pain.  ? Relieve the feeling of being sick to your stomach.  ? Prevent migraine headaches.  · Treatment may also include:  ? Having acupuncture.  ? Avoiding foods that bring on migraine headaches.  ? Learning ways to control your body functions (biofeedback).  ? Therapy to help you know and deal with negative thoughts (cognitive behavioral therapy).  Follow these instructions at home:  Medicines  · Take over-the-counter and prescription medicines only as told by your doctor.  · Ask your doctor if the medicine prescribed to you:  ? Requires you to avoid driving or using heavy machinery.  ? Can cause trouble pooping (constipation). You may need to take these steps to prevent or treat trouble pooping:  § Drink enough fluid to keep your pee (urine) pale yellow.  § Take over-the-counter or prescription medicines.  § Eat foods that are high in fiber. These include beans, whole grains, and fresh fruits and vegetables.  § Limit foods that are high in fat and sugar. These include fried or sweet foods.  Lifestyle  · Do not drink alcohol.  · Do not use any products that contain nicotine or tobacco, such as cigarettes, e-cigarettes, and chewing tobacco. If you need help quitting, ask your doctor.  · Get at least 8 hours of sleep every night.  · Limit and deal with stress.  General instructions         · Keep a journal to find out what may bring on your migraine headaches. For example, write down:  ? What you eat and drink.  ? How much sleep you get.  ? Any change in what you eat or drink.  ? Any change in your medicines.  · If you have a migraine headache:  ? Avoid things that make your symptoms worse, such as bright lights.  ? It may help to lie down in a dark, quiet room.  ? Do not drive or use heavy machinery.  ? Ask your doctor what activities are safe for you.  · Keep all follow-up visits as told by your doctor. This is important.  Contact  a doctor if:  · You get a migraine headache that is different or worse than others you have had.  · You have more than 15 headache days in one month.  Get help right away if:  · Your migraine headache gets very bad.  · Your migraine headache lasts longer than 72 hours.  · You have a fever.  · You have a stiff neck.  · You have trouble seeing.  · Your muscles feel weak or like you cannot control them.  · You start to lose your balance a lot.  · You start to have trouble walking.  · You pass out (faint).  · You have a seizure.  Summary  · A migraine headache is a very strong throbbing pain on one side or both sides of your head. These headaches can also cause other symptoms.  · This condition may be treated with medicines and changes to your lifestyle.  · Keep a journal to find out what may bring on your migraine headaches.  · Contact a doctor if you get a migraine headache that is different or worse than others you have had.  · Contact your doctor if you have more than 15 headache days in a month.  This information is not intended to replace advice given to you by your health care provider. Make sure you discuss any questions you have with your health care provider.  Document Released: 09/26/2009 Document Revised: 04/10/2020 Document Reviewed: 01/30/2020  Elsevier Patient Education © 2020 [employer]

## 2020-07-06 NOTE — PROGRESS NOTES
Subjective   Juany Carlin is a 70 y.o. female.  Patient to establish care with me for hypertension, migraines, hx PE.    History of Present Illness   New patient to me    Hypertension - stable.  Patient taking medication as prescribed.  Denies chest pain, shortness of breath, headache, lower extremity edema.  Patient is taking losartan 100mg and chlorthalidone 25mg.    Migraine-stable.  Patient is not using the Maxalt-MLT 10 mg for abortive measure for migraine.  She said it made her feel ill so she discontinued it.  No other migraines for weeks.    PE - Stable.  Taking Eliquis 2.5mg for prevention.  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    The following portions of the patient's history were reviewed and updated as appropriate: allergies, current medications, past family history, past medical history, past social history, past surgical history and problem list.    Review of Systems   Constitutional: Negative for fatigue and fever.   Respiratory: Negative for shortness of breath and wheezing.    Cardiovascular: Negative for chest pain and palpitations.   Gastrointestinal: Negative for constipation and nausea.       Objective   Physical Exam   Constitutional: She is oriented to person, place, and time. She appears well-developed and well-nourished. No distress.   Eyes: Conjunctivae are normal. Right eye exhibits no discharge. Left eye exhibits no discharge.   Cardiovascular: Normal rate, regular rhythm and normal heart sounds. Exam reveals no gallop and no friction rub.   No murmur heard.  Pulmonary/Chest: Effort normal and breath sounds normal. She has no wheezes. She has no rales.   Neurological: She is alert and oriented to person, place, and time. No cranial nerve deficit. Coordination normal.   Skin: Skin is warm. Capillary refill takes less than 2 seconds. She is not diaphoretic.   Nursing note and vitals reviewed.      Assessment/Plan   Diagnoses and all orders for this visit:    HTN (hypertension),  benign    Migraine with aura and without status migrainosus, not intractable    Saddle embolus of pulmonary artery without acute cor pulmonale, unspecified chronicity (CMS/[facility])    Medicare annual wellness visit, subsequent  -     CBC (No Diff); Future  -     Comprehensive Metabolic Panel; Future  -     Lipid Panel; Future  -     TSH Rfx On Abnormal To Free T4; Future  -     Magnesium; Future    Magnesium deficiency  -     Magnesium; Future    Screening for lipid disorders  -     Lipid Panel; Future    Screening for thyroid disorder  -     TSH Rfx On Abnormal To Free T4; Future    Screening for deficiency anemia  -     CBC (No Diff); Future    Screening for diabetes mellitus  -     Comprehensive Metabolic Panel; Future      All conditions appear stable.  She should continue all medications.  As far as the migraines go, I would just have her keep a headache diary for now.  If they come back we could consider something like Fioricet but she would need to come in for an office visit as it is a controlled substance.  I also put in her labs for her Medicare visit which is in October.

## 2020-07-23 ENCOUNTER — OFFICE VISIT (OUTPATIENT)
Dept: ORTHOPEDIC SURGERY | Facility: CLINIC | Age: 70
End: 2020-07-23

## 2020-07-23 VITALS — BODY MASS INDEX: 27.99 KG/M2 | WEIGHT: 189 LBS | HEIGHT: 69 IN | TEMPERATURE: 97.3 F

## 2020-07-23 DIAGNOSIS — M16.11 ARTHRITIS OF RIGHT HIP: Primary | ICD-10-CM

## 2020-07-23 PROCEDURE — 73501 X-RAY EXAM HIP UNI 1 VIEW: CPT | Performed by: ORTHOPAEDIC SURGERY

## 2020-07-23 PROCEDURE — 99214 OFFICE O/P EST MOD 30 MIN: CPT | Performed by: ORTHOPAEDIC SURGERY

## 2020-07-23 NOTE — PROGRESS NOTES
Patient Name: Juany Carlin   YOB: 1950  Referring Primary Care Physician: Warren Pizarro MD  BMI: Body mass index is 27.91 kg/m².    Chief Complaint:    Chief Complaint   Patient presents with   • Right Hip - Follow-up        HPI:     Juany Carlin is a 70 y.o. female who presents today for evaluation of   Chief Complaint   Patient presents with   • Right Hip - Follow-up   .  Patient has arthritis in her right hip.  Starting to bother her more more.  She tried some physical therapy and got most of it and prior to the COVID shut down.  It bothers her intermittently and sometimes it can be fairly severe.  She is on Eliquis and says she needs to be on it.  She has had a significant pulmonary embolus with a saddle saddle embolism in the past.    This problem is not new to this examiner.     Subjective   Medications:   Home Medications:  Current Outpatient Medications on File Prior to Visit   Medication Sig   • ALPRAZolam (XANAX) 0.25 MG tablet Take 1 tablet by mouth 2 (Two) Times a Day As Needed for Anxiety.   • apixaban (ELIQUIS) 2.5 MG tablet tablet Take 1 tablet by mouth Every 12 (Twelve) Hours.   • B Complex-Biotin-FA (B-100 COMPLEX PO) Take 1 tablet by mouth Daily.   • CALCIUM PO Take 600 mg by mouth Daily.   • chlorthalidone (HYGROTON) 25 MG tablet Take 1 tablet by mouth Daily.   • Cholecalciferol (VITAMIN D3) 2000 UNITS tablet Take 1 tablet by mouth Daily.   • ciclopirox (PENLAC) 8 % solution    • CRANBERRY PO Take 1 tablet by mouth Daily.   • famotidine (PEPCID) 10 MG tablet Take 20 mg by mouth Every Night.   • losartan (COZAAR) 100 MG tablet Take 1 tablet by mouth Daily.   • Magnesium 250 MG tablet Take 2 tablets by mouth Daily.   • Multiple Vitamins-Minerals (MULTIVITAMIN PO) Take 1 tablet by mouth Daily.     No current facility-administered medications on file prior to visit.      Current Medications:  Scheduled Meds:  Continuous Infusions:  No current facility-administered medications for  this visit.   PRN Meds:.    I have reviewed the patient's medical history in detail and updated the computerized patient record.  Review and summarization of old records includes:    Past Medical History:   Diagnosis Date   • Acute cystitis without hematuria 4/10/2020   • Acute right ankle pain 3/3/2020   • Adrenal nodule (CMS/HCC)    • BPPV (benign paroxysmal positional vertigo) 5/10/2016   • Cervical stenosis of spine    • DVT, lower extremity (CMS/HCC)     right   • GERD without esophagitis 5/10/2016   • Hyperplastic colon polyp 2013   • PONV (postoperative nausea and vomiting)    • Pregnancy     , miscarrige x1   • Pulmonary emboli (CMS/HCC)     bilateral extensive PE with saddle emboli.Negative hypercoagulable state w/u   • Pulmonary embolism with acute cor pulmonale (CMS/HCC) 2017   • Urge incontinence 5/10/2016   • Vitamin D deficiency 5/10/2016        Past Surgical History:   Procedure Laterality Date   • CARDIAC CATHETERIZATION N/A 11/10/2016    Procedure: Right Heart Cath;  Surgeon: Johnna Agrawal MD;  Location:  [facility];  Service:    • CARDIAC CATHETERIZATION N/A 11/10/2016    Procedure: Thrombolytic Therapy;  Surgeon: Johnna Agrawal MD;  Location:  [facility];  Service:    • CATARACT EXTRACTION Bilateral    • COLONOSCOPY W/ BIOPSIES  2013    hyperplastic polyp, Dr.Russel Lewis   • DILATATION AND CURETTAGE     • EPIDURAL BLOCK     • HEMORROIDECTOMY     • INTERVENTIONAL RADIOLOGY PROCEDURE Bilateral 11/10/2016    Procedure: Sp Smiley Cath Place Pulmonary Art;  Surgeon: Johnna Agrawal MD;  Location:  [facility];  Service:    • TONSILLECTOMY     • TOTAL LAPAROSCOPIC HYSTERECTOMY     • VEIN SURGERY Left [id number]    leg,    • VENTRAL HERNIA REPAIR          Social History     Occupational History   • Occupation:      Employer: RETIRED     Comment: [employer]   Tobacco Use   • Smoking status: Former  Smoker     Packs/day: 0.50     Years: 10.00     Pack years: 5.00     Types: Cigarettes     Last attempt to quit:      Years since quittin.5   • Smokeless tobacco: Never Used   Substance and Sexual Activity   • Alcohol use: Yes     Alcohol/week: 2.0 standard drinks     Types: 2 Glasses of wine per week   • Drug use: No   • Sexual activity: Defer    Social History     Social History Narrative    LIVES WITH SPOUSE        Family History   Problem Relation Age of Onset   • Macular degeneration Mother    • Deep vein thrombosis Mother    • Heart failure Father    • Deep vein thrombosis Father    • Chiari malformation Sister    • Heart disease Maternal Grandfather    • Heart disease Paternal Grandfather    • Deep vein thrombosis Sister    • Heart failure Sister    • Pancreatic cancer Sister    • Thyroid disease Daughter        ROS: 14 point review of systems was performed and all other systems were reviewed and are negative except for documented findings in HPI and today's encounter.     Allergies:   Allergies   Allergen Reactions   • Ciprocinonide [Fluocinolone] Other (See Comments)     Achilles tendonitis   • Suprax [Cefixime] Diarrhea   • Contrast Dye Rash     Rash many years ago when shell-fish derived contrast dye was used     Constitutional:  Denies fever, shaking or chills   Eyes:  Denies change in visual acuity   HENT:  Denies nasal congestion or sore throat   Respiratory:  Denies cough or shortness of breath   Cardiovascular:  Denies chest pain or severe LE edema   GI:  Denies abdominal pain, nausea, vomiting, bloody stools or diarrhea   Musculoskeletal:  Numbness, tingling, pain, or loss of motor function only as noted above in history of present illness.  : Denies painful urination or hematuria  Integument:  Denies rash, lesion or ulceration   Neurologic:  Denies headache or focal weakness  Endocrine:  Denies lymphadenopathy  Psych:  Denies confusion or change in mental status   Hem:  Denies active  "bleeding    OBJECTIVE:  Physical Exam: 70 y.o. female  Wt Readings from Last 3 Encounters:   07/23/20 85.7 kg (189 lb)   07/06/20 89.4 kg (197 lb)   03/03/20 83.9 kg (185 lb)     Ht Readings from Last 1 Encounters:   07/23/20 175.3 cm (69\")     Body mass index is 27.91 kg/m².  Vitals:    07/23/20 0949   Temp: 97.3 °F (36.3 °C)     Vital signs reviewed.     General Appearance:    Alert, cooperative, in no acute distress                  Eyes: conjunctiva clear  ENT: external ears and nose atraumatic  CV: no peripheral edema  Resp: normal respiratory effort  Skin: no rashes or wounds; normal turgor  Psych: mood and affect appropriate  Lymph: no nodes appreciated  Neuro: gross sensation intact  Vascular:  Palpable peripheral pulse in noted extremity  Musculoskeletal Extremities: Her Stinchfield is positive she has decreased internal rotation and pain and some difficulty transferring and ambulating    Radiology:   AP of the hips lateral right hip taken the office today compared to old x-rays show arthritis of the hip severe    Assessment:     ICD-10-CM ICD-9-CM   1. Arthritis of right hip M16.11 716.95        Procedures       Plan: We discussed treatments including surgery.  Some apprehension on both sides about this approach with her history of significant pulmonary emboli.  I told her she needs to keep doing the exercises best she can.  Cannot take anti-inflammatories with the Eliquis but could take Tylenol moderate doses.  Suggest we try corticosteroid injection in the hip under fluoroscopy and we will set that up for her today.  As far as total hip.  If the injection under fluoroscopy does not provide her satisfactory relief suggested she see her hematologist and decide if there could be a mechanism such as a filter etc. to get her through hip replacement or whether she is simply too high risk.  We will see her back as necessary      7/23/2020    Much of this encounter note is an electronic transcription/translation " of spoken language to printed text. The electronic translation of spoken language may permit erroneous, or at times, nonsensical words or phrases to be inadvertently transcribed; Although I have reviewed the note for such errors, some may still exist

## 2020-07-31 ENCOUNTER — CLINICAL SUPPORT (OUTPATIENT)
Dept: ORTHOPEDIC SURGERY | Facility: CLINIC | Age: 70
End: 2020-07-31

## 2020-07-31 VITALS — BODY MASS INDEX: 32.49 KG/M2 | WEIGHT: 195 LBS | TEMPERATURE: 97 F | HEIGHT: 65 IN

## 2020-07-31 DIAGNOSIS — M54.2 CHRONIC NECK PAIN: Primary | ICD-10-CM

## 2020-07-31 DIAGNOSIS — G89.29 CHRONIC LEFT SHOULDER PAIN: ICD-10-CM

## 2020-07-31 DIAGNOSIS — M25.512 CHRONIC LEFT SHOULDER PAIN: ICD-10-CM

## 2020-07-31 DIAGNOSIS — G89.29 CHRONIC NECK PAIN: Primary | ICD-10-CM

## 2020-07-31 PROCEDURE — 20610 DRAIN/INJ JOINT/BURSA W/O US: CPT | Performed by: ORTHOPAEDIC SURGERY

## 2020-07-31 PROCEDURE — 99214 OFFICE O/P EST MOD 30 MIN: CPT | Performed by: ORTHOPAEDIC SURGERY

## 2020-07-31 RX ORDER — METHYLPREDNISOLONE ACETATE 80 MG/ML
80 INJECTION, SUSPENSION INTRA-ARTICULAR; INTRALESIONAL; INTRAMUSCULAR; SOFT TISSUE
Status: COMPLETED | OUTPATIENT
Start: 2020-07-31 | End: 2020-07-31

## 2020-07-31 RX ADMIN — METHYLPREDNISOLONE ACETATE 80 MG: 80 INJECTION, SUSPENSION INTRA-ARTICULAR; INTRALESIONAL; INTRAMUSCULAR; SOFT TISSUE at 08:16

## 2020-07-31 NOTE — PROGRESS NOTES
Patient: Juany Carlin    YOB: 1950    Medical Record Number: [id number]    Chief Complaints: Follow-up regarding left shoulder pain, new complaint of neck pain    History of Present Illness:     70 y.o. female patient who presents for follow-up of her left shoulder.  The last injection helped tremendously and she would like to get that repeated.  She also mention to me that she has a long history of neck problems.  She has had a couple of MRIs over the years and was referred for epidurals at one point years ago.  The neck seems to be getting worse.  She reports occasional pain shooting down her arm into the hand with intermittent numbness and tingling.  Denies any weakness.  Denies any lower extremity dysfunction.    Allergies:   Allergies   Allergen Reactions   • Ciprocinonide [Fluocinolone] Other (See Comments)     Achilles tendonitis   • Suprax [Cefixime] Diarrhea   • Contrast Dye Rash     Rash many years ago when shell-fish derived contrast dye was used       Home Medications:    Current Outpatient Medications:   •  ALPRAZolam (XANAX) 0.25 MG tablet, Take 1 tablet by mouth 2 (Two) Times a Day As Needed for Anxiety., Disp: 12 tablet, Rfl: 0  •  apixaban (ELIQUIS) 2.5 MG tablet tablet, Take 1 tablet by mouth Every 12 (Twelve) Hours., Disp: 180 tablet, Rfl: 3  •  B Complex-Biotin-FA (B-100 COMPLEX PO), Take 1 tablet by mouth Daily., Disp: , Rfl:   •  CALCIUM PO, Take 600 mg by mouth Daily., Disp: , Rfl:   •  chlorthalidone (HYGROTON) 25 MG tablet, Take 1 tablet by mouth Daily., Disp: 90 tablet, Rfl: 1  •  Cholecalciferol (VITAMIN D3) 2000 UNITS tablet, Take 1 tablet by mouth Daily., Disp: , Rfl:   •  ciclopirox (PENLAC) 8 % solution, , Disp: , Rfl:   •  CRANBERRY PO, Take 1 tablet by mouth Daily., Disp: , Rfl:   •  famotidine (PEPCID) 10 MG tablet, Take 20 mg by mouth Every Night., Disp: , Rfl:   •  losartan (COZAAR) 100 MG tablet, Take 1 tablet by mouth Daily., Disp: 90 tablet, Rfl: 3  •   Magnesium 250 MG tablet, Take 2 tablets by mouth Daily., Disp: , Rfl:   •  Multiple Vitamins-Minerals (MULTIVITAMIN PO), Take 1 tablet by mouth Daily., Disp: , Rfl:     Past Medical History:   Diagnosis Date   • Acute cystitis without hematuria 4/10/2020   • Acute right ankle pain 3/3/2020   • Adrenal nodule (CMS/HCC)    • BPPV (benign paroxysmal positional vertigo) 5/10/2016   • Cervical stenosis of spine    • DVT, lower extremity (CMS/HCC)     right   • GERD without esophagitis 5/10/2016   • Hyperplastic colon polyp 2013   • PONV (postoperative nausea and vomiting)    • Pregnancy     , miscarrige x1   • Pulmonary emboli (CMS/HCC)     bilateral extensive PE with saddle emboli.Negative hypercoagulable state w/u   • Pulmonary embolism with acute cor pulmonale (CMS/HCC) 2017   • Urge incontinence 5/10/2016   • Vitamin D deficiency 5/10/2016       Past Surgical History:   Procedure Laterality Date   • CARDIAC CATHETERIZATION N/A 11/10/2016    Procedure: Right Heart Cath;  Surgeon: Johnna Agrawal MD;  Location:  [facility];  Service:    • CARDIAC CATHETERIZATION N/A 11/10/2016    Procedure: Thrombolytic Therapy;  Surgeon: Johnna Agrawal MD;  Location:  [facility];  Service:    • CATARACT EXTRACTION Bilateral    • COLONOSCOPY W/ BIOPSIES  2013    hyperplastic polyp, Dr.Russel Lewis   • DILATATION AND CURETTAGE     • EPIDURAL BLOCK     • HEMORROIDECTOMY     • INTERVENTIONAL RADIOLOGY PROCEDURE Bilateral 11/10/2016    Procedure: Sp Smiley Cath Place Pulmonary Art;  Surgeon: Johnna Agrawal MD;  Location:  [facility];  Service:    • TONSILLECTOMY     • TOTAL LAPAROSCOPIC HYSTERECTOMY     • VEIN SURGERY Left [id number]    leg,    • VENTRAL HERNIA REPAIR         Social History     Occupational History   • Occupation:      Employer: RETIRED     Comment: [employer]   Tobacco Use   • Smoking status: Former Smoker      "Packs/day: 0.50     Years: 10.00     Pack years: 5.00     Types: Cigarettes     Last attempt to quit:      Years since quittin.6   • Smokeless tobacco: Never Used   Substance and Sexual Activity   • Alcohol use: Yes     Alcohol/week: 2.0 standard drinks     Types: 2 Glasses of wine per week   • Drug use: No   • Sexual activity: Defer      Social History     Social History Narrative    LIVES WITH SPOUSE       Family History   Problem Relation Age of Onset   • Macular degeneration Mother    • Deep vein thrombosis Mother    • Heart failure Father    • Deep vein thrombosis Father    • Chiari malformation Sister    • Heart disease Maternal Grandfather    • Heart disease Paternal Grandfather    • Deep vein thrombosis Sister    • Heart failure Sister    • Pancreatic cancer Sister    • Thyroid disease Daughter        Review of Systems:      Constitutional: Denies fever, shaking or chills   Eyes: Denies change in visual acuity   HEENT: Denies nasal congestion or sore throat   Respiratory: Denies cough or shortness of breath   Cardiovascular: Denies chest pain or edema  Endocrine: Denies tremors, palpitations, intolerance of heat or cold, polyuria, polydipsia.  GI: Denies abdominal pain, nausea, vomiting, bloody stools or diarrhea  : Denies frequency, urgency, incontinence, retention, or nocturia.  Musculoskeletal: Denies numbness, tingling or loss of motor function except as above  Integument: Denies rash, lesion or ulceration   Neurologic: Denies headache or focal weakness, deficits  Heme: Denies spontaneous or excessive bleeding, epistaxis, hematuria, melena, fatigue, enlarged or tender lymph nodes.      All other pertinent positives and negatives as noted above in HPI.    Physical Exam: 70 y.o. female  Vitals:    20 0819   Temp: 97 °F (36.1 °C)   TempSrc: Temporal   Weight: 88.5 kg (195 lb)   Height: 165.1 cm (65\")     General:  Patient is awake and alert.  Appears in no acute distress or " discomfort.    Psych:  Affect and demeanor are appropriate.    Eyes:  Conjunctiva and sclera appear grossly normal.  Eyes track well and EOM seem to be intact.    Ears:  No gross abnormalities.  Hearing adequate for the exam.    Cardiovascular:  Regular rate and rhythm.    Lungs:  Good chest expansion.  Breathing unlabored.    Neck: Skin is benign.  No step-offs.  Rotation and lateral flexion to the left are painful.  Spurling's is positive for neck and posterior shoulder pain.    Extremities: Left shoulder is examined.  Skin is benign.  Mild tenderness anteriorly without increased warmth or effusion.  She has good motion.  Positive impingement maneuvers.  She does have pain and weakness with resisted elevation in the scapular plane.  Positive speeds as well.    Imaging: None taken.  I did review the MRI of her cervical spine from 2018.  Findings are listed below.     IMPRESSION:  Multilevel degenerative disease as described above including  multilevel facet degenerative disease and neural foraminal compromise.  Neural foraminal compromise is most prominent to the left at C4-5 and  C5-6, and to the right at C5-6 and C6-7. The degree of neural foraminal  compromise appears similar to the prior examination. There is canal  stenosis present at C5-6 and to a slightly lesser extent C4-5 and C6-7,  also similar to the prior examination. There is a new grade 1  retrolisthesis of C5 upon C6 and grade 1 anterolisthesis of C7 upon T1.  There is no evidence of a focal herniation or of cord signal  Abnormality.    Assessment/Plan:  1.  Cervical spinal stenosis and degenerative disc disease 2.  Left shoulder impingement and bursitis, possible rotator cuff tear    We talked about further work-up of the shoulder.  She has a history of bilateral PEs for which she is on Eliquis.  She is very fearful about surgery given her history.  That is certainly understandable.  She would like to continue conservative treatment.  We did discuss  the natural history of untreated, full-thickness rotator cuff tears.  She understands the risk of tear progression and she tells me that she can accept that risk.  For now, she would like to get the injection repeated.  The risk, benefits and alternatives were discussed.  She consented and the injection was performed as described below.    Her neck seems to be bothering her more.  This is been a longstanding issue.  She is potentially interested in pursuing further epidurals.  I did explain that she would potentially need to come off the Eliquis for that.  I am going to set her up for an MRI of the cervical spine to evaluate for any changes.  I told her that I will call her with the results.    Andrea Farmer MD    07/31/2020    Large Joint Arthrocentesis: L subacromial bursa  Date/Time: 7/31/2020 8:16 AM  Consent given by: patient  Site marked: site marked  Timeout: Immediately prior to procedure a time out was called to verify the correct patient, procedure, equipment, support staff and site/side marked as required   Supporting Documentation  Indications: pain   Procedure Details  Location: shoulder - L subacromial bursa  Preparation: Patient was prepped and draped in the usual sterile fashion  Needle gauge: 21 G.  Approach: posterior  Medications administered: 2 mL lidocaine (cardiac); 80 mg methylPREDNISolone acetate 80 MG/ML  Patient tolerance: patient tolerated the procedure well with no immediate complications

## 2020-08-07 ENCOUNTER — OFFICE VISIT (OUTPATIENT)
Dept: INTERNAL MEDICINE | Facility: CLINIC | Age: 70
End: 2020-08-07

## 2020-08-07 VITALS
HEIGHT: 69 IN | SYSTOLIC BLOOD PRESSURE: 104 MMHG | OXYGEN SATURATION: 98 % | BODY MASS INDEX: 27.25 KG/M2 | HEART RATE: 89 BPM | WEIGHT: 184 LBS | DIASTOLIC BLOOD PRESSURE: 72 MMHG

## 2020-08-07 DIAGNOSIS — F43.22 ADJUSTMENT DISORDER WITH ANXIOUS MOOD: Primary | ICD-10-CM

## 2020-08-07 PROCEDURE — 99213 OFFICE O/P EST LOW 20 MIN: CPT | Performed by: FAMILY MEDICINE

## 2020-08-07 RX ORDER — DIAZEPAM 5 MG/1
5 TABLET ORAL 2 TIMES DAILY PRN
Qty: 20 TABLET | Refills: 1 | Status: SHIPPED | OUTPATIENT
Start: 2020-08-07 | End: 2021-04-20

## 2020-08-07 NOTE — PATIENT INSTRUCTIONS
Living With Anxiety    After being diagnosed with an anxiety disorder, you may be relieved to know why you have felt or behaved a certain way. It is natural to also feel overwhelmed about the treatment ahead and what it will mean for your life. With care and support, you can manage this condition and recover from it.  How to cope with anxiety  Dealing with stress  Stress is your body’s reaction to life changes and events, both good and bad. Stress can last just a few hours or it can be ongoing. Stress can play a major role in anxiety, so it is important to learn both how to cope with stress and how to think about it differently.  Talk with your health care provider or a counselor to learn more about stress reduction. He or she may suggest some stress reduction techniques, such as:  · Music therapy. This can include creating or listening to music that you enjoy and that inspires you.  · Mindfulness-based meditation. This involves being aware of your normal breaths, rather than trying to control your breathing. It can be done while sitting or walking.  · Centering prayer. This is a kind of meditation that involves focusing on a word, phrase, or sacred image that is meaningful to you and that brings you peace.  · Deep breathing. To do this, expand your stomach and inhale slowly through your nose. Hold your breath for 3-5 seconds. Then exhale slowly, allowing your stomach muscles to relax.  · Self-talk. This is a skill where you identify thought patterns that lead to anxiety reactions and correct those thoughts.  · Muscle relaxation. This involves tensing muscles then relaxing them.  Choose a stress reduction technique that fits your lifestyle and personality. Stress reduction techniques take time and practice. Set aside 5-15 minutes a day to do them. Therapists can offer training in these techniques. The training may be covered by some insurance plans. Other things you can do to manage stress include:  · Keeping a  stress diary. This can help you learn what triggers your stress and ways to control your response.  · Thinking about how you respond to certain situations. You may not be able to control everything, but you can control your reaction.  · Making time for activities that help you relax, and not feeling guilty about spending your time in this way.  Therapy combined with coping and stress-reduction skills provides the best chance for successful treatment.  Medicines  Medicines can help ease symptoms. Medicines for anxiety include:  · Anti-anxiety drugs.  · Antidepressants.  · Beta-blockers.  Medicines may be used as the main treatment for anxiety disorder, along with therapy, or if other treatments are not working. Medicines should be prescribed by a health care provider.  Relationships  Relationships can play a big part in helping you recover. Try to spend more time connecting with trusted friends and family members. Consider going to couples counseling, taking family education classes, or going to family therapy. Therapy can help you and others better understand the condition.  How to recognize changes in your condition  Everyone has a different response to treatment for anxiety. Recovery from anxiety happens when symptoms decrease and stop interfering with your daily activities at home or work. This may mean that you will start to:  · Have better concentration and focus.  · Sleep better.  · Be less irritable.  · Have more energy.  · Have improved memory.  It is important to recognize when your condition is getting worse. Contact your health care provider if your symptoms interfere with home or work and you do not feel like your condition is improving.  Where to find help and support:  You can get help and support from these sources:  · Self-help groups.  · Online and community organizations.  · A trusted spiritual leader.  · Couples counseling.  · Family education classes.  · Family therapy.  Follow these instructions  at home:  · Eat a healthy diet that includes plenty of vegetables, fruits, whole grains, low-fat dairy products, and lean protein. Do not eat a lot of foods that are high in solid fats, added sugars, or salt.  · Exercise. Most adults should do the following:  ? Exercise for at least 150 minutes each week. The exercise should increase your heart rate and make you sweat (moderate-intensity exercise).  ? Strengthening exercises at least twice a week.  · Cut down on caffeine, tobacco, alcohol, and other potentially harmful substances.  · Get the right amount and quality of sleep. Most adults need 7-9 hours of sleep each night.  · Make choices that simplify your life.  · Take over-the-counter and prescription medicines only as told by your health care provider.  · Avoid caffeine, alcohol, and certain over-the-counter cold medicines. These may make you feel worse. Ask your pharmacist which medicines to avoid.  · Keep all follow-up visits as told by your health care provider. This is important.  Questions to ask your health care provider  · Would I benefit from therapy?  · How often should I follow up with a health care provider?  · How long do I need to take medicine?  · Are there any long-term side effects of my medicine?  · Are there any alternatives to taking medicine?  Contact a health care provider if:  · You have a hard time staying focused or finishing daily tasks.  · You spend many hours a day feeling worried about everyday life.  · You become exhausted by worry.  · You start to have headaches, feel tense, or have nausea.  · You urinate more than normal.  · You have diarrhea.  Get help right away if:  · You have a racing heart and shortness of breath.  · You have thoughts of hurting yourself or others.  If you ever feel like you may hurt yourself or others, or have thoughts about taking your own life, get help right away. You can go to your nearest emergency department or call:  · Your local emergency services  (911 in the [place]  · A suicide crisis helpline, such as the National Suicide Prevention Lifeline at 1-435.706.4871. This is open 24-hours a day.  Summary  · Taking steps to deal with stress can help calm you.  · Medicines cannot cure anxiety disorders, but they can help ease symptoms.  · Family, friends, and partners can play a big part in helping you recover from an anxiety disorder.  This information is not intended to replace advice given to you by your health care provider. Make sure you discuss any questions you have with your health care provider.  Document Released: 12/12/2017 Document Revised: 11/30/2018 Document Reviewed: 12/12/2017  Elsevier Patient Education © 2020 [employer]

## 2020-08-07 NOTE — PROGRESS NOTES
Subjective   Juany Carlin is a 70 y.o. female.  Presents for crying episodes and unable to deal with her new situation.    History of Present Illness     Adjustment reaction-worsening.  Patient says that her daughter and son-in-law have been getting back into drugs and she has had to take custody of a 10-month-old and it is wearing on her nerves.  She is asking for somebody to talk to as well as something to help her calm her anxiety to a degree.   The patient is visibly upset and crying much of the interview.  It is difficult to get the exact details of what is going on as the patient continues to repeat the same pieces of the story over and over.    The following portions of the patient's history were reviewed and updated as appropriate: allergies, current medications, past family history, past medical history, past social history, past surgical history and problem list.    Review of Systems   Psychiatric/Behavioral: Positive for decreased concentration, dysphoric mood and sleep disturbance. Negative for agitation, behavioral problems, confusion, hallucinations, self-injury and suicidal ideas. The patient is nervous/anxious. The patient is not hyperactive.        Objective   Physical Exam   Constitutional: She appears distressed.   Pulmonary/Chest: Effort normal.   Skin: She is not diaphoretic.   Psychiatric: Her behavior is normal. Judgment and thought content normal. Her mood appears anxious. Her affect is labile. Her speech is rapid and/or pressured. Cognition and memory are normal. She exhibits a depressed mood. She is inattentive.   Nursing note and vitals reviewed.      Assessment/Plan   Juany was seen today for anxiety.    Diagnoses and all orders for this visit:    Adjustment disorder with anxious mood  -     diazePAM (Valium) 5 MG tablet; Take 1 tablet by mouth 2 (Two) Times a Day As Needed for Anxiety.  -     Ambulatory Referral to Psychotherapy      I put the patient on a very short course of Valium  as needed to help with the anxiety.  I would try to get her in somewhere for some talk therapy as I think she needs someone to talk to.  Follow-up as needed.

## 2020-08-10 ENCOUNTER — TELEPHONE (OUTPATIENT)
Dept: INTERNAL MEDICINE | Facility: CLINIC | Age: 70
End: 2020-08-10

## 2020-08-13 ENCOUNTER — HOSPITAL ENCOUNTER (OUTPATIENT)
Dept: MRI IMAGING | Facility: HOSPITAL | Age: 70
Discharge: HOME | End: 2020-08-13
Admitting: ORTHOPAEDIC SURGERY

## 2020-08-13 DIAGNOSIS — M54.2 CHRONIC NECK PAIN: ICD-10-CM

## 2020-08-13 DIAGNOSIS — G89.29 CHRONIC NECK PAIN: ICD-10-CM

## 2020-08-13 PROCEDURE — 72141 MRI NECK SPINE W/O DYE: CPT

## 2020-08-17 ENCOUNTER — HOSPITAL ENCOUNTER (OUTPATIENT)
Dept: GENERAL RADIOLOGY | Facility: HOSPITAL | Age: 70
Discharge: HOME | End: 2020-08-17
Admitting: RADIOLOGY

## 2020-08-17 DIAGNOSIS — M16.11 ARTHRITIS OF RIGHT HIP: ICD-10-CM

## 2020-08-17 PROCEDURE — 25010000002 METHYLPREDNISOLONE PER 80 MG: Performed by: RADIOLOGY

## 2020-08-17 PROCEDURE — 25010000002 IOPAMIDOL 61 % SOLUTION: Performed by: RADIOLOGY

## 2020-08-17 PROCEDURE — 25010000003 LIDOCAINE 1 % SOLUTION: Performed by: RADIOLOGY

## 2020-08-17 PROCEDURE — 77002 NEEDLE LOCALIZATION BY XRAY: CPT

## 2020-08-17 RX ORDER — BUPIVACAINE HYDROCHLORIDE 2.5 MG/ML
10 INJECTION, SOLUTION EPIDURAL; INFILTRATION; INTRACAUDAL ONCE
Status: COMPLETED | OUTPATIENT
Start: 2020-08-17 | End: 2020-08-17

## 2020-08-17 RX ORDER — LIDOCAINE HYDROCHLORIDE 10 MG/ML
10 INJECTION, SOLUTION INFILTRATION; PERINEURAL ONCE
Status: COMPLETED | OUTPATIENT
Start: 2020-08-17 | End: 2020-08-17

## 2020-08-17 RX ORDER — METHYLPREDNISOLONE ACETATE 80 MG/ML
80 INJECTION, SUSPENSION INTRA-ARTICULAR; INTRALESIONAL; INTRAMUSCULAR; SOFT TISSUE ONCE
Status: COMPLETED | OUTPATIENT
Start: 2020-08-17 | End: 2020-08-17

## 2020-08-17 RX ADMIN — BUPIVACAINE HYDROCHLORIDE 5 ML: 2.5 INJECTION, SOLUTION EPIDURAL; INFILTRATION; INTRACAUDAL; PERINEURAL at 13:26

## 2020-08-17 RX ADMIN — LIDOCAINE HYDROCHLORIDE 2 ML: 10 INJECTION, SOLUTION INFILTRATION; PERINEURAL at 13:25

## 2020-08-17 RX ADMIN — IOPAMIDOL 1 ML: 612 INJECTION, SOLUTION INTRAVENOUS at 13:25

## 2020-08-17 RX ADMIN — METHYLPREDNISOLONE ACETATE 80 MG: 80 INJECTION, SUSPENSION INTRA-ARTICULAR; INTRALESIONAL; INTRAMUSCULAR; SOFT TISSUE at 13:25

## 2020-08-30 ENCOUNTER — RESULTS ENCOUNTER (OUTPATIENT)
Dept: INTERNAL MEDICINE | Facility: CLINIC | Age: 70
End: 2020-08-30

## 2020-08-30 DIAGNOSIS — E83.42 HYPOMAGNESEMIA: ICD-10-CM

## 2020-09-21 RX ORDER — CHLORTHALIDONE 25 MG/1
25 TABLET ORAL DAILY
Qty: 90 TABLET | Refills: 1 | Status: SHIPPED | OUTPATIENT
Start: 2020-09-21 | End: 2021-04-20 | Stop reason: SDUPTHER

## 2020-09-21 NOTE — TELEPHONE ENCOUNTER
FORMER [facility] PATIENT-NEED NEW PRESCRIPTION FROM DR WESTBROOK      Caller: Juany Carlin    Relationship: Self    Best call back number: [phone]      Medication needed:   Requested Prescriptions     Pending Prescriptions Disp Refills   • chlorthalidone (HYGROTON) 25 MG tablet 90 tablet 1     Sig: Take 1 tablet by mouth Daily.       When do you need the refill by: ASAP    What details did the patient provide when requesting the medication: TAKES 1/2 TAB DAILY SO 90 LAST 180 DAYS    Does the patient have less than a 3 day supply:  [] Yes  [x] No    What is the patient's preferred pharmacy:       [address] & ([facility])  [phone] [facility] [phone] FX

## 2020-10-01 ENCOUNTER — RESULTS ENCOUNTER (OUTPATIENT)
Dept: INTERNAL MEDICINE | Facility: CLINIC | Age: 70
End: 2020-10-01

## 2020-10-01 DIAGNOSIS — Z00.00 MEDICARE ANNUAL WELLNESS VISIT, SUBSEQUENT: ICD-10-CM

## 2020-10-01 DIAGNOSIS — Z13.1 SCREENING FOR DIABETES MELLITUS: ICD-10-CM

## 2020-10-01 DIAGNOSIS — E61.2 MAGNESIUM DEFICIENCY: ICD-10-CM

## 2020-10-01 DIAGNOSIS — Z13.29 SCREENING FOR THYROID DISORDER: ICD-10-CM

## 2020-10-01 DIAGNOSIS — Z13.220 SCREENING FOR LIPID DISORDERS: ICD-10-CM

## 2020-10-01 DIAGNOSIS — Z13.0 SCREENING FOR DEFICIENCY ANEMIA: ICD-10-CM

## 2020-10-09 LAB
ALBUMIN SERPL-MCNC: 4.6 G/DL (ref 3.5–5.2)
ALBUMIN/GLOB SERPL: 2.9 G/DL
ALP SERPL-CCNC: 72 U/L (ref 39–117)
ALT SERPL-CCNC: 22 U/L (ref 1–33)
AST SERPL-CCNC: 19 U/L (ref 1–32)
BILIRUB SERPL-MCNC: 0.6 MG/DL (ref 0–1.2)
BUN SERPL-MCNC: 11 MG/DL (ref 8–23)
BUN/CREAT SERPL: 15.3 (ref 7–25)
CALCIUM SERPL-MCNC: 9.4 MG/DL (ref 8.6–10.5)
CHLORIDE SERPL-SCNC: 98 MMOL/L (ref 98–107)
CHOLEST SERPL-MCNC: 180 MG/DL (ref 0–200)
CO2 SERPL-SCNC: 29.7 MMOL/L (ref 22–29)
CREAT SERPL-MCNC: 0.72 MG/DL (ref 0.57–1)
ERYTHROCYTE [DISTWIDTH] IN BLOOD BY AUTOMATED COUNT: 12.4 % (ref 12.3–15.4)
GLOBULIN SER CALC-MCNC: 1.6 GM/DL
GLUCOSE SERPL-MCNC: 94 MG/DL (ref 65–99)
HCT VFR BLD AUTO: 37.5 % (ref 34–46.6)
HDLC SERPL-MCNC: 75 MG/DL (ref 40–60)
HGB BLD-MCNC: 12.4 G/DL (ref 12–15.9)
LDLC SERPL CALC-MCNC: 92 MG/DL (ref 0–100)
MAGNESIUM SERPL-MCNC: 1.9 MG/DL (ref 1.6–2.4)
MCH RBC QN AUTO: 30.1 PG (ref 26.6–33)
MCHC RBC AUTO-ENTMCNC: 33.1 G/DL (ref 31.5–35.7)
MCV RBC AUTO: 91 FL (ref 79–97)
PLATELET # BLD AUTO: 266 10*3/MM3 (ref 140–450)
POTASSIUM SERPL-SCNC: 4.6 MMOL/L (ref 3.5–5.2)
PROT SERPL-MCNC: 6.2 G/DL (ref 6–8.5)
RBC # BLD AUTO: 4.12 10*6/MM3 (ref 3.77–5.28)
SODIUM SERPL-SCNC: 137 MMOL/L (ref 136–145)
TRIGL SERPL-MCNC: 71 MG/DL (ref 0–150)
TSH SERPL DL<=0.005 MIU/L-ACNC: 1.43 UIU/ML (ref 0.27–4.2)
VLDLC SERPL CALC-MCNC: 13 MG/DL (ref 5–40)
WBC # BLD AUTO: 6.27 10*3/MM3 (ref 3.4–10.8)

## 2020-10-16 ENCOUNTER — OFFICE VISIT (OUTPATIENT)
Dept: INTERNAL MEDICINE | Facility: CLINIC | Age: 70
End: 2020-10-16

## 2020-10-16 VITALS
WEIGHT: 177 LBS | DIASTOLIC BLOOD PRESSURE: 82 MMHG | OXYGEN SATURATION: 99 % | BODY MASS INDEX: 26.22 KG/M2 | SYSTOLIC BLOOD PRESSURE: 122 MMHG | HEART RATE: 89 BPM | HEIGHT: 69 IN | TEMPERATURE: 98.8 F

## 2020-10-16 DIAGNOSIS — Z00.00 MEDICARE ANNUAL WELLNESS VISIT, SUBSEQUENT: Primary | ICD-10-CM

## 2020-10-16 DIAGNOSIS — Z86.711 HX PULMONARY EMBOLISM: ICD-10-CM

## 2020-10-16 DIAGNOSIS — Z79.01 ANTICOAGULANT LONG-TERM USE: ICD-10-CM

## 2020-10-16 PROCEDURE — 96160 PT-FOCUSED HLTH RISK ASSMT: CPT | Performed by: FAMILY MEDICINE

## 2020-10-16 PROCEDURE — 99213 OFFICE O/P EST LOW 20 MIN: CPT | Performed by: FAMILY MEDICINE

## 2020-10-16 PROCEDURE — G0439 PPPS, SUBSEQ VISIT: HCPCS | Performed by: FAMILY MEDICINE

## 2020-10-16 NOTE — PROGRESS NOTES
Subjective   Juany Carlin is a 70 y.o. female. anticoagualtion and hx of pulm embolism    History of Present Illness     The patient is considering a right hip replacement due to intractable pain.  Her surgeon mentioned to her that if she were to proceed with surgery that she would need clearance from her primary care but likely also a hematologist because of her long-term anticoagulation use for history of pulmonary embolism.  He was not sure if she would require an IVC filter in order to do the surgery.  She has not had any clots since that event and has taken her Eliquis as prescribed.  She is very worried about the surgery and not sure if she is comfortable with the idea of an IVC filter.  I explained to her that I am not up-to-date on the latest IVC filter guidelines and I will be happy to refer her to a hematologist but I can help with the anticoagulation if needed.  I can also help with the preop if she decides to go through with the surgery.  She used to have a hematologist but that hematologist is not practicing anymore.    The following portions of the patient's history were reviewed and updated as appropriate: allergies, current medications, past family history, past medical history, past social history, past surgical history and problem list.    Review of Systems   Constitutional: Negative for activity change, appetite change, fatigue and fever.   HENT: Negative for ear pain, facial swelling and sore throat.    Eyes: Negative for discharge and itching.   Respiratory: Negative for cough, chest tightness and shortness of breath.    Cardiovascular: Negative for chest pain and palpitations.   Gastrointestinal: Negative for abdominal distention and constipation.   Endocrine: Negative for polydipsia, polyphagia and polyuria.   Genitourinary: Negative for difficulty urinating and flank pain.   Musculoskeletal: Negative for arthralgias and back pain.   Skin: Negative for color change, rash and wound.    Allergic/Immunologic: Negative for environmental allergies and food allergies.   Neurological: Negative for weakness and numbness.   Hematological: Negative for adenopathy. Does not bruise/bleed easily.   Psychiatric/Behavioral: Positive for dysphoric mood. Negative for decreased concentration. The patient is not nervous/anxious.        Objective   Physical Exam  Vitals signs and nursing note reviewed.   Constitutional:       General: She is not in acute distress.     Appearance: She is well-developed. She is not diaphoretic.   Cardiovascular:      Rate and Rhythm: Normal rate and regular rhythm.      Heart sounds: Normal heart sounds. No murmur. No friction rub. No gallop.    Pulmonary:      Effort: Pulmonary effort is normal.      Breath sounds: Normal breath sounds. No wheezing or rales.   Skin:     General: Skin is warm.      Capillary Refill: Capillary refill takes less than 2 seconds.         Assessment/Plan   Diagnoses and all orders for this visit:    1. Medicare annual wellness visit, subsequent (Primary)    2. Anticoagulant long-term use  -     Ambulatory Referral to Hematology    3. Hx pulmonary embolism  -     Ambulatory Referral to Hematology    Other orders  -     Cancel: Compliance Drug Analysis, Ur - Urine, Clean Catch      Will try to see if Dr. Bates can see her to discuss her anticoagulation and if an IVC filter would be necessary if she were to go undergo the right hip replacement.  I explained to her that this may be the slowest part of the process what may be a good idea to go ahead and get this referral placed so that if she decides to go ahead with the surgery we will have all of our consultants seen.

## 2020-10-16 NOTE — PROGRESS NOTES
The ABCs of the Annual Wellness Visit  Subsequent Medicare Wellness Visit    Chief Complaint   Patient presents with   • Medicare Wellness-subsequent   • Hypertension       Subjective   History of Present Illness:  Juany Carlin is a 70 y.o. female who presents for a Subsequent Medicare Wellness Visit.    HEALTH RISK ASSESSMENT    Recent Hospitalizations:  No hospitalization(s) within the last year.    Current Medical Providers:  Patient Care Team:  Warren Pizarro MD as PCP - General (Family Medicine)  Andrea Farmer MD as Consulting Physician (Orthopedic Surgery)  Anitha Daugherty MD as Consulting Physician (Obstetrics and Gynecology)  Gera Rausch MD as Consulting Physician (Ophthalmology)  Joe Aaron MD as Surgeon (Vascular Surgery)  Ansley Rios MD as Consulting Physician (Otolaryngology)  Dada Lewis MD as Surgeon (General Surgery)  Vickie Bernstein MD (Inactive) as Consulting Physician (Hematology and Oncology)  Real Ledesma MD as Referring Physician (Cardiology)    Smoking Status:  Social History     Tobacco Use   Smoking Status Former Smoker   • Packs/day: 0.50   • Years: 10.00   • Pack years: 5.00   • Types: Cigarettes   • Quit date:    • Years since quittin.8   Smokeless Tobacco Never Used       Alcohol Consumption:  Social History     Substance and Sexual Activity   Alcohol Use Yes   • Alcohol/week: 2.0 standard drinks   • Types: 2 Glasses of wine per week       Depression Screen:   PHQ-2/PHQ-9 Depression Screening 1/3/2018   Little interest or pleasure in doing things 0   Feeling down, depressed, or hopeless 0   Total Score 0       Fall Risk Screen:  STEADI Fall Risk Assessment was completed, and patient is at LOW risk for falls.Assessment completed on:10/16/2020    Health Habits and Functional and Cognitive Screening:  Functional & Cognitive Status 1/3/2018   Do you have difficulty preparing food and eating? No   Do you have difficulty  bathing yourself, getting dressed or grooming yourself? No   Do you have difficulty using the toilet? No   Do you have difficulty moving around from place to place? No   Do you have trouble with steps or getting out of a bed or a chair? No   Current Diet Well Balanced Diet   Dental Exam Up to date        Dental Exam Comment Dr. Jorge   Eye Exam Up to date        Eye Exam Comment [facility]   Exercise (times per week) 5 times per week   Current Exercise Activities Include Housecleaning   Do you need help using the phone?  No   Are you deaf or do you have serious difficulty hearing?  No   Do you need help with transportation? No   Do you need help shopping? No   Do you need help preparing meals?  No   Do you need help with housework?  No   Do you need help with laundry? No   Do you need help taking your medications? No   Do you need help managing money? No   Have you felt unusual stress, anger or loneliness in the last month? No   Who do you live with? Spouse   If you need help, do you have trouble finding someone available to you? No   Have you been bothered in the last four weeks by sexual problems? No   Do you have difficulty concentrating, remembering or making decisions? No         Does the patient have evidence of cognitive impairment? No    Asprin use counseling:Contraindicated from taking ASA    Age-appropriate Screening Schedule:  Refer to the list below for future screening recommendations based on patient's age, sex and/or medical conditions. Orders for these recommended tests are listed in the plan section. The patient has been provided with a written plan.    Health Maintenance   Topic Date Due   • MAMMOGRAM  11/07/2019   • COLONOSCOPY  11/07/2023   • TDAP/TD VACCINES (2 - Td) 11/26/2029   • INFLUENZA VACCINE  Completed   • ZOSTER VACCINE  Completed          Reviewed labs from October 2020 with the patient.      The following portions of the patient's history were reviewed and updated as  appropriate: allergies, current medications, past family history, past medical history, past social history, past surgical history and problem list.    Outpatient Medications Prior to Visit   Medication Sig Dispense Refill   • apixaban (ELIQUIS) 2.5 MG tablet tablet Take 1 tablet by mouth Every 12 (Twelve) Hours. 180 tablet 3   • B Complex-Biotin-FA (B-100 COMPLEX PO) Take 1 tablet by mouth Daily.     • CALCIUM PO Take 600 mg by mouth Daily.     • chlorthalidone (HYGROTON) 25 MG tablet Take 1 tablet by mouth Daily. 90 tablet 1   • Cholecalciferol (VITAMIN D3) 2000 UNITS tablet Take 1 tablet by mouth Daily.     • ciclopirox (PENLAC) 8 % solution      • CRANBERRY PO Take 1 tablet by mouth Daily.     • diazePAM (Valium) 5 MG tablet Take 1 tablet by mouth 2 (Two) Times a Day As Needed for Anxiety. 20 tablet 1   • diclofenac (VOLTAREN) 1 % gel gel Apply 4 g topically to the appropriate area as directed 4 (Four) Times a Day As Needed (pain). 1 tube 2   • famotidine (PEPCID) 10 MG tablet Take 20 mg by mouth Every Night.     • losartan (COZAAR) 100 MG tablet Take 1 tablet by mouth Daily. 90 tablet 3   • Magnesium 250 MG tablet Take 2 tablets by mouth Daily.     • Multiple Vitamins-Minerals (MULTIVITAMIN PO) Take 1 tablet by mouth Daily.       No facility-administered medications prior to visit.        Patient Active Problem List   Diagnosis   • HTN (hypertension), benign   • Vitamin D deficiency   • Urge incontinence   • GERD without esophagitis   • Cervical spinal stenosis   • Saddle pulmonary embolus (CMS/HCC)   • Family history of thrombosis   • Degeneration of cervical intervertebral disc   • Adrenal nodule (CMS/HCC)   • Health care maintenance   • Localized osteoarthritis of left shoulder   • Primary osteoarthritis of both knees   • Hypomagnesemia   • Migraine with aura and without status migrainosus, not intractable   • Snoring       Advanced Care Planning:  ACP discussion was held with the patient during this visit.  "Patient has an advance directive (not in EMR), copy requested.    Review of Systems   Constitutional: Negative for activity change, appetite change, fatigue and fever.   HENT: Negative for ear pain, facial swelling and sore throat.    Eyes: Negative for discharge and itching.   Respiratory: Negative for cough, chest tightness and shortness of breath.    Cardiovascular: Negative for chest pain and palpitations.   Gastrointestinal: Negative for abdominal distention and constipation.   Endocrine: Negative for polydipsia, polyphagia and polyuria.   Genitourinary: Negative for difficulty urinating and flank pain.   Musculoskeletal: Negative for arthralgias and back pain.   Skin: Negative for color change, rash and wound.   Allergic/Immunologic: Negative for environmental allergies and food allergies.   Neurological: Negative for weakness and numbness.   Hematological: Negative for adenopathy. Does not bruise/bleed easily.   Psychiatric/Behavioral: Positive for dysphoric mood. Negative for decreased concentration. The patient is not nervous/anxious.         Sees a therapist for her depression/anxiety       Compared to one year ago, the patient feels her physical health is the same.  Compared to one year ago, the patient feels her mental health is worse.    Reviewed chart for potential of high risk medication in the elderly: yes  Reviewed chart for potential of harmful drug interactions in the elderly:yes    Objective         Vitals:    10/16/20 0803   BP: 122/82   BP Location: Left arm   Patient Position: Sitting   Cuff Size: Adult   Pulse: 89   Temp: 98.8 °F (37.1 °C)   TempSrc: Oral   SpO2: 99%   Weight: 80.3 kg (177 lb)   Height: 175.3 cm (69.02\")       Body mass index is 26.12 kg/m².  Discussed the patient's BMI with her. The BMI is in the acceptable range.    Physical Exam    Lab Results   Component Value Date    GLU 94 10/09/2020    CHLPL 180 10/09/2020    TRIG 71 10/09/2020    HDL 75 (H) 10/09/2020    LDL 92 " 10/09/2020    VLDL 13 10/09/2020        Assessment/Plan   Medicare Risks and Personalized Health Plan  CMS Preventative Services Quick Reference  Breast Cancer/Mammogram Screening  Cardiovascular risk  Chronic Pain   Dementia/Memory   Depression/Dysphoria  Diabetic Lab Screening   Fall Risk  Osteoprorosis Risk  Polypharmacy    The above risks/problems have been discussed with the patient.  Pertinent information has been shared with the patient in the After Visit Summary.  Follow up plans and orders are seen below in the Assessment/Plan Section.    Diagnoses and all orders for this visit:    1. Medicare annual wellness visit, subsequent (Primary)    2. Anticoagulant long-term use  -     Ambulatory Referral to Hematology    3. Hx pulmonary embolism  -     Ambulatory Referral to Hematology      Labs are within acceptable limits.  Addressed separate concern in other note.    Follow Up:  Return in about 6 months (around 4/16/2021) for Next scheduled follow up.     An After Visit Summary and PPPS were given to the patient.

## 2020-10-19 RX ORDER — APIXABAN 2.5 MG/1
TABLET, FILM COATED ORAL
Qty: 180 TABLET | Refills: 2 | OUTPATIENT
Start: 2020-10-19

## 2020-10-22 NOTE — TELEPHONE ENCOUNTER
Caller: Carlin Juany NICO    Relationship: Self    Best call back number: [phone]    Medication needed:   Requested Prescriptions     Pending Prescriptions Disp Refills   • apixaban (ELIQUIS) 2.5 MG tablet tablet 180 tablet 3     Sig: Take 1 tablet by mouth Every 12 (Twelve) Hours.       When do you need the refill by: 10/23/2020    What details did the patient provide when requesting the medication: Patient gets a 90 day supply and takes it twice a day. Patient has 5 days of pills left, but wants this filled before Monday if possible.    Does the patient have less than a 3 day supply:  [] Yes  [x] No    What is the patient's preferred pharmacy: [address] & ([place]) - [phone] [facility] [phone] FX

## 2020-12-22 RX ORDER — LOSARTAN POTASSIUM 100 MG/1
100 TABLET ORAL DAILY
Qty: 90 TABLET | Refills: 3 | Status: SHIPPED | OUTPATIENT
Start: 2020-12-22 | End: 2021-12-22 | Stop reason: SDUPTHER

## 2020-12-22 NOTE — TELEPHONE ENCOUNTER
Caller: Raegan Juany L    Relationship: Self    Best call back number: [phone]    Medication needed:   Requested Prescriptions     Pending Prescriptions Disp Refills   • losartan (COZAAR) 100 MG tablet 90 tablet 3     Sig: Take 1 tablet by mouth Daily.       When do you need the refill by: Thursday    What details did the patient provide when requesting the medication: Was prescribed before by Dr Doran    Does the patient have less than a 3 day supply:  [x] Yes  [] No    What is the patient's preferred pharmacy: [address] & ([facility]) - [phone] [facility] [phone] FX

## 2021-01-07 ENCOUNTER — PATIENT MESSAGE (OUTPATIENT)
Dept: INTERNAL MEDICINE | Facility: CLINIC | Age: 71
End: 2021-01-07

## 2021-01-08 DIAGNOSIS — Z86.711 HISTORY OF PULMONARY EMBOLISM: Primary | ICD-10-CM

## 2021-01-08 NOTE — TELEPHONE ENCOUNTER
From: Juany Carlin  To: Warren Pizarro MD  Sent: 1/7/2021 11:13 PM EST  Subject: Non-Urgent Medical Question    Requesting information on how to sign up to receive the Covid vaccine and the Covid booster shot as I am over 70 and also high risk as well as is my  Yasir Carlin. Thank you very much,  Juany [email or web address]   [phone]

## 2021-01-15 ENCOUNTER — PATIENT MESSAGE (OUTPATIENT)
Dept: INTERNAL MEDICINE | Facility: CLINIC | Age: 71
End: 2021-01-15

## 2021-01-18 NOTE — TELEPHONE ENCOUNTER
From: Juany Carlin  To: Warren Pizarro MD  Sent: 1/15/2021 11:03 PM EST  Subject: Complaint    I have been trying to schedule covid vaccinations for myself and my . We are in 1-B over 70 with health concerns and I cannot find a place with an open appointment after searching for several hours. This is my second request for assistance. Please assist us to find appointments as no one notifies us of availability? I keep looking and all spots are taken. We appreciate any help. We would go anywhere. Any time. Thank you.   Yours truly   Juany Carlin [phone]  Darian Carlin [phone]  We are both patients of Dr. Pizarro.

## 2021-01-19 ENCOUNTER — LAB (OUTPATIENT)
Dept: OTHER | Facility: HOSPITAL | Age: 71
End: 2021-01-19

## 2021-01-19 ENCOUNTER — CONSULT (OUTPATIENT)
Dept: ONCOLOGY | Facility: CLINIC | Age: 71
End: 2021-01-19

## 2021-01-19 VITALS
RESPIRATION RATE: 16 BRPM | DIASTOLIC BLOOD PRESSURE: 71 MMHG | WEIGHT: 172.9 LBS | TEMPERATURE: 97.1 F | OXYGEN SATURATION: 99 % | SYSTOLIC BLOOD PRESSURE: 109 MMHG | HEART RATE: 98 BPM | HEIGHT: 69 IN | BODY MASS INDEX: 25.61 KG/M2

## 2021-01-19 DIAGNOSIS — Z86.711 HISTORY OF PULMONARY EMBOLISM: ICD-10-CM

## 2021-01-19 DIAGNOSIS — I26.92 SADDLE EMBOLUS OF PULMONARY ARTERY WITHOUT ACUTE COR PULMONALE, UNSPECIFIED CHRONICITY (HCC): Primary | ICD-10-CM

## 2021-01-19 LAB
BASOPHILS # BLD AUTO: 0.06 10*3/MM3 (ref 0–0.2)
BASOPHILS NFR BLD AUTO: 0.9 % (ref 0–1.5)
DEPRECATED RDW RBC AUTO: 39.8 FL (ref 37–54)
EOSINOPHIL # BLD AUTO: 0.11 10*3/MM3 (ref 0–0.4)
EOSINOPHIL NFR BLD AUTO: 1.6 % (ref 0.3–6.2)
ERYTHROCYTE [DISTWIDTH] IN BLOOD BY AUTOMATED COUNT: 12.3 % (ref 12.3–15.4)
HCT VFR BLD AUTO: 38.5 % (ref 34–46.6)
HGB BLD-MCNC: 13 G/DL (ref 12–15.9)
IMM GRANULOCYTES # BLD AUTO: 0.07 10*3/MM3 (ref 0–0.05)
IMM GRANULOCYTES NFR BLD AUTO: 1 % (ref 0–0.5)
LYMPHOCYTES # BLD AUTO: 2.44 10*3/MM3 (ref 0.7–3.1)
LYMPHOCYTES NFR BLD AUTO: 35.1 % (ref 19.6–45.3)
MCH RBC QN AUTO: 29.5 PG (ref 26.6–33)
MCHC RBC AUTO-ENTMCNC: 33.8 G/DL (ref 31.5–35.7)
MCV RBC AUTO: 87.5 FL (ref 79–97)
MONOCYTES # BLD AUTO: 0.56 10*3/MM3 (ref 0.1–0.9)
MONOCYTES NFR BLD AUTO: 8 % (ref 5–12)
NEUTROPHILS NFR BLD AUTO: 3.72 10*3/MM3 (ref 1.7–7)
NEUTROPHILS NFR BLD AUTO: 53.4 % (ref 42.7–76)
NRBC BLD AUTO-RTO: 0 /100 WBC (ref 0–0.2)
PLATELET # BLD AUTO: 247 10*3/MM3 (ref 140–450)
PMV BLD AUTO: 10.5 FL (ref 6–12)
RBC # BLD AUTO: 4.4 10*6/MM3 (ref 3.77–5.28)
WBC # BLD AUTO: 6.96 10*3/MM3 (ref 3.4–10.8)

## 2021-01-19 PROCEDURE — 99205 OFFICE O/P NEW HI 60 MIN: CPT | Performed by: INTERNAL MEDICINE

## 2021-01-19 PROCEDURE — 85025 COMPLETE CBC W/AUTO DIFF WBC: CPT | Performed by: INTERNAL MEDICINE

## 2021-01-19 PROCEDURE — 36415 COLL VENOUS BLD VENIPUNCTURE: CPT

## 2021-01-28 NOTE — THERAPY DISCHARGE NOTE
Outpatient Physical Therapy Ortho Discharge Summary  [facility]     Patient Name: Juany Carlin  : 1950  MRN: [id number]  Today's Date: 2021      Visit Date: 2020    Visit Dx:    ICD-10-CM ICD-9-CM   1. Primary osteoarthritis of right hip  M16.11 715.15   2. Impaired gait and mobility  R26.89 781.2       Patient Active Problem List   Diagnosis   • HTN (hypertension), benign   • Vitamin D deficiency   • Urge incontinence   • GERD without esophagitis   • Cervical spinal stenosis   • Saddle pulmonary embolus (CMS/HCC)   • Family history of thrombosis   • Degeneration of cervical intervertebral disc   • Adrenal nodule (CMS/HCC)   • Health care maintenance   • Localized osteoarthritis of left shoulder   • Primary osteoarthritis of both knees   • Hypomagnesemia   • Migraine with aura and without status migrainosus, not intractable   • Snoring        Past Medical History:   Diagnosis Date   • Acute cystitis without hematuria 4/10/2020   • Acute right ankle pain 3/3/2020   • Adrenal nodule (CMS/HCC)    • BPPV (benign paroxysmal positional vertigo) 5/10/2016   • Cervical stenosis of spine    • DVT, lower extremity (CMS/HCC)     right   • GERD without esophagitis 5/10/2016   • H/O varicose vein stripping    • History of stress incontinence    • Hyperplastic colon polyp 2013   • Hypertension    • PONV (postoperative nausea and vomiting)    • Pregnancy     , miscarrige x1   • Pulmonary emboli (CMS/HCC)     bilateral extensive PE with saddle emboli.Negative hypercoagulable state w/u   • Pulmonary embolism with acute cor pulmonale (CMS/HCC) 2017   • Urge incontinence 5/10/2016   • Vitamin D deficiency 5/10/2016        Past Surgical History:   Procedure Laterality Date   • CARDIAC CATHETERIZATION N/A 11/10/2016    Procedure: Right Heart Cath;  Surgeon: Johnna Agrawal MD;  Location: [facility] CATH INVASIVE LOCATION;  Service:    • CARDIAC CATHETERIZATION N/A 11/10/2016    Procedure: Thrombolytic  Therapy;  Surgeon: Johnna Agrawal MD;  Location:  [facility] CATH INVASIVE LOCATION;  Service:    • CATARACT EXTRACTION Bilateral    • COLONOSCOPY W/ BIOPSIES  11/2013    hyperplastic polyp, Dr.Russel Lewis   • DILATATION AND CURETTAGE     • EPIDURAL BLOCK     • HEMORROIDECTOMY  2013   • INTERVENTIONAL RADIOLOGY PROCEDURE Bilateral 11/10/2016    Procedure: Sp Sm[facility] Cath Place Pulmonary Art;  Surgeon: Johnna Agrawal MD;  Location:  [facility] CATH INVASIVE LOCATION;  Service:    • TONSILLECTOMY     • TOTAL LAPAROSCOPIC HYSTERECTOMY  2001   • VEIN SURGERY Left [id number]    leg,    • VENTRAL HERNIA REPAIR  1987                                                                     Time Calculation:                OP PT Discharge Summary  Date of Discharge: 01/28/21  Discharge Instructions/Additional Comments: Pt was seen for 7 sessions for R hip pain, goals were progressing but she stopprd due to covid.      Olive Huerta, PT  1/28/2021

## 2021-03-09 DIAGNOSIS — Z23 IMMUNIZATION DUE: ICD-10-CM

## 2021-04-19 NOTE — PROGRESS NOTES
"Chief Complaint  Hypertension (6 month follow up)    Subjective          Juany Carlin presents to [facility]  History of Present Illness     Hypertension - stable.  Patient taking medication as prescribed.  Denies chest pain, shortness of breath, headache, lower extremity edema.  Patient is taking chlorthalidone, losartan.    Lost about 20 lbs since August 2020.        Objective   Vital Signs:   /70 (BP Location: Left arm, Patient Position: Sitting, Cuff Size: Adult)   Pulse 79   Temp 97.5 °F (36.4 °C) (Temporal)   Resp 16   Ht 175.3 cm (69\")   Wt 76.5 kg (168 lb 9.6 oz)   SpO2 99%   BMI 24.90 kg/m²     Physical Exam  Vitals and nursing note reviewed.   Constitutional:       General: She is not in acute distress.     Appearance: Normal appearance.   Cardiovascular:      Rate and Rhythm: Normal rate and regular rhythm.      Heart sounds: Normal heart sounds.   Pulmonary:      Effort: Pulmonary effort is normal.      Breath sounds: Normal breath sounds.   Neurological:      Mental Status: She is alert.        Result Review :   The following data was reviewed by: Warren Pizarro MD on 04/20/2021:  Common labs    Common Labsle 10/9/20 10/9/20 10/9/20 1/19/21    0858 0858 0858    Glucose  94     BUN  11     Creatinine  0.72     eGFR Non  Am  80     eGFR African Am  97     Sodium  137     Potassium  4.6     Chloride  98     Calcium  9.4     Total Protein  6.2     Albumin  4.60     Total Bilirubin  0.6     Alkaline Phosphatase  72     AST (SGOT)  19     ALT (SGPT)  22     WBC 6.27   6.96   Hemoglobin 12.4   13.0   Hematocrit 37.5   38.5   Platelets 266   247   Total Cholesterol   180    Triglycerides   71    HDL Cholesterol   75 (A)    LDL Cholesterol    92    (A) Abnormal value                      Assessment and Plan    Diagnoses and all orders for this visit:    1. HTN (hypertension), benign (Primary)  -     chlorthalidone (HYGROTON) 25 MG tablet; Take 1 tablet by " mouth Daily.  Dispense: 90 tablet; Refill: 3  -     Comprehensive Metabolic Panel      Stable.  Should continue current blood pressure medications and lab CMP.  I mention to her since she lost the weight that she may likely need to half the chlorthalidone or even try stopping it at some point and continue with the losartan.  This is because with the weight loss you can have decreases in the blood pressure as well.  She said she would keep an eye on the blood pressure at home and if it is staying low that she will titrate to likely discontinue the chlorthalidone.        Follow Up   Return in about 6 months (around 10/20/2021) for Recheck - HTN.  Patient was given instructions and counseling regarding her condition or for health maintenance advice. Please see specific information pulled into the AVS if appropriate.

## 2021-04-20 ENCOUNTER — OFFICE VISIT (OUTPATIENT)
Dept: INTERNAL MEDICINE | Facility: CLINIC | Age: 71
End: 2021-04-20

## 2021-04-20 VITALS
WEIGHT: 168.6 LBS | TEMPERATURE: 97.5 F | OXYGEN SATURATION: 99 % | HEIGHT: 69 IN | HEART RATE: 79 BPM | BODY MASS INDEX: 24.97 KG/M2 | SYSTOLIC BLOOD PRESSURE: 110 MMHG | RESPIRATION RATE: 16 BRPM | DIASTOLIC BLOOD PRESSURE: 70 MMHG

## 2021-04-20 DIAGNOSIS — I10 HTN (HYPERTENSION), BENIGN: Primary | ICD-10-CM

## 2021-04-20 LAB
ALBUMIN SERPL-MCNC: 4.5 G/DL (ref 3.5–5.2)
ALBUMIN/GLOB SERPL: 2.4 G/DL
ALP SERPL-CCNC: 73 U/L (ref 39–117)
ALT SERPL-CCNC: 18 U/L (ref 1–33)
AST SERPL-CCNC: 16 U/L (ref 1–32)
BILIRUB SERPL-MCNC: 0.7 MG/DL (ref 0–1.2)
BUN SERPL-MCNC: 16 MG/DL (ref 8–23)
BUN/CREAT SERPL: 21.1 (ref 7–25)
CALCIUM SERPL-MCNC: 10.1 MG/DL (ref 8.6–10.5)
CHLORIDE SERPL-SCNC: 100 MMOL/L (ref 98–107)
CO2 SERPL-SCNC: 29 MMOL/L (ref 22–29)
CREAT SERPL-MCNC: 0.76 MG/DL (ref 0.57–1)
GLOBULIN SER CALC-MCNC: 1.9 GM/DL
GLUCOSE SERPL-MCNC: 99 MG/DL (ref 65–99)
POTASSIUM SERPL-SCNC: 4.8 MMOL/L (ref 3.5–5.2)
PROT SERPL-MCNC: 6.4 G/DL (ref 6–8.5)
SODIUM SERPL-SCNC: 138 MMOL/L (ref 136–145)

## 2021-04-20 PROCEDURE — 99213 OFFICE O/P EST LOW 20 MIN: CPT | Performed by: FAMILY MEDICINE

## 2021-04-20 RX ORDER — CHLORTHALIDONE 25 MG/1
25 TABLET ORAL DAILY
Qty: 90 TABLET | Refills: 3 | Status: SHIPPED | OUTPATIENT
Start: 2021-04-20 | End: 2022-04-18

## 2021-04-20 NOTE — PATIENT INSTRUCTIONS
Hypertension, Adult  Hypertension is another name for high blood pressure. High blood pressure forces your heart to work harder to pump blood. This can cause problems over time.  There are two numbers in a blood pressure reading. There is a top number (systolic) over a bottom number (diastolic). It is best to have a blood pressure that is below 120/80. Healthy choices can help lower your blood pressure, or you may need medicine to help lower it.  What are the causes?  The cause of this condition is not known. Some conditions may be related to high blood pressure.  What increases the risk?  · Smoking.  · Having type 2 diabetes mellitus, high cholesterol, or both.  · Not getting enough exercise or physical activity.  · Being overweight.  · Having too much fat, sugar, calories, or salt (sodium) in your diet.  · Drinking too much alcohol.  · Having long-term (chronic) kidney disease.  · Having a family history of high blood pressure.  · Age. Risk increases with age.  · Race. You may be at higher risk if you are .  · Gender. Men are at higher risk than women before age 45. After age 65, women are at higher risk than men.  · Having obstructive sleep apnea.  · Stress.  What are the signs or symptoms?  · High blood pressure may not cause symptoms. Very high blood pressure (hypertensive crisis) may cause:  ? Headache.  ? Feelings of worry or nervousness (anxiety).  ? Shortness of breath.  ? Nosebleed.  ? A feeling of being sick to your stomach (nausea).  ? Throwing up (vomiting).  ? Changes in how you see.  ? Very bad chest pain.  ? Seizures.  How is this treated?  · This condition is treated by making healthy lifestyle changes, such as:  ? Eating healthy foods.  ? Exercising more.  ? Drinking less alcohol.  · Your health care provider may prescribe medicine if lifestyle changes are not enough to get your blood pressure under control, and if:  ? Your top number is above 130.  ? Your bottom number is above  80.  · Your personal target blood pressure may vary.  Follow these instructions at home:  Eating and drinking    · If told, follow the DASH eating plan. To follow this plan:  ? Fill one half of your plate at each meal with fruits and vegetables.  ? Fill one fourth of your plate at each meal with whole grains. Whole grains include whole-wheat pasta, brown rice, and whole-grain bread.  ? Eat or drink low-fat dairy products, such as skim milk or low-fat yogurt.  ? Fill one fourth of your plate at each meal with low-fat (lean) proteins. Low-fat proteins include fish, chicken without skin, eggs, beans, and tofu.  ? Avoid fatty meat, cured and processed meat, or chicken with skin.  ? Avoid pre-made or processed food.  · Eat less than 1,500 mg of salt each day.  · Do not drink alcohol if:  ? Your doctor tells you not to drink.  ? You are pregnant, may be pregnant, or are planning to become pregnant.  · If you drink alcohol:  ? Limit how much you use to:  § 0-1 drink a day for women.  § 0-2 drinks a day for men.  ? Be aware of how much alcohol is in your drink. In the [place] one drink equals one 12 oz bottle of beer (355 mL), one 5 oz glass of wine (148 mL), or one 1½ oz glass of hard liquor (44 mL).  Lifestyle    · Work with your doctor to stay at a healthy weight or to lose weight. Ask your doctor what the best weight is for you.  · Get at least 30 minutes of exercise most days of the week. This may include walking, swimming, or biking.  · Get at least 30 minutes of exercise that strengthens your muscles (resistance exercise) at least 3 days a week. This may include lifting weights or doing Pilates.  · Do not use any products that contain nicotine or tobacco, such as cigarettes, e-cigarettes, and chewing tobacco. If you need help quitting, ask your doctor.  · Check your blood pressure at home as told by your doctor.  · Keep all follow-up visits as told by your doctor. This is important.  Medicines  · Take over-the-counter  and prescription medicines only as told by your doctor. Follow directions carefully.  · Do not skip doses of blood pressure medicine. The medicine does not work as well if you skip doses. Skipping doses also puts you at risk for problems.  · Ask your doctor about side effects or reactions to medicines that you should watch for.  Contact a doctor if you:  · Think you are having a reaction to the medicine you are taking.  · Have headaches that keep coming back (recurring).  · Feel dizzy.  · Have swelling in your ankles.  · Have trouble with your vision.  Get help right away if you:  · Get a very bad headache.  · Start to feel mixed up (confused).  · Feel weak or numb.  · Feel faint.  · Have very bad pain in your:  ? Chest.  ? Belly (abdomen).  · Throw up more than once.  · Have trouble breathing.  Summary  · Hypertension is another name for high blood pressure.  · High blood pressure forces your heart to work harder to pump blood.  · For most people, a normal blood pressure is less than 120/80.  · Making healthy choices can help lower blood pressure. If your blood pressure does not get lower with healthy choices, you may need to take medicine.  This information is not intended to replace advice given to you by your health care provider. Make sure you discuss any questions you have with your health care provider.  Document Revised: 08/28/2019 Document Reviewed: 08/28/2019  [employer] Patient Education © 2021 [employer]

## 2021-05-07 ENCOUNTER — OFFICE VISIT (OUTPATIENT)
Dept: ORTHOPEDIC SURGERY | Facility: CLINIC | Age: 71
End: 2021-05-07

## 2021-05-07 VITALS — TEMPERATURE: 98.2 F | BODY MASS INDEX: 24.88 KG/M2 | WEIGHT: 168 LBS | HEIGHT: 69 IN

## 2021-05-07 DIAGNOSIS — S86.112A GASTROCNEMIUS MUSCLE STRAIN, LEFT, INITIAL ENCOUNTER: Primary | ICD-10-CM

## 2021-05-07 PROCEDURE — 99214 OFFICE O/P EST MOD 30 MIN: CPT | Performed by: ORTHOPAEDIC SURGERY

## 2021-05-07 RX ORDER — CYCLOBENZAPRINE HCL 10 MG
10 TABLET ORAL 3 TIMES DAILY PRN
Qty: 60 TABLET | Refills: 0 | Status: SHIPPED | OUTPATIENT
Start: 2021-05-07 | End: 2021-10-11

## 2021-05-07 RX ORDER — TRAMADOL HYDROCHLORIDE 50 MG/1
50 TABLET ORAL EVERY 4 HOURS PRN
Qty: 60 TABLET | Refills: 0 | Status: SHIPPED | OUTPATIENT
Start: 2021-05-07 | End: 2021-10-11

## 2021-06-07 ENCOUNTER — APPOINTMENT (OUTPATIENT)
Dept: PHYSICAL THERAPY | Facility: HOSPITAL | Age: 71
End: 2021-06-07

## 2021-06-09 ENCOUNTER — APPOINTMENT (OUTPATIENT)
Dept: PHYSICAL THERAPY | Facility: HOSPITAL | Age: 71
End: 2021-06-09

## 2021-06-14 ENCOUNTER — APPOINTMENT (OUTPATIENT)
Dept: PHYSICAL THERAPY | Facility: HOSPITAL | Age: 71
End: 2021-06-14

## 2021-06-14 ENCOUNTER — HOSPITAL ENCOUNTER (OUTPATIENT)
Dept: PHYSICAL THERAPY | Facility: HOSPITAL | Age: 71
Setting detail: THERAPIES SERIES
Discharge: HOME | End: 2021-06-14

## 2021-06-14 DIAGNOSIS — S86.112D STRAIN OF GASTROCNEMIUS MUSCLE OF LEFT LOWER EXTREMITY, SUBSEQUENT ENCOUNTER: Primary | ICD-10-CM

## 2021-06-14 DIAGNOSIS — M79.605 LEFT LEG PAIN: ICD-10-CM

## 2021-06-14 DIAGNOSIS — R26.2 DIFFICULTY WALKING: ICD-10-CM

## 2021-06-14 PROCEDURE — 97161 PT EVAL LOW COMPLEX 20 MIN: CPT

## 2021-06-14 PROCEDURE — 97110 THERAPEUTIC EXERCISES: CPT

## 2021-06-14 NOTE — THERAPY EVALUATION
Outpatient Physical Therapy Ortho Initial Evaluation  [facility]     Patient Name: Juany Carlin  : 1950  MRN: [id number]  Today's Date: 2021      Visit Date: 2021    Patient Active Problem List   Diagnosis   • HTN (hypertension), benign   • Vitamin D deficiency   • Urge incontinence   • GERD without esophagitis   • Cervical spinal stenosis   • Saddle pulmonary embolus (CMS/HCC)   • Family history of thrombosis   • Degeneration of cervical intervertebral disc   • Adrenal nodule (CMS/HCC)   • Health care maintenance   • Localized osteoarthritis of left shoulder   • Primary osteoarthritis of both knees   • Hypomagnesemia   • Migraine with aura and without status migrainosus, not intractable   • Snoring        Past Medical History:   Diagnosis Date   • Acute cystitis without hematuria 4/10/2020   • Acute right ankle pain 3/3/2020   • Adrenal nodule (CMS/HCC)    • BPPV (benign paroxysmal positional vertigo) 5/10/2016   • Cervical stenosis of spine    • DVT, lower extremity (CMS/HCC)     right   • GERD without esophagitis 5/10/2016   • H/O varicose vein stripping    • History of stress incontinence    • Hyperplastic colon polyp 2013   • Hypertension    • PONV (postoperative nausea and vomiting)    • Pregnancy     , miscarrige x1   • Pulmonary emboli (CMS/HCC)     bilateral extensive PE with saddle emboli.Negative hypercoagulable state w/u   • Pulmonary embolism with acute cor pulmonale (CMS/HCC) 2017   • Urge incontinence 5/10/2016   • Vitamin D deficiency 5/10/2016        Past Surgical History:   Procedure Laterality Date   • CARDIAC CATHETERIZATION N/A 11/10/2016    Procedure: Right Heart Cath;  Surgeon: Johnna Agrawal MD;  Location: [facility] INVASIVE LOCATION;  Service:    • CARDIAC CATHETERIZATION N/A 11/10/2016    Procedure: Thrombolytic Therapy;  Surgeon: Johnna Agrawal MD;  Location:  [facility] INVASIVE LOCATION;  Service:    • CATARACT EXTRACTION Bilateral    •  COLONOSCOPY W/ BIOPSIES  11/2013    hyperplastic polyp, Dr.Russel Lewis   • DILATATION AND CURETTAGE     • EPIDURAL BLOCK     • HEMORROIDECTOMY  2013   • INTERVENTIONAL RADIOLOGY PROCEDURE Bilateral 11/10/2016    Procedure: Sp Smiley Cath Place Pulmonary Art;  Surgeon: Johnna Agrawal MD;  Location:  [facility] C[facility] INVASIVE LOCATION;  Service:    • TONSILLECTOMY     • TOTAL LAPAROSCOPIC HYSTERECTOMY  2001   • VEIN SURGERY Left 3001-0560    leg,    • VENTRAL HERNIA REPAIR  1987       Visit Dx:     ICD-10-CM ICD-9-CM   1. Strain of gastrocnemius muscle of left lower extremity, subsequent encounter  S86.112D V58.89     844.8   2. Left leg pain  M79.605 729.5   3. Difficulty walking  R26.2 719.7         Patient History     Row Name 06/14/21 1000             History    Chief Complaint  Pain;Difficulty Walking  -CA      Type of Pain  Lower Extremity / Leg  -CA      Date Current Problem(s) Began  05/06/21  -CA      Brief Description of Current Complaint  Juany Carlin is a 71 y.o. female who presents today with L lower leg pain, referred for L gastroc strain. Pt reports on May 6, was picking up granddaughter off of bed. Has had ongoing R hip issues, and over compensated with L LE and felt a pop/snap in proximal calf. Reports pain has decreased a lot since initial injury. No imaging to date. Juany Carlin reports difficulty/increased pain with getting on/off floor, difficulty walking. Pain relieving factors include rest. PMH includes chronic R hip pain/OA (possible SRIDHAR in future).   -CA      Occupation/sports/leisure activities  gardening  -CA         Pain     Pain Location  Leg calf  -CA      Pain at Present  2  -CA      Pain at Best  0  -CA      Pain at Worst  3  -CA         Fall Risk Assessment    Any falls in the past year:  No  -CA         Daily Activities    Primary Language  English  -CA      Are you able to read  Yes  -CA      Are you able to write  Yes  -CA      Pt Participated in POC and Goals  Yes  -CA          Safety    Are you being hurt, hit, or frightened by anyone at home or in your life?  No  -CA      Are you being neglected by a caregiver  No  -CA        User Key  (r) = Recorded By, (t) = Taken By, (c) = Cosigned By    Initials Name Provider Type    Jeri Pace PT Physical Therapist          PT Ortho     Row Name 06/14/21 1000       Foot/Ankle Palpation    Medial Gastroc  Left:;Tender;Trigger point  -CA    Lateral Gastroc  Left:;Tender;Trigger point  -CA    Soleus  Left:;Tender  -CA    Achilles' Tendon  Left: no TTP; palpably initact  -CA       Ankle/Foot Special Tests    Friend test (Achilles’ tendon rupture)  Unable to Test pt could not tolerate prone - per MD note was negative  -CA       Right Lower Ext    Rt Ankle Dorsiflexion AROM  10  -CA    Rt Ankle Dorsiflexion PROM  15  -CA    Rt Ankle Plantarflexion AROM  60  -CA       Left Lower Ext    Lt Ankle Dorsiflexion AROM  8  -CA    Lt Ankle Dorsiflexion PROM  10  -CA    Lt Ankle Plantarflexion AROM  60  -CA       MMT (Manual Muscle Testing)    Rt Lower Ext  Rt Hip Flexion;Rt Hip ABduction;Rt Knee Extension;Rt Knee Flexion;Rt Ankle Dorsiflexion;Comments  -CA    Lt Lower Ext  Lt Hip Flexion;Lt Hip ABduction;Lt Knee Extension;Lt Knee Flexion;Lt Ankle Dorsiflexion;Comments  -CA       MMT Right Lower Ext    Rt Hip Flexion MMT, Gross Movement  (3/5) fair  -CA    Rt Hip ABduction MMT, Gross Movement  -- unable to test d/t pain  -CA    Rt Knee Extension MMT, Gross Movement  (5/5) normal  -CA    Rt Knee Flexion MMT, Gross Movement  (5/5) normal  -CA    Rt Ankle Dorsiflexion MMT, Gross Movement  (5/5) normal  -CA       MMT Left Lower Ext    Lt Hip Flexion MMT, Gross Movement  (3+/5) fair plus  -CA    Lt Hip ABduction MMT, Gross Movement  (3-/5) fair minus  -CA    Lt Knee Extension MMT, Gross Movement  (5/5) normal  -CA    Lt Knee Flexion MMT, Gross Movement  (5/5) normal  -CA    Lt Ankle Dorsiflexion MMT, Gross Movement  (5/5) normal  -CA    Lt Lower Extremity  Comments   L SL calf raise: able to complete 4 reps with heavy reliance on B UE  -CA       Lower Extremity Flexibility    Gastrocnemius  Left:;Moderately limited  -CA    Soleus  Left:;Moderately limited  -CA       Balance Skills Training    SLS  L: 5 sec  -CA       Gait/Stairs (Locomotion)    Comment (Gait/Stairs)  fwd flex posture mild, decr trunk rot, decreased B TKE, anatalgic  -CA      User Key  (r) = Recorded By, (t) = Taken By, (c) = Cosigned By    Initials Name Provider Type    Jeri Pace, PT Physical Therapist                      Therapy Education  Education Details: access code: [email or web address]; eval findings, anatomy, POC  Given: HEP, Symptoms/condition management, Pain management  Program: New  How Provided: Verbal, Demonstration, Written  Provided to: Patient  Level of Understanding: Teach back education performed, Verbalized, Demonstrated     PT OP Goals     Row Name 06/14/21 1100          PT Short Term Goals    STG Date to Achieve  06/29/21  -CA     STG 1  The pt will demonstrate IND and compliant with initial HEP to improve strength and power.  -CA     STG 1 Progress  New  -CA     STG 2  The pt will demonstrate L ankle DF AROM to at least 10 deg to facilitate improved gait pattern and stair navigation.  -CA     STG 2 Progress  New  -CA        Long Term Goals    LTG 1  The pt will demonstrate IND and compliant with progressive HEP focused on IND condition management and return to PLOF.  -CA     LTG 1 Progress  New  -CA     LTG 2  The pt will demonstrate L LE SLS to at least 10 sec for improved stair navigation and balance.  -CA     LTG 2 Progress  New  -CA     LTG 3  Patient will perform at least 10 SL calf raise to demo improved functional PF strength in order to perform ADLs.  -CA     LTG 3 Progress  New  -CA     LTG 4  Patient will demo correct lifting techniques for heavy loads, in order to appropriate  grandchildren and avoid future injury.  -CA     LTG 4 Progress  New  -CA        Time  Calculation    PT Goal Re-Cert Due Date  09/12/21  -CA       User Key  (r) = Recorded By, (t) = Taken By, (c) = Cosigned By    Initials Name Provider Type    Jeri Pace PT Physical Therapist          PT Assessment/Plan     Row Name 06/14/21 1100          PT Assessment    Functional Limitations  Impaired gait;Impaired locomotion;Limitation in home management;Limitations in community activities;Limitations in functional capacity and performance;Performance in leisure activities  -CA     Impairments  Balance;Gait;Range of motion;Pain;Muscle strength;Impaired flexibility  -CA     Assessment Comments  Juany Carlin is a 71 y.o. female referred to physical therapy for L gastroc strain. She presents with a stable clinical presentation, along with  comorbidities of severe R hip OA and personal factors of hx of R achilles tendonitis, and R tibia fx that may impact her progress in the plan of care. Pt presents today with decrease L DF A/PROM, decreased B hip and L gastroc strength, gait impairments, and decreased L gastroc/soleus flexibility . Her signs and symptoms are consistent with referring diagnosis. The previous impairments limit her ability to walk, navigate steps, and lift heavy loads (grandchildren) with appropriate body mechanics. Pt will benefit from skilled PT to address the previous impairments and return to PLOF.  -CA     Please refer to paper survey for additional self-reported information  Yes  -CA     Rehab Potential  Good  -CA     Patient/caregiver participated in establishment of treatment plan and goals  Yes  -CA     Patient would benefit from skilled therapy intervention  Yes  -CA        PT Plan    PT Frequency  1x/week  -CA     Predicted Duration of Therapy Intervention (PT)  3-4 visits  -CA     Planned CPT's?  PT EVAL LOW COMPLEXITY: 86980;PT RE-EVAL: 94382;PT THER PROC EA 15 MIN: 95449;PT THER ACT EA 15 MIN: 91740;PT MANUAL THERAPY EA 15 MIN: 34969;PT NEUROMUSC RE-EDUCATION EA 15 MIN:  48841;PT GAIT TRAINING EA 15 MIN: 86701;PT SELF CARE/HOME MGMT/TRAIN EA 15: 80851  -CA     PT Plan Comments  next visit: review HEP, recumbent bike, IASTM vs STM for gastroc?, DF  at wall, heel raise  -CA       User Key  (r) = Recorded By, (t) = Taken By, (c) = Cosigned By    Initials Name Provider Type    Jeri Pace PT Physical Therapist            OP Exercises     Row Name 06/14/21 1100             Total Minutes    09341 - PT Therapeutic Exercise Minutes  15  -CA         Exercise 1    Exercise Name 1  long sitting gastroc str  -CA      Cueing 1  Verbal  -CA      Reps 1  3  -CA      Time 1  20 sec  -CA         Exercise 2    Exercise Name 2  long sitting soleus str  -CA      Cueing 2  Verbal  -CA      Reps 2  3  -CA      Time 2  20 sec  -CA         Exercise 3    Exercise Name 3  4 way ankle  -CA      Cueing 3  Verbal  -CA      Sets 3  1  -CA      Reps 3  20  -CA      Time 3  RTB  -CA        User Key  (r) = Recorded By, (t) = Taken By, (c) = Cosigned By    Initials Name Provider Type    Jeri Pace PT Physical Therapist                        Outcome Measure Options: Lower Extremity Functional Scale (LEFS) (based on L leg not R hip)  Lower Extremity Functional Index  Any of your usual work, housework or school activities: A little bit of difficulty  Your usual hobbies, recreational or sporting activities: A little bit of difficulty  Getting into or out of the bath: A little bit of difficulty  Walking between rooms: A little bit of difficulty  Putting on your shoes or socks: A little bit of difficulty  Squatting: Moderate difficulty  Lifting an object, like a bag of groceries from the floor: A little bit of difficulty  Performing light activities around your home: A little bit of difficulty  Performing heavy activities around your home: Moderate difficulty  Getting into or out of a car: Moderate difficulty  Walking 2 blocks: Moderate difficulty  Walking a mile: Quite a bit of difficulty  Going  up or down 10 stairs (about 1 flight of stairs): Moderate difficulty  Standing for 1 hour: Moderate difficulty  Sitting for 1 hour: No difficulty  Running on even ground: Extreme difficulty or unable to perform activity  Running on uneven ground: Extreme difficulty or unable to perform activity  Making sharp turns while running fast: Extreme difficulty or unable to perform activity  Hopping: Extreme difficulty or unable to perform activity  Rolling over in bed: A little bit of difficulty  Total: 41      Time Calculation:     Start Time: 1055  Stop Time: 1140  Time Calculation (min): 45 min  Total Timed Code Minutes- PT: 15 minute(s)  Timed Charges  80605 - PT Therapeutic Exercise Minutes: 15  Untimed Charges  PT Eval/Re-eval Minutes: 30  Total Minutes  Timed Charges Total Minutes: 15  Untimed Charges Total Minutes: 30   Total Minutes: 45     Therapy Charges for Today     Code Description Service Date Service Provider Modifiers Qty    55762751786 HC PT THER PROC EA 15 MIN 6/14/2021 Jeri Guido, PT GP 1    26123540417 HC PT EVAL LOW COMPLEXITY 2 6/14/2021 Jeri Guido, PT GP 1          PT G-Codes  Outcome Measure Options: Lower Extremity Functional Scale (LEFS) (based on L leg not R hip)  Total: 41         Jeri Guido PT  6/14/2021

## 2021-06-16 ENCOUNTER — APPOINTMENT (OUTPATIENT)
Dept: PHYSICAL THERAPY | Facility: HOSPITAL | Age: 71
End: 2021-06-16

## 2021-06-21 ENCOUNTER — APPOINTMENT (OUTPATIENT)
Dept: PHYSICAL THERAPY | Facility: HOSPITAL | Age: 71
End: 2021-06-21

## 2021-06-23 ENCOUNTER — HOSPITAL ENCOUNTER (OUTPATIENT)
Dept: PHYSICAL THERAPY | Facility: HOSPITAL | Age: 71
Setting detail: THERAPIES SERIES
Discharge: HOME | End: 2021-06-23

## 2021-06-23 ENCOUNTER — APPOINTMENT (OUTPATIENT)
Dept: PHYSICAL THERAPY | Facility: HOSPITAL | Age: 71
End: 2021-06-23

## 2021-06-23 DIAGNOSIS — R26.2 DIFFICULTY WALKING: ICD-10-CM

## 2021-06-23 DIAGNOSIS — M79.605 LEFT LEG PAIN: ICD-10-CM

## 2021-06-23 DIAGNOSIS — S86.112D STRAIN OF GASTROCNEMIUS MUSCLE OF LEFT LOWER EXTREMITY, SUBSEQUENT ENCOUNTER: Primary | ICD-10-CM

## 2021-06-23 PROCEDURE — 97110 THERAPEUTIC EXERCISES: CPT

## 2021-06-23 PROCEDURE — 97140 MANUAL THERAPY 1/> REGIONS: CPT

## 2021-06-23 NOTE — THERAPY TREATMENT NOTE
Outpatient Physical Therapy Ortho Treatment Note  [facility]     Patient Name: Juany Carlin  : 1950  MRN: [id number]  Today's Date: 2021      Visit Date: 2021    Visit Dx:    ICD-10-CM ICD-9-CM   1. Strain of gastrocnemius muscle of left lower extremity, subsequent encounter  S86.112D V58.89     844.8   2. Left leg pain  M79.605 729.5   3. Difficulty walking  R26.2 719.7       Patient Active Problem List   Diagnosis   • HTN (hypertension), benign   • Vitamin D deficiency   • Urge incontinence   • GERD without esophagitis   • Cervical spinal stenosis   • Saddle pulmonary embolus (CMS/HCC)   • Family history of thrombosis   • Degeneration of cervical intervertebral disc   • Adrenal nodule (CMS/HCC)   • Health care maintenance   • Localized osteoarthritis of left shoulder   • Primary osteoarthritis of both knees   • Hypomagnesemia   • Migraine with aura and without status migrainosus, not intractable   • Snoring        Past Medical History:   Diagnosis Date   • Acute cystitis without hematuria 4/10/2020   • Acute right ankle pain 3/3/2020   • Adrenal nodule (CMS/HCC)    • BPPV (benign paroxysmal positional vertigo) 5/10/2016   • Cervical stenosis of spine    • DVT, lower extremity (CMS/HCC)     right   • GERD without esophagitis 5/10/2016   • H/O varicose vein stripping    • History of stress incontinence    • Hyperplastic colon polyp 2013   • Hypertension    • PONV (postoperative nausea and vomiting)    • Pregnancy     , miscarrige x1   • Pulmonary emboli (CMS/HCC)     bilateral extensive PE with saddle emboli.Negative hypercoagulable state w/u   • Pulmonary embolism with acute cor pulmonale (CMS/HCC) 2017   • Urge incontinence 5/10/2016   • Vitamin D deficiency 5/10/2016        Past Surgical History:   Procedure Laterality Date   • CARDIAC CATHETERIZATION N/A 11/10/2016    Procedure: Right Heart Cath;  Surgeon: Johnna Agrawal MD;  Location: [facility] INVASIVE LOCATION;   Service:    • CARDIAC CATHETERIZATION N/A 11/10/2016    Procedure: Thrombolytic Therapy;  Surgeon: Johnna Agrawal MD;  Location:  [facility] INVASIVE LOCATION;  Service:    • CATARACT EXTRACTION Bilateral    • COLONOSCOPY W/ BIOPSIES  11/2013    hyperplastic polyp, Dr.Russel Lewis   • DILATATION AND CURETTAGE     • EPIDURAL BLOCK     • HEMORROIDECTOMY  2013   • INTERVENTIONAL RADIOLOGY PROCEDURE Bilateral 11/10/2016    Procedure: Sp Smiley Cath Place Pulmonary Art;  Surgeon: Johnna Agrawal MD;  Location:  [facility] INVASIVE LOCATION;  Service:    • TONSILLECTOMY     • TOTAL LAPAROSCOPIC HYSTERECTOMY  2001   • VEIN SURGERY Left 2410-2083    leg,    • VENTRAL HERNIA REPAIR  1987                       PT Assessment/Plan     Row Name 06/23/21 1000          PT Assessment    Assessment Comments  Ms. Carlin arrives 11 min late for first f/u, so limited progression today due to time constraints. Added manual to address palpable taut bands/TP in tricep surae, most noteable lateral gastroc. Updated HEP and reminded pt of next appt date and time. Remains good candidate for skilled PT.  -CA        PT Plan    PT Plan Comments  cont manual if beneficial, may add IASTM. is pt candidate for DDN? Add heel raise and hip strengthening  -CA       User Key  (r) = Recorded By, (t) = Taken By, (c) = Cosigned By    Initials Name Provider Type    Jeri Pace, PT Physical Therapist            OP Exercises     Row Name 06/23/21 1000             Subjective Comments    Subjective Comments  Pt arrives at 10:11 for 10:00 appt today. Reports tenderness pain/ in proximal lateral calf. Minimal pain.  -CA         Subjective Pain    Able to rate subjective pain?  yes  -CA      Pre-Treatment Pain Level  1  -CA         Total Minutes    42713 - PT Therapeutic Exercise Minutes  12  -CA      60589 - PT Manual Therapy Minutes  18  -CA         Exercise 1    Exercise Name 1  Recumbent bike  -CA      Cueing 1  Verbal  -CA      Time 1  5 min   -CA      Additional Comments  lvl 1  -CA         Exercise 4    Exercise Name 4  gastroc str at wall  -CA      Cueing 4  Verbal;Demo;Tactile  -CA      Reps 4  3 L   -CA      Time 4  20 sec  -CA         Exercise 5    Exercise Name 5  DF  at wall  -CA      Cueing 5  Verbal  -CA      Sets 5  1  -CA      Reps 5  20  -CA      Additional Comments  cues for form, foot slightly away from wall  -CA         Exercise 6    Exercise Name 6  HEP update and instruction  -CA      Cueing 6  Verbal  -CA      Time 6  2 min  -CA        User Key  (r) = Recorded By, (t) = Taken By, (c) = Cosigned By    Initials Name Provider Type    Jeri Pace, PT Physical Therapist                      Manual Rx (last 36 hours)      Manual Treatments     Row Name 06/23/21 1000             Total Minutes    63625 - PT Manual Therapy Minutes  18  -CA         Manual Rx 1    Manual Rx 1 Location  L gastroc/soleus complex  -CA      Manual Rx 1 Type  STM with cocoa butter. Pt in prone with pillow under hips and bolster under R LE  -CA        User Key  (r) = Recorded By, (t) = Taken By, (c) = Cosigned By    Initials Name Provider Type    Jeri Pace, PT Physical Therapist          PT OP Goals     Row Name 06/23/21 1000          PT Short Term Goals    STG Date to Achieve  06/29/21  -CA     STG 1  The pt will demonstrate IND and compliant with initial HEP to improve strength and power.  -CA     STG 1 Progress  Ongoing  -CA     STG 2  The pt will demonstrate L ankle DF AROM to at least 10 deg to facilitate improved gait pattern and stair navigation.  -CA     STG 2 Progress  Ongoing  -CA        Long Term Goals    LTG 1  The pt will demonstrate IND and compliant with progressive HEP focused on IND condition management and return to PLOF.  -CA     LTG 1 Progress  Ongoing  -CA     LTG 2  The pt will demonstrate L LE SLS to at least 10 sec for improved stair navigation and balance.  -CA     LTG 2 Progress  Ongoing  -CA     LTG 3  Patient will  perform at least 10 SL calf raise to demo improved functional PF strength in order to perform ADLs.  -CA     LTG 3 Progress  Ongoing  -CA     LTG 4  Patient will demo correct lifting techniques for heavy loads, in order to appropriate  grandchildren and avoid future injury.  -CA     LTG 4 Progress  Ongoing  -CA       User Key  (r) = Recorded By, (t) = Taken By, (c) = Cosigned By    Initials Name Provider Type    Jeri Pace, PT Physical Therapist          Therapy Education  Education Details: access code: [email or web address]; HEP in large print  Given: HEP  Program: Reinforced, Progressed  How Provided: Verbal, Demonstration, Written  Provided to: Patient  Level of Understanding: Teach back education performed, Verbalized, Demonstrated              Time Calculation:   Start Time: 1015  Stop Time: 1045  Time Calculation (min): 30 min  Total Timed Code Minutes- PT: 30 minute(s)  Timed Charges  35018 - PT Therapeutic Exercise Minutes: 12  06586 - PT Manual Therapy Minutes: 18  Total Minutes  Timed Charges Total Minutes: 30   Total Minutes: 30  Therapy Charges for Today     Code Description Service Date Service Provider Modifiers Qty    29251563784 HC PT THER PROC EA 15 MIN 6/23/2021 Jeri Guido, PT GP 1    90085153963 HC PT MANUAL THERAPY EA 15 MIN 6/23/2021 Jeri Guido, PT GP 1                    Jeri Guido PT  6/23/2021

## 2021-06-30 ENCOUNTER — APPOINTMENT (OUTPATIENT)
Dept: PHYSICAL THERAPY | Facility: HOSPITAL | Age: 71
End: 2021-06-30

## 2021-07-05 ENCOUNTER — HOSPITAL ENCOUNTER (OUTPATIENT)
Dept: PHYSICAL THERAPY | Facility: HOSPITAL | Age: 71
Setting detail: THERAPIES SERIES
Discharge: HOME | End: 2021-07-05

## 2021-07-05 DIAGNOSIS — S86.112D STRAIN OF GASTROCNEMIUS MUSCLE OF LEFT LOWER EXTREMITY, SUBSEQUENT ENCOUNTER: Primary | ICD-10-CM

## 2021-07-05 DIAGNOSIS — M79.605 LEFT LEG PAIN: ICD-10-CM

## 2021-07-05 DIAGNOSIS — R26.2 DIFFICULTY WALKING: ICD-10-CM

## 2021-07-05 PROCEDURE — 97110 THERAPEUTIC EXERCISES: CPT | Performed by: PHYSICAL THERAPIST

## 2021-07-05 PROCEDURE — 97140 MANUAL THERAPY 1/> REGIONS: CPT | Performed by: PHYSICAL THERAPIST

## 2021-07-05 NOTE — THERAPY DISCHARGE NOTE
Outpatient Physical Therapy Ortho Treatment Note/Discharge Summary  [facility]     Patient Name: Juany Carlin  : 1950  MRN: [id number]  Today's Date: 2021      Visit Date: 2021    Visit Dx:    ICD-10-CM ICD-9-CM   1. Strain of gastrocnemius muscle of left lower extremity, subsequent encounter  S86.112D V58.89     844.8   2. Left leg pain  M79.605 729.5   3. Difficulty walking  R26.2 719.7       Patient Active Problem List   Diagnosis   • HTN (hypertension), benign   • Vitamin D deficiency   • Urge incontinence   • GERD without esophagitis   • Cervical spinal stenosis   • Saddle pulmonary embolus (CMS/HCC)   • Family history of thrombosis   • Degeneration of cervical intervertebral disc   • Adrenal nodule (CMS/HCC)   • Health care maintenance   • Localized osteoarthritis of left shoulder   • Primary osteoarthritis of both knees   • Hypomagnesemia   • Migraine with aura and without status migrainosus, not intractable   • Snoring        Past Medical History:   Diagnosis Date   • Acute cystitis without hematuria 4/10/2020   • Acute right ankle pain 3/3/2020   • Adrenal nodule (CMS/HCC)    • BPPV (benign paroxysmal positional vertigo) 5/10/2016   • Cervical stenosis of spine    • DVT, lower extremity (CMS/HCC)     right   • GERD without esophagitis 5/10/2016   • H/O varicose vein stripping    • History of stress incontinence    • Hyperplastic colon polyp 2013   • Hypertension    • PONV (postoperative nausea and vomiting)    • Pregnancy     , miscarrige x1   • Pulmonary emboli (CMS/HCC)     bilateral extensive PE with saddle emboli.Negative hypercoagulable state w/u   • Pulmonary embolism with acute cor pulmonale (CMS/HCC) 2017   • Urge incontinence 5/10/2016   • Vitamin D deficiency 5/10/2016        Past Surgical History:   Procedure Laterality Date   • CARDIAC CATHETERIZATION N/A 11/10/2016    Procedure: Right Heart Cath;  Surgeon: Johnna Agrawal MD;  Location: [facility]  INVASIVE LOCATION;  Service:    • CARDIAC CATHETERIZATION N/A 11/10/2016    Procedure: Thrombolytic Therapy;  Surgeon: Johnna Agrawal MD;  Location:  [facility] INVASIVE LOCATION;  Service:    • CATARACT EXTRACTION Bilateral    • COLONOSCOPY W/ BIOPSIES  11/2013    hyperplastic polyp, Dr.Russel Lewis   • DILATATION AND CURETTAGE     • EPIDURAL BLOCK     • HEMORROIDECTOMY  2013   • INTERVENTIONAL RADIOLOGY PROCEDURE Bilateral 11/10/2016    Procedure: Sp Smiley Cath Place Pulmonary Art;  Surgeon: Johnna Agrawal MD;  Location:  [facility] INVASIVE LOCATION;  Service:    • TONSILLECTOMY     • TOTAL LAPAROSCOPIC HYSTERECTOMY  2001   • VEIN SURGERY Left [id number]    leg,    • VENTRAL HERNIA REPAIR  1987       PT Ortho     Row Name 07/05/21 1001       Subjective Comments    Subjective Comments  I am doing just fine on the left side. No pain now but I am really worried of reinjurying it because my right hip is so bad. I came here for therapy on the right hip back in March of 2020 but then COVID hit so I stopped. I saw Dr. Hernández in July of last year but I wasn't interested in surgery then even though he said I qualified for a total hip. I am ready now. I am in enough pain I want to do it. I will call to get in with him and then get back with you on what I decide to do.   -SC       Subjective Pain    Able to rate subjective pain?  yes  -SC    Pre-Treatment Pain Level  0  -SC    Subjective Pain Comment  left leg, does report moderate to severe constant right hip/gluteal pain  -SC       Foot/Ankle Palpation    Medial Gastroc  Left: normal  -SC    Lateral Gastroc  Left: normal  -SC    Soleus  Left: normal  -SC    Achilles' Tendon  Left: intact  -SC       Ankle/Foot Special Tests    Friend test (Achilles’ tendon rupture)  Left:;Negative  -SC       MMT Left Lower Ext    Lt Ankle Dorsiflexion MMT, Gross Movement  (5/5) normal  -SC      User Key  (r) = Recorded By, (t) = Taken By, (c) = Cosigned By    Initials Name  Provider Type    Lulu Esteban, PT Physical Therapist                      PT Assessment/Plan     Row Name 07/05/21 1001          PT Assessment    Assessment Comments  Juany Carlin is a 71 y.o. female referred to physical therapy for L gastroc strain. She presented 06/14/21 with a stable clinical presentation, along with comorbidities of severe R hip OA and personal factors of hx of R achilles tendonitis, and R tibia fx. She returns today, 3 total sessions including initial evaluation, treatments including education, therapeutic exercises, manual techniques and HEP. She has currently achieved all established STG’s and LTG’s. Complete resolution of left symptoms. However, reporting marked increased right hip pain and fear of reinjury. She is to return to orthopedic surgeon to discuss options. Did educate prehab R TKA options. D/c Formal care left gastroc strain, to return for right hip pending ortho consult  -SC        PT Plan    Predicted Duration of Therapy Intervention (PT)  d/c  -SC     PT Plan Comments  d/c  -SC       User Key  (r) = Recorded By, (t) = Taken By, (c) = Cosigned By    Initials Name Provider Type    Lulu Esteban, PT Physical Therapist              OP Exercises     Row Name 07/05/21 1001             Subjective Comments    Subjective Comments  I am doing just fine on the left side. No pain now but I am really worried of reinjurying it because my right hip is so bad. I came here for therapy on the right hip back in March of 2020 but then COVID hit so I stopped. I saw Dr. Hernández in July of last year but I wasn't interested in surgery then even though he said I qualified for a total hip. I am ready now. I am in enough pain I want to do it. I will call to get in with him and then get back with you on what I decide to do.   -SC         Subjective Pain    Able to rate subjective pain?  yes  -SC      Pre-Treatment Pain Level  0  -SC      Subjective Pain Comment  left leg, does report  moderate to severe constant right hip/gluteal pain  -SC         Total Minutes    73232 - PT Therapeutic Exercise Minutes  17  -SC      90095 - PT Manual Therapy Minutes  15  -SC         Exercise 7    Exercise Name 7  B HR   -SC      Sets 7  1  -SC      Reps 7  10  -SC         Exercise 8    Exercise Name 8  SLS L  -SC      Sets 8  3  -SC      Reps 8  1  -SC      Time 8  10 seconds  -SC        User Key  (r) = Recorded By, (t) = Taken By, (c) = Cosigned By    Initials Name Provider Type    Lulu Esteban PT Physical Therapist                        Manual Rx (last 36 hours)      Manual Treatments     Row Name 07/05/21 1001             Total Minutes    45746 - PT Manual Therapy Minutes  15  -SC         Manual Rx 1    Manual Rx 1 Location  L gastroc/soleus complex  -SC      Manual Rx 1 Type  STM with cocoa butter. Pt in prone with pillow under hips and bolster under R LE  -SC        User Key  (r) = Recorded By, (t) = Taken By, (c) = Cosigned By    Initials Name Provider Type    Lulu Esteban PT Physical Therapist          PT OP Goals     Row Name 07/05/21 1000          PT Short Term Goals    STG Date to Achieve  --  -SC     STG 1  The pt will demonstrate IND and compliant with initial HEP to improve strength and power.  -SC     STG 1 Progress  Met  -SC     STG 2  The pt will demonstrate L ankle DF AROM to at least 10 deg to facilitate improved gait pattern and stair navigation.  -SC     STG 2 Progress  Met  -SC        Long Term Goals    LTG 1  The pt will demonstrate IND and compliant with progressive HEP focused on IND condition management and return to PLOF.  -SC     LTG 1 Progress  Met  -SC     LTG 2  The pt will demonstrate L LE SLS to at least 10 sec for improved stair navigation and balance.  -SC     LTG 2 Progress  Met  -SC     LTG 3  Patient will perform at least 10 SL calf raise to demo improved functional PF strength in order to perform ADLs.  -SC     LTG 3 Progress  Met  -SC     LTG 4   Patient will demo correct lifting techniques for heavy loads, in order to appropriate  grandchildren and avoid future injury.  -SC     LTG 4 Progress  Met  -SC       User Key  (r) = Recorded By, (t) = Taken By, (c) = Cosigned By    Initials Name Provider Type    Lulu Esteban, PT Physical Therapist          Therapy Education  Education Details: continuation of current HEP, return tortho to discuss pain management, return to PT prehab, vs THR, gait training with STC pending decision on surgery  Given: HEP, Symptoms/condition management, Pain management, Other (comment)  Program: Modified  How Provided: Verbal, Demonstration  Provided to: Patient  Level of Understanding: Teach back education performed, Verbalized, Demonstrated              Time Calculation:   Start Time: 1001  Stop Time: 1033  Time Calculation (min): 32 min  Timed Charges  62418 - PT Therapeutic Exercise Minutes: 17  55485 - PT Manual Therapy Minutes: 15  Total Minutes  Timed Charges Total Minutes: 32   Total Minutes: 32  Therapy Charges for Today     Code Description Service Date Service Provider Modifiers Qty    66494789942  PT THER PROC EA 15 MIN 7/5/2021 Lulu Lubin, PT GP 1    00226407029 HC PT MANUAL THERAPY EA 15 MIN 7/5/2021 Lulu Lubin, PT GP 1                OP PT Discharge Summary  Date of Discharge: 07/05/21  Reason for Discharge: All goals achieved, Maximum functional potential achieved, Independent  Outcomes Achieved: Able to achieve all goals within established timeline  Discharge Destination: Home with home program, Other (comment)  Discharge Instructions/Additional Comments: to return to ortho surgeon for consult persisting right hip pain      Lulu Dan PT  7/5/2021

## 2021-07-23 ENCOUNTER — OFFICE VISIT (OUTPATIENT)
Dept: ORTHOPEDIC SURGERY | Facility: CLINIC | Age: 71
End: 2021-07-23

## 2021-07-23 VITALS — HEIGHT: 69 IN | WEIGHT: 163.8 LBS | BODY MASS INDEX: 24.26 KG/M2 | TEMPERATURE: 97.5 F

## 2021-07-23 DIAGNOSIS — R52 PAIN: Primary | ICD-10-CM

## 2021-07-23 DIAGNOSIS — M16.11 ARTHRITIS OF RIGHT HIP: ICD-10-CM

## 2021-07-23 DIAGNOSIS — Z86.711 HX OF PULMONARY EMBOLUS: ICD-10-CM

## 2021-07-23 DIAGNOSIS — M70.61 TROCHANTERIC BURSITIS OF RIGHT HIP: ICD-10-CM

## 2021-07-23 PROCEDURE — 20610 DRAIN/INJ JOINT/BURSA W/O US: CPT | Performed by: ORTHOPAEDIC SURGERY

## 2021-07-23 PROCEDURE — 99214 OFFICE O/P EST MOD 30 MIN: CPT | Performed by: ORTHOPAEDIC SURGERY

## 2021-07-23 PROCEDURE — 73502 X-RAY EXAM HIP UNI 2-3 VIEWS: CPT | Performed by: ORTHOPAEDIC SURGERY

## 2021-07-23 RX ORDER — METHYLPREDNISOLONE ACETATE 80 MG/ML
80 INJECTION, SUSPENSION INTRA-ARTICULAR; INTRALESIONAL; INTRAMUSCULAR; SOFT TISSUE
Status: COMPLETED | OUTPATIENT
Start: 2021-07-23 | End: 2021-07-23

## 2021-07-23 RX ORDER — LIDOCAINE HYDROCHLORIDE 10 MG/ML
4 INJECTION, SOLUTION EPIDURAL; INFILTRATION; INTRACAUDAL; PERINEURAL
Status: COMPLETED | OUTPATIENT
Start: 2021-07-23 | End: 2021-07-23

## 2021-07-23 RX ADMIN — METHYLPREDNISOLONE ACETATE 80 MG: 80 INJECTION, SUSPENSION INTRA-ARTICULAR; INTRALESIONAL; INTRAMUSCULAR; SOFT TISSUE at 08:54

## 2021-07-23 RX ADMIN — LIDOCAINE HYDROCHLORIDE 4 ML: 10 INJECTION, SOLUTION EPIDURAL; INFILTRATION; INTRACAUDAL; PERINEURAL at 08:54

## 2021-07-23 NOTE — PROGRESS NOTES
Patient Name: Juany Carlin   YOB: 1950  Referring Primary Care Physician: Warren Pizarro MD  BMI: Body mass index is 24.19 kg/m².    Chief Complaint:    Chief Complaint   Patient presents with   • Right Hip - Follow-up, Pain        HPI:     Juany Carlin is a 71 y.o. female who presents today for evaluation of   Chief Complaint   Patient presents with   • Right Hip - Follow-up, Pain   . The patient presents today for follow-up of her right hip pain. She states that her hip is hurting her quite a bit. She is having some lateral hip pain as well. It does not radiate past the knee. She feels better when she puts a pillow between her knees. She has received injections in the past which provided moderate relief. She has done some exercise. The patient reports she has had a saddle embolus pulmonary embolism in the past and she is on Eliquis, so she can not take anti-inflammatories. She notes Tylenol provides no relief. She has seen Dr. Wyman and Dr. Bates about pulmonary emboli and surgery and they told her they thought she could do fine without a filter, but just stop the Eliquis, do the surgery, and restart it.    The patient is helping to raise her 21 month old grandchild and take care of her daughter now as unfortunately her son-in-law passed away a few months back.    Subjective   Medications:   Home Medications:  Current Outpatient Medications on File Prior to Visit   Medication Sig   • apixaban (ELIQUIS) 2.5 MG tablet tablet Take 1 tablet by mouth Every 12 (Twelve) Hours.   • B Complex-Biotin-FA (B-100 COMPLEX PO) Take 1 tablet by mouth Daily.   • CALCIUM PO Take 600 mg by mouth Daily.   • chlorthalidone (HYGROTON) 25 MG tablet Take 1 tablet by mouth Daily.   • Cholecalciferol (VITAMIN D3) 2000 UNITS tablet Take 1 tablet by mouth Daily.   • cyclobenzaprine (FLEXERIL) 10 MG tablet Take 1 tablet by mouth 3 (Three) Times a Day As Needed for Muscle Spasms.   • diclofenac (VOLTAREN) 1 % gel gel Apply  4 g topically to the appropriate area as directed 4 (Four) Times a Day As Needed (pain).   • famotidine (PEPCID) 10 MG tablet Take 20 mg by mouth Every Night.   • losartan (COZAAR) 100 MG tablet Take 1 tablet by mouth Daily.   • Magnesium 250 MG tablet Take 2 tablets by mouth Daily.   • Multiple Vitamins-Minerals (MULTIVITAMIN PO) Take 1 tablet by mouth Daily.   • traMADol (ULTRAM) 50 MG tablet Take 1 tablet by mouth Every 4 (Four) Hours As Needed for Moderate Pain .     No current facility-administered medications on file prior to visit.     Current Medications:  Scheduled Meds:  Continuous Infusions:No current facility-administered medications for this visit.    PRN Meds:.    I have reviewed the patient's medical history in detail and updated the computerized patient record.  Review and summarization of old records includes:    Past Medical History:   Diagnosis Date   • Acute cystitis without hematuria 4/10/2020   • Acute right ankle pain 3/3/2020   • Adrenal nodule (CMS/HCC)    • BPPV (benign paroxysmal positional vertigo) 5/10/2016   • Cervical stenosis of spine    • DVT, lower extremity (CMS/HCC)     right   • GERD without esophagitis 5/10/2016   • H/O varicose vein stripping    • History of stress incontinence    • Hyperplastic colon polyp 2013   • Hypertension    • PONV (postoperative nausea and vomiting)    • Pregnancy     , miscarrige x1   • Pulmonary emboli (CMS/HCC)     bilateral extensive PE with saddle emboli.Negative hypercoagulable state w/u   • Pulmonary embolism with acute cor pulmonale (CMS/HCC) 2017   • Urge incontinence 5/10/2016   • Vitamin D deficiency 5/10/2016        Past Surgical History:   Procedure Laterality Date   • CARDIAC CATHETERIZATION N/A 11/10/2016    Procedure: Right Heart Cath;  Surgeon: Johnna Agrawal MD;  Location: [facility] INVASIVE LOCATION;  Service:    • CARDIAC CATHETERIZATION N/A 11/10/2016    Procedure: Thrombolytic Therapy;  Surgeon: Johnna Agrawal MD;   Location:  [facility] INVASIVE LOCATION;  Service:    • CATARACT EXTRACTION Bilateral    • COLONOSCOPY W/ BIOPSIES  2013    hyperplastic polyp, Dr.Russel Lewis   • DILATATION AND CURETTAGE     • EPIDURAL BLOCK     • HEMORROIDECTOMY     • INTERVENTIONAL RADIOLOGY PROCEDURE Bilateral 11/10/2016    Procedure: Sp Smiley Cath Place Pulmonary Art;  Surgeon: Johnna Agrawal MD;  Location:  [facility] INVASIVE LOCATION;  Service:    • TONSILLECTOMY     • TOTAL LAPAROSCOPIC HYSTERECTOMY     • VEIN SURGERY Left [id number]    leg,    • VENTRAL HERNIA REPAIR          Social History     Occupational History   • Occupation:      Employer: RETIRED     Comment: [employer]   Tobacco Use   • Smoking status: Former Smoker     Packs/day: 0.50     Years: 10.00     Pack years: 5.00     Types: Cigarettes     Quit date:      Years since quittin.5   • Smokeless tobacco: Never Used   Substance and Sexual Activity   • Alcohol use: Yes     Alcohol/week: 2.0 standard drinks     Types: 2 Glasses of wine per week   • Drug use: No   • Sexual activity: Defer      Social History     Social History Narrative    LIVES WITH SPOUSE        Family History   Problem Relation Age of Onset   • Macular degeneration Mother    • Deep vein thrombosis Mother    • Hypertension Mother    • Skin cancer Mother    • Heart failure Father    • Deep vein thrombosis Father    • Cancer Father    • Hypertension Father    • Heart disease Father    • Chiari malformation Sister    • Heart disease Maternal Grandfather    • Heart disease Paternal Grandfather    • Deep vein thrombosis Sister    • Heart failure Sister    • Pancreatic cancer Sister    • Thyroid disease Daughter    • Hypertension Brother        ROS: 14 point review of systems was performed and all other systems were reviewed and are negative except for documented findings in HPI and today's encounter.     Allergies:   Allergies   Allergen Reactions   • Ciprocinonide  "[Fluocinolone] Other (See Comments)     Achilles tendonitis   • Suprax [Cefixime] Diarrhea   • Contrast Dye Rash     Rash many years ago when shell-fish derived contrast dye was used     Constitutional:  Denies fever, shaking or chills   Eyes:  Denies change in visual acuity   HENT:  Denies nasal congestion or sore throat   Respiratory:  Denies cough or shortness of breath   Cardiovascular:  Denies chest pain or severe LE edema   GI:  Denies abdominal pain, nausea, vomiting, bloody stools or diarrhea   Musculoskeletal:  Numbness, tingling, pain, or loss of motor function only as noted above in history of present illness.  : Denies painful urination or hematuria  Integument:  Denies rash, lesion or ulceration   Neurologic:  Denies headache or focal weakness  Endocrine:  Denies lymphadenopathy  Psych:  Denies confusion or change in mental status   Hem:  Denies active bleeding    OBJECTIVE:  Physical Exam: 71 y.o. female  Wt Readings from Last 3 Encounters:   07/23/21 74.3 kg (163 lb 12.8 oz)   05/07/21 76.2 kg (168 lb)   04/20/21 76.5 kg (168 lb 9.6 oz)     Ht Readings from Last 1 Encounters:   07/23/21 175.3 cm (69\")     Body mass index is 24.19 kg/m².  Vitals:    07/23/21 0813   Temp: 97.5 °F (36.4 °C)     Vital signs reviewed.     General Appearance:    Alert, cooperative, in no acute distress                  Eyes: conjunctiva clear  ENT: external ears and nose atraumatic  CV: no peripheral edema  Resp: normal respiratory effort  Skin: no rashes or wounds; normal turgor  Psych: mood and affect appropriate  Lymph: no nodes appreciated  Neuro: gross sensation intact  Vascular:  Palpable peripheral pulse in noted extremity  Musculoskeletal Extremities: Examination of the right hip shows a very stiff hip to internal and external rotation and abduction. The patient walks with an abductor lurch and a stiff hip. She is also tender over the lateral aspect of her hip and it bothers her.     Radiology:   Two views of the " right hip, including AP and lateral views, were obtained and reviewed in the office today for pain. With comparison views from about 1 year ago, these demonstrated advanced arthritic change on the right, progressive.    Assessment:     ICD-10-CM ICD-9-CM   1. Pain  R52 780.96   2. Trochanteric bursitis of right hip  M70.61 726.5   3. Arthritis of right hip  M16.11 716.95   4. Hx of pulmonary embolus  Z86.711 V12.55        MDM/Plan:   The diagnosis(es), natural history, pathophysiology and treatment for diagnosis(es) were discussed. Opportunity given and questions answered.  Biomechanics of pertinent body areas discussed.  When appropriate, the use of ambulatory aids discussed.    EXERCISES:  Advice on benefits of, and types of regular/moderate exercise pertaining to orthopedic diagnosis(es).  MEDICATIONS:  The risks, benefits, warnings,side effects and alternatives of medications discussed.  Inflammation/pain control; with cold, heat, elevation and/or liniments discussed as appropriate  Cortisone Injection. See procedure note.  MEDICAL RECORDS reviewed from other provider(s) for past and current medical history pertinent to this complaint.  Discussed total joint arthroplasty. I reviewed anatomy of a total joint arthroplasty in laymen's terms, as well as typical postoperative recovery and possibly 6-12 months for maximal recovery, and possible need for rehabilitation stay after hospitalization. We also discussed risks, benefits, alternatives, and limitations of procedure with risks including but not limited to neurovascular damage, bleeding, infection, malalignment, chronic pain, failure of implants, osteolysis, loosening of implants, loss of motion, weakness, stiffness, instability, DVT, pulmonary embolus, death, stroke, complex regional pain syndrome, myocardial infarction, and need for additional procedures. Concept of substitution vs. replacement discussed.  No guarantees were given regarding results of surgery.   Patient verbalized understanding, and was given the opportunity to ask and have all questions answered today.      7/23/2021    Large Joint Arthrocentesis: R greater trochanteric bursa  Date/Time: 7/23/2021 8:54 AM  Consent given by: patient  Site marked: site marked  Timeout: Immediately prior to procedure a time out was called to verify the correct patient, procedure, equipment, support staff and site/side marked as required   Supporting Documentation  Indications: pain   Procedure Details  Location: hip - R greater trochanteric bursa  Needle gauge: 21 G.  Approach: lateral  Medications administered: 4 mL lidocaine PF 1% 1 %; 80 mg methylPREDNISolone acetate 80 MG/ML  Patient tolerance: patient tolerated the procedure well with no immediate complications        Scribed for Rashawn Hernández MD by Ashlie Kincaid.  07/23/21   10:33 EDT    I have personally performed the services described in this document as scribed by the above individual, and it is both accurate and complete.  Rashawn Hernández MD  7/23/2021  11:42 EDT

## 2021-09-02 ENCOUNTER — TELEPHONE (OUTPATIENT)
Dept: ORTHOPEDIC SURGERY | Facility: CLINIC | Age: 71
End: 2021-09-02

## 2021-09-02 NOTE — TELEPHONE ENCOUNTER
Caller: Juany Carlin    Relationship to patient: Self    Best call back number: [phone]    Chief complaint: SCHEDULE FOLLOW UP FOR RIGHT HIP INJECTION    Type of visit: 6 WEEK FOLLOW UP / RIGHT HIP / NO NEW INJURY / XR 07/23/21 (Epic) / DISCUSS INJECTION, OPTIONS (PREVIOUS RIGHT HIP TROCHANTERIC BURSA CORTISONE INJECTION 07/23/21)     Requested date: ASA BUT UNAVAILABLE 09/09/21 & 09/24/21     PATIENT'S Best call back number: [phone]     THANKS

## 2021-09-08 ENCOUNTER — OFFICE VISIT (OUTPATIENT)
Dept: ORTHOPEDIC SURGERY | Facility: CLINIC | Age: 71
End: 2021-09-08

## 2021-09-08 ENCOUNTER — PREP FOR SURGERY (OUTPATIENT)
Dept: OTHER | Facility: HOSPITAL | Age: 71
End: 2021-09-08

## 2021-09-08 VITALS — WEIGHT: 163 LBS | TEMPERATURE: 96 F | BODY MASS INDEX: 24.14 KG/M2 | HEIGHT: 69 IN

## 2021-09-08 DIAGNOSIS — M16.11 ARTHRITIS OF RIGHT HIP: Primary | ICD-10-CM

## 2021-09-08 DIAGNOSIS — M51.36 DDD (DEGENERATIVE DISC DISEASE), LUMBAR: ICD-10-CM

## 2021-09-08 PROCEDURE — 99214 OFFICE O/P EST MOD 30 MIN: CPT | Performed by: ORTHOPAEDIC SURGERY

## 2021-09-08 RX ORDER — PREGABALIN 75 MG/1
150 CAPSULE ORAL ONCE
Status: CANCELLED | OUTPATIENT
Start: 2021-10-25 | End: 2021-09-08

## 2021-09-08 RX ORDER — CHLORHEXIDINE GLUCONATE 500 MG/1
1 CLOTH TOPICAL TAKE AS DIRECTED
Status: CANCELLED | OUTPATIENT
Start: 2021-09-08

## 2021-09-08 RX ORDER — ACETAMINOPHEN 500 MG
1000 TABLET ORAL ONCE
Status: CANCELLED | OUTPATIENT
Start: 2021-10-25 | End: 2021-09-08

## 2021-09-08 RX ORDER — MELOXICAM 15 MG/1
15 TABLET ORAL ONCE
Status: CANCELLED | OUTPATIENT
Start: 2021-10-25 | End: 2021-09-08

## 2021-09-08 RX ORDER — CEFAZOLIN SODIUM 2 G/100ML
2 INJECTION, SOLUTION INTRAVENOUS ONCE
Status: CANCELLED | OUTPATIENT
Start: 2021-10-25 | End: 2021-09-08

## 2021-09-08 NOTE — PROGRESS NOTES
"Patient Name: Juany Carlin   YOB: 1950  Referring Primary Care Physician: Warren Pizarro MD  BMI: Body mass index is 24.07 kg/m².    Chief Complaint:    Chief Complaint   Patient presents with   • Right Hip - Follow-up        HPI:     Juany Carlin is a 71 y.o. female who presents today for evaluation of   Chief Complaint   Patient presents with   • Right Hip - Follow-up   .  Ms. Carlin follows up today after having her right hip injected at the hospital.  She says she had 80% relief for few days and was excellent but now her hip hurts her a lot is hard to walk she is having groin and hip pain.  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  She is seeing both Dr. Charlie Wyman and Dr. Bates in the past and asked about doing hip surgery and was told \"she could do fine without a filter stop the Eliquis do the surgery and restart it\".  She feels like she cannot go on with the pain she has      Subjective   Medications:   Home Medications:  Current Outpatient Medications on File Prior to Visit   Medication Sig   • apixaban (ELIQUIS) 2.5 MG tablet tablet Take 1 tablet by mouth Every 12 (Twelve) Hours.   • B Complex-Biotin-FA (B-100 COMPLEX PO) Take 1 tablet by mouth Daily.   • CALCIUM PO Take 600 mg by mouth Daily.   • chlorthalidone (HYGROTON) 25 MG tablet Take 1 tablet by mouth Daily.   • Cholecalciferol (VITAMIN D3) 2000 UNITS tablet Take 1 tablet by mouth Daily.   • cyclobenzaprine (FLEXERIL) 10 MG tablet Take 1 tablet by mouth 3 (Three) Times a Day As Needed for Muscle Spasms.   • diclofenac (VOLTAREN) 1 % gel gel Apply 4 g topically to the appropriate area as directed 4 (Four) Times a Day As Needed (pain).   • famotidine (PEPCID) 10 MG tablet Take 20 mg by mouth Every Night.   • losartan (COZAAR) 100 MG tablet Take 1 tablet by mouth Daily.   • Magnesium 250 MG tablet Take 2 tablets by mouth Daily.   • Multiple Vitamins-Minerals (MULTIVITAMIN PO) Take 1 tablet by " mouth Daily.   • traMADol (ULTRAM) 50 MG tablet Take 1 tablet by mouth Every 4 (Four) Hours As Needed for Moderate Pain .     No current facility-administered medications on file prior to visit.     Current Medications:  Scheduled Meds:  Continuous Infusions:No current facility-administered medications for this visit.    PRN Meds:.    I have reviewed the patient's medical history in detail and updated the computerized patient record.  Review and summarization of old records includes:    Past Medical History:   Diagnosis Date   • Acute cystitis without hematuria 4/10/2020   • Acute right ankle pain 3/3/2020   • Adrenal nodule (CMS/HCC)    • BPPV (benign paroxysmal positional vertigo) 5/10/2016   • Cervical stenosis of spine    • DVT, lower extremity (CMS/HCC)     right   • GERD without esophagitis 5/10/2016   • H/O varicose vein stripping    • History of stress incontinence    • Hyperplastic colon polyp 2013   • Hypertension    • PONV (postoperative nausea and vomiting)    • Pregnancy     , miscarrige x1   • Pulmonary emboli (CMS/HCC)     bilateral extensive PE with saddle emboli.Negative hypercoagulable state w/u   • Pulmonary embolism with acute cor pulmonale (CMS/HCC) 2017   • Urge incontinence 5/10/2016   • Vitamin D deficiency 5/10/2016        Past Surgical History:   Procedure Laterality Date   • CARDIAC CATHETERIZATION N/A 11/10/2016    Procedure: Right Heart Cath;  Surgeon: Johnna Agrawal MD;  Location[facility] C[facility];  Service:    • CARDIAC CATHETERIZATION N/A 11/10/2016    Procedure: Thrombolytic Therapy;  Surgeon: Johnna Agrawal MD;  Location:  [facility] CATH INVASIVE LOCATION;  Service:    • CATARACT EXTRACTION Bilateral    • COLONOSCOPY W/ BIOPSIES  2013    hyperplastic polyp, Dr.Russel Lewis   • DILATATION AND CURETTAGE     • EPIDURAL BLOCK     • HEMORROIDECTOMY     • INTERVENTIONAL RADIOLOGY PROCEDURE Bilateral 11/10/2016    Procedure: Sp Smiley Cath Place Pulmonary Art;   Surgeon: Johnna Agrawal MD;  Location: [facility] INVASIVE LOCATION;  Service:    • TONSILLECTOMY     • TOTAL LAPAROSCOPIC HYSTERECTOMY     • VEIN SURGERY Left [id number]    leg,    • VENTRAL HERNIA REPAIR          Social History     Occupational History   • Occupation:      Employer: RETIRED     Comment: [employer]   Tobacco Use   • Smoking status: Former Smoker     Packs/day: 0.50     Years: 10.00     Pack years: 5.00     Types: Cigarettes     Quit date:      Years since quittin.7   • Smokeless tobacco: Never Used   Substance and Sexual Activity   • Alcohol use: Yes     Alcohol/week: 2.0 standard drinks     Types: 2 Glasses of wine per week   • Drug use: No   • Sexual activity: Defer      Social History     Social History Narrative    LIVES WITH SPOUSE        Family History   Problem Relation Age of Onset   • Macular degeneration Mother    • Deep vein thrombosis Mother    • Hypertension Mother    • Skin cancer Mother    • Heart failure Father    • Deep vein thrombosis Father    • Cancer Father    • Hypertension Father    • Heart disease Father    • Chiari malformation Sister    • Heart disease Maternal Grandfather    • Heart disease Paternal Grandfather    • Deep vein thrombosis Sister    • Heart failure Sister    • Pancreatic cancer Sister    • Thyroid disease Daughter    • Hypertension Brother        ROS: 14 point review of systems was performed and all other systems were reviewed and are negative except for documented findings in HPI and today's encounter.     Allergies:   Allergies   Allergen Reactions   • Ciprocinonide [Fluocinolone] Other (See Comments)     Achilles tendonitis   • Suprax [Cefixime] Diarrhea   • Contrast Dye Rash     Rash many years ago when shell-fish derived contrast dye was used     Constitutional:  Denies fever, shaking or chills   Eyes:  Denies change in visual acuity   HENT:  Denies nasal congestion or sore throat   Respiratory:  Denies cough or  "shortness of breath   Cardiovascular:  Denies chest pain or severe LE edema   GI:  Denies abdominal pain, nausea, vomiting, bloody stools or diarrhea   Musculoskeletal:  Numbness, tingling, pain, or loss of motor function only as noted above in history of present illness.  : Denies painful urination or hematuria  Integument:  Denies rash, lesion or ulceration   Neurologic:  Denies headache or focal weakness  Endocrine:  Denies lymphadenopathy  Psych:  Denies confusion or change in mental status   Hem:  Denies active bleeding    OBJECTIVE:  Physical Exam: 71 y.o. female  Wt Readings from Last 3 Encounters:   09/08/21 73.9 kg (163 lb)   07/23/21 74.3 kg (163 lb 12.8 oz)   05/07/21 76.2 kg (168 lb)     Ht Readings from Last 1 Encounters:   09/08/21 175.3 cm (69\")     Body mass index is 24.07 kg/m².  Vitals:    09/08/21 0832   Temp: 96 °F (35.6 °C)     Vital signs reviewed.     General Appearance:    Alert, cooperative, in no acute distress                  Eyes: conjunctiva clear  ENT: external ears and nose atraumatic  CV: no peripheral edema  Resp: normal respiratory effort  Skin: no rashes or wounds; normal turgor  Psych: mood and affect appropriate  Lymph: no nodes appreciated  Neuro: gross sensation intact  Vascular:  Palpable peripheral pulse in noted extremity  Musculoskeletal Extremities: Examination today shows Stinchfield positive she has no internal rotation she has groin and buttock pain when you do this and she has pain laterally that she describes over the trochanter as a burning pain from time to time the feels \"electric\".  She denies and runs all the way down her legs.    Radiology:   Reviewing her x-rays AP of the hips lateral right hip taken in the past in the office for complaints of pain show advanced arthritic change.  She does have comparison views for that.  I also found a lumbar x-rays and she has multilevel degenerative disc disease with a slight L4-5 spondylolisthesis as " well.        Assessment:     ICD-10-CM ICD-9-CM   1. Arthritis of right hip  M16.11 716.95   2. DDD (degenerative disc disease), lumbar  M51.36 722.52        MDM/Plan:   The diagnosis(es), natural history, pathophysiology and treatment for diagnosis(es) were discussed. Opportunity given and questions answered.  Biomechanics of pertinent body areas discussed.  When appropriate, the use of ambulatory aids discussed.    Total Hip Replacement: Continuation of conservative management vs. SRIDHAR discussed.  I reviewed anatomy of a total hip arthroplasty in laymen's terms, as well as typical postoperative recovery and possibly 6-12 months for maximal recovery, and possible need for rehabilitation stay after hospitalization. We also discussed risks, benefits, alternatives, and limitations of procedure with risks including but not limited to neurovascular damage, bleeding, infection, malalignment, chronic pain, failure of implants, periprosthetic fracture, osteolysis, loosening of implants, loss of motion, weakness, stiffness, instability, DVT, pulmonary embolus, death, stroke, complex regional pain syndrome, myocardial infarction, and need for additional procedures. Concept of substitution vs. replacement discussed.  No guarantees were given regarding results of surgery.  Patient verbalized understanding, and was given the opportunity to ask and have all questions answered today.   She wants to go ahead with a right total hip arthroplasty would asked that she get clearance from primary care and Dr. Bates who is her hematologist as far as managing her perioperatively for DVT prophylaxis    9/8/2021    Much of this encounter note is an electronic transcription/translation of spoken language to printed text. The electronic translation of spoken language may permit erroneous, or at times, nonsensical words or phrases to be inadvertently transcribed; Although I have reviewed the note for such errors, some may still exist

## 2021-09-17 ENCOUNTER — IMMUNIZATION (OUTPATIENT)
Dept: VACCINE CLINIC | Facility: HOSPITAL | Age: 71
End: 2021-09-17

## 2021-09-17 PROCEDURE — 0001A: CPT | Performed by: INTERNAL MEDICINE

## 2021-09-17 PROCEDURE — 91300 HC SARSCOV02 VAC 30MCG/0.3ML IM: CPT | Performed by: INTERNAL MEDICINE

## 2021-10-05 ENCOUNTER — TELEPHONE (OUTPATIENT)
Dept: ORTHOPEDIC SURGERY | Facility: CLINIC | Age: 71
End: 2021-10-05

## 2021-10-05 NOTE — TELEPHONE ENCOUNTER
Provider: JILLIAN BLOOD  Caller: OMAR ALEJO  Relationship to Patient: SELF  Pharmacy:   Phone Number: [phone]  Reason for Call: PATIENT TRYING TO TAKE CLASS REQUIRED FOR HIP REPLACMENT - DID NOT RECIEVE PASS CODE TO GET INTO ZOOM OR VIRTUAL MEETING- WAS ABLE TO GET INTO AUDIO, HOWEVER  DID RECEIVE CREDIT FOR THE AUDIO PORTION AND NO  PASS CODE TO JOIN THE SESSION VIRTUALLY- PATIENT STATES SHE WAS TOLD IT IS MANDATORY TO TAKE THIS SESSION PRIOR TO HIP SURGERY- PLEASE HELP

## 2021-10-11 ENCOUNTER — OFFICE VISIT (OUTPATIENT)
Dept: INTERNAL MEDICINE | Facility: CLINIC | Age: 71
End: 2021-10-11

## 2021-10-11 VITALS
DIASTOLIC BLOOD PRESSURE: 80 MMHG | WEIGHT: 168 LBS | HEART RATE: 78 BPM | SYSTOLIC BLOOD PRESSURE: 133 MMHG | HEIGHT: 69 IN | BODY MASS INDEX: 24.88 KG/M2 | OXYGEN SATURATION: 98 % | TEMPERATURE: 98.4 F

## 2021-10-11 DIAGNOSIS — Z79.01 ANTICOAGULANT LONG-TERM USE: ICD-10-CM

## 2021-10-11 DIAGNOSIS — I10 HTN (HYPERTENSION), BENIGN: ICD-10-CM

## 2021-10-11 DIAGNOSIS — Z86.711 HX PULMONARY EMBOLISM: ICD-10-CM

## 2021-10-11 DIAGNOSIS — Z01.818 PREOPERATIVE CLEARANCE: Primary | ICD-10-CM

## 2021-10-11 DIAGNOSIS — Z86.718 HISTORY OF DVT (DEEP VEIN THROMBOSIS): ICD-10-CM

## 2021-10-11 PROCEDURE — 99214 OFFICE O/P EST MOD 30 MIN: CPT | Performed by: FAMILY MEDICINE

## 2021-10-11 NOTE — PROGRESS NOTES
"Chief Complaint  Follow-up (6 month follow up ) and Post-op Problem (for hip replacement )    Subjective          Juany Carlin presents to [facility]  History of Present Illness     Hypertension - stable.  Patient taking medication as prescribed.  Denies chest pain, shortness of breath, headache, lower extremity edema.  Patient is taking chlorthalidone, losartan.      It appears she has an upcoming surgery with Dr. Hernández for posterior right total hip replacement.  Needs clearance for surgery as she is having a total knee replacement on October 25, 2021.  The surgeon was most concerned about the prophylaxis that she is receives via Eliquis for history of DVT and pulmonary embolism which was a saddle embolus.  I reviewed the notes from Dr. Bates dated in January as well as the note from the vascular surgeon this year.  There was some initial concern that she would require an IVC filter however the vascular surgeon did not feel that she qualified unless she was having an acute issue.  Hypertension is under control as well as other chronic issues.    Objective   Vital Signs:   /80 (BP Location: Left arm, Patient Position: Sitting, Cuff Size: Adult)   Pulse 78   Temp 98.4 °F (36.9 °C) (Temporal)   Ht 175.3 cm (69.02\")   Wt 76.2 kg (168 lb)   SpO2 98%   BMI 24.80 kg/m²     Physical Exam  Vitals and nursing note reviewed.   Constitutional:       General: She is not in acute distress.     Appearance: Normal appearance.   Cardiovascular:      Rate and Rhythm: Normal rate and regular rhythm.      Heart sounds: Normal heart sounds. No murmur heard.      Pulmonary:      Effort: Pulmonary effort is normal.      Breath sounds: Normal breath sounds.   Neurological:      Mental Status: She is alert.        Result Review :   The following data was reviewed by: Warren Pizarro MD on 10/11/2021:  Common labs    Common Labsle 1/19/21 4/20/21   Glucose  99   BUN  16   Creatinine  0.76   eGFR " Non  Am  75   eGFR African Am  91   Sodium  138   Potassium  4.8   Chloride  100   Calcium  10.1   Total Protein  6.4   Albumin  4.50   Total Bilirubin  0.7   Alkaline Phosphatase  73   AST (SGOT)  16   ALT (SGPT)  18   WBC 6.96    Hemoglobin 13.0    Hematocrit 38.5    Platelets 247       Comments are available for some flowsheets but are not being displayed.                     Assessment and Plan    Diagnoses and all orders for this visit:    1. Preoperative clearance (Primary)    2. HTN (hypertension), benign    3. History of DVT (deep vein thrombosis)    4. Hx pulmonary embolism    5. Anticoagulant long-term use      From my standpoint she would be cleared for surgery.  I have sent a message to Dr. Bates to discuss her prophylaxis.  My thought would be to stop her Eliquis 2 days prior to surgery and then restart after surgery.  I will see if he agrees.  Continue current blood pressure medication as above.        I spent 30 minutes caring for Juany on this date of service. This time includes time spent by me in the following activities:preparing for the visit, reviewing tests, obtaining and/or reviewing a separately obtained history, performing a medically appropriate examination and/or evaluation , counseling and educating the patient/family/caregiver, referring and communicating with other health care professionals , documenting information in the medical record and Reviewing notes from hematology and vascular surgery.  Follow Up   Return in about 6 months (around 4/11/2022) for Medicare Wellness.  Patient was given instructions and counseling regarding her condition or for health maintenance advice. Please see specific information pulled into the AVS if appropriate.

## 2021-10-12 ENCOUNTER — TELEPHONE (OUTPATIENT)
Dept: ORTHOPEDIC SURGERY | Facility: CLINIC | Age: 71
End: 2021-10-12

## 2021-10-12 NOTE — TELEPHONE ENCOUNTER
Hub staff attempted to follow warm transfer process and was unsuccessful         Provider: JEREMI  Caller: OMAR  Phone Number: [phone]  Reason for Call: PT HAS A FEW QUESTIONS ABOUT AFTER CARE FOR HER HIP REPLACEMENT AND MEDICAL EQUIPMENT SHE MIGHT NEED.

## 2021-10-12 NOTE — TELEPHONE ENCOUNTER
Spoke with patient and answered all of her questions regarding surgery.  She will call back with any further concerns.

## 2021-10-15 ENCOUNTER — PRE-ADMISSION TESTING (OUTPATIENT)
Dept: PREADMISSION TESTING | Facility: HOSPITAL | Age: 71
End: 2021-10-15

## 2021-10-15 ENCOUNTER — TELEPHONE (OUTPATIENT)
Dept: INTERNAL MEDICINE | Facility: CLINIC | Age: 71
End: 2021-10-15

## 2021-10-15 VITALS
RESPIRATION RATE: 16 BRPM | WEIGHT: 167 LBS | TEMPERATURE: 98.9 F | BODY MASS INDEX: 24.73 KG/M2 | OXYGEN SATURATION: 99 % | SYSTOLIC BLOOD PRESSURE: 148 MMHG | HEIGHT: 69 IN | DIASTOLIC BLOOD PRESSURE: 87 MMHG | HEART RATE: 82 BPM

## 2021-10-15 DIAGNOSIS — M16.11 ARTHRITIS OF RIGHT HIP: ICD-10-CM

## 2021-10-15 LAB
ANION GAP SERPL CALCULATED.3IONS-SCNC: 14.2 MMOL/L (ref 5–15)
BILIRUB UR QL STRIP: NEGATIVE
BUN SERPL-MCNC: 9 MG/DL (ref 8–23)
BUN/CREAT SERPL: 14.1 (ref 7–25)
CALCIUM SPEC-SCNC: 10 MG/DL (ref 8.6–10.5)
CHLORIDE SERPL-SCNC: 102 MMOL/L (ref 98–107)
CLARITY UR: CLEAR
CO2 SERPL-SCNC: 23.8 MMOL/L (ref 22–29)
COLOR UR: YELLOW
CREAT SERPL-MCNC: 0.64 MG/DL (ref 0.57–1)
DEPRECATED RDW RBC AUTO: 42.2 FL (ref 37–54)
ERYTHROCYTE [DISTWIDTH] IN BLOOD BY AUTOMATED COUNT: 12.5 % (ref 12.3–15.4)
GFR SERPL CREATININE-BSD FRML MDRD: 91 ML/MIN/1.73
GLUCOSE SERPL-MCNC: 94 MG/DL (ref 65–99)
GLUCOSE UR STRIP-MCNC: NEGATIVE MG/DL
HCT VFR BLD AUTO: 39.7 % (ref 34–46.6)
HGB BLD-MCNC: 13.1 G/DL (ref 12–15.9)
HGB UR QL STRIP.AUTO: NEGATIVE
KETONES UR QL STRIP: NEGATIVE
LEUKOCYTE ESTERASE UR QL STRIP.AUTO: NEGATIVE
MCH RBC QN AUTO: 30.7 PG (ref 26.6–33)
MCHC RBC AUTO-ENTMCNC: 33 G/DL (ref 31.5–35.7)
MCV RBC AUTO: 93 FL (ref 79–97)
NITRITE UR QL STRIP: NEGATIVE
PH UR STRIP.AUTO: 8 [PH] (ref 5–8)
PLATELET # BLD AUTO: 251 10*3/MM3 (ref 140–450)
PMV BLD AUTO: 10 FL (ref 6–12)
POTASSIUM SERPL-SCNC: 4.4 MMOL/L (ref 3.5–5.2)
PROT UR QL STRIP: NEGATIVE
QT INTERVAL: 370 MS
RBC # BLD AUTO: 4.27 10*6/MM3 (ref 3.77–5.28)
SODIUM SERPL-SCNC: 140 MMOL/L (ref 136–145)
SP GR UR STRIP: 1.01 (ref 1–1.03)
UROBILINOGEN UR QL STRIP: NORMAL
WBC # BLD AUTO: 5.5 10*3/MM3 (ref 3.4–10.8)

## 2021-10-15 PROCEDURE — 93005 ELECTROCARDIOGRAM TRACING: CPT

## 2021-10-15 PROCEDURE — 81003 URINALYSIS AUTO W/O SCOPE: CPT

## 2021-10-15 PROCEDURE — 80048 BASIC METABOLIC PNL TOTAL CA: CPT

## 2021-10-15 PROCEDURE — 93010 ELECTROCARDIOGRAM REPORT: CPT | Performed by: INTERNAL MEDICINE

## 2021-10-15 PROCEDURE — 36415 COLL VENOUS BLD VENIPUNCTURE: CPT

## 2021-10-15 PROCEDURE — 85027 COMPLETE CBC AUTOMATED: CPT

## 2021-10-15 RX ORDER — SENNOSIDES 8.6 MG
650 CAPSULE ORAL EVERY 8 HOURS PRN
COMMUNITY

## 2021-10-15 RX ORDER — APIXABAN 2.5 MG/1
TABLET, FILM COATED ORAL
Qty: 180 TABLET | Refills: 3 | Status: SHIPPED | OUTPATIENT
Start: 2021-10-15 | End: 2022-10-24

## 2021-10-15 RX ORDER — CHLORHEXIDINE GLUCONATE 500 MG/1
1 CLOTH TOPICAL TAKE AS DIRECTED
Status: ACTIVE | OUTPATIENT
Start: 2021-10-15

## 2021-10-15 ASSESSMENT — HOOS JR
HOOS JR SCORE: 14
HOOS JR SCORE: 46.652

## 2021-10-15 NOTE — DISCHARGE INSTRUCTIONS
Take the following medications the morning of surgery:  NONE      ARRIVE 10:30 AM  10/25      If you are on prescription narcotic pain medication to control your pain you may also take that medication the morning of surgery.    General Instructions:  • Do not eat solid food after midnight the night before surgery.  • You may drink clear liquids day of surgery but must stop at least one hour before your hospital arrival time.  • It is beneficial for you to have a clear drink that contains carbohydrates the day of surgery.  We suggest a 12 to 20 ounce bottle of Gatorade or Powerade for non-diabetic patients or a 12 to 20 ounce bottle of G2 or Powerade Zero for diabetic patients. (Pediatric patients, are not advised to drink a 12 to 20 ounce carbohydrate drink)    Clear liquids are liquids you can see through.  Nothing red in color.     Plain water                               Sports drinks  Sodas                                   Gelatin (Jell-O)  Fruit juices without pulp such as white grape juice and apple juice  Popsicles that contain no fruit or yogurt  Tea or coffee (no cream or milk added)  Gatorade / Powerade  G2 / Powerade Zero    • Infants may have breast milk up to four hours before surgery.  • Infants drinking formula may drink formula up to six hours before surgery.   • Patients who avoid smoking, chewing tobacco and alcohol for 4 weeks prior to surgery have a reduced risk of post-operative complications.  Quit smoking as many days before surgery as you can.  • Do not smoke, use chewing tobacco or drink alcohol the day of surgery.   • If applicable bring your C-PAP/ BI-PAP machine.  • Bring any papers given to you in the doctor’s office.  • Wear clean comfortable clothes.  • Do not wear contact lenses, false eyelashes or make-up.  Bring a case for your glasses.   • Bring crutches or walker if applicable.  • Remove all piercings.  Leave jewelry and any other valuables at home.  • Hair extensions with metal  clips must be removed prior to surgery.  • The Pre-Admission Testing nurse will instruct you to bring medications if unable to obtain an accurate list in Pre-Admission Testing.          Preventing a Surgical Site Infection:  • For 2 to 3 days before surgery, avoid shaving with a razor because the razor can irritate skin and make it easier to develop an infection.    • Any areas of open skin can increase the risk of a post-operative wound infection by allowing bacteria to enter and travel throughout the body.  Notify your surgeon if you have any skin wounds / rashes even if it is not near the expected surgical site.  The area will need assessed to determine if surgery should be delayed until it is healed.  • The night prior to surgery shower using a fresh bar of anti-bacterial soap (such as Dial) and clean washcloth.  Sleep in a clean bed with clean clothing.  Do not allow pets to sleep with you.  • Shower on the morning of surgery using a fresh bar of anti-bacterial soap (such as Dial) and clean washcloth.  Dry with a clean towel and dress in clean clothing.  • Ask your surgeon if you will be receiving antibiotics prior to surgery.  • Make sure you, your family, and all healthcare providers clean their hands with soap and water or an alcohol based hand  before caring for you or your wound.    Day of surgery:  Your arrival time is approximately two hours before your scheduled surgery time.  Upon arrival, a Pre-op nurse and Anesthesiologist will review your health history, obtain vital signs, and answer questions you may have.  The only belongings needed at this time will be a list of your home medications and if applicable your C-PAP/BI-PAP machine.  A Pre-op nurse will start an IV and you may receive medication in preparation for surgery, including something to help you relax.     Please be aware that surgery does come with discomfort.  We want to make every effort to control your discomfort so please  discuss any uncontrolled symptoms with your nurse.   Your doctor will most likely have prescribed pain medications.      If you are going home after surgery you will receive individualized written care instructions before being discharged.  A responsible adult must drive you to and from the hospital on the day of your surgery and stay with you for 24 hours.  Discharge prescriptions can be filled by the hospital pharmacy during regular pharmacy hours.  If you are having surgery late in the day/evening your prescription may be e-prescribed to your pharmacy.  Please verify your pharmacy hours or chose a 24 hour pharmacy to avoid not having access to your prescription because your pharmacy has closed for the day.    If you are staying overnight following surgery, you will be transported to your hospital room following the recovery period.  [facility] has all private rooms.    If you have any questions please call Pre-Admission Testing at [phone].  Deductibles and co-payments are collected on the day of service. Please be prepared to pay the required co-pay, deductible or deposit on the day of service as defined by your plan.    Patient Education for Self-Quarantine Process    • Following your COVID testing, we strongly recommend that you wear a mask when you are with other people and practice social distancing.   • Limit your activities to only required outings.  • Wash your hands with soap and water frequently for at least 20 seconds.   • Avoid touching your eyes, nose and mouth with unwashed hands.  • Do not share anything - utensils, drinking glasses, food from the same bowl.   • Sanitize household surfaces daily. Include all high touch areas (door handles, light switches, phones, countertops, etc.)    Call your surgeon immediately if you experience any of the following symptoms:  • Sore Throat  • Shortness of Breath or difficulty breathing  • Cough  • Chills  • Body soreness or muscle  pain  • Headache  • Fever  • New loss of taste or smell  • Do not arrive for your surgery ill.  Your procedure will need to be rescheduled to another time.  You will need to call your physician before the day of surgery to avoid any unnecessary exposure to hospital staff as well as other patients.    CHLORHEXIDINE CLOTH INSTRUCTIONS  The morning of surgery follow these instructions using the Chlorhexidine cloths you've been given.  These steps reduce bacteria on the body.  Do not use the cloths near your eyes, ears mouth, genitalia or on open wounds.  Throw the cloths away after use but do not try to flush them down a toilet.      • Open and remove one cloth at a time from the package.    • Leave the cloth unfolded and begin the bathing.  • Massage the skin with the cloths using gentle pressure to remove bacteria.  Do not scrub harshly.   • Follow the steps below with one 2% CHG cloth per area (6 total cloths).  • One cloth for neck, shoulders and chest.  • One cloth for both arms, hands, fingers and underarms (do underarms last).  • One cloth for the abdomen followed by groin.  • One cloth for right leg and foot including between the toes.  • One cloth for left leg and foot including between the toes.  • The last cloth is to be used for the back of the neck, back and buttocks.    Allow the CHG to air dry 3 minutes on the skin which will give it time to work and decrease the chance of irritation.  The skin may feel sticky until it is dry.  Do not rinse with water or any other liquid or you will lose the beneficial effects of the CHG.  If mild skin irritation occurs, do rinse the skin to remove the CHG.  Report this to the nurse at time of admission.  Do not apply lotions, creams, ointments, deodorants or perfumes after using the clothes. Dress in clean clothes before coming to the hospital.    BACTROBAN NASAL OINTMENT  There are many germs normally in your nose. Bactroban is an ointment that will help reduce these  germs. Please follow these instructions for Bactroban use:      ____The day before surgery in the morning  Date________    ____The day before surgery in the evening              Date________    ____The day of surgery in the morning    Date________    **Squirt ½ package of Bactroban Ointment onto a cotton applicator and apply to inside of 1st nostril.  Squirt the remaining Bactroban and apply to the inside of the other nostril.

## 2021-10-15 NOTE — TELEPHONE ENCOUNTER
Caller: Juany Carlin    Relationship: Self      Medication requested (name and dosage): apixaban (ELIQUIS) 2.5 MG tablet tablet    Pharmacy where request should be sent: [address] & ([facility]) - [phone]  - [phone] FX     Additional details provided by patient: PATIENT STATES THE PHARMACY SENT OVER A PRE AUTHORIZATION FOR THE REFILL     Best call back number: [phone]     Does the patient have less than a 3 day supply:  [] Yes  [x] No    Abdias Araujo Rep   10/15/21 12:11 EDT

## 2021-10-19 ENCOUNTER — OFFICE VISIT (OUTPATIENT)
Dept: ORTHOPEDIC SURGERY | Facility: CLINIC | Age: 71
End: 2021-10-19

## 2021-10-19 VITALS — BODY MASS INDEX: 24.73 KG/M2 | WEIGHT: 167 LBS | HEIGHT: 69 IN | TEMPERATURE: 94.8 F

## 2021-10-19 DIAGNOSIS — M16.11 ARTHRITIS OF RIGHT HIP: Primary | ICD-10-CM

## 2021-10-19 PROCEDURE — S0260 H&P FOR SURGERY: HCPCS | Performed by: NURSE PRACTITIONER

## 2021-10-19 NOTE — PROGRESS NOTES
"   History & Physical       Patient: Juany Carlin  YOB: 1950  Medical Record Number: [id number]  Wt Readings from Last 3 Encounters:   10/19/21 75.8 kg (167 lb)   10/15/21 75.8 kg (167 lb)   10/11/21 76.2 kg (168 lb)     Ht Readings from Last 3 Encounters:   10/19/21 175.3 cm (69\")   10/15/21 175.3 cm (69\")   10/11/21 175.3 cm (69.02\")     Body mass index is 24.66 kg/m².  Facility age limit for growth percentiles is 20 years.    Surgeon:  Dr. Rashawn Hernández    Chief Complaints:   Chief Complaint   Patient presents with   • Right Hip - Pre-op Exam, Pain     Surgery:   Posterior RIGHT TOTAL HIP ARTHROPLASTY GAVIN NAVIGATION    History of Present Illness: 71 y.o. female presents with   Chief Complaint   Patient presents with   • Right Hip - Pre-op Exam, Pain   . Chronic symptoms have been progressively worsening despite more conservative treatment measures including medications OTC and prescription, cortisone injections and or gel injections, and physical therapy.  Symptoms are associated with ability to move, exercise, and perform activities of daily living.  Symptoms are aggravated by weight bearing and ROM necessary for activities of daily living.   Symptoms improve with rest, ice and elevation only minimally.      Allergies:   Allergies   Allergen Reactions   • Ciprocinonide [Fluocinolone] Other (See Comments)     Achilles tendonitis   • Suprax [Cefixime] Diarrhea   • Contrast Dye Rash     Rash many years ago when shell-fish derived contrast dye was used       Medications:   Home Medications:  Current Outpatient Medications on File Prior to Visit   Medication Sig   • acetaminophen (TYLENOL) 650 MG 8 hr tablet Take 650 mg by mouth Every 8 (Eight) Hours As Needed for Mild Pain .   • B Complex-Biotin-FA (B-100 COMPLEX PO) Take 1 tablet by mouth Daily. PT HOLDING FOR SURGERY   • Chlorhexidine Gluconate 2 % pads Apply  topically. As directed pre op   • chlorthalidone (HYGROTON) 25 MG tablet Take 1 " "tablet by mouth Daily. (Patient taking differently: Take 25 mg by mouth Daily. NOT TAKING CURRENTLY DUE TO WEIGHT LOSS)   • Cholecalciferol (VITAMIN D3) 2000 UNITS tablet Take 1 tablet by mouth Daily.   • Eliquis 2.5 MG tablet tablet TAKE ONE TABLET BY MOUTH EVERY 12 HOURS   • famotidine (PEPCID) 10 MG tablet Take 20 mg by mouth Every Night.   • losartan (COZAAR) 100 MG tablet Take 1 tablet by mouth Daily. (Patient taking differently: Take 100 mg by mouth Every Morning.)   • Magnesium 250 MG tablet Take 2 tablets by mouth Every Night. \"FOR LEG CRAMPS\"   • Multiple Vitamins-Minerals (MULTIVITAMIN PO) Take 1 tablet by mouth Every Night. PT HOLDING FOR SURGERY   • mupirocin (BACTROBAN) 2 % nasal ointment into the nostril(s) as directed by provider. As directed pre op     Current Facility-Administered Medications on File Prior to Visit   Medication   • Chlorhexidine Gluconate Cloth 2 % pads 1 each     Current Medications:  Scheduled Meds:  Continuous Infusions:No current facility-administered medications for this visit.    PRN Meds:.    I have reviewed the patient's medical history in detail and updated the computerized patient record.  Review and summarization of old records include:    Past Medical History:   Diagnosis Date   • Acute cystitis without hematuria 04/10/2020   • Acute right ankle pain 2020   • Adrenal nodule (HCC)    • Arthritis    • BPPV (benign paroxysmal positional vertigo) 5/10/2016   • Cervical stenosis of spine    • Depression     NO MEDS   • DVT, lower extremity (HCC) 2016    right   • GERD without esophagitis 5/10/2016   • H/O varicose vein stripping    • Hip pain    • History of migraine    • History of skin cancer    • History of stress incontinence    • Hyperplastic colon polyp 2013   • Hypertension    • PONV (postoperative nausea and vomiting)    • Pregnancy     , miscarrige x1   • Pulmonary emboli (HCC) 2016    bilateral extensive PE with saddle emboli.Negative hypercoagulable " state w/u   • Pulmonary embolism with acute cor pulmonale (HCC) 2017   • Shoulder pain    • Urge incontinence 5/10/2016   • Vitamin D deficiency 5/10/2016        Past Surgical History:   Procedure Laterality Date   • CARDIAC CATHETERIZATION N/A 11/10/2016    Procedure: Right Heart Cath;  Surgeon: Johnna Agrawal MD;  Location:  [facility];  Service:    • CARDIAC CATHETERIZATION N/A 11/10/2016    Procedure: Thrombolytic Therapy;  Surgeon: Johnna Agrawal MD;  Location:  [facility];  Service:    • CATARACT EXTRACTION Bilateral    • COLONOSCOPY W/ BIOPSIES  2013    hyperplastic polyp, Dr.Russel Lewis   • CYST REMOVAL      VAGINAL CYST   • DILATATION AND CURETTAGE     • EPIDURAL BLOCK     • HEMORROIDECTOMY     • INTERVENTIONAL RADIOLOGY PROCEDURE Bilateral 11/10/2016    Procedure: Sp Smiley Cath Place Pulmonary Art;  Surgeon: Johnna Agrawal MD;  Location:  [facility];  Service:    • SKIN CANCER EXCISION     • TONSILLECTOMY     • TOTAL LAPAROSCOPIC HYSTERECTOMY     • VEIN SURGERY Left 2548-1532    leg,    • VENTRAL HERNIA REPAIR          Social History     Occupational History   • Occupation:      Employer: RETIRED     Comment: [employer]   Tobacco Use   • Smoking status: Former Smoker     Packs/day: 0.50     Years: 10.00     Pack years: 5.00     Types: Cigarettes     Quit date:      Years since quittin.8   • Smokeless tobacco: Never Used   Vaping Use   • Vaping Use: Never used   Substance and Sexual Activity   • Alcohol use: Not Currently   • Drug use: No   • Sexual activity: Defer      Social History     Social History Narrative    LIVES WITH SPOUSE        Family History   Problem Relation Age of Onset   • Macular degeneration Mother    • Deep vein thrombosis Mother    • Hypertension Mother    • Skin cancer Mother    • Heart failure Father    • Deep vein thrombosis Father    • Cancer Father    • Hypertension  "Father    • Heart disease Father    • Chiari malformation Sister    • Heart disease Maternal Grandfather    • Heart disease Paternal Grandfather    • Deep vein thrombosis Sister    • Heart failure Sister    • Pancreatic cancer Sister    • Thyroid disease Daughter    • Hypertension Brother    • Malig Hyperthermia Neg Hx        ROS: 14 point review of systems was performed and was negative except for documented findings in HPI and today's encounter.       Constitutional:  Denies fever, shaking or chills   Eyes:  Denies change in visual acuity   HENT:  Denies nasal congestion or sore throat   Respiratory:  Denies cough or shortness of breath   Cardiovascular:  Denies chest pain or severe LE edema   GI:  Denies abdominal pain, nausea, vomiting, bloody stools or diarrhea   Musculoskeletal:  Denies numbness tingling or loss of motor function except as outlined above in history of present illness.  : Denies painful urination or hematuria  Integument:  Denies rash, lesion or ulceration   Neurologic:  Denies headache or focal weakness  Endocrine:  Denies lymphadenopathy  Psych:  Denies confusion or change in mental status   Hem:  Denies active bleeding    Physical Exam: 71 y.o. female  Wt Readings from Last 3 Encounters:   10/19/21 75.8 kg (167 lb)   10/15/21 75.8 kg (167 lb)   10/11/21 76.2 kg (168 lb)     Ht Readings from Last 3 Encounters:   10/19/21 175.3 cm (69\")   10/15/21 175.3 cm (69\")   10/11/21 175.3 cm (69.02\")     Body mass index is 24.66 kg/m².  Facility age limit for growth percentiles is 20 years.  Vitals:    10/19/21 0906   Temp: 94.8 °F (34.9 °C)       Vital signs reviewed.   General Appearance:    Alert, cooperative, in no acute distress                  Eyes: conjunctiva clear  ENT: external ears and nose atraumatic  CV: no peripheral edema  Resp: normal respiratory effort  Skin: no rashes or wounds; normal turgor  Psych: mood and affect appropriate  Lymph: no nodes appreciated  Neuro: gross sensation " intact  Vascular:  Palpable peripheral pulse in noted extremity  Musculoskeletal Extremities: HIP Exam: antalgic gait with assistive device right hip 2+ pedal pulses and brisk capillary refill Pedal edema none        Diagnostic Tests:  Results for orders placed or performed in visit on 01/19/21   CBC Auto Differential    Specimen: Blood   Result Value Ref Range    WBC 6.96 3.40 - 10.80 10*3/mm3    RBC 4.40 3.77 - 5.28 10*6/mm3    Hemoglobin 13.0 12.0 - 15.9 g/dL    Hematocrit 38.5 34.0 - 46.6 %    MCV 87.5 79.0 - 97.0 fL    MCH 29.5 26.6 - 33.0 pg    MCHC 33.8 31.5 - 35.7 g/dL    RDW 12.3 12.3 - 15.4 %    RDW-SD 39.8 37.0 - 54.0 fl    MPV 10.5 6.0 - 12.0 fL    Platelets 247 140 - 450 10*3/mm3    Neutrophil % 53.4 42.7 - 76.0 %    Lymphocyte % 35.1 19.6 - 45.3 %    Monocyte % 8.0 5.0 - 12.0 %    Eosinophil % 1.6 0.3 - 6.2 %    Basophil % 0.9 0.0 - 1.5 %    Immature Grans % 1.0 (H) 0.0 - 0.5 %    Neutrophils, Absolute 3.72 1.70 - 7.00 10*3/mm3    Lymphocytes, Absolute 2.44 0.70 - 3.10 10*3/mm3    Monocytes, Absolute 0.56 0.10 - 0.90 10*3/mm3    Eosinophils, Absolute 0.11 0.00 - 0.40 10*3/mm3    Basophils, Absolute 0.06 0.00 - 0.20 10*3/mm3    Immature Grans, Absolute 0.07 (H) 0.00 - 0.05 10*3/mm3    nRBC 0.0 0.0 - 0.2 /100 WBC   Results for orders placed or performed in visit on 04/12/18   CBC & Differential    Specimen: Blood   Result Value Ref Range    WBC 6.4 3.4 - 10.8 x10E3/uL    RBC 4.49 3.77 - 5.28 x10E6/uL    Hemoglobin 13.8 11.1 - 15.9 g/dL    Hematocrit 41.4 34.0 - 46.6 %    MCV 92 79 - 97 fL    MCH 30.7 26.6 - 33.0 pg    MCHC 33.3 31.5 - 35.7 g/dL    RDW 13.6 12.3 - 15.4 %    Platelets 219 150 - 379 x10E3/uL    Neutrophil Rel % 42 Not Estab. %    Lymphocyte Rel % 44 Not Estab. %    Monocyte Rel % 10 Not Estab. %    Eosinophil Rel % 3 Not Estab. %    Basophil Rel % 1 Not Estab. %    Neutrophils Absolute 2.7 1.4 - 7.0 x10E3/uL    Lymphocytes Absolute 2.9 0.7 - 3.1 x10E3/uL    Monocytes Absolute 0.7 0.1 - 0.9  x10E3/uL    Eosinophils Absolute 0.2 0.0 - 0.4 x10E3/uL    Basophils Absolute 0.1 0.0 - 0.2 x10E3/uL    Immature Granulocyte Rel % 0 Not Estab. %    Immature Grans Absolute 0.0 0.0 - 0.1 x10E3/uL     Lab Results   Component Value Date    GLUCOSE 94 10/15/2021    CALCIUM 10.0 10/15/2021     10/15/2021    K 4.4 10/15/2021    CO2 23.8 10/15/2021     10/15/2021    BUN 9 10/15/2021    CREATININE 0.64 10/15/2021    EGFRIFAFRI 91 04/20/2021    EGFRIFNONA 91 10/15/2021    BCR 14.1 10/15/2021    ANIONGAP 14.2 10/15/2021       EKG:    Narrative & Impression    HEART RATE= 71  bpm  RR Interval= 848  ms  NM Interval= 157  ms  P Horizontal Axis= 11  deg  P Front Axis= 73  deg  QRSD Interval= 93  ms  QT Interval= 370  ms  QRS Axis= 77  deg  T Wave Axis= 43  deg  - NORMAL ECG -  Sinus rhythm  When compared with ECG of 09-Nov-2016 18:02:16,  Significant rate decrease otherwise no change       I have reviewed all the lab & EKG results. There are some abnormalities that are not critical to the patient's health, but I would like to discuss these in person at an office appointment.     Imaging was done previously in the office, viewed images and discussed with the patient:    Indication: pain related symptoms,  Assessment:  Patient Active Problem List   Diagnosis   • HTN (hypertension), benign   • Vitamin D deficiency   • Urge incontinence   • GERD without esophagitis   • Cervical spinal stenosis   • Saddle pulmonary embolus (HCC)   • Family history of thrombosis   • Degeneration of cervical intervertebral disc   • Adrenal nodule (HCC)   • Health care maintenance   • Localized osteoarthritis of left shoulder   • Primary osteoarthritis of both knees   • Hypomagnesemia   • Migraine with aura and without status migrainosus, not intractable   • Snoring   • Arthritis of right hip   • Anticoagulant long-term use   • Hx pulmonary embolism   • History of DVT (deep vein thrombosis)       Plan:  Reviewed anatomy of a total joint  arthroplasty in laymen's terms, as well as typical postoperative recovery and possibly 6-12 months for maximal recovery, and possible need for rehabilitation stay after hospitalization. We also discussed risks, benefits, alternatives, and limitations of procedure with risks including but not limited to neurovascular damage, bleeding, infection, malalignment, chronic pian, failure of implants, osteolysis, loosening of implants, loss of motion, weakness, stiffness, instability, DVT, pulmonary embolus, death, stroke, complex regional pain syndrome, myocardial infarction, and need for additional procedures. Concept of substitution vs. replacement discussed.  No guarantees were given regarding results of surgery.      Juany Carlin was given the opportunity to ask and have all questions answered today.  The patient voiced understanding of the risks, benefits, and alternative forms of treatment that were discussed and the patient consents to proceed with surgery.     PCP clearance Dr. Pizarro 10/11/2021, hematology recommends holding eliquis 48 hours prior to surgery    Skin breakdown? WNL  Metal allergy? No  DVT Risk Factors: hx PE/DVT, on eliquis    DVT Prophylaxis:  eliquis- holding 48 hours prior to surgery    Discharge Plan: POD 1 to home, home health and when cleared by physical therapy as safe for discharge    To be updated    Date: 10/19/2021  ALEX Oscar

## 2021-10-22 ENCOUNTER — LAB (OUTPATIENT)
Dept: LAB | Facility: HOSPITAL | Age: 71
End: 2021-10-22

## 2021-10-22 LAB — SARS-COV-2 ORF1AB RESP QL NAA+PROBE: NOT DETECTED

## 2021-10-22 PROCEDURE — C9803 HOPD COVID-19 SPEC COLLECT: HCPCS

## 2021-10-22 PROCEDURE — U0005 INFEC AGEN DETEC AMPLI PROBE: HCPCS

## 2021-10-22 PROCEDURE — U0004 COV-19 TEST NON-CDC HGH THRU: HCPCS

## 2021-10-25 ENCOUNTER — APPOINTMENT (OUTPATIENT)
Dept: GENERAL RADIOLOGY | Facility: HOSPITAL | Age: 71
End: 2021-10-25

## 2021-10-25 ENCOUNTER — HOSPITAL ENCOUNTER (OUTPATIENT)
Facility: HOSPITAL | Age: 71
Discharge: HOME | End: 2021-10-26
Attending: ORTHOPAEDIC SURGERY | Admitting: ORTHOPAEDIC SURGERY

## 2021-10-25 ENCOUNTER — ANESTHESIA EVENT (OUTPATIENT)
Dept: PERIOP | Facility: HOSPITAL | Age: 71
End: 2021-10-25

## 2021-10-25 ENCOUNTER — ANESTHESIA (OUTPATIENT)
Dept: PERIOP | Facility: HOSPITAL | Age: 71
End: 2021-10-25

## 2021-10-25 DIAGNOSIS — M16.11 ARTHRITIS OF RIGHT HIP: ICD-10-CM

## 2021-10-25 PROCEDURE — G0378 HOSPITAL OBSERVATION PER HR: HCPCS

## 2021-10-25 PROCEDURE — 0 CEFAZOLIN IN DEXTROSE 2-4 GM/100ML-% SOLUTION: Performed by: ORTHOPAEDIC SURGERY

## 2021-10-25 PROCEDURE — 63710000001 FAMOTIDINE 20 MG TABLET: Performed by: NURSE PRACTITIONER

## 2021-10-25 PROCEDURE — 63710000001 ACETAMINOPHEN 500 MG TABLET: Performed by: ORTHOPAEDIC SURGERY

## 2021-10-25 PROCEDURE — 25010000002 EPINEPHRINE 1 MG/ML SOLUTION 30 ML VIAL: Performed by: ORTHOPAEDIC SURGERY

## 2021-10-25 PROCEDURE — 25010000002 HYDROMORPHONE PER 4 MG: Performed by: NURSE ANESTHETIST, CERTIFIED REGISTERED

## 2021-10-25 PROCEDURE — 25010000002 PROPOFOL 10 MG/ML EMULSION: Performed by: NURSE ANESTHETIST, CERTIFIED REGISTERED

## 2021-10-25 PROCEDURE — C1776 JOINT DEVICE (IMPLANTABLE): HCPCS | Performed by: ORTHOPAEDIC SURGERY

## 2021-10-25 PROCEDURE — A9270 NON-COVERED ITEM OR SERVICE: HCPCS | Performed by: ORTHOPAEDIC SURGERY

## 2021-10-25 PROCEDURE — 63710000001 DOCUSATE SODIUM 100 MG CAPSULE: Performed by: NURSE PRACTITIONER

## 2021-10-25 PROCEDURE — 25010000002 ROPIVACAINE PER 1 MG: Performed by: ORTHOPAEDIC SURGERY

## 2021-10-25 PROCEDURE — 25010000002 FENTANYL CITRATE (PF) 50 MCG/ML SOLUTION: Performed by: NURSE ANESTHETIST, CERTIFIED REGISTERED

## 2021-10-25 PROCEDURE — A9270 NON-COVERED ITEM OR SERVICE: HCPCS | Performed by: NURSE PRACTITIONER

## 2021-10-25 PROCEDURE — 63710000001 MELOXICAM 15 MG TABLET: Performed by: ORTHOPAEDIC SURGERY

## 2021-10-25 PROCEDURE — 63710000001 PREGABALIN 75 MG CAPSULE: Performed by: ORTHOPAEDIC SURGERY

## 2021-10-25 PROCEDURE — C1889 IMPLANT/INSERT DEVICE, NOC: HCPCS | Performed by: ORTHOPAEDIC SURGERY

## 2021-10-25 PROCEDURE — 0 CEFAZOLIN IN DEXTROSE 2-4 GM/100ML-% SOLUTION: Performed by: NURSE PRACTITIONER

## 2021-10-25 PROCEDURE — 25010000002 KETOROLAC TROMETHAMINE PER 15 MG: Performed by: ORTHOPAEDIC SURGERY

## 2021-10-25 PROCEDURE — 25010000002 CLONIDINE PER 1 MG: Performed by: ORTHOPAEDIC SURGERY

## 2021-10-25 PROCEDURE — 25010000002 NEOSTIGMINE 5 MG/10ML SOLUTION: Performed by: NURSE ANESTHETIST, CERTIFIED REGISTERED

## 2021-10-25 PROCEDURE — 25010000002 ONDANSETRON PER 1 MG: Performed by: NURSE PRACTITIONER

## 2021-10-25 PROCEDURE — 63710000001 MUPIROCIN 2 % OINTMENT: Performed by: NURSE PRACTITIONER

## 2021-10-25 PROCEDURE — 73501 X-RAY EXAM HIP UNI 1 VIEW: CPT

## 2021-10-25 PROCEDURE — 25010000002 DEXAMETHASONE PER 1 MG: Performed by: NURSE ANESTHETIST, CERTIFIED REGISTERED

## 2021-10-25 PROCEDURE — 25010000002 PHENYLEPHRINE 10 MG/ML SOLUTION: Performed by: NURSE ANESTHETIST, CERTIFIED REGISTERED

## 2021-10-25 PROCEDURE — 25010000002 MIDAZOLAM PER 1 MG: Performed by: ANESTHESIOLOGY

## 2021-10-25 PROCEDURE — 27130 TOTAL HIP ARTHROPLASTY: CPT | Performed by: ORTHOPAEDIC SURGERY

## 2021-10-25 PROCEDURE — 25010000002 ONDANSETRON PER 1 MG: Performed by: NURSE ANESTHETIST, CERTIFIED REGISTERED

## 2021-10-25 DEVICE — SUMMIT FEMORAL STEM 12/14 TAPER TAPER ED W/POROCOAT SIZE 6 HI 150MM
Type: IMPLANTABLE DEVICE | Site: HIP | Status: FUNCTIONAL
Brand: SUMMIT POROCOAT

## 2021-10-25 DEVICE — BIOLOX DELTA CERAMIC FEMORAL HEAD +1.5 36MM DIA 12/14 TAPER
Type: IMPLANTABLE DEVICE | Site: HIP | Status: FUNCTIONAL
Brand: BIOLOX DELTA

## 2021-10-25 DEVICE — DEV CONTRL TISS STRATAFIXSPIRALMNCRYL PLSPS2 REV3/0 45CM: Type: IMPLANTABLE DEVICE | Site: HIP | Status: FUNCTIONAL

## 2021-10-25 DEVICE — TOTL HIP COP DEPUY 9641334: Type: IMPLANTABLE DEVICE | Site: HIP | Status: FUNCTIONAL

## 2021-10-25 DEVICE — SEAL HEMO SURG ARISTA/AH ABS/PWDR 3GM: Type: IMPLANTABLE DEVICE | Site: HIP | Status: FUNCTIONAL

## 2021-10-25 DEVICE — PINNACLE HIP SOLUTIONS ALTRX POLYETHYLENE ACETABULAR LINER +4 10 DEGREE 36MM ID 56MM OD
Type: IMPLANTABLE DEVICE | Site: HIP | Status: FUNCTIONAL
Brand: PINNACLE ALTRX

## 2021-10-25 DEVICE — PINNACLE GRIPTION ACETABULAR SHELL SECTOR 56MM OD
Type: IMPLANTABLE DEVICE | Site: HIP | Status: FUNCTIONAL
Brand: PINNACLE GRIPTION

## 2021-10-25 RX ORDER — DIPHENHYDRAMINE HYDROCHLORIDE 50 MG/ML
12.5 INJECTION INTRAMUSCULAR; INTRAVENOUS
Status: DISCONTINUED | OUTPATIENT
Start: 2021-10-25 | End: 2021-10-25 | Stop reason: HOSPADM

## 2021-10-25 RX ORDER — LABETALOL HYDROCHLORIDE 5 MG/ML
5 INJECTION, SOLUTION INTRAVENOUS
Status: DISCONTINUED | OUTPATIENT
Start: 2021-10-25 | End: 2021-10-25 | Stop reason: HOSPADM

## 2021-10-25 RX ORDER — PROPOFOL 10 MG/ML
VIAL (ML) INTRAVENOUS AS NEEDED
Status: DISCONTINUED | OUTPATIENT
Start: 2021-10-25 | End: 2021-10-25 | Stop reason: SURG

## 2021-10-25 RX ORDER — FENTANYL CITRATE 50 UG/ML
50 INJECTION, SOLUTION INTRAMUSCULAR; INTRAVENOUS
Status: DISCONTINUED | OUTPATIENT
Start: 2021-10-25 | End: 2021-10-25 | Stop reason: HOSPADM

## 2021-10-25 RX ORDER — ROCURONIUM BROMIDE 10 MG/ML
INJECTION, SOLUTION INTRAVENOUS AS NEEDED
Status: DISCONTINUED | OUTPATIENT
Start: 2021-10-25 | End: 2021-10-25 | Stop reason: SURG

## 2021-10-25 RX ORDER — DEXAMETHASONE SODIUM PHOSPHATE 10 MG/ML
INJECTION INTRAMUSCULAR; INTRAVENOUS AS NEEDED
Status: DISCONTINUED | OUTPATIENT
Start: 2021-10-25 | End: 2021-10-25 | Stop reason: SURG

## 2021-10-25 RX ORDER — DOCUSATE SODIUM 100 MG/1
100 CAPSULE, LIQUID FILLED ORAL 2 TIMES DAILY
Status: DISCONTINUED | OUTPATIENT
Start: 2021-10-25 | End: 2021-10-26 | Stop reason: HOSPADM

## 2021-10-25 RX ORDER — KETAMINE HCL IN NACL, ISO-OSM 100MG/10ML
10 SYRINGE (ML) INJECTION
Status: DISCONTINUED | OUTPATIENT
Start: 2021-10-25 | End: 2021-10-25 | Stop reason: HOSPADM

## 2021-10-25 RX ORDER — NALOXONE HCL 0.4 MG/ML
0.2 VIAL (ML) INJECTION AS NEEDED
Status: DISCONTINUED | OUTPATIENT
Start: 2021-10-25 | End: 2021-10-25 | Stop reason: HOSPADM

## 2021-10-25 RX ORDER — SODIUM CHLORIDE 0.9 % (FLUSH) 0.9 %
3-10 SYRINGE (ML) INJECTION AS NEEDED
Status: DISCONTINUED | OUTPATIENT
Start: 2021-10-25 | End: 2021-10-25 | Stop reason: HOSPADM

## 2021-10-25 RX ORDER — POLYETHYLENE GLYCOL 3350 17 G/17G
17 POWDER, FOR SOLUTION ORAL DAILY
Status: DISCONTINUED | OUTPATIENT
Start: 2021-10-26 | End: 2021-10-26 | Stop reason: HOSPADM

## 2021-10-25 RX ORDER — CEFAZOLIN SODIUM 2 G/100ML
2 INJECTION, SOLUTION INTRAVENOUS ONCE
Status: COMPLETED | OUTPATIENT
Start: 2021-10-25 | End: 2021-10-25

## 2021-10-25 RX ORDER — HYDRALAZINE HYDROCHLORIDE 20 MG/ML
5 INJECTION INTRAMUSCULAR; INTRAVENOUS
Status: DISCONTINUED | OUTPATIENT
Start: 2021-10-25 | End: 2021-10-25 | Stop reason: HOSPADM

## 2021-10-25 RX ORDER — ACETAMINOPHEN 500 MG
1000 TABLET ORAL ONCE
Status: COMPLETED | OUTPATIENT
Start: 2021-10-25 | End: 2021-10-25

## 2021-10-25 RX ORDER — HYDROMORPHONE HYDROCHLORIDE 1 MG/ML
0.5 INJECTION, SOLUTION INTRAMUSCULAR; INTRAVENOUS; SUBCUTANEOUS
Status: DISCONTINUED | OUTPATIENT
Start: 2021-10-25 | End: 2021-10-25 | Stop reason: HOSPADM

## 2021-10-25 RX ORDER — NALOXONE HCL 0.4 MG/ML
0.1 VIAL (ML) INJECTION
Status: DISCONTINUED | OUTPATIENT
Start: 2021-10-25 | End: 2021-10-26 | Stop reason: HOSPADM

## 2021-10-25 RX ORDER — EPHEDRINE SULFATE 50 MG/ML
INJECTION, SOLUTION INTRAVENOUS AS NEEDED
Status: DISCONTINUED | OUTPATIENT
Start: 2021-10-25 | End: 2021-10-25 | Stop reason: SURG

## 2021-10-25 RX ORDER — SODIUM CHLORIDE 0.9 % (FLUSH) 0.9 %
3 SYRINGE (ML) INJECTION EVERY 12 HOURS SCHEDULED
Status: DISCONTINUED | OUTPATIENT
Start: 2021-10-25 | End: 2021-10-25 | Stop reason: HOSPADM

## 2021-10-25 RX ORDER — BISACODYL 10 MG
10 SUPPOSITORY, RECTAL RECTAL DAILY PRN
Status: DISCONTINUED | OUTPATIENT
Start: 2021-10-25 | End: 2021-10-26 | Stop reason: HOSPADM

## 2021-10-25 RX ORDER — NEOSTIGMINE METHYLSULFATE 0.5 MG/ML
INJECTION, SOLUTION INTRAVENOUS AS NEEDED
Status: DISCONTINUED | OUTPATIENT
Start: 2021-10-25 | End: 2021-10-25 | Stop reason: SURG

## 2021-10-25 RX ORDER — IBUPROFEN 600 MG/1
600 TABLET ORAL ONCE AS NEEDED
Status: DISCONTINUED | OUTPATIENT
Start: 2021-10-25 | End: 2021-10-25 | Stop reason: HOSPADM

## 2021-10-25 RX ORDER — HYDROCODONE BITARTRATE AND ACETAMINOPHEN 7.5; 325 MG/1; MG/1
1 TABLET ORAL ONCE AS NEEDED
Status: DISCONTINUED | OUTPATIENT
Start: 2021-10-25 | End: 2021-10-25 | Stop reason: HOSPADM

## 2021-10-25 RX ORDER — EPHEDRINE SULFATE 50 MG/ML
5 INJECTION, SOLUTION INTRAVENOUS ONCE AS NEEDED
Status: DISCONTINUED | OUTPATIENT
Start: 2021-10-25 | End: 2021-10-25 | Stop reason: HOSPADM

## 2021-10-25 RX ORDER — ONDANSETRON 2 MG/ML
INJECTION INTRAMUSCULAR; INTRAVENOUS AS NEEDED
Status: DISCONTINUED | OUTPATIENT
Start: 2021-10-25 | End: 2021-10-25 | Stop reason: SURG

## 2021-10-25 RX ORDER — PREGABALIN 75 MG/1
150 CAPSULE ORAL ONCE
Status: COMPLETED | OUTPATIENT
Start: 2021-10-25 | End: 2021-10-25

## 2021-10-25 RX ORDER — DIPHENHYDRAMINE HCL 25 MG
25 CAPSULE ORAL
Status: DISCONTINUED | OUTPATIENT
Start: 2021-10-25 | End: 2021-10-25 | Stop reason: HOSPADM

## 2021-10-25 RX ORDER — FAMOTIDINE 10 MG/ML
20 INJECTION, SOLUTION INTRAVENOUS ONCE
Status: COMPLETED | OUTPATIENT
Start: 2021-10-25 | End: 2021-10-25

## 2021-10-25 RX ORDER — PROMETHAZINE HYDROCHLORIDE 25 MG/1
25 TABLET ORAL ONCE AS NEEDED
Status: DISCONTINUED | OUTPATIENT
Start: 2021-10-25 | End: 2021-10-25 | Stop reason: HOSPADM

## 2021-10-25 RX ORDER — HYDROCODONE BITARTRATE AND ACETAMINOPHEN 7.5; 325 MG/1; MG/1
2 TABLET ORAL EVERY 4 HOURS PRN
Status: DISCONTINUED | OUTPATIENT
Start: 2021-10-25 | End: 2021-10-26 | Stop reason: HOSPADM

## 2021-10-25 RX ORDER — HYDROCODONE BITARTRATE AND ACETAMINOPHEN 7.5; 325 MG/1; MG/1
1 TABLET ORAL EVERY 4 HOURS PRN
Status: DISCONTINUED | OUTPATIENT
Start: 2021-10-25 | End: 2021-10-26 | Stop reason: HOSPADM

## 2021-10-25 RX ORDER — FAMOTIDINE 20 MG/1
20 TABLET, FILM COATED ORAL NIGHTLY
Status: DISCONTINUED | OUTPATIENT
Start: 2021-10-25 | End: 2021-10-26 | Stop reason: HOSPADM

## 2021-10-25 RX ORDER — MIDAZOLAM HYDROCHLORIDE 1 MG/ML
0.5 INJECTION INTRAMUSCULAR; INTRAVENOUS
Status: DISCONTINUED | OUTPATIENT
Start: 2021-10-25 | End: 2021-10-25 | Stop reason: HOSPADM

## 2021-10-25 RX ORDER — MELOXICAM 15 MG/1
15 TABLET ORAL ONCE
Status: COMPLETED | OUTPATIENT
Start: 2021-10-25 | End: 2021-10-25

## 2021-10-25 RX ORDER — MELOXICAM 15 MG/1
15 TABLET ORAL DAILY
Status: DISCONTINUED | OUTPATIENT
Start: 2021-10-26 | End: 2021-10-26 | Stop reason: HOSPADM

## 2021-10-25 RX ORDER — OXYCODONE AND ACETAMINOPHEN 10; 325 MG/1; MG/1
1 TABLET ORAL EVERY 4 HOURS PRN
Status: DISCONTINUED | OUTPATIENT
Start: 2021-10-25 | End: 2021-10-25 | Stop reason: HOSPADM

## 2021-10-25 RX ORDER — GLYCOPYRROLATE 0.2 MG/ML
INJECTION INTRAMUSCULAR; INTRAVENOUS AS NEEDED
Status: DISCONTINUED | OUTPATIENT
Start: 2021-10-25 | End: 2021-10-25 | Stop reason: SURG

## 2021-10-25 RX ORDER — FENTANYL CITRATE 50 UG/ML
INJECTION, SOLUTION INTRAMUSCULAR; INTRAVENOUS AS NEEDED
Status: DISCONTINUED | OUTPATIENT
Start: 2021-10-25 | End: 2021-10-25 | Stop reason: SURG

## 2021-10-25 RX ORDER — ACETAMINOPHEN 325 MG/1
325 TABLET ORAL EVERY 4 HOURS PRN
Status: DISCONTINUED | OUTPATIENT
Start: 2021-10-25 | End: 2021-10-26 | Stop reason: HOSPADM

## 2021-10-25 RX ORDER — LOSARTAN POTASSIUM 100 MG/1
100 TABLET ORAL DAILY
Status: DISCONTINUED | OUTPATIENT
Start: 2021-10-26 | End: 2021-10-26 | Stop reason: HOSPADM

## 2021-10-25 RX ORDER — PROMETHAZINE HYDROCHLORIDE 25 MG/1
25 SUPPOSITORY RECTAL ONCE AS NEEDED
Status: DISCONTINUED | OUTPATIENT
Start: 2021-10-25 | End: 2021-10-25 | Stop reason: HOSPADM

## 2021-10-25 RX ORDER — ONDANSETRON 2 MG/ML
4 INJECTION INTRAMUSCULAR; INTRAVENOUS ONCE AS NEEDED
Status: DISCONTINUED | OUTPATIENT
Start: 2021-10-25 | End: 2021-10-25 | Stop reason: HOSPADM

## 2021-10-25 RX ORDER — ONDANSETRON 4 MG/1
4 TABLET, FILM COATED ORAL EVERY 6 HOURS PRN
Status: DISCONTINUED | OUTPATIENT
Start: 2021-10-25 | End: 2021-10-26 | Stop reason: HOSPADM

## 2021-10-25 RX ORDER — SODIUM CHLORIDE, SODIUM LACTATE, POTASSIUM CHLORIDE, CALCIUM CHLORIDE 600; 310; 30; 20 MG/100ML; MG/100ML; MG/100ML; MG/100ML
100 INJECTION, SOLUTION INTRAVENOUS CONTINUOUS
Status: DISCONTINUED | OUTPATIENT
Start: 2021-10-25 | End: 2021-10-26 | Stop reason: HOSPADM

## 2021-10-25 RX ORDER — BISACODYL 5 MG/1
10 TABLET, DELAYED RELEASE ORAL DAILY PRN
Status: DISCONTINUED | OUTPATIENT
Start: 2021-10-25 | End: 2021-10-26 | Stop reason: HOSPADM

## 2021-10-25 RX ORDER — SODIUM CHLORIDE, SODIUM LACTATE, POTASSIUM CHLORIDE, CALCIUM CHLORIDE 600; 310; 30; 20 MG/100ML; MG/100ML; MG/100ML; MG/100ML
9 INJECTION, SOLUTION INTRAVENOUS CONTINUOUS
Status: DISCONTINUED | OUTPATIENT
Start: 2021-10-25 | End: 2021-10-25

## 2021-10-25 RX ORDER — HYDROMORPHONE HYDROCHLORIDE 1 MG/ML
0.5 INJECTION, SOLUTION INTRAMUSCULAR; INTRAVENOUS; SUBCUTANEOUS
Status: DISCONTINUED | OUTPATIENT
Start: 2021-10-25 | End: 2021-10-26 | Stop reason: HOSPADM

## 2021-10-25 RX ORDER — HYDROMORPHONE HCL 110MG/55ML
PATIENT CONTROLLED ANALGESIA SYRINGE INTRAVENOUS AS NEEDED
Status: DISCONTINUED | OUTPATIENT
Start: 2021-10-25 | End: 2021-10-25 | Stop reason: SURG

## 2021-10-25 RX ORDER — ONDANSETRON 2 MG/ML
4 INJECTION INTRAMUSCULAR; INTRAVENOUS EVERY 6 HOURS PRN
Status: DISCONTINUED | OUTPATIENT
Start: 2021-10-25 | End: 2021-10-26 | Stop reason: HOSPADM

## 2021-10-25 RX ORDER — CEFAZOLIN SODIUM 2 G/100ML
2 INJECTION, SOLUTION INTRAVENOUS EVERY 8 HOURS
Status: COMPLETED | OUTPATIENT
Start: 2021-10-25 | End: 2021-10-26

## 2021-10-25 RX ORDER — LIDOCAINE HYDROCHLORIDE 20 MG/ML
INJECTION, SOLUTION INFILTRATION; PERINEURAL AS NEEDED
Status: DISCONTINUED | OUTPATIENT
Start: 2021-10-25 | End: 2021-10-25 | Stop reason: SURG

## 2021-10-25 RX ORDER — LIDOCAINE HYDROCHLORIDE 10 MG/ML
0.5 INJECTION, SOLUTION EPIDURAL; INFILTRATION; INTRACAUDAL; PERINEURAL ONCE AS NEEDED
Status: DISCONTINUED | OUTPATIENT
Start: 2021-10-25 | End: 2021-10-25 | Stop reason: HOSPADM

## 2021-10-25 RX ORDER — FLUMAZENIL 0.1 MG/ML
0.2 INJECTION INTRAVENOUS AS NEEDED
Status: DISCONTINUED | OUTPATIENT
Start: 2021-10-25 | End: 2021-10-25 | Stop reason: HOSPADM

## 2021-10-25 RX ORDER — PHENYLEPHRINE HYDROCHLORIDE 10 MG/ML
INJECTION INTRAVENOUS AS NEEDED
Status: DISCONTINUED | OUTPATIENT
Start: 2021-10-25 | End: 2021-10-25 | Stop reason: SURG

## 2021-10-25 RX ADMIN — HYDROMORPHONE HYDROCHLORIDE 0.5 MG: 1 INJECTION, SOLUTION INTRAMUSCULAR; INTRAVENOUS; SUBCUTANEOUS at 14:51

## 2021-10-25 RX ADMIN — ACETAMINOPHEN 1000 MG: 500 TABLET, FILM COATED ORAL at 11:55

## 2021-10-25 RX ADMIN — MELOXICAM 15 MG: 15 TABLET ORAL at 11:55

## 2021-10-25 RX ADMIN — HYDROMORPHONE HYDROCHLORIDE 0.25 MG: 2 INJECTION, SOLUTION INTRAMUSCULAR; INTRAVENOUS; SUBCUTANEOUS at 13:48

## 2021-10-25 RX ADMIN — FENTANYL CITRATE 50 MCG: 50 INJECTION INTRAMUSCULAR; INTRAVENOUS at 14:44

## 2021-10-25 RX ADMIN — MUPIROCIN 1 APPLICATION: 20 OINTMENT TOPICAL at 21:44

## 2021-10-25 RX ADMIN — GLYCOPYRROLATE 0.4 MG: 0.2 INJECTION INTRAMUSCULAR; INTRAVENOUS at 14:06

## 2021-10-25 RX ADMIN — HYDROMORPHONE HYDROCHLORIDE 0.5 MG: 1 INJECTION, SOLUTION INTRAMUSCULAR; INTRAVENOUS; SUBCUTANEOUS at 15:28

## 2021-10-25 RX ADMIN — PROPOFOL 200 MG: 10 INJECTION, EMULSION INTRAVENOUS at 12:44

## 2021-10-25 RX ADMIN — ROCURONIUM BROMIDE 10 MG: 50 INJECTION INTRAVENOUS at 13:21

## 2021-10-25 RX ADMIN — Medication 10 MG: at 16:06

## 2021-10-25 RX ADMIN — PHENYLEPHRINE HYDROCHLORIDE 100 MCG: 10 INJECTION, SOLUTION INTRAVENOUS at 13:57

## 2021-10-25 RX ADMIN — FENTANYL CITRATE 50 MCG: 50 INJECTION INTRAMUSCULAR; INTRAVENOUS at 15:27

## 2021-10-25 RX ADMIN — EPHEDRINE SULFATE 10 MG: 50 INJECTION INTRAVENOUS at 13:23

## 2021-10-25 RX ADMIN — CEFAZOLIN SODIUM 2 G: 2 INJECTION, SOLUTION INTRAVENOUS at 12:32

## 2021-10-25 RX ADMIN — ROCURONIUM BROMIDE 40 MG: 50 INJECTION INTRAVENOUS at 12:44

## 2021-10-25 RX ADMIN — HYDROMORPHONE HYDROCHLORIDE 0.25 MG: 2 INJECTION, SOLUTION INTRAMUSCULAR; INTRAVENOUS; SUBCUTANEOUS at 14:13

## 2021-10-25 RX ADMIN — ROCURONIUM BROMIDE 10 MG: 50 INJECTION INTRAVENOUS at 13:50

## 2021-10-25 RX ADMIN — FENTANYL CITRATE 50 MCG: 0.05 INJECTION, SOLUTION INTRAMUSCULAR; INTRAVENOUS at 12:44

## 2021-10-25 RX ADMIN — EPHEDRINE SULFATE 10 MG: 50 INJECTION INTRAVENOUS at 13:21

## 2021-10-25 RX ADMIN — HYDROMORPHONE HYDROCHLORIDE 0.5 MG: 1 INJECTION, SOLUTION INTRAMUSCULAR; INTRAVENOUS; SUBCUTANEOUS at 14:58

## 2021-10-25 RX ADMIN — DOCUSATE SODIUM 100 MG: 100 CAPSULE, LIQUID FILLED ORAL at 21:44

## 2021-10-25 RX ADMIN — ONDANSETRON 4 MG: 2 INJECTION INTRAMUSCULAR; INTRAVENOUS at 19:55

## 2021-10-25 RX ADMIN — ONDANSETRON 4 MG: 2 INJECTION INTRAMUSCULAR; INTRAVENOUS at 14:02

## 2021-10-25 RX ADMIN — PREGABALIN 150 MG: 75 CAPSULE ORAL at 11:55

## 2021-10-25 RX ADMIN — MIDAZOLAM 0.5 MG: 1 INJECTION INTRAMUSCULAR; INTRAVENOUS at 12:19

## 2021-10-25 RX ADMIN — LIDOCAINE HYDROCHLORIDE 80 MG: 20 INJECTION, SOLUTION INFILTRATION; PERINEURAL at 12:44

## 2021-10-25 RX ADMIN — FENTANYL CITRATE 50 MCG: 0.05 INJECTION, SOLUTION INTRAMUSCULAR; INTRAVENOUS at 13:21

## 2021-10-25 RX ADMIN — FENTANYL CITRATE 50 MCG: 50 INJECTION INTRAMUSCULAR; INTRAVENOUS at 15:08

## 2021-10-25 RX ADMIN — FAMOTIDINE 20 MG: 20 TABLET, FILM COATED ORAL at 21:44

## 2021-10-25 RX ADMIN — Medication 10 MG: at 15:51

## 2021-10-25 RX ADMIN — DEXAMETHASONE SODIUM PHOSPHATE 4 MG: 10 INJECTION INTRAMUSCULAR; INTRAVENOUS at 12:50

## 2021-10-25 RX ADMIN — NEOSTIGMINE METHYLSULFATE 3 MG: 0.5 INJECTION INTRAVENOUS at 14:06

## 2021-10-25 RX ADMIN — SODIUM CHLORIDE, POTASSIUM CHLORIDE, SODIUM LACTATE AND CALCIUM CHLORIDE 9 ML/HR: 600; 310; 30; 20 INJECTION, SOLUTION INTRAVENOUS at 12:19

## 2021-10-25 RX ADMIN — FENTANYL CITRATE 50 MCG: 50 INJECTION INTRAMUSCULAR; INTRAVENOUS at 14:58

## 2021-10-25 RX ADMIN — CEFAZOLIN SODIUM 2 G: 2 INJECTION, SOLUTION INTRAVENOUS at 21:44

## 2021-10-25 RX ADMIN — HYDROMORPHONE HYDROCHLORIDE 0.5 MG: 1 INJECTION, SOLUTION INTRAMUSCULAR; INTRAVENOUS; SUBCUTANEOUS at 15:10

## 2021-10-25 RX ADMIN — SODIUM CHLORIDE, POTASSIUM CHLORIDE, SODIUM LACTATE AND CALCIUM CHLORIDE 9 ML/HR: 600; 310; 30; 20 INJECTION, SOLUTION INTRAVENOUS at 15:11

## 2021-10-25 RX ADMIN — FAMOTIDINE 20 MG: 10 INJECTION INTRAVENOUS at 12:19

## 2021-10-25 NOTE — ANESTHESIA PROCEDURE NOTES
Airway  Urgency: elective    Date/Time: 10/25/2021 12:47 PM  Airway not difficult    General Information and Staff    Patient location during procedure: OR  Anesthesiologist: Олег Hutton MD  CRNA: Alea Canseco CRNA    Indications and Patient Condition  Indications for airway management: airway protection    Preoxygenated: yes  MILS not maintained throughout  Mask difficulty assessment: 2 - vent by mask + OA or adjuvant +/- NMBA    Final Airway Details  Final airway type: endotracheal airway      Successful airway: ETT  Cuffed: yes   Successful intubation technique: direct laryngoscopy  Facilitating devices/methods: intubating stylet  Endotracheal tube insertion site: oral  Blade: Huey  Blade size: 3  ETT size (mm): 7.0  Cormack-Lehane Classification: grade I - full view of glottis  Placement verified by: chest auscultation and capnometry   Measured from: lips  ETT/EBT  to lips (cm): 21  Number of attempts at approach: 1  Assessment: lips, teeth, and gum same as pre-op and atraumatic intubation

## 2021-10-25 NOTE — ANESTHESIA POSTPROCEDURE EVALUATION
"Patient: Juany Carlin    Procedure Summary     Date: 10/25/21 Room / Location: [facility] OR [address] [place] [address] MAIN OR    Anesthesia Start: 1238 Anesthesia Stop: 1443    Procedure: Posterior RIGHT TOTAL HIP ARTHROPLASTY GAVIN NAVIGATION (Right Hip) Diagnosis:       Arthritis of right hip      (Arthritis of right hip [M16.11])    Surgeons: Rashawn Hernández MD Provider: Олег Hutton MD    Anesthesia Type: general ASA Status: 3          Anesthesia Type: general    Vitals  Vitals Value Taken Time   /86 10/25/21 1621   Temp 36.4 °C (97.5 °F) 10/25/21 1436   Pulse 62 10/25/21 1632   Resp 16 10/25/21 1550   SpO2 94 % 10/25/21 1632   Vitals shown include unvalidated device data.        Post Anesthesia Care and Evaluation    Patient location during evaluation: bedside  Patient participation: complete - patient participated  Level of consciousness: awake and alert  Pain management: adequate  Airway patency: patent  Anesthetic complications: No anesthetic complications    Cardiovascular status: acceptable  Respiratory status: acceptable  Hydration status: acceptable    Comments: /86   Pulse 67   Temp 36.4 °C (97.5 °F) (Oral)   Resp 16   Ht 175.3 cm (69\")   Wt 75.9 kg (167 lb 5.3 oz)   SpO2 100%   BMI 24.71 kg/m²       "

## 2021-10-25 NOTE — ANESTHESIA PREPROCEDURE EVALUATION
Anesthesia Evaluation     Patient summary reviewed and Nursing notes reviewed   history of anesthetic complications: PONV  NPO Solid Status: > 8 hours  NPO Liquid Status: > 2 hours           Airway   Mallampati: II  Neck ROM: full  No difficulty expected  Dental - normal exam     Pulmonary     breath sounds clear to auscultation  (+) pulmonary embolism,   Cardiovascular     Rhythm: regular    (+) hypertension, DVT,       Neuro/Psych  (+) headaches, psychiatric history Depression,     GI/Hepatic/Renal/Endo    (+)  GERD,      Musculoskeletal     Abdominal    Substance History      OB/GYN          Other   arthritis,                      Anesthesia Plan    ASA 3     general   (On chronic anticoagulation for history of PE)  intravenous induction     Anesthetic plan, all risks, benefits, and alternatives have been provided, discussed and informed consent has been obtained with: patient.

## 2021-10-26 ENCOUNTER — TRANSCRIBE ORDERS (OUTPATIENT)
Dept: HOME HEALTH SERVICES | Facility: HOME HEALTHCARE | Age: 71
End: 2021-10-26

## 2021-10-26 ENCOUNTER — HOME HEALTH ADMISSION (OUTPATIENT)
Dept: HOME HEALTH SERVICES | Facility: HOME HEALTHCARE | Age: 71
End: 2021-10-26

## 2021-10-26 ENCOUNTER — READMISSION MANAGEMENT (OUTPATIENT)
Dept: CALL CENTER | Facility: HOSPITAL | Age: 71
End: 2021-10-26

## 2021-10-26 VITALS
DIASTOLIC BLOOD PRESSURE: 58 MMHG | HEART RATE: 88 BPM | BODY MASS INDEX: 24.98 KG/M2 | RESPIRATION RATE: 16 BRPM | WEIGHT: 168.65 LBS | OXYGEN SATURATION: 99 % | SYSTOLIC BLOOD PRESSURE: 101 MMHG | TEMPERATURE: 97.1 F | HEIGHT: 69 IN

## 2021-10-26 DIAGNOSIS — Z96.641 S/P TOTAL RIGHT HIP ARTHROPLASTY: Primary | ICD-10-CM

## 2021-10-26 LAB
HCT VFR BLD AUTO: 33.5 % (ref 34–46.6)
HGB BLD-MCNC: 10.7 G/DL (ref 12–15.9)

## 2021-10-26 PROCEDURE — 63710000001 MELOXICAM 15 MG TABLET: Performed by: NURSE PRACTITIONER

## 2021-10-26 PROCEDURE — 63710000001 APIXABAN 2.5 MG TABLET: Performed by: NURSE PRACTITIONER

## 2021-10-26 PROCEDURE — G0378 HOSPITAL OBSERVATION PER HR: HCPCS

## 2021-10-26 PROCEDURE — 63710000001 LOSARTAN 100 MG TABLET: Performed by: NURSE PRACTITIONER

## 2021-10-26 PROCEDURE — 0 CEFAZOLIN IN DEXTROSE 2-4 GM/100ML-% SOLUTION: Performed by: NURSE PRACTITIONER

## 2021-10-26 PROCEDURE — 85014 HEMATOCRIT: CPT | Performed by: NURSE PRACTITIONER

## 2021-10-26 PROCEDURE — 97110 THERAPEUTIC EXERCISES: CPT

## 2021-10-26 PROCEDURE — 63710000001 MUPIROCIN 2 % OINTMENT: Performed by: NURSE PRACTITIONER

## 2021-10-26 PROCEDURE — A9270 NON-COVERED ITEM OR SERVICE: HCPCS | Performed by: NURSE PRACTITIONER

## 2021-10-26 PROCEDURE — 99024 POSTOP FOLLOW-UP VISIT: CPT | Performed by: NURSE PRACTITIONER

## 2021-10-26 PROCEDURE — 63710000001 DOCUSATE SODIUM 100 MG CAPSULE: Performed by: NURSE PRACTITIONER

## 2021-10-26 PROCEDURE — 63710000001 POLYETHYLENE GLYCOL 17 G PACK: Performed by: NURSE PRACTITIONER

## 2021-10-26 PROCEDURE — 97162 PT EVAL MOD COMPLEX 30 MIN: CPT

## 2021-10-26 PROCEDURE — 63710000001 HYDROCODONE-ACETAMINOPHEN 7.5-325 MG TABLET: Performed by: NURSE PRACTITIONER

## 2021-10-26 PROCEDURE — 97535 SELF CARE MNGMENT TRAINING: CPT

## 2021-10-26 PROCEDURE — 97166 OT EVAL MOD COMPLEX 45 MIN: CPT

## 2021-10-26 PROCEDURE — 85018 HEMOGLOBIN: CPT | Performed by: NURSE PRACTITIONER

## 2021-10-26 RX ORDER — HYDROCODONE BITARTRATE AND ACETAMINOPHEN 7.5; 325 MG/1; MG/1
TABLET ORAL
Qty: 42 TABLET | Refills: 0 | Status: SHIPPED | OUTPATIENT
Start: 2021-10-26 | End: 2021-10-29 | Stop reason: SDUPTHER

## 2021-10-26 RX ORDER — DOCUSATE SODIUM 100 MG/1
100 CAPSULE, LIQUID FILLED ORAL 2 TIMES DAILY
Qty: 60 CAPSULE | Refills: 0 | Status: SHIPPED | OUTPATIENT
Start: 2021-10-26 | End: 2021-11-25

## 2021-10-26 RX ORDER — ONDANSETRON 4 MG/1
4 TABLET, FILM COATED ORAL EVERY 6 HOURS PRN
Qty: 10 TABLET | Refills: 0 | Status: SHIPPED | OUTPATIENT
Start: 2021-10-26 | End: 2022-04-18

## 2021-10-26 RX ADMIN — LOSARTAN POTASSIUM 100 MG: 100 TABLET, FILM COATED ORAL at 08:52

## 2021-10-26 RX ADMIN — MUPIROCIN 1 APPLICATION: 20 OINTMENT TOPICAL at 08:52

## 2021-10-26 RX ADMIN — CEFAZOLIN SODIUM 2 G: 2 INJECTION, SOLUTION INTRAVENOUS at 03:55

## 2021-10-26 RX ADMIN — MELOXICAM 15 MG: 15 TABLET ORAL at 08:52

## 2021-10-26 RX ADMIN — DOCUSATE SODIUM 100 MG: 100 CAPSULE, LIQUID FILLED ORAL at 08:52

## 2021-10-26 RX ADMIN — HYDROCODONE BITARTRATE AND ACETAMINOPHEN 1 TABLET: 7.5; 325 TABLET ORAL at 08:52

## 2021-10-26 RX ADMIN — APIXABAN 2.5 MG: 2.5 TABLET, FILM COATED ORAL at 08:52

## 2021-10-26 RX ADMIN — POLYETHYLENE GLYCOL 3350 17 G: 17 POWDER, FOR SOLUTION ORAL at 08:52

## 2021-10-26 RX ADMIN — HYDROCODONE BITARTRATE AND ACETAMINOPHEN 1 TABLET: 7.5; 325 TABLET ORAL at 02:50

## 2021-10-26 NOTE — OUTREACH NOTE
Prep Survey      Responses   [facility] patient discharged from? [place]   Is LACE score < 7 ? Yes   Emergency Room discharge w/ pulse ox? No   Eligibility [facility]   Date of Admission 10/25/21   Date of Discharge 10/26/21   Discharge Disposition Home or Self Care   Discharge diagnosis TOTAL HIP ARTHROPLASTY   Does the patient have one of the following disease processes/diagnoses(primary or secondary)? Total Joint Replacement   Does the patient have Home health ordered? Yes   What is the Home health agency?   [facility]   Is there a DME ordered? No   Prep survey completed? Yes          Kathryn Douglas RN

## 2021-10-27 ENCOUNTER — HOME CARE VISIT (OUTPATIENT)
Dept: HOME HEALTH SERVICES | Facility: HOME HEALTHCARE | Age: 71
End: 2021-10-27

## 2021-10-27 ENCOUNTER — TRANSITIONAL CARE MANAGEMENT TELEPHONE ENCOUNTER (OUTPATIENT)
Dept: CALL CENTER | Facility: HOSPITAL | Age: 71
End: 2021-10-27

## 2021-10-27 VITALS
TEMPERATURE: 97.9 F | OXYGEN SATURATION: 97 % | HEART RATE: 85 BPM | SYSTOLIC BLOOD PRESSURE: 126 MMHG | DIASTOLIC BLOOD PRESSURE: 68 MMHG

## 2021-10-27 PROCEDURE — G0151 HHCP-SERV OF PT,EA 15 MIN: HCPCS

## 2021-10-27 NOTE — OUTREACH NOTE
Call Center TCM Note      Responses   [facility] patient discharged from? [place]   Does the patient have one of the following disease processes/diagnoses(primary or secondary)? Total Joint Replacement   Joint surgery performed? Hip   TCM attempt successful? Yes   Discharge diagnosis TOTAL HIP ARTHROPLASTY   Does the patient have all medications related to this admission filled (includes all antibiotics, pain medications, etc.) Yes   Is the patient taking all medications as directed (includes completed medication regime)? Yes   Is the patient able to teach back alternate methods of pain control? Ice,  Reposition,  Correct alignment,  Knee-elevation/no pillow under knee,  Short, frequent activity   Does the patient have a follow up appointment with their surgeon? Yes   Has the patient kept scheduled appointments due by today? Yes   What is the Home health agency?   [facility]   Has home health visited the patient within 72 hours of discharge? Yes   Home health comments [facility] started today   Has all DME been delivered? Yes   Psychosocial issues? No   Does the patient have a wound vac in place? N/A   Has the patient fallen since discharge? No   Did the patient receive a copy of their discharge instructions? Yes   Nursing interventions Reviewed instructions with patient   What is the patient's perception of their functional status since discharge? Improving   Is the patient able to teach back signs and symptoms of infection? Temp >100.4 for 24h or longer,  Blisters around incision,  Severe discomfort or pain,  Shortness of breath or chest pain,  Changes in mobility,  Increased swelling or redness around incision (not associated with surgical edema),  Incisional drainage   Is the patient able to teach back how to prevent infection? Check incision daily,  Shower only as directed by surgeon,  No lotion or creams,  Monitor blood sugar if diabetic,  Wash hands before and after touching incision,  Keep incision covered if  drainage,  Eat well-balanced diet,  No tub baths, hot tub or swimming,  Keep incision covered if pets in house   Is the patient able to teach back signs and symptoms of DVT? Redness in calf,  Swelling in calf,  Area hot to touch,  Shortness of breath or chest pain   Is the patient able to teach back home safety measures? Ability to shower,  Accessibility to necessary areas in home,  Modifications to reach items,  Modifications with ADLs such as dressing, cooking, toileting   Did the patient implement home safety suggestions from pre-surgery classes if attended? N/A   If the patient is a current smoker, are they able to teach back resources for cessation? Not a smoker   Is the patient/caregiver able to teach back the hierarchy of who to call/visit for symptoms/problems? PCP, Specialist, Home health nurse, Urgent Care, ED, 911 Yes   TCM call completed? Yes   Wrap up additional comments Pt doing fair s/p THR LT. Pt better today but had a great deal of pain and difficulty getting comfortable in bed, and did not rest well. Pt did discuss w/HH RN today. I suggested if she continues with this issue to call srgn before wkend for instruction. All meds in place. No other concerns. POST OP is 11/10/2021. PCP Dr Pizarro has no appts avail in TCM time frame if ofc can review sched & call pt for TCM FWP to be completed by 11/09/2021.          Myrna Mancini MA    10/27/2021, 14:28 EDT

## 2021-10-29 ENCOUNTER — HOME CARE VISIT (OUTPATIENT)
Dept: HOME HEALTH SERVICES | Facility: HOME HEALTHCARE | Age: 71
End: 2021-10-29

## 2021-10-29 ENCOUNTER — TELEPHONE (OUTPATIENT)
Dept: ORTHOPEDIC SURGERY | Facility: CLINIC | Age: 71
End: 2021-10-29

## 2021-10-29 DIAGNOSIS — M16.11 ARTHRITIS OF RIGHT HIP: ICD-10-CM

## 2021-10-29 PROCEDURE — G0151 HHCP-SERV OF PT,EA 15 MIN: HCPCS

## 2021-10-29 RX ORDER — HYDROCODONE BITARTRATE AND ACETAMINOPHEN 7.5; 325 MG/1; MG/1
TABLET ORAL
Qty: 42 TABLET | Refills: 0 | Status: SHIPPED | OUTPATIENT
Start: 2021-10-29 | End: 2021-11-10

## 2021-10-29 RX ORDER — HYDROCODONE BITARTRATE AND ACETAMINOPHEN 7.5; 325 MG/1; MG/1
TABLET ORAL
Qty: 42 TABLET | Refills: 0 | Status: SHIPPED | OUTPATIENT
Start: 2021-10-29 | End: 2021-10-29 | Stop reason: SDUPTHER

## 2021-10-29 NOTE — TELEPHONE ENCOUNTER
Caller: OMAR ALEJO    Relationship: SELF    Medication requested (name and dosage):   HYDROcodone-acetaminophen (NORCO) 7.5-325 MG per tablet    Pharmacy where request should be sent: [address] AT [facility] & ([place]) - [phone]  - [phone] FX    Additional details provided by patient: PATIENT IS POST OP FROM 10/25/2021.    Best call back number: [phone]    Does the patient have less than a 3 day supply:  [x] Yes  [] No    Abdias Corado Rep   10/29/21 12:08 EDT

## 2021-11-01 ENCOUNTER — HOME CARE VISIT (OUTPATIENT)
Dept: HOME HEALTH SERVICES | Facility: HOME HEALTHCARE | Age: 71
End: 2021-11-01

## 2021-11-01 ENCOUNTER — TELEPHONE (OUTPATIENT)
Dept: ORTHOPEDIC SURGERY | Facility: CLINIC | Age: 71
End: 2021-11-01

## 2021-11-01 VITALS — TEMPERATURE: 98.1 F | OXYGEN SATURATION: 99 % | HEART RATE: 86 BPM

## 2021-11-01 DIAGNOSIS — M16.11 ARTHRITIS OF RIGHT HIP: Primary | ICD-10-CM

## 2021-11-01 RX ORDER — TRAMADOL HYDROCHLORIDE 50 MG/1
TABLET ORAL
Qty: 42 TABLET | Refills: 0 | Status: SHIPPED | OUTPATIENT
Start: 2021-11-01 | End: 2021-11-10 | Stop reason: SDUPTHER

## 2021-11-01 NOTE — HOME HEALTH
Arrived to find patient in her recliner stating that she felt she twisted her operated leg this am.  She has been sleeping in the chair due to the difficulty getting her operated into the bed.    Her hip integrity looked good and no signs of injury. Bandage intact and clean.  No edema noted distally.     Plan for next visit  1.  Progress gait as tolerated,   2. Attempt steps if patient is willing.   3.  Progress HEP to standing exercises.   4.  Pain/edema management teaching as needed.

## 2021-11-01 NOTE — TELEPHONE ENCOUNTER
Patient reports that since Friday she has had headache, intermittent n/v, and chills. No fever. Her pain in the hip is well controlled. No drainage from the incision- dressing is c/d/i. She is ambulating well. She thinks symptoms may be related to the hydrocodone. We discussed that the n/v could potentially be caused by the hydrocodone but with chills/headache it could be something viral. Instructed to continue tylenol for headache/pain and will switch to tramadol to see if that helps. If headache/chills persist recommend she see her PCP for viral testing if indicate and treatment.

## 2021-11-01 NOTE — TELEPHONE ENCOUNTER
Provider: DR. BLOOD    Caller: PATIENT    Relationship to Patient: SELF      Phone Number:  [phone]    Reason for Call: PT. STATES THAT SHE HAD S RIGHT TOTAL HIP REPLACEMENT WITH DR. BLOOD ON 10/29/21.   SHE WAS FEELING PRETTY GOOD, BUT THEN STARTED THIS WEEKEND FEELING CHILLS, NO FEVER, NAUSEA AND VOMITING, TERRIBLE HEADACHE.   SHE IS NOT SURE IF IT IS RELATED TO HER PAIN MEDICINE-HYDRCODONE. SHE STATES SHE DOES NOT HAVE COVID OR FLU SYMPTOMS.   STATES THAT SHE CAN TASTE AND SMELL AND DOES NOT HAVE FEVER.   ASKING IF DR. BLOOD OR HIS ASSISTANT WILL CALL HER TO ADVISE.

## 2021-11-02 ENCOUNTER — TELEPHONE (OUTPATIENT)
Dept: INTERNAL MEDICINE | Facility: CLINIC | Age: 71
End: 2021-11-02

## 2021-11-02 NOTE — TELEPHONE ENCOUNTER
Caller: Juany Carlin    Relationship: Self    Best call back number: [phone]     What is the best time to reach you: ANYTIME    Who are you requesting to speak with (clinical staff, provider,  specific staff member): MA OR DOCTOR     What was the call regarding: PATIENT STATES SHE HAD HIP REPLACEMENT 10/25 AND SINCE THEN SHE HAS HAD CHILLS, HEADACHE AND NAUSEA AND NO APPETITE. PATIENT STATES SHE IS NOT ABLE TO MOVE. PATIENT IS WONDERING WHAT CAN BE DONE.      Do you require a callback: YES

## 2021-11-02 NOTE — TELEPHONE ENCOUNTER
Called patient and she advised me she is not feeling to well with the nausea headache chills and no appetite. I advised her to call her surgeons office and then if she still doesn't feel better to call 911.

## 2021-11-05 ENCOUNTER — HOME CARE VISIT (OUTPATIENT)
Dept: HOME HEALTH SERVICES | Facility: HOME HEALTHCARE | Age: 71
End: 2021-11-05

## 2021-11-05 PROCEDURE — G0151 HHCP-SERV OF PT,EA 15 MIN: HCPCS

## 2021-11-08 ENCOUNTER — HOME CARE VISIT (OUTPATIENT)
Dept: HOME HEALTH SERVICES | Facility: HOME HEALTHCARE | Age: 71
End: 2021-11-08

## 2021-11-08 VITALS
OXYGEN SATURATION: 98 % | HEART RATE: 86 BPM | DIASTOLIC BLOOD PRESSURE: 86 MMHG | TEMPERATURE: 98.2 F | SYSTOLIC BLOOD PRESSURE: 150 MMHG

## 2021-11-08 PROCEDURE — G0151 HHCP-SERV OF PT,EA 15 MIN: HCPCS

## 2021-11-08 PROCEDURE — G0180 MD CERTIFICATION HHA PATIENT: HCPCS | Performed by: ORTHOPAEDIC SURGERY

## 2021-11-08 NOTE — HOME HEALTH
Patient complained of having a lot of pain yesterday, but better today.  She is taking only 1-2 Tylenol every 4 hrs.  Her bandage is intact and no drainage noted.      Plan for next visit  1.  Progress her standing HEP  2.  Progress gait duration  3.  Pain and edema management teaching  4.  Possible stair navigation.

## 2021-11-09 ENCOUNTER — HOME CARE VISIT (OUTPATIENT)
Dept: HOME HEALTH SERVICES | Facility: HOME HEALTHCARE | Age: 71
End: 2021-11-09

## 2021-11-09 VITALS
HEART RATE: 74 BPM | SYSTOLIC BLOOD PRESSURE: 150 MMHG | DIASTOLIC BLOOD PRESSURE: 90 MMHG | TEMPERATURE: 97.7 F | OXYGEN SATURATION: 99 %

## 2021-11-09 NOTE — HOME HEALTH
Patient stated her hip hurt yesterday but better today.  Pain at 3/10 today.  She also complained of feeling like she her right leg is longer when she walks.  She did admit that she has been told in the past that she had scoliosis.  I checked her in supine and in standing and indeed she appears to be between 1/2 to 3/4 of an inch longer on the right leg.  This is most likely due to her scoliosis.  I put a 1/2 inch book under her left foot and had her stand unsupported and she immediately stated she felt more even and there was less pressure on the right hip.  She was advised to put an insert (she has one already) in her left shoe for now.  She is to discuss possible shoe lift or any other interventions down the road with outpt PT and with her surgeon if the discrepency remains.      Plan for next visit  1.  finalize home HEP and do dc assessment.

## 2021-11-10 ENCOUNTER — OFFICE VISIT (OUTPATIENT)
Dept: ORTHOPEDIC SURGERY | Facility: CLINIC | Age: 71
End: 2021-11-10

## 2021-11-10 VITALS — TEMPERATURE: 97.1 F | BODY MASS INDEX: 24.88 KG/M2 | WEIGHT: 168 LBS | HEIGHT: 69 IN

## 2021-11-10 DIAGNOSIS — R52 PAIN: ICD-10-CM

## 2021-11-10 DIAGNOSIS — M16.11 ARTHRITIS OF RIGHT HIP: ICD-10-CM

## 2021-11-10 DIAGNOSIS — Z96.641 STATUS POST TOTAL REPLACEMENT OF RIGHT HIP: Primary | ICD-10-CM

## 2021-11-10 PROCEDURE — 73502 X-RAY EXAM HIP UNI 2-3 VIEWS: CPT | Performed by: NURSE PRACTITIONER

## 2021-11-10 PROCEDURE — 99024 POSTOP FOLLOW-UP VISIT: CPT | Performed by: NURSE PRACTITIONER

## 2021-11-10 RX ORDER — TRAMADOL HYDROCHLORIDE 50 MG/1
TABLET ORAL
Qty: 42 TABLET | Refills: 0 | Status: SHIPPED | OUTPATIENT
Start: 2021-11-10 | End: 2022-04-18

## 2021-11-10 NOTE — PROGRESS NOTES
Juany Carlin : 1950 MRN: [id number] DATE: 11/10/2021  There is no height or weight on file to calculate BMI.  There were no vitals filed for this visit.      DIAGNOSIS: 2 week follow up right total hip     SUBJECTIVE:Patient returns today for 2 week follow up of right total hip replacement. She is doing well. She is ambulating with a walker today. She did not tolerate the hydrocodone so was switched to tramadol and is doing well with it. She has another session of HHPT and then wants to do outpatient at [facility]. She feels like she is somewhat behind on PT because she had a viral stomach illness for a few days so had to skip a few sessions. She also has concerns about her leg lengths but after discussion with her PT attributes some of that to her back.     OBJECTIVE:   Exam:. The incision is healing appropriately. No sign of infection. Range of motion is progressing as expected. The calf is soft and nontender with a negative Homans sign.    DIAGNOSTIC STUDIES  2V AP&Lat of the right hip were done in the office today  Indication, findings and comparison: images were reviewed for evaluation of recent hip replacement. They demonstrate a well positioned, well aligned hip replacement without complicating factors noted. In comparison with previous films there has been interval implant placement.    ASSESSMENT: 2 week status post right hip replacement expected healing.    PLAN: 1) Incision open to air.   2) Order given for PT and pain medicine (as needed)   3)Continue ice PRN   4) Continue EC Aspirin 81mg BID until 4 weeks out from surgery   5) Follow up in 4 weeks with repeat Xrays of right hip (2 views)   6) Wait for 3 months after surgery for routine teeth cleaning and continue with taking a dose of antibiotics before dental procedures for 2 yrs post total joint replacement.    Nicole Busch, APRN  11/10/2021

## 2021-11-11 ENCOUNTER — HOME CARE VISIT (OUTPATIENT)
Dept: HOME HEALTH SERVICES | Facility: HOME HEALTHCARE | Age: 71
End: 2021-11-11

## 2021-11-11 PROCEDURE — G0151 HHCP-SERV OF PT,EA 15 MIN: HCPCS

## 2021-11-12 VITALS
HEART RATE: 83 BPM | DIASTOLIC BLOOD PRESSURE: 80 MMHG | TEMPERATURE: 98.2 F | SYSTOLIC BLOOD PRESSURE: 136 MMHG | OXYGEN SATURATION: 95 %

## 2021-11-12 NOTE — HOME HEALTH
Good report from the orthopedic MD yesterday.  Her hip is healing well.  She has outpt PT referral pending.  She feels she is walking better and feeling like her right side is not as high as it was.  I still recommended she use an insert in her left shoe will her hip gets better.  Bandage was removed yesterday and her incsion is healed and fully granulated.

## 2021-11-16 ENCOUNTER — APPOINTMENT (OUTPATIENT)
Dept: PHYSICAL THERAPY | Facility: HOSPITAL | Age: 71
End: 2021-11-16

## 2021-11-18 ENCOUNTER — HOSPITAL ENCOUNTER (OUTPATIENT)
Dept: PHYSICAL THERAPY | Facility: HOSPITAL | Age: 71
Setting detail: THERAPIES SERIES
Discharge: HOME | End: 2021-11-18

## 2021-11-18 DIAGNOSIS — Z96.641 AFTERCARE FOLLOWING RIGHT HIP JOINT REPLACEMENT SURGERY: Primary | ICD-10-CM

## 2021-11-18 DIAGNOSIS — M25.551 RIGHT HIP PAIN: ICD-10-CM

## 2021-11-18 DIAGNOSIS — Z47.1 AFTERCARE FOLLOWING RIGHT HIP JOINT REPLACEMENT SURGERY: Primary | ICD-10-CM

## 2021-11-18 DIAGNOSIS — Z74.09 IMPAIRED MOBILITY: ICD-10-CM

## 2021-11-18 PROCEDURE — 97110 THERAPEUTIC EXERCISES: CPT | Performed by: PHYSICAL THERAPIST

## 2021-11-18 PROCEDURE — 97162 PT EVAL MOD COMPLEX 30 MIN: CPT | Performed by: PHYSICAL THERAPIST

## 2021-11-18 PROCEDURE — 97530 THERAPEUTIC ACTIVITIES: CPT | Performed by: PHYSICAL THERAPIST

## 2021-11-18 NOTE — THERAPY EVALUATION
Outpatient Physical Therapy Ortho Initial Evaluation  [facility]     Patient Name: Juany Carlin  : 1950  MRN: [id number]  Today's Date: 2021      Visit Date: 2021    Patient Active Problem List   Diagnosis   • HTN (hypertension), benign   • Vitamin D deficiency   • Urge incontinence   • GERD without esophagitis   • Cervical spinal stenosis   • Saddle pulmonary embolus (HCC)   • Family history of thrombosis   • Degeneration of cervical intervertebral disc   • Adrenal nodule (HCC)   • Health care maintenance   • Localized osteoarthritis of left shoulder   • Primary osteoarthritis of both knees   • Hypomagnesemia   • Migraine with aura and without status migrainosus, not intractable   • Snoring   • Arthritis of right hip   • Anticoagulant long-term use   • Hx pulmonary embolism   • History of DVT (deep vein thrombosis)        Past Medical History:   Diagnosis Date   • Acute cystitis without hematuria 04/10/2020   • Acute right ankle pain 2020   • Adrenal nodule (HCC)    • Arthritis    • BPPV (benign paroxysmal positional vertigo) 5/10/2016   • Cervical stenosis of spine    • Depression     NO MEDS   • DVT, lower extremity (HCC) 2016    right   • GERD without esophagitis 5/10/2016   • H/O varicose vein stripping    • Hip pain    • History of migraine    • History of skin cancer    • History of stress incontinence    • Hyperplastic colon polyp 2013   • Hypertension    • PONV (postoperative nausea and vomiting)    • Pregnancy     , miscarrige x1   • Pulmonary emboli (HCC) 2016    bilateral extensive PE with saddle emboli.Negative hypercoagulable state w/u   • Pulmonary embolism with acute cor pulmonale (HCC) 2017   • Shoulder pain    • Urge incontinence 5/10/2016   • Vitamin D deficiency 5/10/2016        Past Surgical History:   Procedure Laterality Date   • CARDIAC CATHETERIZATION N/A 11/10/2016    Procedure: Right Heart Cath;  Surgeon: Johnna Agrawal MD;  Location: [facility]  CATH INVASIVE LOCATION;  Service:    • CARDIAC CATHETERIZATION N/A 11/10/2016    Procedure: Thrombolytic Therapy;  Surgeon: Johnna Agrawal MD;  Location:  [facility] CATH INVASIVE LOCATION;  Service:    • CATARACT EXTRACTION Bilateral    • COLONOSCOPY W/ BIOPSIES  11/2013    hyperplastic polyp, Dr.Russel Lewis   • CYST REMOVAL      VAGINAL CYST   • DILATATION AND CURETTAGE     • EPIDURAL BLOCK     • HEMORROIDECTOMY  2013   • INTERVENTIONAL RADIOLOGY PROCEDURE Bilateral 11/10/2016    Procedure: Sp Smiley Cath Place Pulmonary Art;  Surgeon: Johnna Agrawal MD;  Location:  [facility] CATH INVASIVE LOCATION;  Service:    • SKIN CANCER EXCISION     • TONSILLECTOMY     • TOTAL HIP ARTHROPLASTY Right 10/25/2021    Procedure: Posterior RIGHT TOTAL HIP ARTHROPLASTY GAVIN NAVIGATION;  Surgeon: Rashawn Hernández MD;  Location: [facility] MAIN OR;  Service: Orthopedics;  Laterality: Right;   • TOTAL LAPAROSCOPIC HYSTERECTOMY  2001   • VEIN SURGERY Left [id number]    leg,    • VENTRAL HERNIA REPAIR  1987       Visit Dx:     ICD-10-CM ICD-9-CM   1. Aftercare following right hip joint replacement surgery  Z47.1 V54.81    Z96.641 V43.64   2. Impaired mobility  Z74.09 799.89   3. Right hip pain  M25.551 719.45          Patient History     Row Name 11/18/21 1500             History    Chief Complaint Difficulty Walking; Balance Problems; Difficulty with daily activities; Pain  -GJ      Type of Pain Hip pain; Lower Extremity / Leg  R  -GJ      Date Current Problem(s) Began --  20 year hx of R hip pain  -GJ      Brief Description of Current Complaint Ms. Carlin is a 72 y/o female. She is 3.5 weeks s/p R posterior approach SRIDHAR (10/25/2021).  She reports 20+ year hx of R hip pain prior to surgery, indicating prior to surgery she walked forward flexed secondary to pain.  She lives with her , daughter and granddaughter. Her  suffered from a TIA on 11/15/2021 and is now home. She reports she suffered from GI issues for 1-2 weeks  post op forcing her to cancel several HHPT visits.  She has stairs to enter the house as well as a flight to her bedroom.  -GJ      Previous treatment for THIS PROBLEM Rehabilitation; Medication; Surgery  -GJ      Surgery Date: 10/25/21  -GJ      Patient/Caregiver Goals Relieve pain; Improve mobility; Improve strength; Know what to do to help the symptoms  -GJ      Occupation/sports/leisure activities caring for , granddaugter. retired  -GJ      Are you or can you be pregnant No  -GJ              Pain     What Performance Factors Make the Current Problem(s) WORSE? walking, transfers, ,standing  -GJ              Fall Risk Assessment    Any falls in the past year: No  -GJ              Daily Activities    Primary Language English  -GJ      Are you able to read Yes  -GJ      Are you able to write Yes  -GJ      Teaching needs identified Home Exercise Program; Management of Condition  -GJ      Patient is concerned about/has problems with Climbing Stairs; Bed Mobility; Performing home management (household chores, shopping, care of dependents); Performing job responsibilities/community activities (work, school,; Performing sports, recreation, and play activities; Standing; Transfers (getting out of a chair, bed); Walking  -GJ      Barriers to learning None  -GJ      Pt Participated in POC and Goals Yes  -GJ            User Key  (r) = Recorded By, (t) = Taken By, (c) = Cosigned By    Initials Name Provider Type    GJ Walter Jenkins, PT Physical Therapist                 PT Ortho     Row Name 11/18/21 1500       Posture/Observations    Alignment Options Scoliosis; Iliac crests  -GJ    Scoliosis Mild; Moderate  -GJ    Iliac crests Right:; Elevated  -GJ    Observations Incision healing  -GJ       Special Tests/Palpation    Special Tests/Palpation Knee; Hip  -GJ       Hip/Thigh Palpation    Hip/Thigh Palpation? Yes  -GJ    Gluteus Medius Right:; Tender; Guarded/taut  -GJ    Piriformis Right:; Tender; Guarded/taut  -GJ     Biceps Formoris Right:; Guarded/taut  -GJ       Knee Special Tests    Diloln’s sign (DVT) Bilateral:; Negative  -GJ       General ROM    RT Lower Ext Rt Knee Extension/Flexion; Rt Hip Flexion  -GJ    LT Lower Ext Lt Knee Extension/Flexion  -GJ    GENERAL ROM COMMENTS BLE measure 93.5 cm from ASIS to medial malleolus i nsupine  -GJ       Right Lower Ext    Rt Hip Flexion PROM 90, pt very reluctant  -GJ    Rt Knee Extension/Flexion AROM 5-120  -GJ       Left Lower Ext    Lt Knee Extension/Flexion AROM   -GJ       MMT (Manual Muscle Testing)    Rt Lower Ext Rt Hip Flexion; Rt Hip ABduction; Rt Knee Extension; Rt Knee Flexion; Rt Ankle Dorsiflexion  -GJ    Lt Lower Ext Lt Hip Flexion; Lt Hip ABduction; Lt Knee Extension; Lt Knee Flexion; Lt Ankle Dorsiflexion  -GJ       MMT Right Lower Ext    Rt Hip Flexion MMT, Gross Movement (4/5) good  -GJ    Rt Hip ABduction MMT, Gross Movement (3-/5) fair minus  -GJ    Rt Knee Extension MMT, Gross Movement (4+/5) good plus  -GJ    Rt Knee Flexion MMT, Gross Movement (4+/5) good plus  -GJ    Rt Ankle Dorsiflexion MMT, Gross Movement (5/5) normal  -GJ       MMT Left Lower Ext    Lt Hip Flexion MMT, Gross Movement (4/5) good  -GJ    Lt Hip ABduction MMT, Gross Movement --  NT, pt relluctant to lie on R side  -GJ    Lt Knee Extension MMT, Gross Movement (5/5) normal  -GJ    Lt Knee Flexion MMT, Gross Movement (5/5) normal  -GJ    Lt Ankle Dorsiflexion MMT, Gross Movement (5/5) normal  -GJ       Flexibility    Flexibility Tested? Lower Extremity  -GJ       Lower Extremity Flexibility    Hamstrings Bilateral:; Moderately limited  -GJ    Hip Flexors Bilateral:; Moderately limited  -GJ    Quadriceps Bilateral:; Moderately limited  -GJ       Balance Skills Training    SLS unable  -GJ          User Key  (r) = Recorded By, (t) = Taken By, (c) = Cosigned By    Initials Name Provider Type    Walter Kaur, PT Physical Therapist                            Therapy  Education  Education Details: discussed dx, px, poc, discussed anatomy of the hip  and physiology of healing, discussed realistic expectations and time frames for therapy, discussed activity modifications, answered questions.  Given: HEP, Symptoms/condition management, Pain management, Posture/body mechanics, Mobility training  Program: New  How Provided: Verbal, Demonstration, Written  Provided to: Patient  Level of Understanding: Teach back education performed, Verbalized, Demonstrated      PT OP Goals     Row Name 11/18/21 1400          PT Short Term Goals    STG Date to Achieve 12/17/21  -GJ     STG 1 pt. to be I with initial HEP to facilitate self management of their condition  -GJ     STG 1 Progress New  -GJ     STG 2 pt. to be educated in/verbalize understanding of the importance of posture/ergonomics in association with their condition to facilitate self management of their condition  -GJ     STG 2 Progress New  -GJ     STG 3 pt to demonstrate STS x 5 n </= 18 seconds from standard chair, with/without UE's, to faciliate ease/safety with household mobility  -GJ     STG 3 Progress New  -GJ     STG 4 pt. to ambulate with near normal heel to toe gait pattern with SC to facilitate ease/safety with community mobility  -GJ     STG 4 Progress New  -GJ            Long Term Goals    LTG Date to Achieve 02/11/22  -GJ     LTG 1 pt. to be I with advanced HEP to facilitate self management of their condition  -GJ     LTG 1 Progress New  -GJ     LTG 2 pt. to report an LEFS >/= 60% to demonstrate decreased level of perceived disability  -GJ     LTG 2 Progress New  -GJ     LTG 3 pt. to ambulate without AD with near normal heel to toe gait pattern to facilitate ease/safety of community mobility  -GJ     LTG 3 Progress New  -GJ     LTG 4 pt. to ascend/descends stairs with reciprocal pattern (</= 1 rails) to facilitate ease/safety of household/community mobility  -GJ     LTG 4 Progress New  -GJ     LTG 5 pt to demonstrate R hip  abd MMt >/= 4/5 to facilitate ease/safety with community mobility  -            Time Calculation    PT Goal Re-Cert Due Date 02/11/22  -GJ           User Key  (r) = Recorded By, (t) = Taken By, (c) = Cosigned By    Initials Name Provider Type    Walter Kaur, PT Physical Therapist                 PT Assessment/Plan     Row Name 11/18/21 1501          PT Assessment    Functional Limitations Impaired gait; Limitation in home management; Limitations in community activities; Performance in leisure activities; Performance in self-care ADL; Limitations in functional capacity and performance  -     Impairments Balance; Endurance; Gait; Impaired flexibility; Impaired muscle endurance; Impaired muscle length; Impaired muscle power; Impaired postural alignment; Muscle strength; Pain; Poor body mechanics; Posture; Range of motion  -     Assessment Comments Ms. Carlin is a 70 y/o female. She is 3.5 weeks s/p R posterior approach SRIDHAR (10/25/2021).  She reports 20+ year hx of R hip pain prior to surgery, indicating prior to surgery she walked forward flexed secondary to pain.  She lives with her , daughter and granddaughter. Her  suffered from a TIA on 11/15/2021 and is now home. She reports she suffered from GI issues for 1-2 weeks post op forcing her to cancel several HHPT visits.  She has stairs to enter the house as well as a flight to her bedroom.  She is anxious about being behind schedule.  Ms. Carlin presents using a RW, altered gait pattern. She sits with RLE extended, is anxious about moving her RLE, and demonstrates mild to moderate scoliosis.  She demonstrates weakness of R hip in all planes, most notably hip abduction.  Of note, she does demonstrate L>R knee extension deficits.  Incision is healing well, no s/s of infection.  She does require extra time to complete sit to/from stand x 5.  Shee reports an LEFS score of 33% scored 0-100, 100% represents no perceived disability.  Ms. Carlin  demonstrates evolving s/s consistent following above procedure which limits her participation in household, community, leisure activities.    Aggravating/Personal factors affecting recovery include,  but are not limited to, chronicity of hip pain prior to surgery, illness post operatively, being care give to .  Ms. Carlin may benefit from skilled physical therapy to address the above impairments.  -     Please refer to paper survey for additional self-reported information Yes  -GJ     Rehab Potential Excellent  -GJ     Patient/caregiver participated in establishment of treatment plan and goals Yes  -GJ     Patient would benefit from skilled therapy intervention Yes  -GJ            PT Plan    PT Frequency 2x/week; 3x/week  -GJ     Predicted Duration of Therapy Intervention (PT) 10 visits  -GJ     Planned CPT's? PT EVAL LOW COMPLEXITY: 09729; PT RE-EVAL: 08609; PT THER PROC EA 15 MIN: 38413; PT THER ACT EA 15 MIN: 49087; PT MANUAL THERAPY EA 15 MIN: 93785; PT NEUROMUSC RE-EDUCATION EA 15 MIN: 60262; PT GAIT TRAINING EA 15 MIN: 89075; PT AQUATIC THERAPY EA 15 MIN: 77153; PT HOT OR COLD PACK TREAT MCARE  -GJ     PT Plan Comments warm up on Nustep, ? gentle mini squats (pt. very hesitant/anxisous about movement), gentle lateral stepping, HS curl (either seated/standing), standing marching,  -           User Key  (r) = Recorded By, (t) = Taken By, (c) = Cosigned By    Initials Name Provider Type     Walter Jenkins, PT Physical Therapist                   OP Exercises     Row Name 11/18/21 1500 11/18/21 1400          Total Minutes    37951 - PT Therapeutic Exercise Minutes -- 15  -GJ     18151 - PT Therapeutic Activity Minutes -- 10  educatioin  -GJ            Exercise 1    Exercise Name 1 HR  -GJ --     Cueing 1 Verbal; Demo  -GJ --     Reps 1 15  -GJ --            Exercise 2    Exercise Name 2 prone hip ext isometric (toe in ground, tight glue/quad to raise knee off ground)  -GJ --     Cueing 2 Verbal; Demo   -GJ --     Reps 2 15  -GJ --     Time 2 5s  -GJ --            Exercise 3    Exercise Name 3 HL hip abd  -GJ --     Cueing 3 Verbal; Demo  -GJ --     Reps 3 15  -GJ --     Time 3 3s  -GJ --     Additional Comments RTB  -GJ --            Exercise 4    Exercise Name 4 HL hip add  -GJ --     Cueing 4 Verbal; Demo  -GJ --     Reps 4 15  -GJ --     Time 4 3s  -GJ --     Additional Comments ball  -GJ --            Exercise 5    Exercise Name 5 bridge  -GJ --     Cueing 5 Verbal; Demo  -GJ --     Reps 5 15  -GJ --     Time 5 3s  -GJ --            Exercise 6    Exercise Name 6 marlee marlee  -GJ --     Cueing 6 Verbal; Demo  -GJ --     Reps 6 2  -GJ --     Time 6 2min  -GJ --            Exercise 7    Exercise Name 7 prone lying  -GJ --     Cueing 7 Verbal; Demo  -GJ --     Time 7 2 min  -GJ --           User Key  (r) = Recorded By, (t) = Taken By, (c) = Cosigned By    Initials Name Provider Type    Walter Kaur, PT Physical Therapist                              Outcome Measure Options: 5x Sit to Stand  5 Times Sit to Stand  5 Times Sit to Stand (seconds): 32 seconds  5 Times Sit to Stand Comments: definite need for UEs, minimal weight through RLE  Lower Extremity Functional Index  Any of your usual work, housework or school activities: Quite a bit of difficulty (33%)  Your usual hobbies, recreational or sporting activities: Quite a bit of difficulty  Getting into or out of the bath: A little bit of difficulty  Walking between rooms: A little bit of difficulty  Putting on your shoes or socks: A little bit of difficulty  Squatting: Quite a bit of difficulty  Lifting an object, like a bag of groceries from the floor: Quite a bit of difficulty  Performing light activities around your home: Moderate difficulty  Performing heavy activities around your home: Extreme difficulty or unable to perform activity  Getting into or out of a car: A little bit of difficulty  Walking 2 blocks: Quite a bit of difficulty  Walking a mile:  Extreme difficulty or unable to perform activity  Going up or down 10 stairs (about 1 flight of stairs): Moderate difficulty  Standing for 1 hour: Quite a bit of difficulty  Sitting for 1 hour: A little bit of difficulty  Running on even ground: Extreme difficulty or unable to perform activity  Running on uneven ground: Extreme difficulty or unable to perform activity  Making sharp turns while running fast: Extreme difficulty or unable to perform activity  Hopping: Extreme difficulty or unable to perform activity  Rolling over in bed: Moderate difficulty  Total: 27      Time Calculation:     Start Time: 1450  Stop Time: 1535  Time Calculation (min): 45 min  Timed Charges  85349 - PT Therapeutic Exercise Minutes: 15  64705 - PT Therapeutic Activity Minutes: 10 (educatioin)  Total Minutes  Timed Charges Total Minutes: 25   Total Minutes: 25     Therapy Charges for Today     Code Description Service Date Service Provider Modifiers Qty    95805180242 HC PT THER PROC EA 15 MIN 11/18/2021 Walter Jenkins, PT GP 1    28501336518 HC PT THERAPEUTIC ACT EA 15 MIN 11/18/2021 Walter Jenkins, PT GP 1    [id number]  PT EVAL MOD COMPLEXITY 1 11/18/2021 Walter Jenkins, PT GP 1          PT G-Codes  Outcome Measure Options: 5x Sit to Stand  Total: 27         Walter Jenkins, PT  11/18/2021

## 2021-11-24 ENCOUNTER — HOSPITAL ENCOUNTER (OUTPATIENT)
Dept: PHYSICAL THERAPY | Facility: HOSPITAL | Age: 71
Setting detail: THERAPIES SERIES
Discharge: HOME | End: 2021-11-24

## 2021-11-24 DIAGNOSIS — Z74.09 IMPAIRED MOBILITY: ICD-10-CM

## 2021-11-24 DIAGNOSIS — M25.551 RIGHT HIP PAIN: ICD-10-CM

## 2021-11-24 DIAGNOSIS — Z47.1 AFTERCARE FOLLOWING RIGHT HIP JOINT REPLACEMENT SURGERY: Primary | ICD-10-CM

## 2021-11-24 DIAGNOSIS — Z96.641 AFTERCARE FOLLOWING RIGHT HIP JOINT REPLACEMENT SURGERY: Primary | ICD-10-CM

## 2021-11-24 PROCEDURE — 97530 THERAPEUTIC ACTIVITIES: CPT | Performed by: PHYSICAL THERAPIST

## 2021-11-24 NOTE — THERAPY TREATMENT NOTE
Outpatient Physical Therapy Ortho Treatment Note  [facility]     Patient Name: Juany Carlin  : 1950  MRN: [id number]  Today's Date: 2021      Visit Date: 2021    Visit Dx:    ICD-10-CM ICD-9-CM   1. Aftercare following right hip joint replacement surgery  Z47.1 V54.81    Z96.641 V43.64   2. Impaired mobility  Z74.09 799.89   3. Right hip pain  M25.551 719.45       Patient Active Problem List   Diagnosis   • HTN (hypertension), benign   • Vitamin D deficiency   • Urge incontinence   • GERD without esophagitis   • Cervical spinal stenosis   • Saddle pulmonary embolus (HCC)   • Family history of thrombosis   • Degeneration of cervical intervertebral disc   • Adrenal nodule (HCC)   • Health care maintenance   • Localized osteoarthritis of left shoulder   • Primary osteoarthritis of both knees   • Hypomagnesemia   • Migraine with aura and without status migrainosus, not intractable   • Snoring   • Arthritis of right hip   • Anticoagulant long-term use   • Hx pulmonary embolism   • History of DVT (deep vein thrombosis)        Past Medical History:   Diagnosis Date   • Acute cystitis without hematuria 04/10/2020   • Acute right ankle pain 2020   • Adrenal nodule (HCC)    • Arthritis    • BPPV (benign paroxysmal positional vertigo) 5/10/2016   • Cervical stenosis of spine    • Depression     NO MEDS   • DVT, lower extremity (HCC) 2016    right   • GERD without esophagitis 5/10/2016   • H/O varicose vein stripping    • Hip pain    • History of migraine    • History of skin cancer    • History of stress incontinence    • Hyperplastic colon polyp 2013   • Hypertension    • PONV (postoperative nausea and vomiting)    • Pregnancy     , miscarrige x1   • Pulmonary emboli ([facility]) 2016    bilateral extensive PE with saddle emboli.Negative hypercoagulable state w/u   • Pulmonary embolism with acute cor pulmonale (HCC) 2017   • Shoulder pain    • Urge incontinence 5/10/2016   • Vitamin D  deficiency 5/10/2016        Past Surgical History:   Procedure Laterality Date   • CARDIAC CATHETERIZATION N/A 11/10/2016    Procedure: Right Heart Cath;  Surgeon: Johnna Agrawal MD;  Location:  [facility];  Service:    • CARDIAC CATHETERIZATION N/A 11/10/2016    Procedure: Thrombolytic Therapy;  Surgeon: Johnna Agrawal MD;  Location:  [facility];  Service:    • CATARACT EXTRACTION Bilateral    • COLONOSCOPY W/ BIOPSIES  11/2013    hyperplastic polyp, Dr.Russel Lewis   • CYST REMOVAL      VAGINAL CYST   • DILATATION AND CURETTAGE     • EPIDURAL BLOCK     • HEMORROIDECTOMY  2013   • INTERVENTIONAL RADIOLOGY PROCEDURE Bilateral 11/10/2016    Procedure: Sp Smiley Cath Place Pulmonary Art;  Surgeon: Johnna Agrawal MD;  Location:  [facility];  Service:    • SKIN CANCER EXCISION     • TONSILLECTOMY     • TOTAL HIP ARTHROPLASTY Right 10/25/2021    Procedure: Posterior RIGHT TOTAL HIP ARTHROPLASTY GAVIN NAVIGATION;  Surgeon: Rashawn Hernández MD;  Location: [facility] MAIN OR;  Service: Orthopedics;  Laterality: Right;   • TOTAL LAPAROSCOPIC HYSTERECTOMY  2001   • VEIN SURGERY Left [id number]    leg,    • VENTRAL HERNIA REPAIR  1987                        PT Assessment/Plan     Row Name 11/24/21 0833          PT Assessment    Assessment Comments Ms. Carlin returns for first f/u after initial evaluation s/p R posterior approach THR 10/25/21. Tolerated progression of therapeutic exercises in clinic today with no acute adverse reactions. Initiated gait training in // bars. Ambulating with RW currently. Does have right knee pain due to muscle imbalance limiting progress. Updated HEP with handouts from [employer].  -SC            PT Plan    PT Plan Comments standing MIP, hamstring stretch, gait training, progress SRIDHAR protocol, SL clams  -SC           User Key  (r) = Recorded By, (t) = Taken By, (c) = Cosigned By    Initials Name Provider Type    Lulu Esteban  PT Physical Therapist                   OP Exercises     Row Name 11/24/21 0841             Subjective Comments    Subjective Comments I feel safer with the walker. I don't want to use the cane unless I need to. I feel a little behind with everything that happened but no pain. Just tender at the incision.  -SC              Subjective Pain    Able to rate subjective pain? yes  -SC      Pre-Treatment Pain Level 0  -SC              Total Minutes    24698 - PT Therapeutic Activity Minutes 39  -SC              Exercise 1    Exercise Name 1 HR  -SC      Cueing 1 Verbal; Demo  -SC      Reps 1 15  -SC      Additional Comments fingertips  -SC              Exercise 2    Exercise Name 2 prone hip ext isometric (toe in ground, tight glue/quad to raise knee off ground)  -SC      Cueing 2 Verbal; Demo  -SC      Reps 2 15  -SC      Time 2 5s  -SC              Exercise 3    Exercise Name 3 HL hip abd  -SC      Cueing 3 Verbal; Demo  -SC      Reps 3 15  -SC      Time 3 3s  -SC      Additional Comments RTB  -SC              Exercise 4    Exercise Name 4 HL hip add  -SC      Cueing 4 Verbal; Demo  -SC      Reps 4 15  -SC      Time 4 3s  -SC      Additional Comments ball  -SC              Exercise 5    Exercise Name 5 bridge  -SC      Cueing 5 Verbal; Demo  -SC      Reps 5 15  -SC      Time 5 3s  -SC      Additional Comments with RTB  -SC              Exercise 6    Exercise Name 6 --  -SC      Cueing 6 --  -SC      Reps 6 --  -SC      Time 6 --  -SC              Exercise 7    Exercise Name 7 prone lying  -SC      Cueing 7 Verbal; Demo  -SC      Time 7 2 min  -SC              Exercise 8    Exercise Name 8 nustep  -SC      Reps 8 5 minutes  -SC      Additional Comments L4 UE/LE  -SC              Exercise 9    Exercise Name 9 walking in /// bars  -SC      Reps 9 5 laps  -SC      Additional Comments B hands, left hand only  -SC              Exercise 10    Exercise Name 10 standing HS curl R  -SC      Sets 10 1  -SC      Reps 10 15  -SC               Exercise 11    Exercise Name 11 lateral walk  -SC      Time 11 3 laps at barre  -SC              Exercise 12    Exercise Name 12 mini squat  -SC      Reps 12 1  -SC      Time 12 10  -SC              Exercise 13    Exercise Name 13 LAQ  -SC      Sets 13 2  -SC      Reps 13 10  -SC      Additional Comments R  -SC            User Key  (r) = Recorded By, (t) = Taken By, (c) = Cosigned By    Initials Name Provider Type    SC Lulu Lubin, PT Physical Therapist                              PT OP Goals     Row Name 11/24/21 0833          PT Short Term Goals    STG Date to Achieve 12/17/21  -SC     STG 1 pt. to be I with initial HEP to facilitate self management of their condition  -SC     STG 1 Progress Progressing  -SC     STG 1 Progress Comments compliant with initial HEP, provided updated HEP and handouts from [facility]  -SC     STG 2 pt. to be educated in/verbalize understanding of the importance of posture/ergonomics in association with their condition to facilitate self management of their condition  -SC     STG 2 Progress Progressing  -SC     STG 2 Progress Comments reviewed bed mobility and gait  techniques today  -SC     STG 3 pt to demonstrate STS x 5 n </= 18 seconds from standard chair, with/without UE's, to faciliate ease/safety with household mobility  -SC     STG 3 Progress Ongoing  -SC     STG 3 Progress Comments initiated mini squats today to assist  -SC     STG 4 pt. to ambulate with near normal heel to toe gait pattern with SC to facilitate ease/safety with community mobility  -SC     STG 4 Progress Ongoing  -SC     STG 4 Progress Comments initiated gait training in // bars today  -SC            Long Term Goals    LTG Date to Achieve 02/11/22  -SC     LTG 1 pt. to be I with advanced HEP to facilitate self management of their condition  -SC     LTG 1 Progress Ongoing  -SC     LTG 2 pt. to report an LEFS >/= 60% to demonstrate decreased level of perceived disability  -SC     LTG 2  Progress Ongoing  -SC     LTG 3 pt. to ambulate without AD with near normal heel to toe gait pattern to facilitate ease/safety of community mobility  -SC     LTG 3 Progress [facility]     LTG 4 pt. to ascend/descends stairs with reciprocal pattern (</= 1 rails) to facilitate ease/safety of household/community mobility  -SC     LTG 4 Progress [facility]     LTG 5 pt to demonstrate R hip abd MMt >/= 4/5 to facilitate ease/safety with community mobility  -[facility] 5 Progress [facility]            Time Calculation    PT Goal Re-Cert Due Date 02/11/22  -SC           User Key  (r) = Recorded By, (t) = Taken By, (c) = Cosigned By    Initials Name Provider Type    SC Lulu Lubin, PT Physical Therapist                Therapy Education  Education Details: updated HEP with handouts from [employer], reviewed bed mobility, prognosis, gait mechanics with AD  Given: HEP, Symptoms/condition management, Pain management, Mobility training, Other (comment)  Program: Progressed, Modified  How Provided: Verbal, Demonstration, Written  Provided to: Patient  Level of Understanding: Teach back education performed, Verbalized, Demonstrated              Time Calculation:   Start Time: 0833  Stop Time: 0912  Time Calculation (min): 39 min  Timed Charges  15296 - PT Therapeutic Activity Minutes: 39  Total Minutes  Timed Charges Total Minutes: 39   Total Minutes: 39  Therapy Charges for Today     Code Description Service Date Service Provider Modifiers Qty    94506542849  PT THERAPEUTIC ACT EA 15 MIN 11/24/2021 Lulu Lubin PT GP 3                    Lulu Dan PT  11/24/2021

## 2021-12-01 ENCOUNTER — HOSPITAL ENCOUNTER (OUTPATIENT)
Dept: PHYSICAL THERAPY | Facility: HOSPITAL | Age: 71
Setting detail: THERAPIES SERIES
Discharge: HOME | End: 2021-12-01

## 2021-12-01 DIAGNOSIS — Z96.641 AFTERCARE FOLLOWING RIGHT HIP JOINT REPLACEMENT SURGERY: Primary | ICD-10-CM

## 2021-12-01 DIAGNOSIS — Z47.1 AFTERCARE FOLLOWING RIGHT HIP JOINT REPLACEMENT SURGERY: Primary | ICD-10-CM

## 2021-12-01 DIAGNOSIS — Z74.09 IMPAIRED MOBILITY: ICD-10-CM

## 2021-12-01 DIAGNOSIS — M25.551 RIGHT HIP PAIN: ICD-10-CM

## 2021-12-01 PROCEDURE — 97110 THERAPEUTIC EXERCISES: CPT

## 2021-12-01 NOTE — THERAPY TREATMENT NOTE
Outpatient Physical Therapy Ortho Treatment Note  [facility]     Patient Name: Juany Carlin  : 1950  MRN: [id number]  Today's Date: 2021      Visit Date: 2021    Visit Dx:    ICD-10-CM ICD-9-CM   1. Aftercare following right hip joint replacement surgery  Z47.1 V54.81    Z96.641 V43.64   2. Impaired mobility  Z74.09 799.89   3. Right hip pain  M25.551 719.45       Patient Active Problem List   Diagnosis   • HTN (hypertension), benign   • Vitamin D deficiency   • Urge incontinence   • GERD without esophagitis   • Cervical spinal stenosis   • Saddle pulmonary embolus (HCC)   • Family history of thrombosis   • Degeneration of cervical intervertebral disc   • Adrenal nodule (HCC)   • Health care maintenance   • Localized osteoarthritis of left shoulder   • Primary osteoarthritis of both knees   • Hypomagnesemia   • Migraine with aura and without status migrainosus, not intractable   • Snoring   • Arthritis of right hip   • Anticoagulant long-term use   • Hx pulmonary embolism   • History of DVT (deep vein thrombosis)        Past Medical History:   Diagnosis Date   • Acute cystitis without hematuria 04/10/2020   • Acute right ankle pain 2020   • Adrenal nodule (HCC)    • Arthritis    • BPPV (benign paroxysmal positional vertigo) 5/10/2016   • Cervical stenosis of spine    • Depression     NO MEDS   • DVT, lower extremity (HCC) 2016    right   • GERD without esophagitis 5/10/2016   • H/O varicose vein stripping    • Hip pain    • History of migraine    • History of skin cancer    • History of stress incontinence    • Hyperplastic colon polyp 2013   • Hypertension    • PONV (postoperative nausea and vomiting)    • Pregnancy     , miscarrige x1   • Pulmonary emboli ([facility]) 2016    bilateral extensive PE with saddle emboli.Negative hypercoagulable state w/u   • Pulmonary embolism with acute cor pulmonale (HCC) 2017   • Shoulder pain    • Urge incontinence 5/10/2016   • Vitamin D  deficiency 5/10/2016        Past Surgical History:   Procedure Laterality Date   • CARDIAC CATHETERIZATION N/A 11/10/2016    Procedure: Right Heart Cath;  Surgeon: Johnna Agrawal MD;  Location:  [facility];  Service:    • CARDIAC CATHETERIZATION N/A 11/10/2016    Procedure: Thrombolytic Therapy;  Surgeon: Johnna Agrawal MD;  Location:  [facility];  Service:    • CATARACT EXTRACTION Bilateral    • COLONOSCOPY W/ BIOPSIES  11/2013    hyperplastic polyp, Dr.Russel Lewis   • CYST REMOVAL      VAGINAL CYST   • DILATATION AND CURETTAGE     • EPIDURAL BLOCK     • HEMORROIDECTOMY  2013   • INTERVENTIONAL RADIOLOGY PROCEDURE Bilateral 11/10/2016    Procedure: Sp Smiley Cath Place Pulmonary Art;  Surgeon: Johnna Agrawal MD;  Location:  [facility];  Service:    • SKIN CANCER EXCISION     • TONSILLECTOMY     • TOTAL HIP ARTHROPLASTY Right 10/25/2021    Procedure: Posterior RIGHT TOTAL HIP ARTHROPLASTY GAVIN NAVIGATION;  Surgeon: Rashawn Hernández MD;  Location: [facility] MAIN OR;  Service: Orthopedics;  Laterality: Right;   • TOTAL LAPAROSCOPIC HYSTERECTOMY  2001   • VEIN SURGERY Left [id number]    leg,    • VENTRAL HERNIA REPAIR  1987                        PT Assessment/Plan     Row Name 12/01/21 1100          PT Assessment    Assessment Comments Ms. Carlin returns today reporting improvements in pain since start of therapy. Does demo significant lateral hip weakness, thus added s/l hip abd to address. Pt reports donning socks independently at home, and thus review posterior hip precautions, especially since pts reports pain when donning socks and instructed pt to continue to observe posterior hip precautions until cleared by surgeon. She verbed good understanding.  -CC            PT Plan    PT Plan Comments s/l clams? standing hip abd  -CC           User Key  (r) = Recorded By, (t) = Taken By, (c) = Cosigned By    Initials Name Provider Type    BLANKA Abel  "Jeri, PT Physical Therapist                   OP Exercises     Row Name 12/01/21 1000             Subjective Comments    Subjective Comments \"Pain is doing much better. I reached down to put my sock on to day because I got impatient waiting for my  and had some pain in the back of my hip but it's gone now\".  -CC              Subjective Pain    Able to rate subjective pain? yes  -CC      Subjective Pain Comment \"very slight\"  -CC              Total Minutes    15623 - PT Therapeutic Exercise Minutes 38  -CC              Exercise 1    Exercise Name 1 HR  -CC      Cueing 1 Verbal; Demo  -CC      Reps 1 20  -CC      Additional Comments fingertips  -CC              Exercise 2    Exercise Name 2 prone hip ext isometric (toe in ground, tight glue/quad to raise knee off ground)  -CC      Cueing 2 Verbal; Demo  -CC      Reps 2 15  -CC      Time 2 5s  -CC              Exercise 3    Exercise Name 3 HL hip abd  -CC      Cueing 3 Verbal; Demo  -CC      Sets 3 2  -CC      Reps 3 10  -CC      Time 3 3s  -CC      Additional Comments RTB  -CC              Exercise 4    Exercise Name 4 HL hip add  -CC      Cueing 4 Verbal; Demo  -CC      Reps 4 15  -CC      Time 4 3s  -CC      Additional Comments ball  -CC              Exercise 5    Exercise Name 5 bridge  -CC      Cueing 5 Verbal; Demo  -CC      Sets 5 2  -CC      Reps 5 10  -CC      Time 5 3s  -CC      Additional Comments RTB  -CC              Exercise 6    Exercise Name 6 s/l hip abd  -CC      Cueing 6 Verbal  -CC      Sets 6 1 R  -CC      Reps 6 10  -CC      Time 6 pillow under leg  -CC      Additional Comments small range  -CC              Exercise 7    Exercise Name 7 prone lying  -CC      Cueing 7 Verbal; Demo  -CC      Time 7 2 min  -CC              Exercise 8    Exercise Name 8 nustep  -CC      Reps 8 5 minutes  -CC      Additional Comments L5 UE/LE  -CC              Exercise 10    Exercise Name 10 standing HS curl R  -CC      Cueing 10 Verbal  -CC      Sets 10 2  " -CC      Reps 10 15  -CC      Time 10 2#  -CC              Exercise 11    Exercise Name 11 lateral walk  -CC      Cueing 11 Verbal  -CC      Reps 11 3 laps at barre  -              Exercise 12    Exercise Name 12 mini squat  -CC      Cueing 12 Verbal  -CC      Reps 12 2x10  -CC      Time 12 at barre  -      Additional Comments extensive cueing for posterior weight shift and to drive through LE/heels rather than pulling up with arms  -CC              Exercise 13    Exercise Name 13 LAQ  -CC      Sets 13 2 R  -CC      Reps 13 10  -CC      Time 13 2#  -CC              Exercise 14    Exercise Name 14 review of posterior hip precautions  -CC      Cueing 14 Verbal  -CC            User Key  (r) = Recorded By, (t) = Taken By, (c) = Cosigned By    Initials Name Provider Type    CC Jeri Abel, PT Physical Therapist                                                Time Calculation:   Start Time: 1043  Stop Time: 1121  Time Calculation (min): 38 min  Total Timed Code Minutes- PT: 38 minute(s)  Timed Charges  33251 - PT Therapeutic Exercise Minutes: 38  Total Minutes  Timed Charges Total Minutes: 38   Total Minutes: 38  Therapy Charges for Today     Code Description Service Date Service Provider Modifiers Qty    [id number] HC PT THER PROC EA 15 MIN 12/1/2021 Jeri Abel, PT GP 3                    Jeri Abel PT  12/1/2021

## 2021-12-03 ENCOUNTER — HOSPITAL ENCOUNTER (OUTPATIENT)
Dept: PHYSICAL THERAPY | Facility: HOSPITAL | Age: 71
Setting detail: THERAPIES SERIES
Discharge: HOME | End: 2021-12-03

## 2021-12-03 DIAGNOSIS — Z47.1 AFTERCARE FOLLOWING RIGHT HIP JOINT REPLACEMENT SURGERY: Primary | ICD-10-CM

## 2021-12-03 DIAGNOSIS — Z74.09 IMPAIRED MOBILITY: ICD-10-CM

## 2021-12-03 DIAGNOSIS — M25.551 RIGHT HIP PAIN: ICD-10-CM

## 2021-12-03 DIAGNOSIS — Z96.641 AFTERCARE FOLLOWING RIGHT HIP JOINT REPLACEMENT SURGERY: Primary | ICD-10-CM

## 2021-12-03 PROCEDURE — 97110 THERAPEUTIC EXERCISES: CPT | Performed by: PHYSICAL THERAPIST

## 2021-12-03 NOTE — THERAPY TREATMENT NOTE
Outpatient Physical Therapy Ortho Treatment Note  [facility]     Patient Name: Juany Carlin  : 1950  MRN: [id number]  Today's Date: 12/3/2021      Visit Date: 2021    Visit Dx:    ICD-10-CM ICD-9-CM   1. Aftercare following right hip joint replacement surgery  Z47.1 V54.81    Z96.641 V43.64   2. Impaired mobility  Z74.09 799.89   3. Right hip pain  M25.551 719.45       Patient Active Problem List   Diagnosis   • HTN (hypertension), benign   • Vitamin D deficiency   • Urge incontinence   • GERD without esophagitis   • Cervical spinal stenosis   • Saddle pulmonary embolus (HCC)   • Family history of thrombosis   • Degeneration of cervical intervertebral disc   • Adrenal nodule (HCC)   • Health care maintenance   • Localized osteoarthritis of left shoulder   • Primary osteoarthritis of both knees   • Hypomagnesemia   • Migraine with aura and without status migrainosus, not intractable   • Snoring   • Arthritis of right hip   • Anticoagulant long-term use   • Hx pulmonary embolism   • History of DVT (deep vein thrombosis)        Past Medical History:   Diagnosis Date   • Acute cystitis without hematuria 04/10/2020   • Acute right ankle pain 2020   • Adrenal nodule (HCC)    • Arthritis    • BPPV (benign paroxysmal positional vertigo) 5/10/2016   • Cervical stenosis of spine    • Depression     NO MEDS   • DVT, lower extremity (HCC) 2016    right   • GERD without esophagitis 5/10/2016   • H/O varicose vein stripping    • Hip pain    • History of migraine    • History of skin cancer    • History of stress incontinence    • Hyperplastic colon polyp 2013   • Hypertension    • PONV (postoperative nausea and vomiting)    • Pregnancy     , miscarrige x1   • Pulmonary emboli ([facility]) 2016    bilateral extensive PE with saddle emboli.Negative hypercoagulable state w/u   • Pulmonary embolism with acute cor pulmonale (HCC) 2017   • Shoulder pain    • Urge incontinence 5/10/2016   • Vitamin D  deficiency 5/10/2016        Past Surgical History:   Procedure Laterality Date   • CARDIAC CATHETERIZATION N/A 11/10/2016    Procedure: Right Heart Cath;  Surgeon: Johnna Agrawal MD;  Location:  [facility];  Service:    • CARDIAC CATHETERIZATION N/A 11/10/2016    Procedure: Thrombolytic Therapy;  Surgeon: Johnna Agrawal MD;  Location:  [facility];  Service:    • CATARACT EXTRACTION Bilateral    • COLONOSCOPY W/ BIOPSIES  11/2013    hyperplastic polyp, Dr.Russel Lewis   • CYST REMOVAL      VAGINAL CYST   • DILATATION AND CURETTAGE     • EPIDURAL BLOCK     • HEMORROIDECTOMY  2013   • INTERVENTIONAL RADIOLOGY PROCEDURE Bilateral 11/10/2016    Procedure: Sp [place] [facility] Pulmonary Art;  Surgeon: Johnna Agrawal MD;  Location:  [facility];  Service:    • SKIN CANCER EXCISION     • TONSILLECTOMY     • TOTAL HIP ARTHROPLASTY Right 10/25/2021    Procedure: Posterior RIGHT TOTAL HIP ARTHROPLASTY GAVIN NAVIGATION;  Surgeon: Rashawn Hernández MD;  Location:  [facility];  Service: Orthopedics;  Laterality: Right;   • TOTAL LAPAROSCOPIC HYSTERECTOMY  2001   • VEIN SURGERY Left [id number]    leg,    • VENTRAL HERNIA REPAIR  1987                        PT Assessment/Plan     Row Name 12/03/21 0900          PT Assessment    Assessment Comments Pt reports with steady improvements in pain and functional mobility with ambulation. LE strength deficits continue though patient tolerated additional walking without HHA along with introduction of 4 inch step ups with HHA in frontal and sagittal planes. Pt education of progression to cane as pt continues progression of LE strength and AROM at R hip. Pt remains appropriate for skilled PT.  -GT            PT Plan    PT Plan Comments standing hip abd on foam? add weight,  -GT           User Key  (r) = Recorded By, (t) = Taken By, (c) = Cosigned By    Initials Name Provider Type    Walter Smith, PT Physical Therapist                    OP Exercises     Row Name 12/03/21 0900             Subjective Comments    Subjective Comments Pt reports with continued improvements with pain.  -GT              Subjective Pain    Able to rate subjective pain? yes  -GT      Pre-Treatment Pain Level 2  -GT              Total Minutes    72214 - PT Therapeutic Exercise Minutes 40  -GT              Exercise 1    Exercise Name 1 HR  -GT      Cueing 1 Verbal; Demo  -GT      Reps 1 20  -GT      Additional Comments foam with finger tips  -GT              Exercise 2    Exercise Name 2 prone hip ext isometric (toe in ground, tight glue/quad to raise knee off ground)  -GT      Cueing 2 Verbal; Demo  -GT      Reps 2 15  -GT      Time 2 5s  -GT              Exercise 3    Exercise Name 3 HL hip abd  -GT      Cueing 3 Verbal; Demo  -GT      Sets 3 2  -GT      Reps 3 10  -GT      Time 3 3s  -GT      Additional Comments GTB  -GT              Exercise 4    Exercise Name 4 --  -GT      Cueing 4 --  -GT      Reps 4 --  -GT      Time 4 --  -GT      Additional Comments --  -GT              Exercise 5    Exercise Name 5 bridge  -GT      Cueing 5 Verbal; Demo  -GT      Sets 5 2  -GT      Reps 5 10  -GT      Time 5 3s  -GT      Additional Comments RTB  -GT              Exercise 6    Exercise Name 6 s/l hip abd  -GT      Cueing 6 Verbal  -GT      Sets 6 1 R  -GT      Reps 6 10  -GT      Time 6 pillow under leg  -GT              Exercise 7    Exercise Name 7 --  -GT      Cueing 7 --  -GT      Time 7 --  -GT              Exercise 8    Exercise Name 8 nustep  -GT      Reps 8 5 minutes  -GT      Additional Comments L5 UE/LE  -GT              Exercise 9    Exercise Name 9 walking in // without HHA  -GT      Reps 9 5 laps  -GT              Exercise 10    Exercise Name 10 standing HS curl R  -GT      Cueing 10 Verbal  -GT      Sets 10 2  -GT      Reps 10 15  -GT      Time 10 3#  -GT              Exercise 11    Exercise Name 11 lateral walk  -GT      Cueing 11 Verbal  -GT      Reps  11 3 laps at barre  -GT      Additional Comments YTB  -GT              Exercise 12    Exercise Name 12 mini squat  -GT      Cueing 12 Verbal  -GT      Reps 12 2x10  -GT      Time 12 at barre  -GT      Additional Comments extensive cueing for posterior weight shift and to drive through LE/heels rather than pulling up with arms  -GT              Exercise 13    Exercise Name 13 LAQ  -GT      Sets 13 2 R  -GT      Reps 13 10  -GT      Time 13 2#  -GT              Exercise 14    Exercise Name 14 4 inches forward and lateral step up  -GT      Cueing 14 Verbal; Demo  -GT      Reps 14 15 ea  -GT      Additional Comments 2 HHA  -GT            User Key  (r) = Recorded By, (t) = Taken By, (c) = Cosigned By    Initials Name Provider Type    GT Walter Oneill, PT Physical Therapist                              PT OP Goals     Row Name 12/03/21 0900          PT Short Term Goals    STG Date to Achieve 12/17/21  -GT     STG 1 pt. to be I with initial HEP to facilitate self management of their condition  -GT     STG 1 Progress Progressing  -GT     STG 2 pt. to be educated in/verbalize understanding of the importance of posture/ergonomics in association with their condition to facilitate self management of their condition  -GT     STG 2 Progress Progressing  -GT     STG 3 pt to demonstrate STS x 5 n </= 18 seconds from standard chair, with/without UE's, to faciliate ease/safety with household mobility  -GT     STG 3 Progress Ongoing  -GT     STG 4 pt. to ambulate with near normal heel to toe gait pattern with SC to facilitate ease/safety with community mobility  -GT     STG 4 Progress Ongoing  -GT            Long Term Goals    LTG Date to Achieve 02/11/22  -GT     LTG 1 pt. to be I with advanced HEP to facilitate self management of their condition  -GT     LTG 1 Progress Ongoing  -GT     LTG 2 pt. to report an LEFS >/= 60% to demonstrate decreased level of perceived disability  -GT     LTG 2 Progress Ongoing  -GT     LTG 3 pt. to  ambulate without AD with near normal heel to toe gait pattern to facilitate ease/safety of community mobility  -GT     LTG 3 Progress Ongoing  -GT     LTG 4 pt. to ascend/descends stairs with reciprocal pattern (</= 1 rails) to facilitate ease/safety of household/community mobility  -GT     LTG 4 Progress Ongoing  -GT     LTG 5 pt to demonstrate R hip abd MMt >/= 4/5 to facilitate ease/safety with community mobility  -GT     LTG 5 Progress Ongoing  -GT           User Key  (r) = Recorded By, (t) = Taken By, (c) = Cosigned By    Initials Name Provider Type    GT Walter Oneill, PT Physical Therapist                Therapy Education  Education Details: prognosis, gait mechanics with AD  Given: HEP, Symptoms/condition management, Pain management, Mobility training, Other (comment)  Program: Progressed, Modified  How Provided: Verbal, Demonstration, Written  Provided to: Patient  Level of Understanding: Teach back education performed, Verbalized, Demonstrated              Time Calculation:   Start Time: 0916  Stop Time: 0957  Time Calculation (min): 41 min  Timed Charges  82244 - PT Therapeutic Exercise Minutes: 40  Total Minutes  Timed Charges Total Minutes: 40   Total Minutes: 40  Therapy Charges for Today     Code Description Service Date Service Provider Modifiers Qty    89515086924 HC PT THER PROC EA 15 MIN 12/3/2021 Walter Oneill, PT GP 3                    Walter Oneill PT  12/3/2021

## 2021-12-08 ENCOUNTER — HOSPITAL ENCOUNTER (OUTPATIENT)
Dept: PHYSICAL THERAPY | Facility: HOSPITAL | Age: 71
Setting detail: THERAPIES SERIES
Discharge: HOME | End: 2021-12-08

## 2021-12-08 ENCOUNTER — OFFICE VISIT (OUTPATIENT)
Dept: ORTHOPEDIC SURGERY | Facility: CLINIC | Age: 71
End: 2021-12-08

## 2021-12-08 VITALS — HEIGHT: 69 IN | TEMPERATURE: 96.8 F | BODY MASS INDEX: 20.29 KG/M2 | WEIGHT: 137 LBS

## 2021-12-08 DIAGNOSIS — M25.551 RIGHT HIP PAIN: ICD-10-CM

## 2021-12-08 DIAGNOSIS — Z96.641 AFTERCARE FOLLOWING RIGHT HIP JOINT REPLACEMENT SURGERY: Primary | ICD-10-CM

## 2021-12-08 DIAGNOSIS — Z96.641 STATUS POST TOTAL REPLACEMENT OF RIGHT HIP: ICD-10-CM

## 2021-12-08 DIAGNOSIS — Z47.1 AFTERCARE FOLLOWING RIGHT HIP JOINT REPLACEMENT SURGERY: Primary | ICD-10-CM

## 2021-12-08 DIAGNOSIS — R52 PAIN: Primary | ICD-10-CM

## 2021-12-08 DIAGNOSIS — Z74.09 IMPAIRED MOBILITY: ICD-10-CM

## 2021-12-08 PROCEDURE — 97530 THERAPEUTIC ACTIVITIES: CPT | Performed by: PHYSICAL THERAPIST

## 2021-12-08 PROCEDURE — 99024 POSTOP FOLLOW-UP VISIT: CPT | Performed by: ORTHOPAEDIC SURGERY

## 2021-12-08 PROCEDURE — 73502 X-RAY EXAM HIP UNI 2-3 VIEWS: CPT | Performed by: ORTHOPAEDIC SURGERY

## 2021-12-08 RX ORDER — AMOXICILLIN 500 MG/1
CAPSULE ORAL
Qty: 20 CAPSULE | Refills: 1 | Status: SHIPPED | OUTPATIENT
Start: 2021-12-08 | End: 2022-06-22

## 2021-12-08 NOTE — PROGRESS NOTES
Patient is seen today 6 weeks after right total hip arthroplasty she is very satisfied and has no complaints.  Her house is across the [address] from [facility] unfortunately her daughter needs to go to rehab today and she is somewhat tearful concerning this.  Exam today shows she is can transfer ambulate and exhibit safe positions of the hip she is using a cane when she is out in the community but still doing physical therapy.  AP lateral right hip taken the office today for postop follow-up with comparison view shows a well aligned total hip arthroplasty with good leg lengths.  Plan gave her postop total joint recommendations and precautions and prescribed antibiotics for dental cleanings.  See her back in 2 months with x-rays

## 2021-12-08 NOTE — THERAPY TREATMENT NOTE
Outpatient Physical Therapy Ortho Treatment Note  [facility]     Patient Name: Juany Carlin  : 1950  MRN: [id number]  Today's Date: 2021      Visit Date: 2021    Visit Dx:    ICD-10-CM ICD-9-CM   1. Aftercare following right hip joint replacement surgery  Z47.1 V54.81    Z96.641 V43.64   2. Impaired mobility  Z74.09 799.89   3. Right hip pain  M25.551 719.45       Patient Active Problem List   Diagnosis   • HTN (hypertension), benign   • Vitamin D deficiency   • Urge incontinence   • GERD without esophagitis   • Cervical spinal stenosis   • Saddle pulmonary embolus (HCC)   • Family history of thrombosis   • Degeneration of cervical intervertebral disc   • Adrenal nodule (HCC)   • Health care maintenance   • Localized osteoarthritis of left shoulder   • Primary osteoarthritis of both knees   • Hypomagnesemia   • Migraine with aura and without status migrainosus, not intractable   • Snoring   • Arthritis of right hip   • Anticoagulant long-term use   • Hx pulmonary embolism   • History of DVT (deep vein thrombosis)        Past Medical History:   Diagnosis Date   • Acute cystitis without hematuria 04/10/2020   • Acute right ankle pain 2020   • Adrenal nodule (HCC)    • Arthritis    • BPPV (benign paroxysmal positional vertigo) 5/10/2016   • Cervical stenosis of spine    • Depression     NO MEDS   • DVT, lower extremity (HCC) 2016    right   • GERD without esophagitis 5/10/2016   • H/O varicose vein stripping    • Hip pain    • History of migraine    • History of skin cancer    • History of stress incontinence    • Hyperplastic colon polyp 2013   • Hypertension    • PONV (postoperative nausea and vomiting)    • Pregnancy     , miscarrige x1   • Pulmonary emboli ([facility]) 2016    bilateral extensive PE with saddle emboli.Negative hypercoagulable state w/u   • Pulmonary embolism with acute cor pulmonale (HCC) 2017   • Shoulder pain    • Urge incontinence 5/10/2016   • Vitamin D  deficiency 5/10/2016        Past Surgical History:   Procedure Laterality Date   • CARDIAC CATHETERIZATION N/A 11/10/2016    Procedure: Right Heart Cath;  Surgeon: Johnna Agrawal MD;  Location:  [facility];  Service:    • CARDIAC CATHETERIZATION N/A 11/10/2016    Procedure: Thrombolytic Therapy;  Surgeon: Johnna Agrawal MD;  Location:  [facility];  Service:    • CATARACT EXTRACTION Bilateral    • COLONOSCOPY W/ BIOPSIES  11/2013    hyperplastic polyp, Dr.Russel Lewis   • CYST REMOVAL      VAGINAL CYST   • DILATATION AND CURETTAGE     • EPIDURAL BLOCK     • HEMORROIDECTOMY  2013   • INTERVENTIONAL RADIOLOGY PROCEDURE Bilateral 11/10/2016    Procedure: Sp [place] [facility] [address] Pulmonary Art;  Surgeon: Johnna Agrawal MD;  Location:  [facility];  Service:    • SKIN CANCER EXCISION     • TONSILLECTOMY     • TOTAL HIP ARTHROPLASTY Right 10/25/2021    Procedure: Posterior RIGHT TOTAL HIP ARTHROPLASTY GAVIN NAVIGATION;  Surgeon: Rashawn Hernández MD;  Location: [facility];  Service: Orthopedics;  Laterality: Right;   • TOTAL LAPAROSCOPIC HYSTERECTOMY  2001   • VEIN SURGERY Left [id number]    leg,    • VENTRAL HERNIA REPAIR  1987                        PT Assessment/Plan     Row Name 12/08/21 1019          PT Assessment    Assessment Comments Ms. Carlin is 6 weeks s/p posterior approach R RSIDHAR.  She has progressed to ambulating with her cane. I have encouraged her to initiate a walking program 5-10 min. 2-3 x's per day and progress per 10% rule. We continued to work on hip girdle stregthening, progressing somewhat.  Overall, Ms. Carlin is progressing quite well.  She shares that she is dealing with some family issues currently, which may impede her progress, however, she is progressing well at this time.  Ms. Carlin contiues to be a good candidate for skilled physical therapy.  -GJ            PT Plan    PT Plan Comments assess MD visit, add monster walk F/B to help  work on hip girdle strength, continue to work on functional activity tolerance  -GJ           User Key  (r) = Recorded By, (t) = Taken By, (c) = Cosigned By    Initials Name Provider Type    GJ Walter Jenkins, PT Physical Therapist                   OP Exercises     Row Name 12/08/21 1001 12/08/21 1000          Subjective Comments    Subjective Comments -- I'm feeling better. I didn't bring my walker today. I see Dr. Hernández right after this appointment.  -GJ            Subjective Pain    Pre-Treatment Pain Level -- 0  -GJ            Total Minutes    67317 - PT Therapeutic Activity Minutes 40  -GJ --            Exercise 1    Exercise Name 1 -- HR  -GJ     Cueing 1 -- Verbal; Demo  -GJ     Reps 1 -- 20  -GJ     Additional Comments -- foam with finger tips  -GJ            Exercise 7    Exercise Name 7 -- standing hip abd, B,  -GJ     Cueing 7 -- Verbal; Demo  -GJ     Sets 7 -- 2  -GJ     Reps 7 -- 10 each  -GJ     Additional Comments -- cues to not lean laterally  -GJ            Exercise 8    Exercise Name 8 -- nustep  -GJ     Reps 8 -- 5 minutes  -GJ     Additional Comments -- L5 UE/LE  -GJ            Exercise 9    Exercise Name 9 -- gait around clinic  -GJ     Reps 9 -- 2 laps around clinic,  -GJ     Additional Comments -- with SC  -GJ            Exercise 10    Exercise Name 10 -- standing HS curl B  -GJ     Cueing 10 -- Verbal  -GJ     Sets 10 -- 2  -GJ     Reps 10 -- 15  -GJ     Time 10 -- 3#  -GJ     Additional Comments -- on blue foam, B, 1 hand  -GJ            Exercise 11    Exercise Name 11 -- lateral walk  -GJ     Cueing 11 -- Verbal  -GJ     Reps 11 -- 3 laps at barre  -GJ     Additional Comments -- YTB  -GJ            Exercise 12    Exercise Name 12 -- sit to/from stand  -GJ     Cueing 12 -- Verbal; Demo  -GJ     Reps 12 -- 2 x 10  -GJ     Time 12 -- blue mat table, slightly elevated  -GJ     Additional Comments -- 10 with BLE at same level, 5 RLE in front, 5 LLE in front  -GJ            Exercise 13     Exercise Name 13 -- LAQ  -GJ     Cueing 13 -- Verbal; Demo  -GJ     Sets 13 -- 2  -GJ     Reps 13 -- 10  -GJ     Time 13 -- 3#  -GJ            Exercise 14    Exercise Name 14 -- 4 inches forward and lateral step up  -GJ     Cueing 14 -- Verbal; Demo  -GJ     Reps 14 -- 15 ea  -GJ           User Key  (r) = Recorded By, (t) = Taken By, (c) = Cosigned By    Initials Name Provider Type    Walter Kaur, PT Physical Therapist                              PT OP Goals     Row Name 12/08/21 1000          PT Short Term Goals    STG Date to Achieve 12/17/21  -GJ     STG 1 pt. to be I with initial HEP to facilitate self management of their condition  -GJ     STG 1 Progress Met  -GJ     STG 2 pt. to be educated in/verbalize understanding of the importance of posture/ergonomics in association with their condition to facilitate self management of their condition  -GJ     STG 2 Progress Met  -GJ     STG 3 pt to demonstrate STS x 5 n </= 18 seconds from standard chair, with/without UE's, to faciliate ease/safety with household mobility  -GJ     STG 3 Progress Ongoing  -GJ     STG 4 pt. to ambulate with near normal heel to toe gait pattern with SC to facilitate ease/safety with community mobility  -GJ     STG 4 Progress Ongoing  -GJ            Long Term Goals    LTG Date to Achieve 02/11/22  -GJ     LTG 1 pt. to be I with advanced HEP to facilitate self management of their condition  -GJ     LTG 1 Progress Ongoing  -GJ     LTG 2 pt. to report an LEFS >/= 60% to demonstrate decreased level of perceived disability  -GJ     LTG 2 Progress Ongoing  -GJ     LTG 3 pt. to ambulate without AD with near normal heel to toe gait pattern to facilitate ease/safety of community mobility  -GJ     LTG 3 Progress Ongoing  -GJ     LTG 4 pt. to ascend/descends stairs with reciprocal pattern (</= 1 rails) to facilitate ease/safety of household/community mobility  -GJ     LTG 4 Progress Ongoing  -GJ     LTG 5 pt to demonstrate R hip abd MMt >/= 4/5  to facilitate ease/safety with community mobility  -GJ     LTG 5 Progress Ongoing  -GJ           User Key  (r) = Recorded By, (t) = Taken By, (c) = Cosigned By    Initials Name Provider Type    Walter Kaur, PT Physical Therapist                Therapy Education  Education Details: encouraged pt to only use cane now, encouraged her to start a dedicated walking program, 5-10 min. 2-3 x's per day and progress gently with 10% rule in mind  Given: HEP, Symptoms/condition management, Pain management, Posture/body mechanics, Mobility training, Fall prevention and home safety, Edema management  Program: Reinforced, Progressed  How Provided: Verbal, Demonstration  Provided to: Patient  Level of Understanding: Teach back education performed, Verbalized, Demonstrated              Time Calculation:   Start Time: 1001  Stop Time: 1045  Time Calculation (min): 44 min  Timed Charges  69778 - PT Therapeutic Activity Minutes: 40  Total Minutes  Timed Charges Total Minutes: 40   Total Minutes: 40  Therapy Charges for Today     Code Description Service Date Service Provider Modifiers Qty    [id number]  PT THERAPEUTIC ACT EA 15 MIN 12/8/2021 Walter Jenkins, PT GP 3                    Walter Jenkins, PT  12/8/2021

## 2021-12-10 ENCOUNTER — HOSPITAL ENCOUNTER (OUTPATIENT)
Dept: PHYSICAL THERAPY | Facility: HOSPITAL | Age: 71
Setting detail: THERAPIES SERIES
Discharge: HOME | End: 2021-12-10

## 2021-12-10 DIAGNOSIS — Z47.1 AFTERCARE FOLLOWING RIGHT HIP JOINT REPLACEMENT SURGERY: Primary | ICD-10-CM

## 2021-12-10 DIAGNOSIS — M25.551 RIGHT HIP PAIN: ICD-10-CM

## 2021-12-10 DIAGNOSIS — Z74.09 IMPAIRED MOBILITY: ICD-10-CM

## 2021-12-10 DIAGNOSIS — Z96.641 AFTERCARE FOLLOWING RIGHT HIP JOINT REPLACEMENT SURGERY: Primary | ICD-10-CM

## 2021-12-10 PROCEDURE — 97110 THERAPEUTIC EXERCISES: CPT | Performed by: PHYSICAL THERAPIST

## 2021-12-10 NOTE — THERAPY TREATMENT NOTE
Outpatient Physical Therapy Ortho Treatment Note  [facility]     Patient Name: Juany Carlin  : 1950  MRN: [id number]  Today's Date: 12/10/2021      Visit Date: 12/10/2021    Visit Dx:    ICD-10-CM ICD-9-CM   1. Aftercare following right hip joint replacement surgery  Z47.1 V54.81    Z96.641 V43.64   2. Impaired mobility  Z74.09 799.89   3. Right hip pain  M25.551 719.45       Patient Active Problem List   Diagnosis   • HTN (hypertension), benign   • Vitamin D deficiency   • Urge incontinence   • GERD without esophagitis   • Cervical spinal stenosis   • Saddle pulmonary embolus (HCC)   • Family history of thrombosis   • Degeneration of cervical intervertebral disc   • Adrenal nodule (HCC)   • Health care maintenance   • Localized osteoarthritis of left shoulder   • Primary osteoarthritis of both knees   • Hypomagnesemia   • Migraine with aura and without status migrainosus, not intractable   • Snoring   • Arthritis of right hip   • Anticoagulant long-term use   • Hx pulmonary embolism   • History of DVT (deep vein thrombosis)        Past Medical History:   Diagnosis Date   • Acute cystitis without hematuria 04/10/2020   • Acute right ankle pain 2020   • Adrenal nodule (HCC)    • Arthritis    • BPPV (benign paroxysmal positional vertigo) 5/10/2016   • Cervical stenosis of spine    • Depression     NO MEDS   • DVT, lower extremity (HCC) 2016    right   • GERD without esophagitis 5/10/2016   • H/O varicose vein stripping    • Hip pain    • History of migraine    • History of skin cancer    • History of stress incontinence    • Hyperplastic colon polyp 2013   • Hypertension    • PONV (postoperative nausea and vomiting)    • Pregnancy     , miscarrige x1   • Pulmonary emboli ([facility]) 2016    bilateral extensive PE with saddle emboli.Negative hypercoagulable state w/u   • Pulmonary embolism with acute cor pulmonale (HCC) 2017   • Shoulder pain    • Urge incontinence 5/10/2016   • Vitamin D  deficiency 5/10/2016        Past Surgical History:   Procedure Laterality Date   • CARDIAC CATHETERIZATION N/A 11/10/2016    Procedure: Right Heart Cath;  Surgeon: Johnna Agrawal MD;  Location:  [facility];  Service:    • CARDIAC CATHETERIZATION N/A 11/10/2016    Procedure: Thrombolytic Therapy;  Surgeon: Johnna Agrawal MD;  Location:  [facility];  Service:    • CATARACT EXTRACTION Bilateral    • COLONOSCOPY W/ BIOPSIES  11/2013    hyperplastic polyp, Dr.Russel Lewis   • CYST REMOVAL      VAGINAL CYST   • DILATATION AND CURETTAGE     • EPIDURAL BLOCK     • HEMORROIDECTOMY  2013   • INTERVENTIONAL RADIOLOGY PROCEDURE Bilateral 11/10/2016    Procedure: Sp Smiley Cath Place Pulmonary Art;  Surgeon: Johnna Agrawal MD;  Location:  [facility];  Service:    • SKIN CANCER EXCISION     • TONSILLECTOMY     • TOTAL HIP ARTHROPLASTY Right 10/25/2021    Procedure: Posterior RIGHT TOTAL HIP ARTHROPLASTY GAVIN NAVIGATION;  Surgeon: Rashawn Hernández MD;  Location:  [facility];  Service: Orthopedics;  Laterality: Right;   • TOTAL LAPAROSCOPIC HYSTERECTOMY  2001   • VEIN SURGERY Left 2612-6033    leg,    • VENTRAL HERNIA REPAIR  1987                        PT Assessment/Plan     Row Name 12/10/21 1103          PT Assessment    Assessment Comments Ms. Carlin is s/p R SRIDHAR. She returns from MD with a good report. We progressed her hip girdle strengthening today, focusing on funtional mobility activities. We increased step height to 6 inch with good form.  Ms. Carlin demosntrates improved gait pattern today, using cane. OK to start ambulating without AD at home, continue to use in the community.  Ms. Carlin continues to be a good candidate for skilled physical therapy.  -GJ            PT Plan    PT Plan Comments assess response to ambulation at home without AD, continue to strengthen hip girdle tissues, SL Hip abd, ? tandem balance activities  -GJ           User Key  (r) =  Recorded By, (t) = Taken By, (c) = Cosigned By    Initials Name Provider Type    GJ Walter Jenkins W, PT Physical Therapist                   OP Exercises     Row Name 12/10/21 1003 12/10/21 1000          Subjective Comments    Subjective Comments -- I'm hurting in my hip. I did drive myself today,maybe that's it  -GJ            Total Minutes    88424 - PT Therapeutic Exercise Minutes 43  -GJ --            Exercise 1    Exercise Name 1 -- HR  -GJ     Cueing 1 -- Verbal; Demo  -GJ     Reps 1 -- 20  -GJ     Additional Comments -- foam with finger tips  -GJ            Exercise 5    Exercise Name 5 -- bridge  -GJ     Cueing 5 -- Verbal; Demo  -GJ     Sets 5 -- 2  -GJ     Reps 5 -- 10  -GJ     Time 5 -- 3s  -GJ     Additional Comments -- RTB, cues to raise equally bilaterally, R side lags behind L  -GJ            Exercise 6    Exercise Name 6 -- s/l hip abd  -GJ     Additional Comments -- next session  -GJ            Exercise 7    Exercise Name 7 -- standing hip abd, B,  -GJ     Cueing 7 -- Verbal; Demo  -GJ     Sets 7 -- 2  -GJ     Reps 7 -- 10 each  -GJ     Additional Comments -- 3#,, on foam, cues to be erect  -GJ            Exercise 8    Exercise Name 8 -- nustep  -GJ     Cueing 8 -- Verbal  -GJ     Reps 8 -- 5 minutes  -GJ     Time 8 -- L5 UE/LE  -GJ            Exercise 10    Exercise Name 10 -- standing HS curl B  -GJ     Cueing 10 -- Verbal  -GJ     Sets 10 -- 2  -GJ     Reps 10 -- 15  -GJ     Time 10 -- 3#  -GJ     Additional Comments -- on blue foam, B, 1 hand  -GJ            Exercise 11    Exercise Name 11 -- lateral walk  -GJ     Cueing 11 -- Verbal  -GJ     Reps 11 -- 3 laps at barre  -GJ     Additional Comments -- YTB  -GJ            Exercise 12    Exercise Name 12 -- sit to/from stand  -GJ     Cueing 12 -- Verbal; Demo  -GJ     Reps 12 -- 2 x 10  -GJ     Time 12 -- tan table  -GJ     Additional Comments -- hands on thighs, much improved performance today  -GJ            Exercise 13    Exercise Name 13 -- LAQ   -GJ     Cueing 13 -- Verbal; Demo  -GJ     Sets 13 -- 2  -GJ     Reps 13 -- 10  -GJ     Time 13 -- 3#  -GJ            Exercise 14    Exercise Name 14 -- 6 inches forward and lateral step up  -GJ     Cueing 14 -- Verbal; Demo  -GJ     Reps 14 -- 15 ea  -GJ            Exercise 15    Exercise Name 15 -- monster walk, F/B  -GJ     Cueing 15 -- Verbal; Demo  -GJ     Reps 15 -- 3 laps  -GJ     Additional Comments -- YTB, no hands  -GJ           User Key  (r) = Recorded By, (t) = Taken By, (c) = Cosigned By    Initials Name Provider Type    GJ Walter Jenkins, PT Physical Therapist                              PT OP Goals     Row Name 12/10/21 1000          PT Short Term Goals    STG Date to Achieve 12/17/21  -GJ     STG 1 pt. to be I with initial HEP to facilitate self management of their condition  -GJ     STG 1 Progress Met  -GJ     STG 2 pt. to be educated in/verbalize understanding of the importance of posture/ergonomics in association with their condition to facilitate self management of their condition  -GJ     STG 2 Progress Met  -GJ     STG 3 pt to demonstrate STS x 5 n </= 18 seconds from standard chair, with/without UE's, to faciliate ease/safety with household mobility  -GJ     STG 3 Progress Ongoing  -GJ     STG 4 pt. to ambulate with near normal heel to toe gait pattern with SC to facilitate ease/safety with community mobility  -GJ     STG 4 Progress Ongoing; Progressing; Partially Met  -GJ     STG 4 Progress Comments gait with sc, much improved gait pattern today  -GJ            Long Term Goals    LTG Date to Achieve 02/11/22  -GJ     LTG 1 pt. to be I with advanced HEP to facilitate self management of their condition  -GJ     LTG 1 Progress Ongoing  -GJ     LTG 2 pt. to report an LEFS >/= 60% to demonstrate decreased level of perceived disability  -GJ     LTG 2 Progress Ongoing  -GJ     LTG 3 pt. to ambulate without AD with near normal heel to toe gait pattern to facilitate ease/safety of community  mobility  -     LTG 3 Progress Ongoing  -     LTG 4 pt. to ascend/descends stairs with reciprocal pattern (</= 1 rails) to facilitate ease/safety of household/community mobility  -     LTG 4 Progress Ongoing  -     LTG 4 Progress Comments step up on 6 inch step fairly easy, good form  -     LTG 5 pt to demonstrate R hip abd MMt >/= 4/5 to facilitate ease/safety with community mobility  -     LTG 5 Progress Ongoing  -           User Key  (r) = Recorded By, (t) = Taken By, (c) = Cosigned By    Initials Name Provider Type    Walter Kaur, PT Physical Therapist                Therapy Education  Education Details: updated HEP and issued new hand outs, ok for pt to start ambulating at home wihout AD, encouraged to keep cane for community mobility or if fatigued and and limiping  Given: HEP, Symptoms/condition management, Pain management, Posture/body mechanics, Mobility training  Program: New, Reinforced, Progressed  How Provided: Verbal, Demonstration, Written  Provided to: Patient  Level of Understanding: Teach back education performed, Verbalized, Demonstrated              Time Calculation:   Start Time: 1003  Stop Time: 1046  Time Calculation (min): 43 min  Timed Charges  50690 - PT Therapeutic Exercise Minutes: 43  Total Minutes  Timed Charges Total Minutes: 43   Total Minutes: 43  Therapy Charges for Today     Code Description Service Date Service Provider Modifiers Qty    18267178299 HC PT THER PROC EA 15 MIN 12/10/2021 Walter Jenkins, PT GP 3                    Walter Jenkins, PT  12/10/2021

## 2021-12-15 ENCOUNTER — HOSPITAL ENCOUNTER (OUTPATIENT)
Dept: PHYSICAL THERAPY | Facility: HOSPITAL | Age: 71
Setting detail: THERAPIES SERIES
Discharge: HOME | End: 2021-12-15

## 2021-12-15 DIAGNOSIS — M25.551 RIGHT HIP PAIN: ICD-10-CM

## 2021-12-15 DIAGNOSIS — Z47.1 AFTERCARE FOLLOWING RIGHT HIP JOINT REPLACEMENT SURGERY: Primary | ICD-10-CM

## 2021-12-15 DIAGNOSIS — Z96.641 AFTERCARE FOLLOWING RIGHT HIP JOINT REPLACEMENT SURGERY: Primary | ICD-10-CM

## 2021-12-15 DIAGNOSIS — Z74.09 IMPAIRED MOBILITY: ICD-10-CM

## 2021-12-15 PROCEDURE — 97110 THERAPEUTIC EXERCISES: CPT | Performed by: PHYSICAL THERAPIST

## 2021-12-15 NOTE — THERAPY TREATMENT NOTE
Outpatient Physical Therapy Ortho Treatment Note  [facility]     Patient Name: Juany Carlin  : 1950  MRN: [id number]  Today's Date: 12/15/2021      Visit Date: 12/15/2021    Visit Dx:    ICD-10-CM ICD-9-CM   1. Aftercare following right hip joint replacement surgery  Z47.1 V54.81    Z96.641 V43.64   2. Impaired mobility  Z74.09 799.89   3. Right hip pain  M25.551 719.45       Patient Active Problem List   Diagnosis   • HTN (hypertension), benign   • Vitamin D deficiency   • Urge incontinence   • GERD without esophagitis   • Cervical spinal stenosis   • Saddle pulmonary embolus (HCC)   • Family history of thrombosis   • Degeneration of cervical intervertebral disc   • Adrenal nodule (HCC)   • Health care maintenance   • Localized osteoarthritis of left shoulder   • Primary osteoarthritis of both knees   • Hypomagnesemia   • Migraine with aura and without status migrainosus, not intractable   • Snoring   • Arthritis of right hip   • Anticoagulant long-term use   • Hx pulmonary embolism   • History of DVT (deep vein thrombosis)        Past Medical History:   Diagnosis Date   • Acute cystitis without hematuria 04/10/2020   • Acute right ankle pain 2020   • Adrenal nodule (HCC)    • Arthritis    • BPPV (benign paroxysmal positional vertigo) 5/10/2016   • Cervical stenosis of spine    • Depression     NO MEDS   • DVT, lower extremity (HCC)     right   • GERD without esophagitis 5/10/2016   • H/O varicose vein stripping    • Hip pain    • History of migraine    • History of skin cancer    • History of stress incontinence    • Hyperplastic colon polyp 2013   • Hypertension    • PONV (postoperative nausea and vomiting)    • Pregnancy     , miscarrige x1   • Pulmonary emboli ([facility]) 2016    bilateral extensive PE with saddle emboli.Negative hypercoagulable state w/u   • Pulmonary embolism with acute cor pulmonale (HCC) 2017   • Shoulder pain    • Urge incontinence 5/10/2016   • Vitamin D  deficiency 5/10/2016        Past Surgical History:   Procedure Laterality Date   • CARDIAC CATHETERIZATION N/A 11/10/2016    Procedure: Right Heart Cath;  Surgeon: Johnna Agrawal MD;  Location:  [facility];  Service:    • CARDIAC CATHETERIZATION N/A 11/10/2016    Procedure: Thrombolytic Therapy;  Surgeon: Johnna Agrawal MD;  Location:  [facility];  Service:    • CATARACT EXTRACTION Bilateral    • COLONOSCOPY W/ BIOPSIES  11/2013    hyperplastic polyp, Dr.Russel Lewis   • CYST REMOVAL      VAGINAL CYST   • DILATATION AND CURETTAGE     • EPIDURAL BLOCK     • HEMORROIDECTOMY  2013   • INTERVENTIONAL RADIOLOGY PROCEDURE Bilateral 11/10/2016    Procedure: Sp Smiley Cath Place Pulmonary Art;  Surgeon: Johnna Agrawal MD;  Location:  [facility];  Service:    • SKIN CANCER EXCISION     • TONSILLECTOMY     • TOTAL HIP ARTHROPLASTY Right 10/25/2021    Procedure: Posterior RIGHT TOTAL HIP ARTHROPLASTY GAVIN NAVIGATION;  Surgeon: Rashawn Hernández MD;  Location:  [facility];  Service: Orthopedics;  Laterality: Right;   • TOTAL LAPAROSCOPIC HYSTERECTOMY  2001   • VEIN SURGERY Left 5702-2101    leg,    • VENTRAL HERNIA REPAIR  1987                        PT Assessment/Plan     Row Name 12/15/21 2227          PT Assessment    Assessment Comments Ms. Carlin returns, reporting trial of ambulating at home without AD is going well.  She has less pain today. We were able to progress her hip girdle strengthening and balance activities with noted fatigue, however tolerated well. Attempted to update HEP for home however medbridge was down, will update next session. Added additional sessions. Ms. Carlin continues to be a good candidate for skilled physical therapy.  -GJ            PT Plan    PT Plan Comments print new scheduel, update HEP and print for pt., continue to workon hip girdle strengthening, ? exaggerated march  -GJ           User Key  (r) = Recorded By, (t) = Taken  By, (c) = Cosigned By    Initials Name Provider Type    GJ Walter Jenkins, PT Physical Therapist                   OP Exercises     Row Name 12/15/21 1005 12/15/21 1000          Subjective Comments    Subjective Comments -- I'm better today, yesterday i had more pain  -GJ            Total Minutes    71406 - PT Therapeutic Exercise Minutes 45  -GJ --            Exercise 1    Exercise Name 1 -- HR  -GJ     Cueing 1 -- Verbal; Demo  -GJ     Reps 1 -- 15  -GJ     Additional Comments -- foam with finger tips  -GJ            Exercise 5    Exercise Name 5 -- bridge  -GJ     Cueing 5 -- Verbal; Demo  -GJ     Sets 5 -- 2  -GJ     Reps 5 -- 10  -GJ     Time 5 -- 3s  -GJ            Exercise 6    Exercise Name 6 -- s/l hip abd  -GJ     Cueing 6 -- Verbal  -GJ            Exercise 7    Exercise Name 7 -- standing hip abd, B,  -GJ     Cueing 7 -- Verbal; Demo  -GJ     Sets 7 -- 2  -GJ     Reps 7 -- 10 each  -GJ     Additional Comments -- 3#,, on foam, cues to be erect  -GJ            Exercise 8    Exercise Name 8 -- nustep  -GJ     Cueing 8 -- Verbal  -GJ     Reps 8 -- 5 minutes  -GJ     Time 8 -- L5 UE/LE  -GJ            Exercise 10    Exercise Name 10 -- standing HS curl B  -GJ     Cueing 10 -- Verbal  -GJ     Sets 10 -- 2  -GJ     Reps 10 -- 15  -GJ     Time 10 -- 3#  -GJ     Additional Comments -- on blue foam, B, 1 hand  -GJ            Exercise 11    Exercise Name 11 -- lateral walk  -GJ     Cueing 11 -- Verbal  -GJ     Reps 11 -- 3 laps at barre  -GJ     Additional Comments -- YTB  -GJ            Exercise 12    Exercise Name 12 -- sit to/from stand  -GJ     Cueing 12 -- Verbal; Demo  -GJ     Reps 12 -- 2 x 10  -GJ     Time 12 -- lowest blue mat table  -GJ     Additional Comments -- hands across chest  -GJ            Exercise 13    Exercise Name 13 -- --  -GJ     Cueing 13 -- --  -GJ            Exercise 14    Exercise Name 14 -- 6 inches forward and lateral step up  -GJ     Cueing 14 -- Verbal; Demo  -GJ     Reps 14 -- 15 ea   -GJ     Additional Comments -- 1hand support  -GJ            Exercise 15    Exercise Name 15 -- monster walk, F/B  -GJ     Cueing 15 -- Verbal; Demo  -GJ     Reps 15 -- 3 laps  -GJ     Additional Comments -- RTB  -GJ            Exercise 16    Exercise Name 16 -- tandem stance, (R/L in front)  -GJ     Cueing 16 -- Verbal; Demo  -GJ     Reps 16 -- 2, each  -GJ     Time 16 -- 30 s  -GJ            Exercise 17    Exercise Name 17 -- SL (R) hip abd  -GJ     Cueing 17 -- Verbal; Demo  -GJ     Sets 17 -- 2  -GJ     Reps 17 -- 10  -GJ           User Key  (r) = Recorded By, (t) = Taken By, (c) = Cosigned By    Initials Name Provider Type    Walter Kaur, PT Physical Therapist                              PT OP Goals     Row Name 12/15/21 1000          PT Short Term Goals    STG Date to Achieve 12/17/21  -GJ     STG 1 pt. to be I with initial HEP to facilitate self management of their condition  -GJ     STG 1 Progress Met  -GJ     STG 2 pt. to be educated in/verbalize understanding of the importance of posture/ergonomics in association with their condition to facilitate self management of their condition  -GJ     STG 2 Progress Met  -GJ     STG 3 pt to demonstrate STS x 5 n </= 18 seconds from standard chair, with/without UE's, to faciliate ease/safety with household mobility  -GJ     STG 3 Progress Ongoing  -GJ     STG 4 pt. to ambulate with near normal heel to toe gait pattern with SC to facilitate ease/safety with community mobility  -GJ     STG 4 Progress Ongoing; Progressing; Partially Met  -GJ     STG 4 Progress Comments improving  -GJ            Long Term Goals    LTG Date to Achieve 02/11/22  -GJ     LTG 1 pt. to be I with advanced HEP to facilitate self management of their condition  -GJ     LTG 1 Progress Ongoing  -GJ     LTG 2 pt. to report an LEFS >/= 60% to demonstrate decreased level of perceived disability  -GJ     LTG 2 Progress Ongoing  -GJ     LTG 3 pt. to ambulate without AD with near normal heel to  toe gait pattern to facilitate ease/safety of community mobility  -     LTG 3 Progress Ongoing  -     LTG 4 pt. to ascend/descends stairs with reciprocal pattern (</= 1 rails) to facilitate ease/safety of household/community mobility  -     LTG 4 Progress Ongoing  -GJ     LTG 5 pt to demonstrate R hip abd MMt >/= 4/5 to facilitate ease/safety with community mobility  -     LTG 5 Progress Ongoing  -           User Key  (r) = Recorded By, (t) = Taken By, (c) = Cosigned By    Initials Name Provider Type    Walter Kaur, PT Physical Therapist                Therapy Education  Given: HEP, Symptoms/condition management, Pain management, Posture/body mechanics, Mobility training  Program: Reinforced  How Provided: Verbal, Demonstration (attempted to update medbridge, medbrige was down, so will do so next session)  Provided to: Patient  Level of Understanding: Teach back education performed, Verbalized, Demonstrated              Time Calculation:   Start Time: 1005  Stop Time: 1050  Time Calculation (min): 45 min  Timed Charges  16994 - PT Therapeutic Exercise Minutes: 45  Total Minutes  Timed Charges Total Minutes: 45   Total Minutes: 45  Therapy Charges for Today     Code Description Service Date Service Provider Modifiers Qty    14616840430 HC PT THER PROC EA 15 MIN 12/15/2021 Walter Jenkins, PT GP 3                    Walter Jenkins PT  12/15/2021

## 2021-12-17 ENCOUNTER — HOSPITAL ENCOUNTER (OUTPATIENT)
Dept: PHYSICAL THERAPY | Facility: HOSPITAL | Age: 71
Setting detail: THERAPIES SERIES
Discharge: HOME | End: 2021-12-17

## 2021-12-17 DIAGNOSIS — Z74.09 IMPAIRED MOBILITY: ICD-10-CM

## 2021-12-17 DIAGNOSIS — M25.551 RIGHT HIP PAIN: ICD-10-CM

## 2021-12-17 DIAGNOSIS — Z96.641 AFTERCARE FOLLOWING RIGHT HIP JOINT REPLACEMENT SURGERY: Primary | ICD-10-CM

## 2021-12-17 DIAGNOSIS — Z47.1 AFTERCARE FOLLOWING RIGHT HIP JOINT REPLACEMENT SURGERY: Primary | ICD-10-CM

## 2021-12-17 PROCEDURE — 97110 THERAPEUTIC EXERCISES: CPT | Performed by: PHYSICAL THERAPIST

## 2021-12-17 PROCEDURE — 97530 THERAPEUTIC ACTIVITIES: CPT | Performed by: PHYSICAL THERAPIST

## 2021-12-17 PROCEDURE — 97140 MANUAL THERAPY 1/> REGIONS: CPT | Performed by: PHYSICAL THERAPIST

## 2021-12-17 NOTE — THERAPY TREATMENT NOTE
Outpatient Physical Therapy Ortho Treatment Note  [facility]     Patient Name: Juany Carlin  : 1950  MRN: [id number]  Today's Date: 2021      Visit Date: 2021    Visit Dx:    ICD-10-CM ICD-9-CM   1. Aftercare following right hip joint replacement surgery  Z47.1 V54.81    Z96.641 V43.64   2. Impaired mobility  Z74.09 799.89   3. Right hip pain  M25.551 719.45       Patient Active Problem List   Diagnosis   • HTN (hypertension), benign   • Vitamin D deficiency   • Urge incontinence   • GERD without esophagitis   • Cervical spinal stenosis   • Saddle pulmonary embolus (HCC)   • Family history of thrombosis   • Degeneration of cervical intervertebral disc   • Adrenal nodule (HCC)   • Health care maintenance   • Localized osteoarthritis of left shoulder   • Primary osteoarthritis of both knees   • Hypomagnesemia   • Migraine with aura and without status migrainosus, not intractable   • Snoring   • Arthritis of right hip   • Anticoagulant long-term use   • Hx pulmonary embolism   • History of DVT (deep vein thrombosis)        Past Medical History:   Diagnosis Date   • Acute cystitis without hematuria 04/10/2020   • Acute right ankle pain 2020   • Adrenal nodule (HCC)    • Arthritis    • BPPV (benign paroxysmal positional vertigo) 5/10/2016   • Cervical stenosis of spine    • Depression     NO MEDS   • DVT, lower extremity (HCC) 2016    right   • GERD without esophagitis 5/10/2016   • H/O varicose vein stripping    • Hip pain    • History of migraine    • History of skin cancer    • History of stress incontinence    • Hyperplastic colon polyp 2013   • Hypertension    • PONV (postoperative nausea and vomiting)    • Pregnancy     , miscarrige x1   • Pulmonary emboli ([facility]) 2016    bilateral extensive PE with saddle emboli.Negative hypercoagulable state w/u   • Pulmonary embolism with acute cor pulmonale (HCC) 2017   • Shoulder pain    • Urge incontinence 5/10/2016   • Vitamin D  deficiency 5/10/2016        Past Surgical History:   Procedure Laterality Date   • CARDIAC CATHETERIZATION N/A 11/10/2016    Procedure: Right Heart Cath;  Surgeon: Johnna Agrawal MD;  Location:  [facility];  Service:    • CARDIAC CATHETERIZATION N/A 11/10/2016    Procedure: Thrombolytic Therapy;  Surgeon: Johnna Agrawal MD;  Location:  [facility];  Service:    • CATARACT EXTRACTION Bilateral    • COLONOSCOPY W/ BIOPSIES  2013    hyperplastic polyp, Dr.Russel Lewis   • CYST REMOVAL      VAGINAL CYST   • DILATATION AND CURETTAGE     • EPIDURAL BLOCK     • HEMORROIDECTOMY     • INTERVENTIONAL RADIOLOGY PROCEDURE Bilateral 11/10/2016    Procedure: Sp Smiley Cath Place Pulmonary Art;  Surgeon: Johnna Agrawal MD;  Location:  [facility];  Service:    • SKIN CANCER EXCISION     • TONSILLECTOMY     • TOTAL HIP ARTHROPLASTY Right 10/25/2021    Procedure: Posterior RIGHT TOTAL HIP ARTHROPLASTY GAVIN NAVIGATION;  Surgeon: Rashawn Hernández MD;  [facility];  Service: Orthopedics;  Laterality: Right;   • TOTAL LAPAROSCOPIC HYSTERECTOMY     • VEIN SURGERY Left 0310-8712    leg,    • VENTRAL HERNIA REPAIR                          PT Assessment/Plan     Row Name 21 1033          PT Assessment    Assessment Comments  returns today, reporting increaed R posterior hip pain today. She also reports attending  yesterday which likely came wth more walking/standing. We dicussed activity modifications.  Held on jody of her exercises today to allow tissue to recover and performed STM to R posterior lateral hip tissue. she was quite TTP along the R posterior lateal hip girdle tissue.  SHe also has a lot going on personally which may be holding her back in terms of progress.  She had difficulty with SL R hip abd, so changed to SL clam until she can perform without pain.  Ms Carlin continues to be a good candidate for skilled physical  therapy.  -GJ            PT Plan    PT Plan Comments assess pain level and response to holding on SL hip abduction and addition of SL clam.  Resume all exercises as able, consider exaggerated marching  -GJ           User Key  (r) = Recorded By, (t) = Taken By, (c) = Cosigned By    Initials Name Provider Type    Walter Kaur, PT Physical Therapist                   OP Exercises     Row Name 21 1009 21 1000          Subjective Comments    Subjective Comments -- my hip is bothering me today, why?  I did go to a  yesterday, maybe I stood too long  -GJ            Subjective Pain    Pre-Treatment Pain Level -- 4  -GJ            Total Minutes    98960 - PT Therapeutic Exercise Minutes 14  -GJ --     28083 - PT Therapeutic Activity Minutes 10  education  -GJ --     71019 - PT Manual Therapy Minutes 20  -GJ --            Exercise 5    Exercise Name 5 -- bridge  -GJ     Cueing 5 -- Verbal; Demo  -GJ     Sets 5 -- 2  -GJ     Reps 5 -- 10  -GJ     Time 5 -- 3s  -GJ            Exercise 8    Exercise Name 8 -- nustep  -GJ     Reps 8 -- 6 min  -GJ     Time 8 -- L5 UE/LE  -GJ            Exercise 16    Exercise Name 16 -- tandem stance, (R/L in front)  -GJ     Cueing 16 -- Verbal; Demo  -GJ     Reps 16 -- 2, each  -GJ     Time 16 -- 30 s  -GJ            Exercise 17    Exercise Name 17 -- SL (R) hip abd  -GJ     Cueing 17 -- Verbal; Demo  -GJ     Sets 17 -- 2  -GJ     Reps 17 -- 10  -GJ           User Key  (r) = Recorded By, (t) = Taken By, (c) = Cosigned By    Initials Name Provider Type    Walter Kaur, PT Physical Therapist                         Manual Rx (last 36 hours)     Manual Treatments     Row Name 21 1009 21 0900          Total Minutes    97705 - PT Manual Therapy Minutes 20  -GJ --            Manual Rx 1    Manual Rx 1 Location -- STM to R posterior/lateral hip  -GJ     Manual Rx 1 Type -- pt in L SL with pillow between knees  -GJ           User Key  (r) = Recorded By, (t) =  Taken By, (c) = Cosigned By    Initials Name Provider Type    Walter Kaur, PT Physical Therapist                 PT OP Goals     Row Name 12/17/21 1000          PT Short Term Goals    STG Date to Achieve 12/17/21  -GJ     STG 1 pt. to be I with initial HEP to facilitate self management of their condition  -GJ     STG 1 Progress Met  -GJ     STG 2 pt. to be educated in/verbalize understanding of the importance of posture/ergonomics in association with their condition to facilitate self management of their condition  -GJ     STG 2 Progress Met  -GJ     STG 3 pt to demonstrate STS x 5 n </= 18 seconds from standard chair, with/without UE's, to faciliate ease/safety with household mobility  -GJ     STG 3 Progress Ongoing  -GJ     STG 4 pt. to ambulate with near normal heel to toe gait pattern with SC to facilitate ease/safety with community mobility  -GJ     STG 4 Progress Ongoing; Progressing; Partially Met  -GJ     STG 4 Progress Comments increased limp today, using cane  -GJ            Long Term Goals    LTG Date to Achieve 02/11/22  -GJ     LTG 1 pt. to be I with advanced HEP to facilitate self management of their condition  -GJ     LTG 1 Progress Ongoing  -GJ     LTG 2 pt. to report an LEFS >/= 60% to demonstrate decreased level of perceived disability  -GJ     LTG 2 Progress Ongoing  -GJ     LTG 3 pt. to ambulate without AD with near normal heel to toe gait pattern to facilitate ease/safety of community mobility  -GJ     LTG 3 Progress Ongoing  -GJ     LTG 4 pt. to ascend/descends stairs with reciprocal pattern (</= 1 rails) to facilitate ease/safety of household/community mobility  -GJ     LTG 4 Progress Ongoing  -GJ     LTG 5 pt to demonstrate R hip abd MMt >/= 4/5 to facilitate ease/safety with community mobility  -GJ     LTG 5 Progress Ongoing  -GJ           User Key  (r) = Recorded By, (t) = Taken By, (c) = Cosigned By    Initials Name Provider Type    Walter Kaur, PT Physical Therapist                 Therapy Education  Education Details: discussed activity modifications and not cleaning an entire house in one setting but to split it up over a few days.  printed new schedule for pt, reprited HEP  Given: HEP, Symptoms/condition management, Pain management, Posture/body mechanics, Mobility training  Program: Reinforced  How Provided: Verbal, Demonstration, Written  Provided to: Patient  Level of Understanding: Teach back education performed, Verbalized, Demonstrated              Time Calculation:   Start Time: 1000  Stop Time: 1045  Time Calculation (min): 45 min  Timed Charges  09157 - PT Therapeutic Exercise Minutes: 14  61905 - PT Manual Therapy Minutes: 20  06200 - PT Therapeutic Activity Minutes: 10 (education)  Total Minutes  Timed Charges Total Minutes: 44   Total Minutes: 44  Therapy Charges for Today     Code Description Service Date Service Provider Modifiers Qty    93642807481 HC PT THER PROC EA 15 MIN 12/17/2021 Walter Jenkins, PT GP 1    [id number]  PT THERAPEUTIC ACT EA 15 MIN 12/17/2021 Walter Jenkins, PT GP 1    [id number]  PT MANUAL THERAPY EA 15 MIN 12/17/2021 Walter Jenkins, PT GP 1                    Walter Jenkins, PT  12/17/2021

## 2021-12-20 ENCOUNTER — HOSPITAL ENCOUNTER (OUTPATIENT)
Dept: PHYSICAL THERAPY | Facility: HOSPITAL | Age: 71
Setting detail: THERAPIES SERIES
Discharge: HOME | End: 2021-12-20

## 2021-12-20 DIAGNOSIS — Z74.09 IMPAIRED MOBILITY: ICD-10-CM

## 2021-12-20 DIAGNOSIS — Z47.1 AFTERCARE FOLLOWING RIGHT HIP JOINT REPLACEMENT SURGERY: Primary | ICD-10-CM

## 2021-12-20 DIAGNOSIS — Z96.641 AFTERCARE FOLLOWING RIGHT HIP JOINT REPLACEMENT SURGERY: Primary | ICD-10-CM

## 2021-12-20 DIAGNOSIS — M25.551 RIGHT HIP PAIN: ICD-10-CM

## 2021-12-20 PROCEDURE — 97110 THERAPEUTIC EXERCISES: CPT

## 2021-12-20 NOTE — THERAPY PROGRESS REPORT/RE-CERT
Outpatient Physical Therapy Ortho Progress Note  [facility]     Patient Name: Juany Carlin  : 1950  MRN: [id number]  Today's Date: 2021      Visit Date: 2021    Visit Dx:    ICD-10-CM ICD-9-CM   1. Aftercare following right hip joint replacement surgery  Z47.1 V54.81    Z96.641 V43.64   2. Impaired mobility  Z74.09 799.89   3. Right hip pain  M25.551 719.45       Patient Active Problem List   Diagnosis   • HTN (hypertension), benign   • Vitamin D deficiency   • Urge incontinence   • GERD without esophagitis   • Cervical spinal stenosis   • Saddle pulmonary embolus (HCC)   • Family history of thrombosis   • Degeneration of cervical intervertebral disc   • Adrenal nodule (HCC)   • Health care maintenance   • Localized osteoarthritis of left shoulder   • Primary osteoarthritis of both knees   • Hypomagnesemia   • Migraine with aura and without status migrainosus, not intractable   • Snoring   • Arthritis of right hip   • Anticoagulant long-term use   • Hx pulmonary embolism   • History of DVT (deep vein thrombosis)        Past Medical History:   Diagnosis Date   • Acute cystitis without hematuria 04/10/2020   • Acute right ankle pain 2020   • Adrenal nodule (HCC)    • Arthritis    • BPPV (benign paroxysmal positional vertigo) 5/10/2016   • Cervical stenosis of spine    • Depression     NO MEDS   • DVT, lower extremity (HCC) 2016    right   • GERD without esophagitis 5/10/2016   • H/O varicose vein stripping    • Hip pain    • History of migraine    • History of skin cancer    • History of stress incontinence    • Hyperplastic colon polyp 2013   • Hypertension    • PONV (postoperative nausea and vomiting)    • Pregnancy     , miscarrige x1   • Pulmonary emboli ([facility]) 2016    bilateral extensive PE with saddle emboli.Negative hypercoagulable state w/u   • Pulmonary embolism with acute cor pulmonale (HCC) 2017   • Shoulder pain    • Urge incontinence 5/10/2016   • Vitamin D  deficiency 5/10/2016        Past Surgical History:   Procedure Laterality Date   • CARDIAC CATHETERIZATION N/A 11/10/2016    Procedure: Right Heart Cath;  Surgeon: Johnna Agrawal MD;  Location:  [facility];  Service:    • CARDIAC CATHETERIZATION N/A 11/10/2016    Procedure: Thrombolytic Therapy;  Surgeon: Johnna Agrawal MD;  Location:  [facility];  Service:    • CATARACT EXTRACTION Bilateral    • COLONOSCOPY W/ BIOPSIES  11/2013    hyperplastic polyp, Dr.Russel Lewis   • CYST REMOVAL      VAGINAL CYST   • DILATATION AND CURETTAGE     • EPIDURAL BLOCK     • HEMORROIDECTOMY  2013   • INTERVENTIONAL RADIOLOGY PROCEDURE Bilateral 11/10/2016    Procedure: Sp Smiley Cath Place Pulmonary Art;  Surgeon: Johnna Agrawal MD;  Location:  [facility];  Service:    • SKIN CANCER EXCISION     • TONSILLECTOMY     • TOTAL HIP ARTHROPLASTY Right 10/25/2021    Procedure: Posterior RIGHT TOTAL HIP ARTHROPLASTY GAVIN NAVIGATION;  Surgeon: Rashawn Hernández MD;  Location: [facility] MAIN OR;  Service: Orthopedics;  Laterality: Right;   • TOTAL LAPAROSCOPIC HYSTERECTOMY  2001   • VEIN SURGERY Left 0028-7028    leg,    • VENTRAL HERNIA REPAIR  1987        PT Ortho     Row Name 12/20/21 1000       MMT Right Lower Ext    Rt Hip ABduction MMT, Gross Movement (3-/5) fair minus  -CC          User Key  (r) = Recorded By, (t) = Taken By, (c) = Cosigned By    Initials Name Provider Type    CC Jeri Abel, PT Physical Therapist                             PT Assessment/Plan     Row Name 12/20/21 1000          PT Assessment    Functional Limitations Impaired gait; Limitation in home management; Limitations in community activities; Performance in leisure activities; Performance in self-care ADL; Limitations in functional capacity and performance  -CC     Impairments Balance; Endurance; Gait; Impaired flexibility; Impaired muscle endurance; Impaired muscle length; Impaired muscle  power; Impaired postural alignment; Muscle strength; Pain; Poor body mechanics; Posture; Range of motion  -CC     Assessment Comments Juany Carlin has been seen for 9 physical therapy sessions for s/p R posterior SIRDHAR  On 10/25/21 (8 weeks post - op). Treatment has included therapeutic exercise, manual therapy, neuro-muscular retraining , gait training and patient education with home exercise program . Progress to physical therapy goals is fair. Pt has met 2/4 STG and 0/5 LTG. Pt continues to ambulate with SPC and mild limp, significant R hip weakness, fwd flex posture with standing/ambulation, and pain. Pt does report she has a lot going on in her personal life currently, which is likely contributing to her slow progression and decreased motivation. She will benefit from continued skilled physical therapy to address remaining impairments and functional limitations.  -CC     Please refer to paper survey for additional self-reported information Yes  -CC     Rehab Potential Good  -CC     Patient/caregiver participated in establishment of treatment plan and goals Yes  -CC     Patient would benefit from skilled therapy intervention Yes  -CC            PT Plan    PT Frequency 2x/week  -CC     Predicted Duration of Therapy Intervention (PT) 6-8 more visits  -CC     PT Plan Comments Assess response to resumed ther ex, add exaggerated marching, leg press  -CC           User Key  (r) = Recorded By, (t) = Taken By, (c) = Cosigned By    Initials Name Provider Type    Jeri Kaplan, PT Physical Therapist                   OP Exercises     Row Name 12/20/21 1000             Subjective Comments    Subjective Comments Feeling much better than last week.  -CC              Subjective Pain    Able to rate subjective pain? yes  -CC      Pre-Treatment Pain Level 1  -CC              Total Minutes    17964 - PT Therapeutic Exercise Minutes 38  -CC              Exercise 2    Exercise Name 2 objective measure update  -CC               Exercise 5    Exercise Name 5 bridge  -CC      Cueing 5 Verbal; Demo  -CC      Sets 5 2  -CC      Reps 5 10  -CC      Time 5 3s  -CC              Exercise 7    Exercise Name 7 standing hip abd, B,  -CC      Cueing 7 Verbal; Demo  -CC      Sets 7 1  -CC      Reps 7 15, B  -CC      Time 7 RTB at knees  -CC              Exercise 8    Exercise Name 8 nustep  -CC      Reps 8 6 min  -CC      Time 8 L5 UE/LE  -CC              Exercise 11    Exercise Name 11 lateral walk  -CC      Cueing 11 Verbal  -CC      Reps 11 4 laps at barre  -CC      Additional Comments RTB  -CC              Exercise 12    Exercise Name 12 sit to/from stand  -CC      Cueing 12 Verbal; Demo  -CC      Reps 12 2 x 10  -CC      Time 12 lowest blue mat table  -CC      Additional Comments no UE, cues for very slow eccentric  -CC              Exercise 14    Exercise Name 14 6 inches forward and lateral step up  -CC      Cueing 14 Verbal; Demo  -CC      Reps 14 15 ea  -CC      Additional Comments 1hand support  -CC              Exercise 15    Exercise Name 15 monster walk, F/B  -CC      Cueing 15 Verbal; Demo  -CC      Reps 15 4 laps  -CC      Additional Comments RTB  -CC              Exercise 17    Exercise Name 17 SL (R) hip abd  -CC      Cueing 17 Verbal; Demo  -CC      Sets 17 2  -CC      Reps 17 10  -CC            User Key  (r) = Recorded By, (t) = Taken By, (c) = Cosigned By    Initials Name Provider Type    CC Jeri Abel, PT Physical Therapist                              PT OP Goals     Row Name 12/20/21 1000          PT Short Term Goals    STG Date to Achieve 12/17/21  -CC     STG 1 pt. to be I with initial HEP to facilitate self management of their condition  -CC     STG 1 Progress Met  -CC     STG 2 pt. to be educated in/verbalize understanding of the importance of posture/ergonomics in association with their condition to facilitate self management of their condition  -CC     STG 2 Progress Met  -CC     STG 3 pt to demonstrate STS  x 5 n </= 18 seconds from standard chair, with/without UE's, to faciliate ease/safety with household mobility  -CC     STG 3 Progress Ongoing; Progressing  -CC     STG 3 Progress Comments 20.9 sec  -     STG 4 pt. to ambulate with near normal heel to toe gait pattern with SC to facilitate ease/safety with community mobility  -     STG 4 Progress Ongoing; Progressing; Partially Met  -     STG 4 Progress Comments using cane, mild limp  -            Long Term Goals    LTG Date to Achieve 02/11/22  -     LTG 1 pt. to be I with advanced HEP to facilitate self management of their condition  -CC     LTG 1 Progress Ongoing  -     LTG 2 pt. to report an LEFS >/= 60% to demonstrate decreased level of perceived disability  -CC     LTG 2 Progress Ongoing; Progressing  -     LTG 2 Progress Comments 36% fxn today  -     LTG 3 pt. to ambulate without AD with near normal heel to toe gait pattern to facilitate ease/safety of community mobility  -     LTG 3 Progress Ongoing  -     LTG 3 Progress Comments using cane, mild limp  -     LTG 4 pt. to ascend/descends stairs with reciprocal pattern (</= 1 rails) to facilitate ease/safety of household/community mobility  -     LTG 4 Progress Ongoing  -     LTG 5 pt to demonstrate R hip abd MMt >/= 4/5 to facilitate ease/safety with community mobility  -     LTG 5 Progress Ongoing  -           User Key  (r) = Recorded By, (t) = Taken By, (c) = Cosigned By    Initials Name Provider Type    CC Jeri Abel, PT Physical Therapist                     Outcome Measure Options: Lower Extremity Functional Scale (LEFS)  5 Times Sit to Stand  5 Times Sit to Stand (seconds): 20.9 seconds  5 Times Sit to Stand Comments: standard chair, B UE on armrest  Lower Extremity Functional Index  Any of your usual work, housework or school activities: Moderate difficulty  Your usual hobbies, recreational or sporting activities: Quite a bit of difficulty  Getting into or out of  the bath: Quite a bit of difficulty  Walking between rooms: A little bit of difficulty  Putting on your shoes or socks: Moderate difficulty  Squatting: Quite a bit of difficulty  Lifting an object, like a bag of groceries from the floor: Quite a bit of difficulty  Performing light activities around your home: Moderate difficulty  Performing heavy activities around your home: Quite a bit of difficulty  Getting into or out of a car: A little bit of difficulty  Walking 2 blocks: Moderate difficulty  Walking a mile: Quite a bit of difficulty  Going up or down 10 stairs (about 1 flight of stairs): Moderate difficulty  Standing for 1 hour: Quite a bit of difficulty  Sitting for 1 hour: A little bit of difficulty  Running on even ground: Extreme difficulty or unable to perform activity  Running on uneven ground: Extreme difficulty or unable to perform activity  Making sharp turns while running fast: Extreme difficulty or unable to perform activity  Hopping: Extreme difficulty or unable to perform activity  Rolling over in bed: A little bit of difficulty  Total: 29      Time Calculation:   Start Time: 1003  Stop Time: 1041  Time Calculation (min): 38 min  Total Timed Code Minutes- PT: 38 minute(s)  Timed Charges  11548 - PT Therapeutic Exercise Minutes: 38  Total Minutes  Timed Charges Total Minutes: 38   Total Minutes: 38  Therapy Charges for Today     Code Description Service Date Service Provider Modifiers Qty    47085010870 HC PT THER PROC EA 15 MIN 12/20/2021 Jeri Abel, PT GP 3          PT G-Codes  Outcome Measure Options: Lower Extremity Functional Scale (LEFS)  Total: 29         Jeri Abel PT  12/20/2021

## 2021-12-22 ENCOUNTER — HOSPITAL ENCOUNTER (OUTPATIENT)
Dept: PHYSICAL THERAPY | Facility: HOSPITAL | Age: 71
Setting detail: THERAPIES SERIES
Discharge: HOME | End: 2021-12-22

## 2021-12-22 DIAGNOSIS — Z96.641 AFTERCARE FOLLOWING RIGHT HIP JOINT REPLACEMENT SURGERY: Primary | ICD-10-CM

## 2021-12-22 DIAGNOSIS — M25.551 RIGHT HIP PAIN: ICD-10-CM

## 2021-12-22 DIAGNOSIS — Z47.1 AFTERCARE FOLLOWING RIGHT HIP JOINT REPLACEMENT SURGERY: Primary | ICD-10-CM

## 2021-12-22 DIAGNOSIS — Z74.09 IMPAIRED MOBILITY: ICD-10-CM

## 2021-12-22 PROCEDURE — 97110 THERAPEUTIC EXERCISES: CPT

## 2021-12-22 PROCEDURE — 97116 GAIT TRAINING THERAPY: CPT

## 2021-12-22 RX ORDER — LOSARTAN POTASSIUM 100 MG/1
100 TABLET ORAL DAILY
Qty: 90 TABLET | Refills: 3 | Status: SHIPPED | OUTPATIENT
Start: 2021-12-22 | End: 2022-12-14

## 2021-12-22 NOTE — TELEPHONE ENCOUNTER
Caller: Raegan Juany L    Relationship: Self    Best call back number: [phone]      Requested Prescriptions:   Requested Prescriptions     Pending Prescriptions Disp Refills   • losartan (COZAAR) 100 MG tablet 90 tablet 3     Sig: Take 1 tablet by mouth Daily.        Pharmacy where request should be sent: [facility] ([facility])  [phone] [facility] [phone] FX     Additional details provided by patient: PATIENT HAS ABOUT 6 DOSES LEFT    Does the patient have less than a 3 day supply:  [] Yes  [x] No    Abdias Aguayo Rep   12/22/21 09:18 EST

## 2021-12-22 NOTE — THERAPY TREATMENT NOTE
Outpatient Physical Therapy Ortho Treatment Note  [facility]     Patient Name: Juany Carlin  : 1950  MRN: [id number]  Today's Date: 2021      Visit Date: 2021    Visit Dx:    ICD-10-CM ICD-9-CM   1. Aftercare following right hip joint replacement surgery  Z47.1 V54.81    Z96.641 V43.64   2. Impaired mobility  Z74.09 799.89   3. Right hip pain  M25.551 719.45       Patient Active Problem List   Diagnosis   • HTN (hypertension), benign   • Vitamin D deficiency   • Urge incontinence   • GERD without esophagitis   • Cervical spinal stenosis   • Saddle pulmonary embolus (HCC)   • Family history of thrombosis   • Degeneration of cervical intervertebral disc   • Adrenal nodule (HCC)   • Health care maintenance   • Localized osteoarthritis of left shoulder   • Primary osteoarthritis of both knees   • Hypomagnesemia   • Migraine with aura and without status migrainosus, not intractable   • Snoring   • Arthritis of right hip   • Anticoagulant long-term use   • Hx pulmonary embolism   • History of DVT (deep vein thrombosis)        Past Medical History:   Diagnosis Date   • Acute cystitis without hematuria 04/10/2020   • Acute right ankle pain 2020   • Adrenal nodule (HCC)    • Arthritis    • BPPV (benign paroxysmal positional vertigo) 5/10/2016   • Cervical stenosis of spine    • Depression     NO MEDS   • DVT, lower extremity (HCC) 2016    right   • GERD without esophagitis 5/10/2016   • H/O varicose vein stripping    • Hip pain    • History of migraine    • History of skin cancer    • History of stress incontinence    • Hyperplastic colon polyp 2013   • Hypertension    • PONV (postoperative nausea and vomiting)    • Pregnancy     , miscarrige x1   • Pulmonary emboli ([facility]) 2016    bilateral extensive PE with saddle emboli.Negative hypercoagulable state w/u   • Pulmonary embolism with acute cor pulmonale (HCC) 2017   • Shoulder pain    • Urge incontinence 5/10/2016   • Vitamin D  deficiency 5/10/2016        Past Surgical History:   Procedure Laterality Date   • CARDIAC CATHETERIZATION N/A 11/10/2016    Procedure: Right Heart Cath;  Surgeon: Johnna Agrawal MD;  Location:  [facility];  Service:    • CARDIAC CATHETERIZATION N/A 11/10/2016    Procedure: Thrombolytic Therapy;  Surgeon: Johnna Agrawal MD;  Location:  [facility];  Service:    • CATARACT EXTRACTION Bilateral    • COLONOSCOPY W/ BIOPSIES  11/2013    hyperplastic polyp, Dr.Russel Lewis   • CYST REMOVAL      VAGINAL CYST   • DILATATION AND CURETTAGE     • EPIDURAL BLOCK     • HEMORROIDECTOMY  2013   • INTERVENTIONAL RADIOLOGY PROCEDURE Bilateral 11/10/2016    Procedure: Sp Smiley Cath Place Pulmonary Art;  Surgeon: Johnna Agrawal MD;  Location:  [facility];  Service:    • SKIN CANCER EXCISION     • TONSILLECTOMY     • TOTAL HIP ARTHROPLASTY Right 10/25/2021    Procedure: Posterior RIGHT TOTAL HIP ARTHROPLASTY GAVIN NAVIGATION;  Surgeon: Rashawn Hernández MD;  Location:  [facility];  Service: Orthopedics;  Laterality: Right;   • TOTAL LAPAROSCOPIC HYSTERECTOMY  2001   • VEIN SURGERY Left [id number]    leg,    • VENTRAL HERNIA REPAIR  1987                        PT Assessment/Plan     Row Name 12/22/21 1000          PT Assessment    Assessment Comments Ms. Carlin returns with mild pain flare up secondary to playing with grandchild all day yesterday. Continues to amb with SPC, so worked on gait training in clinic today. Pt continues to demo fear avoidance beliefs, and is nervous about progressing from cane to no AD, as well as beginning new exercise/movements. This coupled along with a lot going on personally is likely contributing to her slower progress. Pt will benefit from ongoing skilled PT to address strength, gait, and confidence with ADLs.  -CC            PT Plan    PT Plan Comments Cont to work on confidence, gait training, relaxation during exercises, appropriate  breathing patterns during ther ex.  -CC           User Key  (r) = Recorded By, (t) = Taken By, (c) = Cosigned By    Initials Name Provider Type    Jeri Kaplan, PT Physical Therapist                   OP Exercises     Row Name 12/22/21 1000             Subjective Comments    Subjective Comments Played with krista all day yesterday so a little sore today.  -CC              Subjective Pain    Able to rate subjective pain? yes  -CC      Pre-Treatment Pain Level 3  -CC              Total Minutes    85868 - Gait Training Minutes  10  -CC      98957 - PT Therapeutic Exercise Minutes 32  -CC              Exercise 1    Exercise Name 1 exaggerated marching  -CC      Cueing 1 Verbal  -CC      Reps 1 4 laps // bars  -CC      Time 1 one fingertip support  -CC              Exercise 3    Exercise Name 3 fwd/bkwd walking in // bars  -CC      Cueing 3 Verbal  -CC      Reps 3 6 laps  -CC      Time 3 no UE  -CC      Additional Comments cues for appropriate step length and heel/toe  -CC              Exercise 4    Exercise Name 4 DL leg press  -CC      Cueing 4 Verbal  -CC      Sets 4 1  -CC      Reps 4 10  -CC      Time 4 100#  -CC              Exercise 7    Exercise Name 7 standing hip abd, B,  -CC      Cueing 7 Verbal; Demo  -CC      Sets 7 1  -CC      Reps 7 15, B  -CC      Time 7 RTB at knees  -CC              Exercise 8    Exercise Name 8 nustep  -CC      Reps 8 6 min  -CC      Time 8 L5 UE/LE  -CC              Exercise 9    Exercise Name 9 gait training around clinic  -CC      Cueing 9 Verbal  -CC      Reps 9 3 laps  -CC      Time 9 no AD  -CC      Additional Comments cues for step length, to relax, arm swing, heel strike  -CC              Exercise 11    Exercise Name 11 lateral walk  -CC      Cueing 11 Verbal  -CC      Reps 11 4 laps at barre  -CC      Additional Comments RTB  -CC              Exercise 13    Exercise Name 13 heel raise  -CC      Cueing 13 Verbal  -CC      Sets 13 1  -CC      Reps 13 20  -CC       Time 13 B UE  -CC              Exercise 14    Exercise Name 14 6 inches forward and lateral step up  -      Cueing 14 Verbal; Demo  -CC      Reps 14 15 ea  -      Additional Comments 1hand support  -              Exercise 15    Exercise Name 15 monster walk, F/B  -CC      Cueing 15 Verbal; Demo  -CC      Reps 15 4 laps  -      Additional Comments RTB  -CC            User Key  (r) = Recorded By, (t) = Taken By, (c) = Cosigned By    Initials Name Provider Type    CC Jeri Abel, PT Physical Therapist                                                Time Calculation:   Start Time: 0957  Stop Time: 1039  Time Calculation (min): 42 min  Total Timed Code Minutes- PT: 42 minute(s)  Timed Charges  36422 - PT Therapeutic Exercise Minutes: 32  15562 - Gait Training Minutes : 10  Total Minutes  Timed Charges Total Minutes: 42   Total Minutes: 42  Therapy Charges for Today     Code Description Service Date Service Provider Modifiers Qty    13678999528 HC PT THER PROC EA 15 MIN 12/22/2021 Jeri Abel, PT GP 2    17604889523 HC GAIT TRAINING EA 15 MIN 12/22/2021 Jeri Abel, PT GP 1                    Jeri Abel, PT  12/22/2021

## 2022-01-03 ENCOUNTER — HOSPITAL ENCOUNTER (OUTPATIENT)
Dept: PHYSICAL THERAPY | Facility: HOSPITAL | Age: 72
Setting detail: THERAPIES SERIES
Discharge: HOME | End: 2022-01-03

## 2022-01-03 DIAGNOSIS — Z47.1 AFTERCARE FOLLOWING RIGHT HIP JOINT REPLACEMENT SURGERY: Primary | ICD-10-CM

## 2022-01-03 DIAGNOSIS — Z74.09 IMPAIRED MOBILITY: ICD-10-CM

## 2022-01-03 DIAGNOSIS — M25.551 RIGHT HIP PAIN: ICD-10-CM

## 2022-01-03 DIAGNOSIS — Z96.641 AFTERCARE FOLLOWING RIGHT HIP JOINT REPLACEMENT SURGERY: Primary | ICD-10-CM

## 2022-01-03 PROCEDURE — 97140 MANUAL THERAPY 1/> REGIONS: CPT | Performed by: PHYSICAL THERAPIST

## 2022-01-03 PROCEDURE — 97530 THERAPEUTIC ACTIVITIES: CPT | Performed by: PHYSICAL THERAPIST

## 2022-01-03 NOTE — THERAPY TREATMENT NOTE
Outpatient Physical Therapy Ortho Treatment Note  [facility]     Patient Name: Juany Carlin  : 1950  MRN: [id number]  Today's Date: 1/3/2022      Visit Date: 2022    Visit Dx:    ICD-10-CM ICD-9-CM   1. Aftercare following right hip joint replacement surgery  Z47.1 V54.81    Z96.641 V43.64   2. Impaired mobility  Z74.09 799.89   3. Right hip pain  M25.551 719.45       Patient Active Problem List   Diagnosis   • HTN (hypertension), benign   • Vitamin D deficiency   • Urge incontinence   • GERD without esophagitis   • Cervical spinal stenosis   • Saddle pulmonary embolus (HCC)   • Family history of thrombosis   • Degeneration of cervical intervertebral disc   • Adrenal nodule (HCC)   • Health care maintenance   • Localized osteoarthritis of left shoulder   • Primary osteoarthritis of both knees   • Hypomagnesemia   • Migraine with aura and without status migrainosus, not intractable   • Snoring   • Arthritis of right hip   • Anticoagulant long-term use   • Hx pulmonary embolism   • History of DVT (deep vein thrombosis)        Past Medical History:   Diagnosis Date   • Acute cystitis without hematuria 04/10/2020   • Acute right ankle pain 2020   • Adrenal nodule (HCC)    • Arthritis    • BPPV (benign paroxysmal positional vertigo) 5/10/2016   • Cervical stenosis of spine    • Depression     NO MEDS   • DVT, lower extremity (HCC) 2016    right   • GERD without esophagitis 5/10/2016   • H/O varicose vein stripping    • Hip pain    • History of migraine    • History of skin cancer    • History of stress incontinence    • Hyperplastic colon polyp 2013   • Hypertension    • PONV (postoperative nausea and vomiting)    • Pregnancy     , miscarrige x1   • Pulmonary emboli ([facility]) 2016    bilateral extensive PE with saddle emboli.Negative hypercoagulable state w/u   • Pulmonary embolism with acute cor pulmonale (HCC) 2017   • Shoulder pain    • Urge incontinence 5/10/2016   • Vitamin D  deficiency 5/10/2016        Past Surgical History:   Procedure Laterality Date   • CARDIAC CATHETERIZATION N/A 11/10/2016    Procedure: Right Heart Cath;  Surgeon: Johnna Agrawal MD;  Location:  [facility];  Service:    • CARDIAC CATHETERIZATION N/A 11/10/2016    Procedure: Thrombolytic Therapy;  Surgeon: Johnna Agrawal MD;  Location:  [facility];  Service:    • CATARACT EXTRACTION Bilateral    • COLONOSCOPY W/ BIOPSIES  11/2013    hyperplastic polyp, Dr.Russel Lewis   • CYST REMOVAL      VAGINAL CYST   • DILATATION AND CURETTAGE     • EPIDURAL BLOCK     • HEMORROIDECTOMY  2013   • INTERVENTIONAL RADIOLOGY PROCEDURE Bilateral 11/10/2016    Procedure: Sp Smiley Cath Place Pulmonary Art;  Surgeon: Johnna Agrawal MD;  Location:  [facility];  Service:    • SKIN CANCER EXCISION     • TONSILLECTOMY     • TOTAL HIP ARTHROPLASTY Right 10/25/2021    Procedure: Posterior RIGHT TOTAL HIP ARTHROPLASTY GAVIN NAVIGATION;  Surgeon: Rashawn Hernández MD;  Location:  [facility];  Service: Orthopedics;  Laterality: Right;   • TOTAL LAPAROSCOPIC HYSTERECTOMY  2001   • VEIN SURGERY Left 2708-4515    leg,    • VENTRAL HERNIA REPAIR  1987                        PT Assessment/Plan     Row Name 01/03/22 1540          PT Assessment    Assessment Comments Ms. Carlin is 10 weeks s/p R posterior approach SRIDHAR. She has transitioned to gait without AD, with mild to moderate limp. She reports increased R posterior hip pain. She does demontrate taut tissues throughout R posterior/lateral hip girdle tissues, which responded somewhat with sof tissue massage. We discussed activity modifications and easing into activities.  She demonstrates a well healing incision.  I suspect her R posterior hip pain is from considerable ramp up in demands on her tissues (taking care of grand daughter, increasing stair usage at home, adn increasing walking with granddaughter without cane, progressed  therapy exercises, coupled with scoliosis).  ms. Carlin continues to be a candidate for skilled physical therapy.  -GJ            PT Plan    PT Plan Comments assess response to STM of R posterior hip girdle tissues. Resume all hip girdle strengthening as able  -GJ           User Key  (r) = Recorded By, (t) = Taken By, (c) = Cosigned By    Initials Name Provider Type    Walter Kaur, PT Physical Therapist                   OP Exercises     Row Name 01/03/22 1454 01/03/22 1400          Subjective Comments    Subjective Comments -- My R hip has  been bothering me the past few days, maybe from playing with my granduaAppGratister  -GJ            Total Minutes    78514 - PT Therapeutic Activity Minutes 25  -GJ --     33762 - PT Manual Therapy Minutes 15  -GJ --            Exercise 1    Exercise Name 1 -- exaggerated marching  -GJ     Cueing 1 -- Verbal  -GJ     Reps 1 -- 4 laps // bars  -GJ     Time 1 -- one fingertip support  -GJ            Exercise 4    Exercise Name 4 -- DL leg press  -GJ     Cueing 4 -- Verbal  -GJ     Sets 4 -- 2  -GJ     Reps 4 -- 10  -GJ     Time 4 -- 100#  -GJ            Exercise 8    Exercise Name 8 -- nustep  -GJ     Reps 8 -- 5 min  -GJ     Time 8 -- L5 UE/LE  -GJ            Exercise 11    Exercise Name 11 -- --  -GJ     Cueing 11 -- --  -GJ     Reps 11 -- --  -GJ     Additional Comments -- --  -GJ            Exercise 13    Exercise Name 13 -- heel raise  -GJ     Cueing 13 -- Verbal  -GJ     Reps 13 -- 20  -GJ     Time 13 -- B UE  -GJ            Exercise 14    Exercise Name 14 -- 6 inches forward and lateral step up  -GJ     Cueing 14 -- Verbal; Demo  -GJ     Reps 14 -- 15 ea  -GJ     Time 14 -- 5s  -GJ     Additional Comments -- 1hand support  -GJ            Exercise 15    Exercise Name 15 -- --  -GJ     Cueing 15 -- --  -GJ     Reps 15 -- --  -GJ     Additional Comments -- --  -GJ           User Key  (r) = Recorded By, (t) = Taken By, (c) = Cosigned By    Initials Name Provider Type    PEGGY Jenkins  Walter VELÁZQUEZ, PT Physical Therapist                         Manual Rx (last 36 hours)     Manual Treatments     Row Name 01/03/22 1500 01/03/22 1454          Total Minutes    51668 - PT Manual Therapy Minutes -- 15  -GJ            Manual Rx 1    Manual Rx 1 Location STM to R posterior/lateral hip  -GJ --     Manual Rx 1 Type pt in L SL with pillow between knees  -GJ --           User Key  (r) = Recorded By, (t) = Taken By, (c) = Cosigned By    Initials Name Provider Type    GJ Walter Jenkins, PT Physical Therapist                 PT OP Goals     Row Name 01/03/22 1400          PT Short Term Goals    STG Date to Achieve 12/17/21  -GJ     STG 1 pt. to be I with initial HEP to facilitate self management of their condition  -GJ     STG 1 Progress Met  -GJ     STG 2 pt. to be educated in/verbalize understanding of the importance of posture/ergonomics in association with their condition to facilitate self management of their condition  -GJ     STG 2 Progress Met  -GJ     STG 3 pt to demonstrate STS x 5 n </= 18 seconds from standard chair, with/without UE's, to faciliate ease/safety with household mobility  -GJ     STG 3 Progress Ongoing; Progressing  -GJ     STG 4 pt. to ambulate with near normal heel to toe gait pattern with SC to facilitate ease/safety with community mobility  -GJ     STG 4 Progress Ongoing; Progressing; Partially Met  -GJ     STG 4 Progress Comments no AD, mild limp  -GJ            Long Term Goals    LTG Date to Achieve 02/11/22  -GJ     LTG 1 pt. to be I with advanced HEP to facilitate self management of their condition  -GJ     LTG 1 Progress Ongoing  -GJ     LTG 2 pt. to report an LEFS >/= 60% to demonstrate decreased level of perceived disability  -GJ     LTG 2 Progress Ongoing; Progressing  -GJ     LTG 3 pt. to ambulate without AD with near normal heel to toe gait pattern to facilitate ease/safety of community mobility  -GJ     LTG 3 Progress Ongoing  -GJ     LTG 4 pt. to ascend/descends stairs with  reciprocal pattern (</= 1 rails) to facilitate ease/safety of household/community mobility  -GJ     LTG 4 Progress Ongoing  -GJ     LTG 5 pt to demonstrate R hip abd MMt >/= 4/5 to facilitate ease/safety with community mobility  -GJ     LTG 5 Progress Ongoing  -GJ           User Key  (r) = Recorded By, (t) = Taken By, (c) = Cosigned By    Initials Name Provider Type     Walter Jenkins, PT Physical Therapist                Therapy Education  Education Details: discussed activity modifications, discussed self massage/trigger point release to R posterior hip girdle tissue  Given: HEP, Symptoms/condition management, Pain management, Posture/body mechanics, Mobility training  Program: Reinforced, Progressed  How Provided: Verbal, Demonstration  Provided to: Patient  Level of Understanding: Teach back education performed, Verbalized, Demonstrated              Time Calculation:   Start Time: 1450  Stop Time: 1530  Time Calculation (min): 40 min  Timed Charges  75265 - PT Manual Therapy Minutes: 15  11106 - PT Therapeutic Activity Minutes: 25  Total Minutes  Timed Charges Total Minutes: 40   Total Minutes: 40  Therapy Charges for Today     Code Description Service Date Service Provider Modifiers Qty    43163464468 HC PT THERAPEUTIC ACT EA 15 MIN 1/3/2022 Walter Jenkins, PT GP 2    87886896271 HC PT MANUAL THERAPY EA 15 MIN 1/3/2022 Walter Jenkins, PT GP 1                    Walter Jenkins, PT  1/3/2022      second specimen sent in SDA/yes

## 2022-01-05 ENCOUNTER — HOSPITAL ENCOUNTER (OUTPATIENT)
Dept: PHYSICAL THERAPY | Facility: HOSPITAL | Age: 72
Setting detail: THERAPIES SERIES
Discharge: HOME | End: 2022-01-05

## 2022-01-05 DIAGNOSIS — M25.551 RIGHT HIP PAIN: ICD-10-CM

## 2022-01-05 DIAGNOSIS — Z96.641 AFTERCARE FOLLOWING RIGHT HIP JOINT REPLACEMENT SURGERY: Primary | ICD-10-CM

## 2022-01-05 DIAGNOSIS — Z47.1 AFTERCARE FOLLOWING RIGHT HIP JOINT REPLACEMENT SURGERY: Primary | ICD-10-CM

## 2022-01-05 DIAGNOSIS — Z74.09 IMPAIRED MOBILITY: ICD-10-CM

## 2022-01-05 PROCEDURE — 97110 THERAPEUTIC EXERCISES: CPT | Performed by: PHYSICAL THERAPIST

## 2022-01-05 NOTE — THERAPY PROGRESS REPORT/RE-CERT
Outpatient Physical Therapy Ortho Progress Note  [facility]     Patient Name: Juany Carlin  : 1950  MRN: [id number]  Today's Date: 2022      Visit Date: 2022    Visit Dx:    ICD-10-CM ICD-9-CM   1. Aftercare following right hip joint replacement surgery  Z47.1 V54.81    Z96.641 V43.64   2. Impaired mobility  Z74.09 799.89   3. Right hip pain  M25.551 719.45       Patient Active Problem List   Diagnosis   • HTN (hypertension), benign   • Vitamin D deficiency   • Urge incontinence   • GERD without esophagitis   • Cervical spinal stenosis   • Saddle pulmonary embolus (HCC)   • Family history of thrombosis   • Degeneration of cervical intervertebral disc   • Adrenal nodule (HCC)   • Health care maintenance   • Localized osteoarthritis of left shoulder   • Primary osteoarthritis of both knees   • Hypomagnesemia   • Migraine with aura and without status migrainosus, not intractable   • Snoring   • Arthritis of right hip   • Anticoagulant long-term use   • Hx pulmonary embolism   • History of DVT (deep vein thrombosis)        Past Medical History:   Diagnosis Date   • Acute cystitis without hematuria 04/10/2020   • Acute right ankle pain 2020   • Adrenal nodule (HCC)    • Arthritis    • BPPV (benign paroxysmal positional vertigo) 5/10/2016   • Cervical stenosis of spine    • Depression     NO MEDS   • DVT, lower extremity (HCC) 2016    right   • GERD without esophagitis 5/10/2016   • H/O varicose vein stripping    • Hip pain    • History of migraine    • History of skin cancer    • History of stress incontinence    • Hyperplastic colon polyp 2013   • Hypertension    • PONV (postoperative nausea and vomiting)    • Pregnancy     , miscarrige x1   • Pulmonary emboli ([facility]) 2016    bilateral extensive PE with saddle emboli.Negative hypercoagulable state w/u   • Pulmonary embolism with acute cor pulmonale (HCC) 2017   • Shoulder pain    • Urge incontinence 5/10/2016   • Vitamin D  deficiency 5/10/2016        Past Surgical History:   Procedure Laterality Date   • CARDIAC CATHETERIZATION N/A 11/10/2016    Procedure: Right Heart Cath;  Surgeon: Johnna Agrawal MD;  Location:  [facility];  Service:    • CARDIAC CATHETERIZATION N/A 11/10/2016    Procedure: Thrombolytic Therapy;  Surgeon: Johnna Agrawal MD;  Location:  [facility];  Service:    • CATARACT EXTRACTION Bilateral    • COLONOSCOPY W/ BIOPSIES  11/2013    hyperplastic polyp, Dr.Russel Lewis   • CYST REMOVAL      VAGINAL CYST   • DILATATION AND CURETTAGE     • EPIDURAL BLOCK     • HEMORROIDECTOMY  2013   • INTERVENTIONAL RADIOLOGY PROCEDURE Bilateral 11/10/2016    Procedure: Sp Smiley Cath Place Pulmonary Art;  Surgeon: Johnna Agrawal MD;  Location:  [facility];  Service:    • SKIN CANCER EXCISION     • TONSILLECTOMY     • TOTAL HIP ARTHROPLASTY Right 10/25/2021    Procedure: Posterior RIGHT TOTAL HIP ARTHROPLASTY GAVIN NAVIGATION;  Surgeon: Rashawn Hernández MD;  Location: [facility] MAIN OR;  Service: Orthopedics;  Laterality: Right;   • TOTAL LAPAROSCOPIC HYSTERECTOMY  2001   • VEIN SURGERY Left [id number]    leg,    • VENTRAL HERNIA REPAIR  1987        PT Ortho     Row Name 01/05/22 1000       MMT (Manual Muscle Testing)    Rt Lower Ext Rt Hip Extension  -GJ       MMT Right Lower Ext    Rt Hip Extension MMT, Gross Movement (4/5) good  -GJ    Rt Hip ABduction MMT, Gross Movement (3+/5) fair plus  -GJ          User Key  (r) = Recorded By, (t) = Taken By, (c) = Cosigned By    Initials Name Provider Type    GJ Walter Jenkins, PT Physical Therapist                             PT Assessment/Plan     Row Name 01/05/22 1014          PT Assessment    Functional Limitations Impaired gait; Limitation in home management; Limitations in community activities; Performance in leisure activities  -GJ     Impairments Balance; Gait; Endurance; Impaired flexibility; Impaired muscle endurance;  Impaired muscle length; Impaired muscle power; Impaired postural alignment; Muscle strength; Pain; Poor body mechanics; Posture; Range of motion  -     Assessment Comments Ms. Carlin is a 72 y/o female. She is 10 weeks, 2 days s/p R posterior hip SRIDHAR. Prgoress towards goals is fair, having met 4 of 4 STG's and partially meeting 1 of 4 LTG's.  Her progress is certainly complicated by her scoliosis, issues in personal life.  Today, she presents using cane for gait, which may be near her baseline pattern given level of scoliosis.  She demonstrates improving, but continued weakness through R>>L hip girdle/LE musculature.  Overall endurance to standing/functional exercises is improving.  Suspect posterior R hip pain is secondary to continued weakness and increased demands.  Ms. Carlin continues to be a good candidate for skilled physical therapy.  -     Please refer to paper survey for additional self-reported information Yes  -GJ     Rehab Potential Good  -GJ     Patient/caregiver participated in establishment of treatment plan and goals Yes  -GJ     Patient would benefit from skilled therapy intervention Yes  -GJ            PT Plan    PT Frequency 1x/week; 2x/week  -GJ     Predicted Duration of Therapy Intervention (PT) 10 visits  -GJ     Planned CPT's? PT RE-EVAL: 30122; PT THER PROC EA 15 MIN: 54547; PT THER ACT EA 15 MIN: 48115; PT NEUROMUSC RE-EDUCATION EA 15 MIN: 81729; PT GAIT TRAINING EA 15 MIN: 77736; PT MANUAL THERAPY EA 15 MIN: 12932; PT AQUATIC THERAPY EA 15 MIN: 72179; PT HOT OR COLD PACK TREAT MCARE; PT ELECTRICAL STIM UNATTEND:   -     PT Plan Comments continue to work on hip girdle strength, especially abd/extension, continue to work on standing/functioal strengtheniing, gait normalization  -           User Key  (r) = Recorded By, (t) = Taken By, (c) = Cosigned By    Initials Name Provider Type    Walter Kaur, PT Physical Therapist                   OP Exercises     Row Name 01/05/22 1002  01/05/22 1000          Subjective Comments    Subjective Comments -- i still have some pain, it feels like nerve pain in my ball and socket joint  -GJ            Total Minutes    09589 - PT Therapeutic Exercise Minutes 41  -GJ --            Exercise 1    Exercise Name 1 -- exaggerated marching  -GJ     Cueing 1 -- Verbal  -GJ     Reps 1 -- 4 laps  -GJ            Exercise 2    Exercise Name 2 -- objective measure update  -GJ            Exercise 4    Exercise Name 4 -- DL leg press  -GJ     Cueing 4 -- Verbal  -GJ     Sets 4 -- 2  -GJ     Reps 4 -- 20  -GJ     Time 4 -- 120#  -GJ            Exercise 5    Exercise Name 5 -- SL clam  -GJ     Cueing 5 -- Verbal; Demo  -GJ     Reps 5 -- 15  -GJ     Time 5 -- RTB  -GJ            Exercise 6    Exercise Name 6 -- stair training,  -GJ     Time 6 -- 5  min  -GJ            Exercise 8    Exercise Name 8 -- nustep  -GJ     Reps 8 -- 5 min  -GJ     Time 8 -- L5 UE/LE  -GJ            Exercise 11    Exercise Name 11 -- lateral walk  -GJ     Cueing 11 -- Verbal  -GJ     Reps 11 -- 4 laps out of bars  -GJ     Time 11 -- RTB  -GJ            Exercise 14    Exercise Name 14 -- 8 inches forward step up  -GJ     Reps 14 -- 15  -GJ     Time 14 -- 1 hand support  -GJ            Exercise 15    Exercise Name 15 -- monster walk, F/B  -GJ     Cueing 15 -- Verbal; Demo  -GJ     Reps 15 -- 4 laps  -GJ     Time 15 -- RTB  -GJ     Additional Comments -- out of bars  -GJ           User Key  (r) = Recorded By, (t) = Taken By, (c) = Cosigned By    Initials Name Provider Type    GJ Walter Jenkins, PT Physical Therapist                              PT OP Goals     Row Name 01/05/22 1000          PT Short Term Goals    STG Date to Achieve 12/17/21  -GJ     STG 1 pt. to be I with initial HEP to facilitate self management of their condition  -GJ     STG 1 Progress Met  -GJ     STG 2 pt. to be educated in/verbalize understanding of the importance of posture/ergonomics in association with their condition to  facilitate self management of their condition  -GJ     STG 2 Progress Met  -GJ     STG 3 pt to demonstrate STS x 5 n </= 18 seconds from standard chair, with/without UE's, to faciliate ease/safety with household mobility  -GJ     STG 3 Progress Met  -GJ     STG 3 Progress Comments 14 sec  -GJ     STG 4 pt. to ambulate with near normal heel to toe gait pattern with SC to facilitate ease/safety with community mobility  -GJ     STG 4 Progress Met  -GJ     STG 4 Progress Comments with sc, mild limp but likely baseline given her scoliosis  -GJ            Long Term Goals    LTG Date to Achieve 02/11/22  -GJ     LTG 1 pt. to be I with advanced HEP to facilitate self management of their condition  -GJ     LTG 1 Progress Ongoing  -GJ     LTG 2 pt. to report an LEFS >/= 60% to demonstrate decreased level of perceived disability  -GJ     LTG 2 Progress Ongoing; Progressing  -GJ     LTG 3 pt. to ambulate without AD with near normal heel to toe gait pattern to facilitate ease/safety of community mobility  -GJ     LTG 3 Progress Ongoing  -GJ     LTG 4 pt. to ascend/descends stairs with reciprocal pattern (</= 1 rails) to facilitate ease/safety of household/community mobility  -GJ     LTG 4 Progress Partially Met; Ongoing  -GJ     LTG 5 pt to demonstrate R hip abd MMt >/= 4/5 to facilitate ease/safety with community mobility  -GJ     LTG 5 Progress Ongoing  -           User Key  (r) = Recorded By, (t) = Taken By, (c) = Cosigned By    Initials Name Provider Type    Walter Kaur, PT Physical Therapist                Therapy Education  Education Details: discussed realistic time frames for healing, activity modifications, strengthening to continue to be once every other day, continue to work on gait  Given: HEP, Symptoms/condition management, Pain management, Posture/body mechanics, Mobility training, Fall prevention and home safety  Program: Progressed, Reinforced, Modified  How Provided: Demonstration, Verbal  Provided to:  Patient  Level of Understanding: Teach back education performed, Verbalized, Demonstrated              Time Calculation:   Start Time: 1004  Stop Time: 1045  Time Calculation (min): 41 min  Timed Charges  38385 - PT Therapeutic Exercise Minutes: 41  Total Minutes  Timed Charges Total Minutes: 41   Total Minutes: 41  Therapy Charges for Today     Code Description Service Date Service Provider Modifiers Qty    27060892355 HC PT THER PROC EA 15 MIN 1/5/2022 Walter Jenkins, PT GP 3                    Walter Jenkins, PT  1/5/2022

## 2022-01-10 ENCOUNTER — HOSPITAL ENCOUNTER (OUTPATIENT)
Dept: PHYSICAL THERAPY | Facility: HOSPITAL | Age: 72
Setting detail: THERAPIES SERIES
Discharge: HOME | End: 2022-01-10

## 2022-01-10 DIAGNOSIS — M25.551 RIGHT HIP PAIN: ICD-10-CM

## 2022-01-10 DIAGNOSIS — Z96.641 AFTERCARE FOLLOWING RIGHT HIP JOINT REPLACEMENT SURGERY: Primary | ICD-10-CM

## 2022-01-10 DIAGNOSIS — Z47.1 AFTERCARE FOLLOWING RIGHT HIP JOINT REPLACEMENT SURGERY: Primary | ICD-10-CM

## 2022-01-10 DIAGNOSIS — Z74.09 IMPAIRED MOBILITY: ICD-10-CM

## 2022-01-10 PROCEDURE — 97116 GAIT TRAINING THERAPY: CPT

## 2022-01-10 PROCEDURE — 97110 THERAPEUTIC EXERCISES: CPT

## 2022-01-10 NOTE — THERAPY TREATMENT NOTE
Outpatient Physical Therapy Ortho Treatment Note  [facility]     Patient Name: Juany Carlin  : 1950  MRN: [id number]  Today's Date: 1/10/2022      Visit Date: 01/10/2022    Visit Dx:    ICD-10-CM ICD-9-CM   1. Aftercare following right hip joint replacement surgery  Z47.1 V54.81    Z96.641 V43.64   2. Impaired mobility  Z74.09 799.89   3. Right hip pain  M25.551 719.45       Patient Active Problem List   Diagnosis   • HTN (hypertension), benign   • Vitamin D deficiency   • Urge incontinence   • GERD without esophagitis   • Cervical spinal stenosis   • Saddle pulmonary embolus (HCC)   • Family history of thrombosis   • Degeneration of cervical intervertebral disc   • Adrenal nodule (HCC)   • Health care maintenance   • Localized osteoarthritis of left shoulder   • Primary osteoarthritis of both knees   • Hypomagnesemia   • Migraine with aura and without status migrainosus, not intractable   • Snoring   • Arthritis of right hip   • Anticoagulant long-term use   • Hx pulmonary embolism   • History of DVT (deep vein thrombosis)        Past Medical History:   Diagnosis Date   • Acute cystitis without hematuria 04/10/2020   • Acute right ankle pain 2020   • Adrenal nodule (HCC)    • Arthritis    • BPPV (benign paroxysmal positional vertigo) 5/10/2016   • Cervical stenosis of spine    • Depression     NO MEDS   • DVT, lower extremity (HCC) 2016    right   • GERD without esophagitis 5/10/2016   • H/O varicose vein stripping    • Hip pain    • History of migraine    • History of skin cancer    • History of stress incontinence    • Hyperplastic colon polyp 2013   • Hypertension    • PONV (postoperative nausea and vomiting)    • Pregnancy     , miscarrige x1   • Pulmonary emboli ([facility]) 2016    bilateral extensive PE with saddle emboli.Negative hypercoagulable state w/u   • Pulmonary embolism with acute cor pulmonale (HCC) 2017   • Shoulder pain    • Urge incontinence 5/10/2016   • Vitamin D  deficiency 5/10/2016        Past Surgical History:   Procedure Laterality Date   • CARDIAC CATHETERIZATION N/A 11/10/2016    Procedure: Right Heart Cath;  Surgeon: Johnna Agrawal MD;  Location:  [facility];  Service:    • CARDIAC CATHETERIZATION N/A 11/10/2016    Procedure: Thrombolytic Therapy;  Surgeon: Johnna Agrawal MD;  Location:  [facility];  Service:    • CATARACT EXTRACTION Bilateral    • COLONOSCOPY W/ BIOPSIES  11/2013    hyperplastic polyp, Dr.Russel Lewis   • CYST REMOVAL      VAGINAL CYST   • DILATATION AND CURETTAGE     • EPIDURAL BLOCK     • HEMORROIDECTOMY  2013   • INTERVENTIONAL RADIOLOGY PROCEDURE Bilateral 11/10/2016    Procedure: Sp Smiley Cath Place Pulmonary Art;  Surgeon: Johnna Agrawal MD;  Location:  [facility];  Service:    • SKIN CANCER EXCISION     • TONSILLECTOMY     • TOTAL HIP ARTHROPLASTY Right 10/25/2021    Procedure: Posterior RIGHT TOTAL HIP ARTHROPLASTY GAVIN NAVIGATION;  Surgeon: Rashawn Hernández MD;  Location:  [facility] MAIN OR;  Service: Orthopedics;  Laterality: Right;   • TOTAL LAPAROSCOPIC HYSTERECTOMY  2001   • VEIN SURGERY Left [id number]    leg,    • VENTRAL HERNIA REPAIR  1987        PT Ortho     Row Name 01/10/22 1000       Leg Length Test    True Unequal   R LE longer  -CC          User Key  (r) = Recorded By, (t) = Taken By, (c) = Cosigned By    Initials Name Provider Type    Jeri Kaplan, PT Physical Therapist                             PT Assessment/Plan     Row Name 01/10/22 1100          PT Assessment    Assessment Comments Ms. Carlin presents today with ongoing posteriolateral R hip pain. She also presents with true LLD R LE significant longer than L LE. Discussed benefits of getting custome heel lift or shoe build up, and issues temporary heel lift for pt to trial at home. Continued focus on glute strengthening, added standing hip extension, with much cueing to isolate glute vs. lumbar  ext. Pt remains good candidate for skilled PT.  -CC            PT Plan    PT Plan Comments F/u on heel lift, give pt number for Shoe repair and discuss POC re: this.  -CC           User Key  (r) = Recorded By, (t) = Taken By, (c) = Cosigned By    Initials Name Provider Type    Jeri Kaplan, PT Physical Therapist                   OP Exercises     Row Name 01/10/22 1000             Subjective Comments    Subjective Comments Reports ongoing pain in posterolateral R hip  -CC              Subjective Pain    Able to rate subjective pain? yes  -CC      Pre-Treatment Pain Level 2  -CC              Total Minutes    37710 - Gait Training Minutes  10  -CC      16210 - PT Therapeutic Exercise Minutes 28  -CC              Exercise 2    Exercise Name 2 recumbent bike  -CC      Cueing 2 Verbal  -CC      Time 2 5 min, seat 8  -CC              Exercise 3    Exercise Name 3 trial and discussion of heel lift vs. built up shoe  -CC      Cueing 3 Verbal  -CC              Exercise 4    Exercise Name 4 DL leg press  -CC      Cueing 4 Verbal  -CC      Sets 4 2  -CC      Reps 4 20  -CC      Time 4 120#  -CC      Additional Comments cues for eccentric control  -CC              Exercise 6    Exercise Name 6 gait training with no AD and heel insert in L shoe  -CC      Cueing 6 Verbal  -CC      Time 6 3 laps around clinic  -CC              Exercise 10    Exercise Name 10 standing hip ext  -CC      Cueing 10 Verbal  -CC      Sets 10 standing on airex  -CC      Reps 10 15 ea  -CC      Time 10 RTB  -CC      Additional Comments fingertip support  -CC              Exercise 11    Exercise Name 11 lateral walk  -CC      Cueing 11 Verbal  -CC      Reps 11 4 laps out of bars  -CC      Time 11 RTB  -CC              Exercise 15    Exercise Name 15 monster walk, F/B  -CC      Cueing 15 Verbal; Demo  -CC      Reps 15 4 laps  -CC      Time 15 RTB  -CC      Additional Comments out of bars  -CC            User Key  (r) = Recorded By, (t) = Taken  By, (c) = Cosigned By    Initials Name Provider Type    CC Jeri Abel, PT Physical Therapist                                                Time Calculation:   Start Time: 1002  Stop Time: 1040  Time Calculation (min): 38 min  Total Timed Code Minutes- PT: 38 minute(s)  Timed Charges  05077 - PT Therapeutic Exercise Minutes: 28  56560 - Gait Training Minutes : 10  Total Minutes  Timed Charges Total Minutes: 38   Total Minutes: 38  Therapy Charges for Today     Code Description Service Date Service Provider Modifiers Qty    78283356465 HC PT THER PROC EA 15 MIN 1/10/2022 Jeri Abel, PT GP 2    33543730120 HC GAIT TRAINING EA 15 MIN 1/10/2022 Jeri Abel, PT GP 1                    Jeri Abel PT  1/10/2022

## 2022-01-12 ENCOUNTER — HOSPITAL ENCOUNTER (OUTPATIENT)
Dept: PHYSICAL THERAPY | Facility: HOSPITAL | Age: 72
Setting detail: THERAPIES SERIES
Discharge: HOME | End: 2022-01-12

## 2022-01-12 DIAGNOSIS — M25.551 RIGHT HIP PAIN: ICD-10-CM

## 2022-01-12 DIAGNOSIS — Z74.09 IMPAIRED MOBILITY: ICD-10-CM

## 2022-01-12 DIAGNOSIS — Z96.641 AFTERCARE FOLLOWING RIGHT HIP JOINT REPLACEMENT SURGERY: Primary | ICD-10-CM

## 2022-01-12 DIAGNOSIS — Z47.1 AFTERCARE FOLLOWING RIGHT HIP JOINT REPLACEMENT SURGERY: Primary | ICD-10-CM

## 2022-01-12 PROCEDURE — 97110 THERAPEUTIC EXERCISES: CPT

## 2022-01-12 NOTE — THERAPY TREATMENT NOTE
Outpatient Physical Therapy Ortho Treatment Note  [facility]     Patient Name: Juany Carlin  : 1950  MRN: [id number]  Today's Date: 2022      Visit Date: 2022    Visit Dx:    ICD-10-CM ICD-9-CM   1. Aftercare following right hip joint replacement surgery  Z47.1 V54.81    Z96.641 V43.64   2. Impaired mobility  Z74.09 799.89   3. Right hip pain  M25.551 719.45       Patient Active Problem List   Diagnosis   • HTN (hypertension), benign   • Vitamin D deficiency   • Urge incontinence   • GERD without esophagitis   • Cervical spinal stenosis   • Saddle pulmonary embolus (HCC)   • Family history of thrombosis   • Degeneration of cervical intervertebral disc   • Adrenal nodule (HCC)   • Health care maintenance   • Localized osteoarthritis of left shoulder   • Primary osteoarthritis of both knees   • Hypomagnesemia   • Migraine with aura and without status migrainosus, not intractable   • Snoring   • Arthritis of right hip   • Anticoagulant long-term use   • Hx pulmonary embolism   • History of DVT (deep vein thrombosis)        Past Medical History:   Diagnosis Date   • Acute cystitis without hematuria 04/10/2020   • Acute right ankle pain 2020   • Adrenal nodule (HCC)    • Arthritis    • BPPV (benign paroxysmal positional vertigo) 5/10/2016   • Cervical stenosis of spine    • Depression     NO MEDS   • DVT, lower extremity (HCC) 2016    right   • GERD without esophagitis 5/10/2016   • H/O varicose vein stripping    • Hip pain    • History of migraine    • History of skin cancer    • History of stress incontinence    • Hyperplastic colon polyp 2013   • Hypertension    • PONV (postoperative nausea and vomiting)    • Pregnancy     , miscarrige x1   • Pulmonary emboli ([facility]) 2016    bilateral extensive PE with saddle emboli.Negative hypercoagulable state w/u   • Pulmonary embolism with acute cor pulmonale (HCC) 2017   • Shoulder pain    • Urge incontinence 5/10/2016   • Vitamin D  deficiency 5/10/2016        Past Surgical History:   Procedure Laterality Date   • CARDIAC CATHETERIZATION N/A 11/10/2016    Procedure: Right Heart Cath;  Surgeon: Johnna Agrawal MD;  Location:  [facility];  Service:    • CARDIAC CATHETERIZATION N/A 11/10/2016    Procedure: Thrombolytic Therapy;  Surgeon: Johnna Agrawal MD;  Location:  [facility];  Service:    • CATARACT EXTRACTION Bilateral    • COLONOSCOPY W/ BIOPSIES  11/2013    hyperplastic polyp, Dr.Russel Lewis   • CYST REMOVAL      VAGINAL CYST   • DILATATION AND CURETTAGE     • EPIDURAL BLOCK     • HEMORROIDECTOMY  2013   • INTERVENTIONAL RADIOLOGY PROCEDURE Bilateral 11/10/2016    Procedure: Sp Smiley Cath Place Pulmonary Art;  Surgeon: Johnna Agrawal MD;  Location:  [facility];  Service:    • SKIN CANCER EXCISION     • TONSILLECTOMY     • TOTAL HIP ARTHROPLASTY Right 10/25/2021    Procedure: Posterior RIGHT TOTAL HIP ARTHROPLASTY GAVIN NAVIGATION;  Surgeon: Rashawn Hernández MD;  [facility];  Service: Orthopedics;  Laterality: Right;   • TOTAL LAPAROSCOPIC HYSTERECTOMY  2001   • VEIN SURGERY Left [id number]    leg,    • VENTRAL HERNIA REPAIR  1987                        PT Assessment/Plan     Row Name 01/12/22 1000          PT Assessment    Assessment Comments Ms. Carlin presents today amb with no AD, and reports improved pain (1-2/10) in R posterolateral hip since Monday. Pt has been wearing heel lift in L shoe, but reports she feels it is too soon to see if it made a difference, despite reports of decr pain today and no AD use. Reports she is uncertain about contacting othotist about a permanent heel lift or shoe build up as her surgeon had encouraged her to wait several months to see if her LLD would change/improve. I do think her scoliosis contributes to her LLD, which being a chronic issue, I encouraged her to reach out to her surgeon to see their thoughts on the heel  lift/shoe build up. Pt verbed understanding. Continued strengthening interventions focused on glute strengthening today, especially hip ext. Pt reports fatigue at end of session, but overall feeliing improved.  -CC            PT Plan    PT Plan Comments Follow up re: use of temporary heel lift at home and if pain/ambulation continues to improve? Did pt make appt with ? Begin to work towards indep HEP for likely upcoming DC  -CC           User Key  (r) = Recorded By, (t) = Taken By, (c) = Cosigned By    Initials Name Provider Type    Jeri Kaplan, PT Physical Therapist                   OP Exercises     Row Name 01/12/22 1000             Subjective Comments    Subjective Comments Reports too soon to tell if heel lift helped, but has been wearing it. Does feel like pain is less today, but still located in posterolateral aspect of R hip.  -CC              Subjective Pain    Able to rate subjective pain? yes  -CC      Pre-Treatment Pain Level 1  -CC              Total Minutes    82474 - PT Therapeutic Exercise Minutes 40  -CC              Exercise 1    Exercise Name 1 exaggerated marching  -CC      Cueing 1 Verbal  -CC      Reps 1 4 laps // bars  -CC      Time 1 one fingertip support  -CC              Exercise 2    Exercise Name 2 nu step  -CC      Time 2 lvl 5, 5 min  -CC              Exercise 3    Exercise Name 3 Reviewed and discussed heel lift vs. built up shoe, provided pt with written info to contact orthotist for further work up  -CC      Time 3 5 min  -CC              Exercise 4    Exercise Name 4 DL leg press  -CC      Cueing 4 Verbal  -CC      Sets 4 2  -CC      Reps 4 20  -CC      Time 4 120#  -CC      Additional Comments cues for eccentric control  -CC              Exercise 5    Exercise Name 5 R SL leg press  -CC      Cueing 5 Verbal  -CC      Sets 5 2  -CC      Reps 5 10  -CC      Time 5 80#  -CC              Exercise 7    Exercise Name 7 mini squat  -CC      Cueing 7 Verbal  -CC      Sets  7 2  -CC      Reps 7 10  -CC      Time 7 // bars  -CC      Additional Comments cueing for posterior weight shift and glute squeeze at top  -CC              Exercise 9    Exercise Name 9 retro walking  -CC      Cueing 9 Verbal  -CC      Reps 9 5 laps in // bars  -CC      Additional Comments cues to initiate at glute  -CC              Exercise 10    Exercise Name 10 standing hip ext  -CC      Cueing 10 Verbal  -CC      Sets 10 standing on airex  -CC      Reps 10 15 ea  -CC      Time 10 RTB  -CC      Additional Comments fingertip support  -CC              Exercise 11    Exercise Name 11 lateral walk  -CC      Cueing 11 Verbal  -CC      Reps 11 4 laps out of bars  -CC      Time 11 RTB  -CC              Exercise 15    Exercise Name 15 monster walk, F/B  -CC      Cueing 15 Verbal; Demo  -CC      Reps 15 4 laps  -CC      Time 15 RTB  -CC      Additional Comments out of bars  -CC            User Key  (r) = Recorded By, (t) = Taken By, (c) = Cosigned By    Initials Name Provider Type    CC Jeri Abel, PT Physical Therapist                                                Time Calculation:   Start Time: 0959  Stop Time: 1039  Time Calculation (min): 40 min  Total Timed Code Minutes- PT: 40 minute(s)  Timed Charges  25041 - PT Therapeutic Exercise Minutes: 40  Total Minutes  Timed Charges Total Minutes: 40   Total Minutes: 40  Therapy Charges for Today     Code Description Service Date Service Provider Modifiers Qty    50820474778 HC PT THER PROC EA 15 MIN 1/12/2022 Jeri Abel, PT GP 3                    Jeri Abel PT  1/12/2022

## 2022-01-19 ENCOUNTER — HOSPITAL ENCOUNTER (OUTPATIENT)
Dept: PHYSICAL THERAPY | Facility: HOSPITAL | Age: 72
Setting detail: THERAPIES SERIES
Discharge: HOME | End: 2022-01-19

## 2022-01-19 DIAGNOSIS — Z74.09 IMPAIRED MOBILITY: ICD-10-CM

## 2022-01-19 DIAGNOSIS — Z47.1 AFTERCARE FOLLOWING RIGHT HIP JOINT REPLACEMENT SURGERY: Primary | ICD-10-CM

## 2022-01-19 DIAGNOSIS — Z96.641 AFTERCARE FOLLOWING RIGHT HIP JOINT REPLACEMENT SURGERY: Primary | ICD-10-CM

## 2022-01-19 DIAGNOSIS — M25.551 RIGHT HIP PAIN: ICD-10-CM

## 2022-01-19 PROCEDURE — 97110 THERAPEUTIC EXERCISES: CPT | Performed by: PHYSICAL THERAPIST

## 2022-01-19 NOTE — THERAPY TREATMENT NOTE
Outpatient Physical Therapy Ortho Treatment Note  [facility]     Patient Name: Juany Carlin  : 1950  MRN: [id number]  Today's Date: 2022      Visit Date: 2022    Visit Dx:    ICD-10-CM ICD-9-CM   1. Aftercare following right hip joint replacement surgery  Z47.1 V54.81    Z96.641 V43.64   2. Impaired mobility  Z74.09 799.89   3. Right hip pain  M25.551 719.45       Patient Active Problem List   Diagnosis   • HTN (hypertension), benign   • Vitamin D deficiency   • Urge incontinence   • GERD without esophagitis   • Cervical spinal stenosis   • Saddle pulmonary embolus (HCC)   • Family history of thrombosis   • Degeneration of cervical intervertebral disc   • Adrenal nodule (HCC)   • Health care maintenance   • Localized osteoarthritis of left shoulder   • Primary osteoarthritis of both knees   • Hypomagnesemia   • Migraine with aura and without status migrainosus, not intractable   • Snoring   • Arthritis of right hip   • Anticoagulant long-term use   • Hx pulmonary embolism   • History of DVT (deep vein thrombosis)        Past Medical History:   Diagnosis Date   • Acute cystitis without hematuria 04/10/2020   • Acute right ankle pain 2020   • Adrenal nodule (HCC)    • Arthritis    • BPPV (benign paroxysmal positional vertigo) 5/10/2016   • Cervical stenosis of spine    • Depression     NO MEDS   • DVT, lower extremity (HCC) 2016    right   • GERD without esophagitis 5/10/2016   • H/O varicose vein stripping    • Hip pain    • History of migraine    • History of skin cancer    • History of stress incontinence    • Hyperplastic colon polyp 2013   • Hypertension    • PONV (postoperative nausea and vomiting)    • Pregnancy     , miscarrige x1   • Pulmonary emboli ([facility]) 2016    bilateral extensive PE with saddle emboli.Negative hypercoagulable state w/u   • Pulmonary embolism with acute cor pulmonale (HCC) 2017   • Shoulder pain    • Urge incontinence 5/10/2016   • Vitamin D  deficiency 5/10/2016        Past Surgical History:   Procedure Laterality Date   • CARDIAC CATHETERIZATION N/A 11/10/2016    Procedure: Right Heart Cath;  Surgeon: Johnna Agrawal MD;  Location:  [facility];  Service:    • CARDIAC CATHETERIZATION N/A 11/10/2016    Procedure: Thrombolytic Therapy;  Surgeon: Johnna Agrawal MD;  Location:  [facility];  Service:    • CATARACT EXTRACTION Bilateral    • COLONOSCOPY W/ BIOPSIES  11/2013    hyperplastic polyp, Dr.Russel Lewis   • CYST REMOVAL      VAGINAL CYST   • DILATATION AND CURETTAGE     • EPIDURAL BLOCK     • HEMORROIDECTOMY  2013   • INTERVENTIONAL RADIOLOGY PROCEDURE Bilateral 11/10/2016    Procedure: Sp Smiley Cath Place Pulmonary Art;  Surgeon: Johnna Agrawal MD;  Location:  [facility];  Service:    • SKIN CANCER EXCISION     • TONSILLECTOMY     • TOTAL HIP ARTHROPLASTY Right 10/25/2021    Procedure: Posterior RIGHT TOTAL HIP ARTHROPLASTY GAVIN NAVIGATION;  Surgeon: Rashawn Hernández MD;  [facility];  Service: Orthopedics;  Laterality: Right;   • TOTAL LAPAROSCOPIC HYSTERECTOMY  2001   • VEIN SURGERY Left [id number]    leg,    • VENTRAL HERNIA REPAIR  1987                        PT Assessment/Plan     Row Name 01/19/22 1035          PT Assessment    Assessment Comments Ms. Carlin is a 70 y/o female. She is 12 weeks, 2 days s/p R posterior hip SRIDHAR. She reports continuing to wear heel lift in her L shoe. She continues to report that she is unsure if it is helping and reports R greter trochanteric region pain. She is ambuating without AD, mild limp. Overall her gait pattern is improved. SHe ddoes demosntrate imrpoved performance with retro walking, lateral walking with less lateral trunk sway. She also continues to report signficant personal issues which may be affecting progres.  She has one additional session established after which we discussed continuing independently for 2 months. She  is cuatiously in agreement with this plan.  SHe is to see MD on 2/9/2022.  -GJ            PT Plan    PT Plan Comments likely transition to independent management followiing next session, allow to work independently for 2 months adn then schedule follow up at that time, pt is cautiously on board with this idea  -GJ           User Key  (r) = Recorded By, (t) = Taken By, (c) = Cosigned By    Initials Name Provider Type     Walter Jenkins, PT Physical Therapist                   OP Exercises     Row Name 01/19/22 1003 01/19/22 1000          Subjective Comments    Subjective Comments -- I'm still wearing the heel lift, but not sure it is getting better or not. I had pain walking to my car, but not when I got out and came in here.  -GJ            Total Minutes    83201 - PT Therapeutic Exercise Minutes 40  -GJ --            Exercise 1    Exercise Name 1 -- exaggerated marching  -GJ     Cueing 1 -- Verbal  -GJ     Reps 1 -- 4 laps // bars  -GJ            Exercise 2    Exercise Name 2 -- nu step  -GJ     Time 2 -- lvl 5, 5 min  -GJ            Exercise 4    Exercise Name 4 -- DL leg press  -GJ     Cueing 4 -- Verbal  -GJ     Sets 4 -- 2  -GJ     Reps 4 -- 20  -GJ     Time 4 -- 120#  -GJ     Additional Comments -- cues for eccentric control  -GJ            Exercise 5    Exercise Name 5 -- R SL leg press  -GJ     Cueing 5 -- Verbal  -GJ     Sets 5 -- 2  -GJ     Reps 5 -- 10  -GJ     Time 5 -- 80#  -GJ     Additional Comments -- cues to avoid R knee valgus  -GJ            Exercise 7    Exercise Name 7 -- mini squat  -GJ     Cueing 7 -- Verbal  -GJ     Sets 7 -- 2  -GJ     Reps 7 -- 10  -GJ     Time 7 -- // bars  -GJ     Additional Comments -- cues for less reliance on UE's and to squat  -GJ            Exercise 9    Exercise Name 9 -- retro walking  -GJ     Cueing 9 -- Verbal  -GJ     Reps 9 -- 5 laps in // bars  -GJ            Exercise 10    Exercise Name 10 -- standing hip ext  -GJ     Cueing 10 -- Verbal  -GJ     Sets 10  -- standing on airex  -GJ     Reps 10 -- 2 x 10  -GJ     Time 10 -- RTB  -GJ     Additional Comments -- 1 hand support  -GJ            Exercise 11    Exercise Name 11 -- lateral walk  -GJ     Cueing 11 -- Verbal  -GJ     Reps 11 -- 4 laps out of bars  -GJ     Time 11 -- RTB  -GJ            Exercise 15    Exercise Name 15 -- monster walk, F/B  -GJ     Cueing 15 -- Verbal; Demo  -GJ     Reps 15 -- 4 laps  -GJ     Time 15 -- RTB  -GJ           User Key  (r) = Recorded By, (t) = Taken By, (c) = Cosigned By    Initials Name Provider Type    Walter Kaur, PT Physical Therapist                              PT OP Goals     Row Name 01/19/22 1000          PT Short Term Goals    STG Date to Achieve 12/17/21  -GJ     STG 1 pt. to be I with initial HEP to facilitate self management of their condition  -GJ     STG 1 Progress Met  -GJ     STG 2 pt. to be educated in/verbalize understanding of the importance of posture/ergonomics in association with their condition to facilitate self management of their condition  -GJ     STG 2 Progress Met  -GJ     STG 3 pt to demonstrate STS x 5 n </= 18 seconds from standard chair, with/without UE's, to faciliate ease/safety with household mobility  -GJ     STG 3 Progress Met  -GJ     STG 4 pt. to ambulate with near normal heel to toe gait pattern with SC to facilitate ease/safety with community mobility  -GJ     STG 4 Progress Met  -GJ            Long Term Goals    LTG Date to Achieve 02/11/22  -GJ     LTG 1 pt. to be I with advanced HEP to facilitate self management of their condition  -GJ     LTG 1 Progress Ongoing  -GJ     LTG 2 pt. to report an LEFS >/= 60% to demonstrate decreased level of perceived disability  -GJ     LTG 2 Progress Ongoing; Progressing  -GJ     LTG 3 pt. to ambulate without AD with near normal heel to toe gait pattern to facilitate ease/safety of community mobility  -GJ     LTG 3 Progress Ongoing  -GJ     LTG 4 pt. to ascend/descends stairs with reciprocal pattern  (</= 1 rails) to facilitate ease/safety of household/community mobility  -     LTG 4 Progress Partially Met; Ongoing  -     LTG 5 pt to demonstrate R hip abd MMt >/= 4/5 to facilitate ease/safety with community mobility  -     LTG 5 Progress Ongoing  -           User Key  (r) = Recorded By, (t) = Taken By, (c) = Cosigned By    Initials Name Provider Type     Walter Jenkins, PT Physical Therapist                Therapy Education  Education Details: discussed trial of independent manageent following next session with likely follow up 2 months later.  Given: HEP, Symptoms/condition management, Pain management, Posture/body mechanics, Mobility training  Program: Reinforced  How Provided: Verbal, Demonstration  Provided to: Patient  Level of Understanding: Verbalized              Time Calculation:   Start Time: 1004  Stop Time: 1045  Time Calculation (min): 41 min  Timed Charges  30752 - PT Therapeutic Exercise Minutes: 40  Total Minutes  Timed Charges Total Minutes: 40   Total Minutes: 40  Therapy Charges for Today     Code Description Service Date Service Provider Modifiers Qty    87556648262 HC PT THER PROC EA 15 MIN 1/19/2022 Walter Jenkins, PT GP 3                    Walter Jenkins, PT  1/19/2022

## 2022-01-26 ENCOUNTER — HOSPITAL ENCOUNTER (OUTPATIENT)
Dept: PHYSICAL THERAPY | Facility: HOSPITAL | Age: 72
Setting detail: THERAPIES SERIES
Discharge: HOME | End: 2022-01-26

## 2022-01-26 DIAGNOSIS — Z47.1 AFTERCARE FOLLOWING RIGHT HIP JOINT REPLACEMENT SURGERY: Primary | ICD-10-CM

## 2022-01-26 DIAGNOSIS — Z74.09 IMPAIRED MOBILITY: ICD-10-CM

## 2022-01-26 DIAGNOSIS — M25.551 RIGHT HIP PAIN: ICD-10-CM

## 2022-01-26 DIAGNOSIS — Z96.641 AFTERCARE FOLLOWING RIGHT HIP JOINT REPLACEMENT SURGERY: Primary | ICD-10-CM

## 2022-01-26 PROCEDURE — 97110 THERAPEUTIC EXERCISES: CPT

## 2022-01-26 NOTE — THERAPY DISCHARGE NOTE
Outpatient Physical Therapy Ortho Treatment Note/Discharge Summary  [facility]     Patient Name: Juany Carlin  : 1950  MRN: [id number]  Today's Date: 2022      Visit Date: 2022    Visit Dx:    ICD-10-CM ICD-9-CM   1. Aftercare following right hip joint replacement surgery  Z47.1 V54.81    Z96.641 V43.64   2. Impaired mobility  Z74.09 799.89   3. Right hip pain  M25.551 719.45       Patient Active Problem List   Diagnosis   • HTN (hypertension), benign   • Vitamin D deficiency   • Urge incontinence   • GERD without esophagitis   • Cervical spinal stenosis   • Saddle pulmonary embolus (HCC)   • Family history of thrombosis   • Degeneration of cervical intervertebral disc   • Adrenal nodule (HCC)   • Health care maintenance   • Localized osteoarthritis of left shoulder   • Primary osteoarthritis of both knees   • Hypomagnesemia   • Migraine with aura and without status migrainosus, not intractable   • Snoring   • Arthritis of right hip   • Anticoagulant long-term use   • Hx pulmonary embolism   • History of DVT (deep vein thrombosis)        Past Medical History:   Diagnosis Date   • Acute cystitis without hematuria 04/10/2020   • Acute right ankle pain 2020   • Adrenal nodule (HCC)    • Arthritis    • BPPV (benign paroxysmal positional vertigo) 5/10/2016   • Cervical stenosis of spine    • Depression     NO MEDS   • DVT, lower extremity (HCC) 2016    right   • GERD without esophagitis 5/10/2016   • H/O varicose vein stripping    • Hip pain    • History of migraine    • History of skin cancer    • History of stress incontinence    • Hyperplastic colon polyp 2013   • Hypertension    • PONV (postoperative nausea and vomiting)    • Pregnancy     , miscarrige x1   • Pulmonary emboli (HCC) 2016    bilateral extensive PE with saddle emboli.Negative hypercoagulable state w/u   • Pulmonary embolism with acute cor pulmonale (HCC) 2017   • Shoulder pain    • Urge incontinence  5/10/2016   • Vitamin D deficiency 5/10/2016        Past Surgical History:   Procedure Laterality Date   • CARDIAC CATHETERIZATION N/A 11/10/2016    Procedure: Right Heart Cath;  Surgeon: Johnna Agrawal MD;  Location:  [facility]VAS[facility];  Service:    • CARDIAC CATHETERIZATION N/A 11/10/2016    Procedure: Thrombolytic Therapy;  Surgeon: Johnna Agrawal MD;  Location:  [facility];  Service:    • CATARACT EXTRACTION Bilateral    • COLONOSCOPY W/ BIOPSIES  11/2013    hyperplastic polyp, Dr.Russel Lewis   • CYST REMOVAL      VAGINAL CYST   • DILATATION AND CURETTAGE     • EPIDURAL BLOCK     • HEMORROIDECTOMY  2013   • INTERVENTIONAL RADIOLOGY PROCEDURE Bilateral 11/10/2016    Procedure: Sp Smiley Cath Place Pulmonary Art;  Surgeon: Johnna Agrawal MD;  Location:  [facility] INVAS[facility];  Service:    • SKIN CANCER EXCISION     • TONSILLECTOMY     • TOTAL HIP ARTHROPLASTY Right 10/25/2021    Procedure: Posterior RIGHT TOTAL HIP ARTHROPLASTY GAVIN NAVIGATION;  Surgeon: Rashawn Hernández MD;  Location[facility] MA[facility] OR;  Service: Orthopedics;  Laterality: Right;   • TOTAL LAPAROSCOPIC HYSTERECTOMY  2001   • VEIN SURGERY Left [id number]    leg,    • VENTRAL HERNIA REPAIR  1987                        PT Assessment/Plan     Row Name 01/26/22 1000          PT Assessment    Assessment Comments Juany Carlin was seen for 16 physical therapy sessions for s/p posterior R SRIDHAR on 10/25/21.  Treatment included therapeutic exercise, manual therapy, neuro-muscular retraining , gait training and patient education with home exercise program . Progress to physical therapy goals was fair. Pt met 4/4 STG and 3/5 LTG. Pt continues to be frustrated with ongoing low level pain, despite HEP performance. Functionally pt has met most of her goals and is able to ambulate with no AD, as well as navigate stairs reciprocally. Discussed plan for trial of indep management for 2-3 months, and to return if pain is  no resolving. Of note, pt does have significant leg length discrepancy, and since use of temporary heel lift has seen improvements in gait/AD use/pain, but continues to be resistive to recommendations to see prosthetist for workup for permanent heel lift or shoe build up. Pt agreeable to plan for trial of indep management. She was discharged to an independent HEP and provided patient education to self-manage condition.  -CC            PT Plan    PT Plan Comments discharged  -CC           User Key  (r) = Recorded By, (t) = Taken By, (c) = Cosigned By    Initials Name Provider Type    CC Jeri Abel, PT Physical Therapist                     OP Exercises     Row Name 01/26/22 1000             Subjective Comments    Subjective Comments Arrives at 10:10 for 10:00 appt. Reports she is frustrated with her pain, but still agreeable to indep management  -CC              Subjective Pain    Able to rate subjective pain? yes  -CC      Pre-Treatment Pain Level 2  -CC              Total Minutes    17513 - PT Therapeutic Exercise Minutes 28  -CC              Exercise 1    Exercise Name 1 exaggerated marching  -CC      Cueing 1 Verbal  -CC      Reps 1 4 laps // bars  -CC              Exercise 2    Exercise Name 2 nu step  -CC      Time 2 lvl 5, 5 min  -CC              Exercise 3    Exercise Name 3 objective measure update  -CC              Exercise 4    Exercise Name 4 review of indep management and HEP  -CC              Exercise 7    Exercise Name 7 mini squat  -CC      Cueing 7 Verbal  -CC      Sets 7 2  -CC      Reps 7 10  -CC      Time 7 // bars  -CC              Exercise 9    Exercise Name 9 retro walking  -CC      Cueing 9 Verbal  -CC      Reps 9 5 laps in // bars  -CC              Exercise 10    Exercise Name 10 standing hip ext  -CC      Cueing 10 Verbal  -CC      Sets 10 standing on airex  -CC      Reps 10 2 x 10  -CC      Time 10 RTB  -CC      Additional Comments 1 hand support  -CC              Exercise 11     Exercise Name 11 lateral walk  -      Cueing 11 Verbal  -      Reps 11 4 laps out of bars  -      Time 11 RTB  -              Exercise 15    Exercise Name 15 monster walk, F/B  -      Cueing 15 Verbal; Demo  -      Reps 15 4 laps  -      Time 15 RTB  -CC            User Key  (r) = Recorded By, (t) = Taken By, (c) = Cosigned By    Initials Name Provider Type    CC Jeri Abel, PT Physical Therapist                                PT OP Goals     Row Name 01/26/22 1000          PT Short Term Goals    STG Date to Achieve 12/17/21  -CC     STG 1 pt. to be I with initial HEP to facilitate self management of their condition  -CC     STG 1 Progress Met  -     STG 2 pt. to be educated in/verbalize understanding of the importance of posture/ergonomics in association with their condition to facilitate self management of their condition  -CC     STG 2 Progress Met  -     STG 3 pt to demonstrate STS x 5 n </= 18 seconds from standard chair, with/without UE's, to faciliate ease/safety with household mobility  -CC     STG 3 Progress Met  -     STG 4 pt. to ambulate with near normal heel to toe gait pattern with SC to facilitate ease/safety with community mobility  -     STG 4 Progress Met  -            Long Term Goals    LTG Date to Achieve 02/11/22  -     LTG 1 pt. to be I with advanced HEP to facilitate self management of their condition  -CC     LTG 1 Progress Met  -     LTG 2 pt. to report an LEFS >/= 60% to demonstrate decreased level of perceived disability  -CC     LTG 2 Progress Not Met  -     LTG 3 pt. to ambulate without AD with near normal heel to toe gait pattern to facilitate ease/safety of community mobility  -CC     LTG 3 Progress Met  -     LTG 4 pt. to ascend/descends stairs with reciprocal pattern (</= 1 rails) to facilitate ease/safety of household/community mobility  -     LTG 4 Progress Met  -     LTG 5 pt to demonstrate R hip abd MMt >/= 4/5 to facilitate  ease/safety with community mobility  -CC     LTG 5 Progress Not Met  -CC           User Key  (r) = Recorded By, (t) = Taken By, (c) = Cosigned By    Initials Name Provider Type    Jeri Kaplan PT Physical Therapist                Therapy Education  Education Details: Access Code [email or web address]  Given: HEP, Symptoms/condition management, Pain management, Posture/body mechanics, Mobility training  Program: Reinforced  How Provided: Verbal, Demonstration, Written  Provided to: Patient  Level of Understanding: Verbalized       Lower Extremity Functional Index  Any of your usual work, housework or school activities: A little bit of difficulty  Your usual hobbies, recreational or sporting activities: Quite a bit of difficulty  Getting into or out of the bath: A little bit of difficulty  Walking between rooms: A little bit of difficulty  Putting on your shoes or socks: A little bit of difficulty  Squatting: Moderate difficulty  Lifting an object, like a bag of groceries from the floor: Moderate difficulty  Performing light activities around your home: Moderate difficulty  Performing heavy activities around your home: Quite a bit of difficulty  Getting into or out of a car: A little bit of difficulty  Walking 2 blocks: A little bit of difficulty  Walking a mile: Quite a bit of difficulty  Going up or down 10 stairs (about 1 flight of stairs): Moderate difficulty  Standing for 1 hour: Moderate difficulty  Sitting for 1 hour: No difficulty  Running on even ground: Extreme difficulty or unable to perform activity  Running on uneven ground: Extreme difficulty or unable to perform activity  Making sharp turns while running fast: Extreme difficulty or unable to perform activity  Hopping: Extreme difficulty or unable to perform activity  Rolling over in bed: A little bit of difficulty  Total: 38      Time Calculation:   Start Time: 1010  Stop Time: 1038  Time Calculation (min): 28 min  Total Timed Code Minutes- PT: 28  minute(s)  Timed Charges  77557 - PT Therapeutic Exercise Minutes: 28  Total Minutes  Timed Charges Total Minutes: 28   Total Minutes: 28  Therapy Charges for Today     Code Description Service Date Service Provider Modifiers Qty    [id number] HC PT THER PROC EA 15 MIN 1/26/2022 Jeri Abel, PT GP 2          PT G-Codes  Total: 38     OP PT Discharge Summary  Date of Discharge: 01/26/22  Reason for Discharge: Maximum functional potential achieved, Independent  Outcomes Achieved: Refer to plan of care for updates on goals achieved  Discharge Destination: Home with home program      Jeri Abel, PT  1/26/2022

## 2022-02-09 ENCOUNTER — OFFICE VISIT (OUTPATIENT)
Dept: ORTHOPEDIC SURGERY | Facility: CLINIC | Age: 72
End: 2022-02-09

## 2022-02-09 VITALS — HEIGHT: 69 IN | RESPIRATION RATE: 18 BRPM | TEMPERATURE: 96.4 F | BODY MASS INDEX: 24.73 KG/M2 | WEIGHT: 167 LBS

## 2022-02-09 DIAGNOSIS — Z96.641 S/P TOTAL RIGHT HIP ARTHROPLASTY: Primary | ICD-10-CM

## 2022-02-09 PROCEDURE — 73502 X-RAY EXAM HIP UNI 2-3 VIEWS: CPT | Performed by: ORTHOPAEDIC SURGERY

## 2022-02-09 PROCEDURE — 99213 OFFICE O/P EST LOW 20 MIN: CPT | Performed by: ORTHOPAEDIC SURGERY

## 2022-02-09 NOTE — PROGRESS NOTES
"Patient Name: Juany Carlin   YOB: 1950  Referring Primary Care Physician: Warren Pizarro MD  BMI: Body mass index is 24.66 kg/m².    Chief Complaint:    Chief Complaint   Patient presents with   • Right Hip - Follow-up, Pain        HPI:     Juany Carlin is a 72 y.o. female who presents today for evaluation of   Chief Complaint   Patient presents with   • Right Hip - Follow-up, Pain   .  Patient is about 3 months after right total hip replacement doing very well.  He does have some chronic back pain off and on.  She said the therapist told her one leg was longer than the other and asked me about a heel rise.      Subjective   Medications:   Home Medications:  Current Outpatient Medications on File Prior to Visit   Medication Sig   • acetaminophen (TYLENOL) 650 MG 8 hr tablet Take 650 mg by mouth Every 8 (Eight) Hours As Needed for Mild Pain .   • amoxicillin (AMOXIL) 500 MG capsule Take four pills one hour prior to dental work   • B Complex-Biotin-FA (B-100 COMPLEX PO) Take 1 tablet by mouth Daily. PT HOLDING FOR SURGERY   • Eliquis 2.5 MG tablet tablet TAKE ONE TABLET BY MOUTH EVERY 12 HOURS (Patient taking differently: Take 2.5 mg by mouth Every 12 (Twelve) Hours.)   • famotidine (PEPCID) 10 MG tablet Take 20 mg by mouth Every Night.   • losartan (COZAAR) 100 MG tablet Take 1 tablet by mouth Daily.   • Magnesium 250 MG tablet Take 2 tablets by mouth Every Night. \"FOR LEG CRAMPS\"   • Multiple Vitamins-Minerals (MULTIVITAMIN PO) Take 1 tablet by mouth Every Night. PT HOLDING FOR SURGERY   • sertraline (ZOLOFT) 50 MG tablet 25 mg.   • chlorthalidone (HYGROTON) 25 MG tablet Take 1 tablet by mouth Daily. (Patient taking differently: Take 25 mg by mouth Daily. NOT TAKING CURRENTLY DUE TO WEIGHT LOSS)   • Cholecalciferol (VITAMIN D3) 2000 UNITS tablet Take 1 tablet by mouth Daily.   • ondansetron (ZOFRAN) 4 MG tablet Take 1 tablet by mouth Every 6 (Six) Hours As Needed for Nausea or Vomiting.   • " traMADol (ULTRAM) 50 MG tablet Take 1-2 tablets PO q4-6hr PRN severe pain     Current Facility-Administered Medications on File Prior to Visit   Medication   • Chlorhexidine Gluconate Cloth 2 % pads 1 each     Current Medications:  Scheduled Meds:  Continuous Infusions:No current facility-administered medications for this visit.    PRN Meds:.    I have reviewed the patient's medical history in detail and updated the computerized patient record.  Review and summarization of old records includes:    Past Medical History:   Diagnosis Date   • Acute cystitis without hematuria 04/10/2020   • Acute right ankle pain 2020   • Adrenal nodule (HCC)    • Arthritis    • BPPV (benign paroxysmal positional vertigo) 5/10/2016   • Cervical stenosis of spine    • Depression     NO MEDS   • DVT, lower extremity (formerly [facility])     right   • GERD without esophagitis 5/10/2016   • H/O varicose vein stripping    • Hip pain    • History of migraine    • History of skin cancer    • History of stress incontinence    • Hyperplastic colon polyp 2013   • Hypertension    • PONV (postoperative nausea and vomiting)    • Pregnancy     , miscarrige x1   • Pulmonary emboli (formerly [facility]) 2016    bilateral extensive PE with saddle emboli.Negative hypercoagulable state w/u   • Pulmonary embolism with acute cor pulmonale (formerly [facility]) 2017   • Shoulder pain    • Urge incontinence 5/10/2016   • Vitamin D deficiency 5/10/2016        Past Surgical History:   Procedure Laterality Date   • CARDIAC CATHETERIZATION N/A 11/10/2016    Procedure: Right Heart Cath;  Surgeon: Johnna Agrawal MD;  Location: [facility] INVASIVE LOCATION;  Service:    • CARDIAC CATHETERIZATION N/A 11/10/2016    Procedure: Thrombolytic Therapy;  Surgeon: Johnna Agrawal MD;  Location: [facility] CATH INVASIVE LOCATION;  Service:    • CATARACT EXTRACTION Bilateral    • COLONOSCOPY W/ BIOPSIES  2013    hyperplastic polyp, Dr.Russel Lewis   • CYST REMOVAL      VAGINAL CYST   • DILATATION  AND CURETTAGE     • EPIDURAL BLOCK     • HEMORROIDECTOMY     • INTERVENTIONAL RADIOLOGY PROCEDURE Bilateral 11/10/2016    Procedure: Sp Smiley Cath Place Pulmonary Art;  Surgeon: Johnna Agrawal MD;  Location:  [facility] INVAS[facility];  Service:    • SKIN CANCER EXCISION     • TONSILLECTOMY     • TOTAL HIP ARTHROPLASTY Right 10/25/2021    Procedure: Posterior RIGHT TOTAL HIP ARTHROPLASTY GAVIN NAVIGATION;  Surgeon: Rashawn Hernández MD;  Location: [facility] MAIN OR;  Service: Orthopedics;  Laterality: Right;   • TOTAL LAPAROSCOPIC HYSTERECTOMY     • VEIN SURGERY Left [id number]    leg,    • VENTRAL HERNIA REPAIR          Social History     Occupational History   • Occupation:      Employer: RETIRED     Comment: [employer]   Tobacco Use   • Smoking status: Former Smoker     Packs/day: 0.50     Years: 10.00     Pack years: 5.00     Types: Cigarettes     Quit date:      Years since quittin.1   • Smokeless tobacco: Never Used   Vaping Use   • Vaping Use: Never used   Substance and Sexual Activity   • Alcohol use: Not Currently     Comment: rarely   • Drug use: No   • Sexual activity: Defer      Social History     Social History Narrative    LIVES WITH SPOUSE        Family History   Problem Relation Age of Onset   • Macular degeneration Mother    • Deep vein thrombosis Mother    • Hypertension Mother    • Skin cancer Mother    • Heart failure Father    • Deep vein thrombosis Father    • Cancer Father    • Hypertension Father    • Heart disease Father    • Chiari malformation Sister    • Heart disease Maternal Grandfather    • Heart disease Paternal Grandfather    • Deep vein thrombosis Sister    • Heart failure Sister    • Pancreatic cancer Sister    • Thyroid disease Daughter    • Hypertension Brother    • Malig Hyperthermia Neg Hx        ROS: 14 point review of systems was performed and all other systems were reviewed and are negative except for documented findings in HPI and  "today's encounter.     Allergies:   Allergies   Allergen Reactions   • Ciprocinonide [Fluocinolone] Other (See Comments)     Achilles tendonitis   • Suprax [Cefixime] Diarrhea   • Contrast Dye Rash     Rash many years ago when shell-fish derived contrast dye was used   • Nickel Rash     Possible nickel allergy     Constitutional:  Denies fever, shaking or chills   Eyes:  Denies change in visual acuity   HENT:  Denies nasal congestion or sore throat   Respiratory:  Denies cough or shortness of breath   Cardiovascular:  Denies chest pain or severe LE edema   GI:  Denies abdominal pain, nausea, vomiting, bloody stools or diarrhea   Musculoskeletal:  Numbness, tingling, pain, or loss of motor function only as noted above in history of present illness.  : Denies painful urination or hematuria  Integument:  Denies rash, lesion or ulceration   Neurologic:  Denies headache or focal weakness  Endocrine:  Denies lymphadenopathy  Psych:  Denies confusion or change in mental status   Hem:  Denies active bleeding    OBJECTIVE:  Physical Exam: 72 y.o. female  Wt Readings from Last 3 Encounters:   02/09/22 75.8 kg (167 lb)   12/08/21 62.1 kg (137 lb)   11/10/21 76.2 kg (168 lb)     Ht Readings from Last 1 Encounters:   02/09/22 175.3 cm (69\")     Body mass index is 24.66 kg/m².  Vitals:    02/09/22 1105   Resp: 18   Temp: 96.4 °F (35.8 °C)     Vital signs reviewed.     General Appearance:    Alert, cooperative, in no acute distress                  Eyes: conjunctiva clear  ENT: external ears and nose atraumatic  CV: no peripheral edema  Resp: normal respiratory effort  Skin: no rashes or wounds; normal turgor  Psych: mood and affect appropriate  Lymph: no nodes appreciated  Neuro: gross sensation intact  Vascular:  Palpable peripheral pulse in noted extremity  Musculoskeletal Extremities: Exam today shows pleasant lady she is walking well she has been up scoliosis in her lumbar spine.  Hip incisions healing and nontender x-ray " "AP of the hips lateral right hip taken the office today for postop follow-up with comparison view shows well-placed total hip arthroplasty with symmetric leg lengths.  She has a severe scoliosis in her lumbar spine and this is pointed out to her I explained to her \"one hip being longer than the other\" versus apparent leg length discrepancy    Radiology:   The above        Assessment:     ICD-10-CM ICD-9-CM   1. S/P total right hip arthroplasty  Z96.641 V43.64        MDM/Plan:   The diagnosis(es), natural history, pathophysiology and treatment for diagnosis(es) were discussed. Opportunity given and questions answered.  Biomechanics of pertinent body areas discussed.  When appropriate, the use of ambulatory aids discussed.    HOME EXERCISE/PT program encouraged  TOTAL JOINT recommendations and precautions, including antibiotic prophylaxis discussed. Advice given and questions answered.       2/9/2022    Dictated utilizing Dragon dictation    "

## 2022-04-18 ENCOUNTER — OFFICE VISIT (OUTPATIENT)
Dept: INTERNAL MEDICINE | Facility: CLINIC | Age: 72
End: 2022-04-18

## 2022-04-18 VITALS
WEIGHT: 169.2 LBS | OXYGEN SATURATION: 95 % | HEIGHT: 69 IN | HEART RATE: 83 BPM | DIASTOLIC BLOOD PRESSURE: 76 MMHG | TEMPERATURE: 97.3 F | BODY MASS INDEX: 25.06 KG/M2 | SYSTOLIC BLOOD PRESSURE: 128 MMHG

## 2022-04-18 DIAGNOSIS — Z00.00 MEDICARE ANNUAL WELLNESS VISIT, SUBSEQUENT: Primary | ICD-10-CM

## 2022-04-18 DIAGNOSIS — E78.5 DYSLIPIDEMIA: ICD-10-CM

## 2022-04-18 DIAGNOSIS — I10 HTN (HYPERTENSION), BENIGN: ICD-10-CM

## 2022-04-18 PROCEDURE — 1125F AMNT PAIN NOTED PAIN PRSNT: CPT | Performed by: FAMILY MEDICINE

## 2022-04-18 PROCEDURE — 1170F FXNL STATUS ASSESSED: CPT | Performed by: FAMILY MEDICINE

## 2022-04-18 PROCEDURE — 1159F MED LIST DOCD IN RCRD: CPT | Performed by: FAMILY MEDICINE

## 2022-04-18 PROCEDURE — G0439 PPPS, SUBSEQ VISIT: HCPCS | Performed by: FAMILY MEDICINE

## 2022-04-18 PROCEDURE — 96160 PT-FOCUSED HLTH RISK ASSMT: CPT | Performed by: FAMILY MEDICINE

## 2022-04-18 NOTE — PROGRESS NOTES
The ABCs of the Annual Wellness Visit  Subsequent Medicare Wellness Visit    Chief Complaint   Patient presents with   • Medicare Wellness-subsequent      Subjective    History of Present Illness:  Juany Carlin is a 72 y.o. female who presents for a Subsequent Medicare Wellness Visit.    The following portions of the patient's history were reviewed and   updated as appropriate: allergies, current medications, past family history, past medical history, past social history, past surgical history and problem list.    Compared to one year ago, the patient feels her physical   health is the same.    Compared to one year ago, the patient feels her mental   health is worse but working through several external stressors at home.    Recent Hospitalizations:  She was admitted within the past 365 days at [facility], [place] for elective surgery.       Current Medical Providers:  Patient Care Team:  Warren Pizarro MD as PCP - General (Family Medicine)  Andrea Farmer MD as Consulting Physician (Orthopedic Surgery)  Anitha Daugherty MD as Consulting Physician (Obstetrics and Gynecology)  Gera Rausch MD as Consulting Physician (Ophthalmology)  Joe Aaron MD as Surgeon (Vascular Surgery)  Ansley Rios MD as Consulting Physician (Otolaryngology)  Dada Lewis MD as Surgeon (General Surgery)  Warren Pizarro MD as Referring Physician (Family Medicine)  Siva Bates II, MD as Consulting Physician (Hematology and Oncology)    Outpatient Medications Prior to Visit   Medication Sig Dispense Refill   • acetaminophen (TYLENOL) 650 MG 8 hr tablet Take 650 mg by mouth Every 8 (Eight) Hours As Needed for Mild Pain .     • amoxicillin (AMOXIL) 500 MG capsule Take four pills one hour prior to dental work 20 capsule 1   • B Complex-Biotin-FA (B-100 COMPLEX PO) Take 1 tablet by mouth Daily. PT HOLDING FOR SURGERY     • Eliquis 2.5 MG tablet tablet TAKE ONE TABLET BY MOUTH EVERY 12 HOURS (Patient  "taking differently: Take 2.5 mg by mouth Every 12 (Twelve) Hours.) 180 tablet 3   • famotidine (PEPCID) 10 MG tablet Take 20 mg by mouth Every Night.     • losartan (COZAAR) 100 MG tablet Take 1 tablet by mouth Daily. 90 tablet 3   • Magnesium 250 MG tablet Take 2 tablets by mouth Every Night. \"FOR LEG CRAMPS\"     • Multiple Vitamins-Minerals (MULTIVITAMIN PO) Take 1 tablet by mouth Every Night. PT HOLDING FOR SURGERY     • sertraline (ZOLOFT) 50 MG tablet 25 mg.     • chlorthalidone (HYGROTON) 25 MG tablet Take 1 tablet by mouth Daily. (Patient taking differently: Take 25 mg by mouth Daily. NOT TAKING CURRENTLY DUE TO WEIGHT LOSS) 90 tablet 3   • Cholecalciferol (VITAMIN D3) 2000 UNITS tablet Take 1 tablet by mouth Daily.     • ondansetron (ZOFRAN) 4 MG tablet Take 1 tablet by mouth Every 6 (Six) Hours As Needed for Nausea or Vomiting. 10 tablet 0   • traMADol (ULTRAM) 50 MG tablet Take 1-2 tablets PO q4-6hr PRN severe pain 42 tablet 0     Facility-Administered Medications Prior to Visit   Medication Dose Route Frequency Provider Last Rate Last Admin   • Chlorhexidine Gluconate Cloth 2 % pads 1 each  1 each Apply externally Take As Directed Rashawn Hernández MD           No opioid medication identified on active medication list. I have reviewed chart for other potential  high risk medication/s and harmful drug interactions in the elderly.          Aspirin is not on active medication list.  Aspirin use is not indicated based on review of current medical condition/s. Risk of harm outweighs potential benefits.  .    Patient Active Problem List   Diagnosis   • HTN (hypertension), benign   • Vitamin D deficiency   • Urge incontinence   • GERD without esophagitis   • Cervical spinal stenosis   • Saddle pulmonary embolus (HCC)   • Family history of thrombosis   • Degeneration of cervical intervertebral disc   • Adrenal nodule (HCC)   • Health care maintenance   • Localized osteoarthritis of left shoulder   • Primary " "osteoarthritis of both knees   • Hypomagnesemia   • Migraine with aura and without status migrainosus, not intractable   • Snoring   • Arthritis of right hip   • Anticoagulant long-term use   • Hx pulmonary embolism   • History of DVT (deep vein thrombosis)     Advance Care Planning  Advance Directive is not on file.  ACP discussion was held with the patient during this visit. Patient does not have an advance directive, information provided.          Objective    Vitals:    22 0804   BP: 128/76   BP Location: Right arm   Patient Position: Sitting   Cuff Size: Adult   Pulse: 83   Temp: 97.3 °F (36.3 °C)   TempSrc: Temporal   SpO2: 95%   Weight: 76.7 kg (169 lb 3.2 oz)   Height: 175.3 cm (69\")   PainSc:   2   PainLoc: Hip     BMI Readings from Last 1 Encounters:   22 24.99 kg/m²   BMI is within normal parameters. No follow-up required.    Does the patient have evidence of cognitive impairment? No    Physical Exam  Vitals and nursing note reviewed.   Constitutional:       General: She is not in acute distress.     Appearance: Normal appearance.   Cardiovascular:      Rate and Rhythm: Normal rate and regular rhythm.      Heart sounds: Normal heart sounds. No murmur heard.  Pulmonary:      Effort: Pulmonary effort is normal.      Breath sounds: Normal breath sounds.   Neurological:      Mental Status: She is alert.                 HEALTH RISK ASSESSMENT    Smoking Status:  Social History     Tobacco Use   Smoking Status Former Smoker   • Packs/day: 0.50   • Years: 10.00   • Pack years: 5.00   • Types: Cigarettes   • Quit date:    • Years since quittin.3   Smokeless Tobacco Never Used     Alcohol Consumption:  Social History     Substance and Sexual Activity   Alcohol Use Not Currently    Comment: rarely     Fall Risk Screen:    STEADI Fall Risk Assessment was completed, and patient is at LOW risk for falls.Assessment completed on:2022    Depression Screening:  PHQ-2/PHQ-9 Depression Screening " 4/18/2022   Retired Total Score -   Little Interest or Pleasure in Doing Things 1-->several days   Feeling Down, Depressed or Hopeless 1-->several days   Trouble Falling or Staying Asleep, or Sleeping Too Much 2-->more than half the days   Feeling Tired or Having Little Energy 2-->more than half the days   Poor Appetite or Overeating 1-->several days   Feeling Bad about Yourself - or that You are a Failure or Have Let Yourself or Your Family Down 1-->several days   Trouble Concentrating on Things, Such as Reading the Newspaper or Watching Television 0-->not at all   Moving or Speaking So Slowly that Other People Could Have Noticed? Or the Opposite - Being So Fidgety 0-->not at all   Thoughts that You Would be Better Off Dead or of Hurting Yourself in Some Way 0-->not at all   PHQ-9: Brief Depression Severity Measure Score 8       Health Habits and Functional and Cognitive Screening:  Functional & Cognitive Status 4/18/2022   Do you have difficulty preparing food and eating? No   Do you have difficulty bathing yourself, getting dressed or grooming yourself? No   Do you have difficulty using the toilet? No   Do you have difficulty moving around from place to place? No   Do you have trouble with steps or getting out of a bed or a chair? No   Current Diet Well Balanced Diet   Dental Exam Up to date        Dental Exam Comment -   Eye Exam Up to date        Eye Exam Comment -   Exercise (times per week) 5 times per week   Current Exercises Include Walking   Current Exercise Activities Include -   Do you need help using the phone?  No   Are you deaf or do you have serious difficulty hearing?  No   Do you need help with transportation? No   Do you need help shopping? No   Do you need help preparing meals?  No   Do you need help with housework?  Yes   Do you need help with laundry? No   Do you need help taking your medications? No   Do you need help managing money? No   Do you ever drive or ride in a car without wearing a  seat belt? No   Have you felt unusual stress, anger or loneliness in the last month? Yes   Who do you live with? Spouse   If you need help, do you have trouble finding someone available to you? No   Have you been bothered in the last four weeks by sexual problems? No   Do you have difficulty concentrating, remembering or making decisions? No       Age-appropriate Screening Schedule:  Refer to the list below for future screening recommendations based on patient's age, sex and/or medical conditions. Orders for these recommended tests are listed in the plan section. The patient has been provided with a written plan.    Health Maintenance   Topic Date Due   • DXA SCAN  11/28/2020   • MAMMOGRAM  12/05/2021   • INFLUENZA VACCINE  08/01/2022   • TDAP/TD VACCINES (3 - Td or Tdap) 11/26/2029   • ZOSTER VACCINE  Completed              Assessment/Plan   CMS Preventative Services Quick Reference  Risk Factors Identified During Encounter  psychosocial stressors but coping well  The above risks/problems have been discussed with the patient.  Follow up actions/plans if indicated are seen below in the Assessment/Plan Section.  Pertinent information has been shared with the patient in the After Visit Summary.    Diagnoses and all orders for this visit:    1. Medicare annual wellness visit, subsequent (Primary)    2. HTN (hypertension), benign  -     CBC (No Diff)  -     Comprehensive Metabolic Panel    3. Dyslipidemia  -     CBC (No Diff)  -     Comprehensive Metabolic Panel  -     Lipid Panel With LDL / HDL Ratio      She is planning to get mammogram and DEXA done through [facility] and will have them send me a report copy.      Follow Up:   Return in about 6 months (around 10/18/2022) for Next scheduled follow up.     An After Visit Summary and PPPS were made available to the patient.

## 2022-04-19 LAB
ALBUMIN SERPL-MCNC: 4.3 G/DL (ref 3.7–4.7)
ALBUMIN/GLOB SERPL: 2.4 {RATIO} (ref 1.2–2.2)
ALP SERPL-CCNC: 90 IU/L (ref 44–121)
ALT SERPL-CCNC: 18 IU/L (ref 0–32)
AST SERPL-CCNC: 20 IU/L (ref 0–40)
BILIRUB SERPL-MCNC: 0.4 MG/DL (ref 0–1.2)
BUN SERPL-MCNC: 19 MG/DL (ref 8–27)
BUN/CREAT SERPL: 26 (ref 12–28)
CALCIUM SERPL-MCNC: 9.8 MG/DL (ref 8.7–10.3)
CHLORIDE SERPL-SCNC: 107 MMOL/L (ref 96–106)
CHOLEST SERPL-MCNC: 212 MG/DL (ref 100–199)
CO2 SERPL-SCNC: 24 MMOL/L (ref 20–29)
CREAT SERPL-MCNC: 0.72 MG/DL (ref 0.57–1)
EGFRCR SERPLBLD CKD-EPI 2021: 89 ML/MIN/1.73
ERYTHROCYTE [DISTWIDTH] IN BLOOD BY AUTOMATED COUNT: 12.9 % (ref 11.7–15.4)
GLOBULIN SER CALC-MCNC: 1.8 G/DL (ref 1.5–4.5)
GLUCOSE SERPL-MCNC: 97 MG/DL (ref 65–99)
HCT VFR BLD AUTO: 37.4 % (ref 34–46.6)
HDLC SERPL-MCNC: 92 MG/DL
HGB BLD-MCNC: 12 G/DL (ref 11.1–15.9)
LDLC SERPL CALC-MCNC: 111 MG/DL (ref 0–99)
LDLC/HDLC SERPL: 1.2 RATIO (ref 0–3.2)
MCH RBC QN AUTO: 29.6 PG (ref 26.6–33)
MCHC RBC AUTO-ENTMCNC: 32.1 G/DL (ref 31.5–35.7)
MCV RBC AUTO: 92 FL (ref 79–97)
PLATELET # BLD AUTO: 232 X10E3/UL (ref 150–450)
POTASSIUM SERPL-SCNC: 4.8 MMOL/L (ref 3.5–5.2)
PROT SERPL-MCNC: 6.1 G/DL (ref 6–8.5)
RBC # BLD AUTO: 4.06 X10E6/UL (ref 3.77–5.28)
SODIUM SERPL-SCNC: 143 MMOL/L (ref 134–144)
TRIGL SERPL-MCNC: 50 MG/DL (ref 0–149)
VLDLC SERPL CALC-MCNC: 9 MG/DL (ref 5–40)
WBC # BLD AUTO: 5.5 X10E3/UL (ref 3.4–10.8)

## 2022-06-22 ENCOUNTER — OFFICE VISIT (OUTPATIENT)
Dept: INTERNAL MEDICINE | Facility: CLINIC | Age: 72
End: 2022-06-22

## 2022-06-22 VITALS
DIASTOLIC BLOOD PRESSURE: 82 MMHG | HEIGHT: 69 IN | WEIGHT: 167 LBS | OXYGEN SATURATION: 95 % | BODY MASS INDEX: 24.73 KG/M2 | TEMPERATURE: 98.3 F | HEART RATE: 77 BPM | SYSTOLIC BLOOD PRESSURE: 129 MMHG | RESPIRATION RATE: 18 BRPM

## 2022-06-22 DIAGNOSIS — A08.4 VIRAL GASTROENTERITIS: ICD-10-CM

## 2022-06-22 DIAGNOSIS — R19.7 DIARRHEA, UNSPECIFIED TYPE: Primary | ICD-10-CM

## 2022-06-22 DIAGNOSIS — R68.83 CHILLS: ICD-10-CM

## 2022-06-22 DIAGNOSIS — R11.0 NAUSEA: ICD-10-CM

## 2022-06-22 LAB
EXPIRATION DATE: NORMAL
FLUAV AG UPPER RESP QL IA.RAPID: NOT DETECTED
FLUBV AG UPPER RESP QL IA.RAPID: NOT DETECTED
INTERNAL CONTROL: NORMAL
Lab: NORMAL
SARS-COV-2 AG UPPER RESP QL IA.RAPID: NOT DETECTED

## 2022-06-22 PROCEDURE — 99213 OFFICE O/P EST LOW 20 MIN: CPT | Performed by: NURSE PRACTITIONER

## 2022-06-22 PROCEDURE — 87428 SARSCOV & INF VIR A&B AG IA: CPT | Performed by: NURSE PRACTITIONER

## 2022-06-22 RX ORDER — ONDANSETRON 4 MG/1
4 TABLET, ORALLY DISINTEGRATING ORAL EVERY 8 HOURS PRN
Qty: 15 TABLET | Refills: 0 | Status: SHIPPED | OUTPATIENT
Start: 2022-06-22 | End: 2022-06-27

## 2022-06-22 NOTE — PROGRESS NOTES
"Chief Complaint  Diarrhea (Pt presents here today with diarrhea and abdominal cramping. X 1 week) and Abdominal Cramping    Subjective        Juany Carlin presents to [facility]  History of Present Illness    Patient is a pleasant 72-year-old female typically sees Dr. Pizarro here in the office.  Patient is new to me and she presents to the clinic today with complaints of abdominal cramping with diarrhea, chills and nausea.  Patient states this all started with a headache and then the diarrhea started.  She denies being around anybody who has been sick.  She has been taking Pepto-Bismol over-the-counter which has decreased her symptoms at this time.  Patient is COVID vaccinated.  She denies any upper respiratory symptoms.      Objective   Vital Signs:  /82 (BP Location: Right arm, Patient Position: Sitting, Cuff Size: Adult)   Pulse 77   Temp 98.3 °F (36.8 °C)   Resp 18   Ht 175.3 cm (69\")   Wt 75.8 kg (167 lb)   SpO2 95%   BMI 24.66 kg/m²   Estimated body mass index is 24.66 kg/m² as calculated from the following:    Height as of this encounter: 175.3 cm (69\").    Weight as of this encounter: 75.8 kg (167 lb).    BMI is within normal parameters. No other follow-up for BMI required.      Physical Exam  Vitals and nursing note reviewed.   Constitutional:       Appearance: Normal appearance.   HENT:      Head: Normocephalic.      Nose: Nose normal.   Eyes:      Pupils: Pupils are equal, round, and reactive to light.   Cardiovascular:      Rate and Rhythm: Normal rate and regular rhythm.      Pulses: Normal pulses.      Heart sounds: Normal heart sounds.      Comments: No peripheral edema noted.   Pulmonary:      Effort: Pulmonary effort is normal. No respiratory distress.      Breath sounds: Normal breath sounds. No stridor. No wheezing, rhonchi or rales.   Chest:      Chest wall: No tenderness.   Abdominal:      General: There is no distension.      Tenderness: There is no " abdominal tenderness.      Comments: C/o generalized abd cramping with n/d x 6 days  Getting better   BS normal x 4    Genitourinary:     Comments: Reports normal urinary pattern    Musculoskeletal:         General: Normal range of motion.   Skin:     General: Skin is warm.      Capillary Refill: Capillary refill takes less than 2 seconds.   Neurological:      Mental Status: She is alert and oriented to person, place, and time.   Psychiatric:         Behavior: Behavior normal.        Result Review :           CBC (No Diff) (04/18/2022 9:00 AM)  Comprehensive Metabolic Panel (04/18/2022 9:00 AM)  POCT SARS-CoV-2 Antigen ANISA (06/22/2022 3:20 PM)       Assessment and Plan   Diagnoses and all orders for this visit:    1. Diarrhea, unspecified type (Primary)  -     POCT SARS-CoV-2 Antigen ANISA  -     CBC & Differential  -     Comprehensive Metabolic Panel    2. Nausea  -     POCT SARS-CoV-2 Antigen ANISA  -     CBC & Differential  -     Comprehensive Metabolic Panel    3. Chills  -     POCT SARS-CoV-2 Antigen ANISA  -     CBC & Differential  -     Comprehensive Metabolic Panel    4. Viral gastroenteritis  -     CBC & Differential  -     Comprehensive Metabolic Panel    Other orders  -     ondansetron ODT (Zofran ODT) 4 MG disintegrating tablet; Place 1 tablet on the tongue Every 8 (Eight) Hours As Needed for Nausea or Vomiting for up to 5 days.  Dispense: 15 tablet; Refill: 0    Continue Imodium   If you start to have abdominal pain, fever, chills or any worsening nausea/diarrhea please go to the ER.         Follow Up   Return if symptoms worsen or fail to improve.  Patient was given instructions and counseling regarding her condition or for health maintenance advice. Please see specific information pulled into the AVS if appropriate.

## 2022-06-22 NOTE — PATIENT INSTRUCTIONS
Continue Imodium   If you start to have abdominal pain, fever, chills or any worsening nausea/diarrhea please go to the ER.

## 2022-06-23 LAB
ALBUMIN SERPL-MCNC: 4.6 G/DL (ref 3.7–4.7)
ALBUMIN/GLOB SERPL: 2.4 {RATIO} (ref 1.2–2.2)
ALP SERPL-CCNC: 93 IU/L (ref 44–121)
ALT SERPL-CCNC: 14 IU/L (ref 0–32)
AST SERPL-CCNC: 18 IU/L (ref 0–40)
BASOPHILS # BLD AUTO: 0.1 X10E3/UL (ref 0–0.2)
BASOPHILS NFR BLD AUTO: 1 %
BILIRUB SERPL-MCNC: 0.6 MG/DL (ref 0–1.2)
BUN SERPL-MCNC: 5 MG/DL (ref 8–27)
BUN/CREAT SERPL: 7 (ref 12–28)
CALCIUM SERPL-MCNC: 9.7 MG/DL (ref 8.7–10.3)
CHLORIDE SERPL-SCNC: 101 MMOL/L (ref 96–106)
CO2 SERPL-SCNC: 25 MMOL/L (ref 20–29)
CREAT SERPL-MCNC: 0.69 MG/DL (ref 0.57–1)
EGFRCR SERPLBLD CKD-EPI 2021: 92 ML/MIN/1.73
EOSINOPHIL # BLD AUTO: 0.1 X10E3/UL (ref 0–0.4)
EOSINOPHIL NFR BLD AUTO: 1 %
ERYTHROCYTE [DISTWIDTH] IN BLOOD BY AUTOMATED COUNT: 12 % (ref 11.7–15.4)
GLOBULIN SER CALC-MCNC: 1.9 G/DL (ref 1.5–4.5)
GLUCOSE SERPL-MCNC: 96 MG/DL (ref 65–99)
HCT VFR BLD AUTO: 40.9 % (ref 34–46.6)
HGB BLD-MCNC: 13.4 G/DL (ref 11.1–15.9)
IMM GRANULOCYTES # BLD AUTO: 0 X10E3/UL (ref 0–0.1)
IMM GRANULOCYTES NFR BLD AUTO: 0 %
LYMPHOCYTES # BLD AUTO: 2.3 X10E3/UL (ref 0.7–3.1)
LYMPHOCYTES NFR BLD AUTO: 32 %
MCH RBC QN AUTO: 29.5 PG (ref 26.6–33)
MCHC RBC AUTO-ENTMCNC: 32.8 G/DL (ref 31.5–35.7)
MCV RBC AUTO: 90 FL (ref 79–97)
MONOCYTES # BLD AUTO: 0.7 X10E3/UL (ref 0.1–0.9)
MONOCYTES NFR BLD AUTO: 10 %
NEUTROPHILS # BLD AUTO: 4 X10E3/UL (ref 1.4–7)
NEUTROPHILS NFR BLD AUTO: 56 %
PLATELET # BLD AUTO: 248 X10E3/UL (ref 150–450)
POTASSIUM SERPL-SCNC: 4.8 MMOL/L (ref 3.5–5.2)
PROT SERPL-MCNC: 6.5 G/DL (ref 6–8.5)
RBC # BLD AUTO: 4.54 X10E6/UL (ref 3.77–5.28)
SODIUM SERPL-SCNC: 138 MMOL/L (ref 134–144)
WBC # BLD AUTO: 7.1 X10E3/UL (ref 3.4–10.8)

## 2022-06-27 ENCOUNTER — OFFICE VISIT (OUTPATIENT)
Dept: ORTHOPEDIC SURGERY | Facility: CLINIC | Age: 72
End: 2022-06-27

## 2022-06-27 VITALS — TEMPERATURE: 96.8 F | HEIGHT: 69 IN | BODY MASS INDEX: 24.85 KG/M2 | WEIGHT: 167.8 LBS

## 2022-06-27 DIAGNOSIS — R52 PAIN: Primary | ICD-10-CM

## 2022-06-27 DIAGNOSIS — M51.36 DDD (DEGENERATIVE DISC DISEASE), LUMBAR: ICD-10-CM

## 2022-06-27 DIAGNOSIS — M41.9 SCOLIOSIS OF LUMBAR SPINE, UNSPECIFIED SCOLIOSIS TYPE: ICD-10-CM

## 2022-06-27 DIAGNOSIS — Z96.641 STATUS POST TOTAL REPLACEMENT OF RIGHT HIP: ICD-10-CM

## 2022-06-27 PROCEDURE — 73502 X-RAY EXAM HIP UNI 2-3 VIEWS: CPT | Performed by: ORTHOPAEDIC SURGERY

## 2022-06-27 PROCEDURE — 99214 OFFICE O/P EST MOD 30 MIN: CPT | Performed by: ORTHOPAEDIC SURGERY

## 2022-06-27 NOTE — PROGRESS NOTES
"Patient Name: Juany Carlin   YOB: 1950  Referring Primary Care Physician: Warren Pizarro MD  BMI: Body mass index is 24.78 kg/m².    Chief Complaint:    Chief Complaint   Patient presents with   • Right Hip - Follow-up        HPI:     Juany Carlin is a 72 y.o. female who presents today for evaluation of   Chief Complaint   Patient presents with   • Right Hip - Follow-up   .  Patient is seen today complaining of \"right hip\" pain.  She had right total hip replacement about 8 months ago.  The pain she points to is more in her buttock almost to the SI joint she says she may have had back problems in the past.  Reviewing her chart I cannot see all the studies but she had lumbar MRI and x-rays about 10 years ago presumably by Dr. Christensen in this office she also has had cervical spine work-up in the past [facility] and had MRIs there that showed some pretty severe spinal stenosis.  She says at one point surgery was was recommended for which she did not want she has had epidurals etc.  She does have back pain.      Subjective   Medications:   Home Medications:  Current Outpatient Medications on File Prior to Visit   Medication Sig   • acetaminophen (TYLENOL) 650 MG 8 hr tablet Take 650 mg by mouth Every 8 (Eight) Hours As Needed for Mild Pain .   • B Complex-Biotin-FA (B-100 COMPLEX PO) Take 1 tablet by mouth Daily. PT HOLDING FOR SURGERY   • Eliquis 2.5 MG tablet tablet TAKE ONE TABLET BY MOUTH EVERY 12 HOURS (Patient taking differently: Take 2.5 mg by mouth Every 12 (Twelve) Hours.)   • famotidine (PEPCID) 10 MG tablet Take 20 mg by mouth Every Night.   • losartan (COZAAR) 100 MG tablet Take 1 tablet by mouth Daily.   • Magnesium 250 MG tablet Take 2 tablets by mouth Every Night. \"FOR LEG CRAMPS\"   • Multiple Vitamins-Minerals (MULTIVITAMIN PO) Take 1 tablet by mouth Every Night. PT HOLDING FOR SURGERY   • ondansetron ODT (Zofran ODT) 4 MG disintegrating tablet Place 1 tablet on the tongue Every 8 (Eight) " Hours As Needed for Nausea or Vomiting for up to 5 days.   • sertraline (ZOLOFT) 50 MG tablet 25 mg.     Current Facility-Administered Medications on File Prior to Visit   Medication   • Chlorhexidine Gluconate Cloth 2 % pads 1 each     Current Medications:  Scheduled Meds:  Continuous Infusions:No current facility-administered medications for this visit.    PRN Meds:.    I have reviewed the patient's medical history in detail and updated the computerized patient record.  Review and summarization of old records includes:    Past Medical History:   Diagnosis Date   • Acute cystitis without hematuria 04/10/2020   • Acute right ankle pain 2020   • Adrenal nodule (HCC)    • Arthritis    • BPPV (benign paroxysmal positional vertigo) 5/10/2016   • Cervical stenosis of spine    • Depression     NO MEDS   • DVT, lower extremity (HCC)     right   • GERD without esophagitis 5/10/2016   • H/O varicose vein stripping    • Hip pain    • History of migraine    • History of skin cancer    • History of stress incontinence    • Hyperplastic colon polyp 2013   • Hypertension    • PONV (postoperative nausea and vomiting)    • Pregnancy     , miscarrige x1   • Pulmonary emboli ([facility]) 2016    bilateral extensive PE with saddle emboli.Negative hypercoagulable state w/u   • Pulmonary embolism with acute cor pulmonale ([facility]) 2017   • Shoulder pain    • Urge incontinence 5/10/2016   • Vitamin D deficiency 5/10/2016        Past Surgical History:   Procedure Laterality Date   • CARDIAC CATHETERIZATION N/A 11/10/2016    Procedure: Right Heart Cath;  Surgeon: Johnna Agrawal MD;  Location: [facility] INVASIVE LOCATION;  Service:    • CARDIAC CATHETERIZATION N/A 11/10/2016    Procedure: Thrombolytic Therapy;  Surgeon: Johnna Agrawal MD;  Location: [facility] CATH INVASIVE LOC[facility];  Service:    • CATARACT EXTRACTION Bilateral    • COLONOSCOPY W/ BIOPSIES  2013    hyperplastic polyp, Dr.Russel Lewis   • CYST REMOVAL       VAGINAL CYST   • DILATATION AND CURETTAGE     • EPIDURAL BLOCK     • HEMORROIDECTOMY     • INTERVENTIONAL RADIOLOGY PROCEDURE Bilateral 11/10/2016    Procedure: Sp Smiley Cath Place Pulmonary Art;  Surgeon: Johnna Agrawal MD;  Location:  [facility] CATH INVASIVE LOCATION;  Service:    • SKIN CANCER EXCISION     • TONSILLECTOMY     • TOTAL HIP ARTHROPLASTY Right 10/25/2021    Procedure: Posterior RIGHT TOTAL HIP ARTHROPLASTY GAVIN NAVIGATION;  Surgeon: Rashawn Hernández MD;  [facility];  Service: Orthopedics;  Laterality: Right;   • TOTAL LAPAROSCOPIC HYSTERECTOMY     • VEIN SURGERY Left [id number]    leg,    • VENTRAL HERNIA REPAIR          Social History     Occupational History   • Occupation:      Employer: RETIRED     Comment: [employer]   Tobacco Use   • Smoking status: Former Smoker     Packs/day: 0.50     Years: 10.00     Pack years: 5.00     Types: Cigarettes     Quit date:      Years since quittin.5   • Smokeless tobacco: Never Used   Vaping Use   • Vaping Use: Never used   Substance and Sexual Activity   • Alcohol use: Not Currently     Comment: rarely   • Drug use: No   • Sexual activity: Defer      Social History     Social History Narrative    LIVES WITH SPOUSE        Family History   Problem Relation Age of Onset   • Macular degeneration Mother    • Deep vein thrombosis Mother    • Hypertension Mother    • Skin cancer Mother    • Heart failure Father    • Deep vein thrombosis Father    • Cancer Father    • Hypertension Father    • Heart disease Father    • Chiari malformation Sister    • Heart disease Maternal Grandfather    • Heart disease Paternal Grandfather    • Deep vein thrombosis Sister    • Heart failure Sister    • Pancreatic cancer Sister    • Thyroid disease Daughter    • Hypertension Brother    • Malig Hyperthermia Neg Hx        ROS: 14 point review of systems was performed and all other systems were reviewed and are negative except for  "documented findings in HPI and today's encounter.     Allergies:   Allergies   Allergen Reactions   • Ciprocinonide [Fluocinolone] Other (See Comments)     Achilles tendonitis   • Suprax [Cefixime] Diarrhea   • Contrast Dye Rash     Rash many years ago when shell-fish derived contrast dye was used   • Nickel Rash     Possible nickel allergy     Constitutional:  Denies fever, shaking or chills   Eyes:  Denies change in visual acuity   HENT:  Denies nasal congestion or sore throat   Respiratory:  Denies cough or shortness of breath   Cardiovascular:  Denies chest pain or severe LE edema   GI:  Denies abdominal pain, nausea, vomiting, bloody stools or diarrhea   Musculoskeletal:  Numbness, tingling, pain, or loss of motor function only as noted above in history of present illness.  : Denies painful urination or hematuria  Integument:  Denies rash, lesion or ulceration   Neurologic:  Denies headache or focal weakness  Endocrine:  Denies lymphadenopathy  Psych:  Denies confusion or change in mental status   Hem:  Denies active bleeding    OBJECTIVE:  Physical Exam: 72 y.o. female  Wt Readings from Last 3 Encounters:   06/27/22 76.1 kg (167 lb 12.8 oz)   06/22/22 75.8 kg (167 lb)   04/18/22 76.7 kg (169 lb 3.2 oz)     Ht Readings from Last 1 Encounters:   06/27/22 175.3 cm (69\")     Body mass index is 24.78 kg/m².  Vitals:    06/27/22 0844   Temp: 96.8 °F (36 °C)     Vital signs reviewed.     General Appearance:    Alert, cooperative, in no acute distress                  Eyes: conjunctiva clear  ENT: external ears and nose atraumatic  CV: no peripheral edema  Resp: normal respiratory effort  Skin: no rashes or wounds; normal turgor  Psych: mood and affect appropriate  Lymph: no nodes appreciated  Neuro: gross sensation intact  Vascular:  Palpable peripheral pulse in noted extremity  Musculoskeletal Extremities: Exam today shows she is tender sort of the region between her trochanter and her buttocks to deep palpation " "he does have some back tenderness and SI joint tenderness as well her hip itself she does not have groin pain and she appears to have some pelvic asymmetry    Radiology:   AP of the hips lateral right hip taken in office today for complaints of pain with comparison view shows symmetric leg lengths with a evidence of fairly severe lumbosacral degenerative disc disease and scoliosis on the visualized portions of the x-ray believe that this is causing the pelvic asymmetry not DePuy her leg length deformity at the hips as that looks good        Assessment:     ICD-10-CM ICD-9-CM   1. Pain  R52 780.96   2. DDD (degenerative disc disease), lumbar  M51.36 722.52   3. Status post total replacement of right hip  Z96.641 V43.64   4. Scoliosis of lumbar spine, unspecified scoliosis type  M41.9 737.30        MDM/Plan:   The diagnosis(es), natural history, pathophysiology and treatment for diagnosis(es) were discussed. Opportunity given and questions answered.  Biomechanics of pertinent body areas discussed.  When appropriate, the use of ambulatory aids discussed.    EXERCISES:  Advice on benefits of, and types of regular/moderate exercise pertaining to orthopedic diagnosis(es).  MEDICATIONS:  The risks, benefits, warnings,side effects and alternatives of medications discussed.  Inflammation/pain control; with cold, heat, elevation and/or liniments discussed as appropriate  TOTAL JOINT recommendations and precautions, including antibiotic prophylaxis discussed. Advice given and questions answered.   MEDICAL RECORDS reviewed from other provider(s) for past and current medical history pertinent to this complaint.  We talked about possibility get an MRI of the lumbosacral spine and referral to a \"spine place\" especially since she has had trouble with her cervical now believe lumbar spine she says not bother that much at this point she is 1 make sure that the hip replacement look pretty good and on x-rays it does appear to be well " fixed and well aligned.  She has further problems be glad to work it up and refer her needed he want injection in the hip area may be either epidural or trochanteric injection but could be done at pain management but would recommend her being worked up by back specialist with MRI prior    6/27/2022    Dictated utilizing Dragon dictation        Procedures

## 2022-07-01 ENCOUNTER — TELEPHONE (OUTPATIENT)
Dept: INTERNAL MEDICINE | Facility: CLINIC | Age: 72
End: 2022-07-01

## 2022-07-01 NOTE — TELEPHONE ENCOUNTER
Caller: Carlin Juany NICO    Relationship: Self    Best call back number: [phone]     What is the best time to reach you: ANYTIME     Who are you requesting to speak with (clinical staff, provider,  specific staff member): CLINICAL     What was the call regarding: PATIENT HAD AN APPOINTMENT FOR TODAY 07-01-22 AND HAD TO CANCEL DUE TO NOT HAVING TRANSPORTATION.     PATIENT IS HAVING DIARRHEA THAT IS NORMALLY A COUPLE TIMES A DAY. IT'S LOOSE WATERY STOOL AND SOMETIMES CAUSES HER TO HAVE AN ACCIDENT.    PATIENT STATES SHE HAS MILD CRAMPING WITH THIS AND A HEADACHE.     PATIENT STATES THIS IS GOING ON FOR 3 WEEKS AND EVERY TIME SHE GOES BACK ON A NORMAL DIET THAT DIARRHEA SEEMS TO GET WORSE. PATIENT WOULD LIKE TO KNOW WHAT SHE NEEDS TO DO TO BE ABLE TO HELP WITH THIS.     PATIENT STATES SHE HAS HAD THIS ISSUE BEFORE, AFTER GETTING OUT OF THE HOSPITAL IN October OF 2021 AFTER HAVING A HIP REPLACEMENT. PATIENT SEEN [facility] ON 06.22.22 FOR THIS ISSUE AND IT IS NOT GETTING ANY BETTER.     PLEASE CALL TO DISCUSS AND ADVISE PATIENT ON WHAT CAN HELP OR IF ANY MEDICATION CAN BE CALLED IN TO HELP WITH THESE SYMPTOMS.       [facility] [address] AT [facility] [address] (ABA) - [phone] [facility] [phone] FX

## 2022-07-01 NOTE — TELEPHONE ENCOUNTER
Caller: Juany Carlin    Relationship: Self    Best call back number: [phone]    What was the call regarding: PATIENT STATED THAT SHE GOT A MESSAGE IN [employer] THAT SAYS THAT SHE HAD A TELEPHONE CONFERENCE WITH DR WESTBROOK AND SHOWS A SUMMARY THAT SHE IS UNABLE TO OPEN. PATIENT WANTED TO MAKE SURE WE KNEW THAT SHE HAS NOT HAD A TELEPHONE CONFERENCE WITH DR WESTBROOK AND SHE IS STILL WAITING ON A CALL BACK. PLEASE ADVISE.     Do you require a callback: YES

## 2022-07-06 NOTE — TELEPHONE ENCOUNTER
I had not discussed this with her recently.  She had a visit with Adelina.  There is a telephone note open from July 1 is when this dialogue started.  But I have not had any telephone conversation with her.  This needs to be a visit in the office.  Someone needs to see her.  I cannot give guidance because I have not discussed with her.

## 2022-08-17 ENCOUNTER — TELEPHONE (OUTPATIENT)
Dept: ORTHOPEDIC SURGERY | Facility: CLINIC | Age: 72
End: 2022-08-17

## 2022-08-17 DIAGNOSIS — M51.36 DDD (DEGENERATIVE DISC DISEASE), LUMBAR: Primary | ICD-10-CM

## 2022-08-17 NOTE — TELEPHONE ENCOUNTER
Provider: DR BLOOD     Caller: OMAR ALEJO     Relationship to Patient: SELF     Phone Number: [phone]    Reason for Call: PATIENT CALLED AND STATED THAT DR BLOOD TOLD HER SHE WOULD NEED A BACK SPECIALIST AND ALSO WANTS HER TO HAVE AN MRI BEFORE HE REFERS HER PLEASE ADVISE

## 2022-08-17 NOTE — TELEPHONE ENCOUNTER
Lumbar MRI ordered and referral to Neurosurgery placed. They will contact her to schedule. Thank you

## 2022-09-20 ENCOUNTER — HOSPITAL ENCOUNTER (OUTPATIENT)
Dept: MRI IMAGING | Facility: HOSPITAL | Age: 72
Discharge: HOME | End: 2022-09-20
Admitting: NURSE PRACTITIONER

## 2022-09-20 DIAGNOSIS — M51.36 DDD (DEGENERATIVE DISC DISEASE), LUMBAR: ICD-10-CM

## 2022-09-20 PROCEDURE — 72148 MRI LUMBAR SPINE W/O DYE: CPT

## 2022-10-04 ENCOUNTER — OFFICE VISIT (OUTPATIENT)
Dept: NEUROSURGERY | Facility: CLINIC | Age: 72
End: 2022-10-04

## 2022-10-04 VITALS
TEMPERATURE: 97.3 F | HEART RATE: 76 BPM | BODY MASS INDEX: 24.73 KG/M2 | HEIGHT: 69 IN | DIASTOLIC BLOOD PRESSURE: 110 MMHG | OXYGEN SATURATION: 98 % | SYSTOLIC BLOOD PRESSURE: 170 MMHG | WEIGHT: 167 LBS

## 2022-10-04 DIAGNOSIS — M43.16 SPONDYLOLISTHESIS OF LUMBAR REGION: ICD-10-CM

## 2022-10-04 DIAGNOSIS — M47.816 LUMBAR SPONDYLOSIS: Primary | ICD-10-CM

## 2022-10-04 DIAGNOSIS — M41.26 OTHER IDIOPATHIC SCOLIOSIS, LUMBAR REGION: ICD-10-CM

## 2022-10-04 PROCEDURE — 99204 OFFICE O/P NEW MOD 45 MIN: CPT | Performed by: NEUROLOGICAL SURGERY

## 2022-10-04 RX ORDER — DULOXETIN HYDROCHLORIDE 30 MG/1
CAPSULE, DELAYED RELEASE ORAL
COMMUNITY
Start: 2022-09-23

## 2022-10-04 RX ORDER — TRAZODONE HYDROCHLORIDE 50 MG/1
TABLET ORAL
COMMUNITY
Start: 2022-09-23

## 2022-10-07 ENCOUNTER — PATIENT ROUNDING (BHMG ONLY) (OUTPATIENT)
Dept: NEUROSURGERY | Facility: CLINIC | Age: 72
End: 2022-10-07

## 2022-10-24 ENCOUNTER — OFFICE VISIT (OUTPATIENT)
Dept: INTERNAL MEDICINE | Facility: CLINIC | Age: 72
End: 2022-10-24

## 2022-10-24 VITALS
TEMPERATURE: 98 F | HEIGHT: 69 IN | BODY MASS INDEX: 24.44 KG/M2 | HEART RATE: 75 BPM | OXYGEN SATURATION: 97 % | SYSTOLIC BLOOD PRESSURE: 128 MMHG | RESPIRATION RATE: 19 BRPM | WEIGHT: 165 LBS | DIASTOLIC BLOOD PRESSURE: 80 MMHG

## 2022-10-24 DIAGNOSIS — I10 HTN (HYPERTENSION), BENIGN: Primary | ICD-10-CM

## 2022-10-24 DIAGNOSIS — Z86.711 HX PULMONARY EMBOLISM: Chronic | ICD-10-CM

## 2022-10-24 DIAGNOSIS — T50.905S MEDICATION SIDE EFFECT, SEQUELA: ICD-10-CM

## 2022-10-24 DIAGNOSIS — Z12.31 ENCOUNTER FOR SCREENING MAMMOGRAM FOR MALIGNANT NEOPLASM OF BREAST: ICD-10-CM

## 2022-10-24 DIAGNOSIS — Z78.0 POSTMENOPAUSAL: ICD-10-CM

## 2022-10-24 DIAGNOSIS — Z79.01 ANTICOAGULANT LONG-TERM USE: Chronic | ICD-10-CM

## 2022-10-24 DIAGNOSIS — M47.816 LUMBAR SPONDYLOSIS: ICD-10-CM

## 2022-10-24 DIAGNOSIS — Z86.718 HISTORY OF DVT (DEEP VEIN THROMBOSIS): Chronic | ICD-10-CM

## 2022-10-24 DIAGNOSIS — M41.26 OTHER IDIOPATHIC SCOLIOSIS, LUMBAR REGION: ICD-10-CM

## 2022-10-24 DIAGNOSIS — M43.16 SPONDYLOLISTHESIS OF LUMBAR REGION: ICD-10-CM

## 2022-10-24 PROCEDURE — 99214 OFFICE O/P EST MOD 30 MIN: CPT | Performed by: FAMILY MEDICINE

## 2022-10-24 RX ORDER — APIXABAN 2.5 MG/1
TABLET, FILM COATED ORAL
Qty: 180 TABLET | Refills: 1 | Status: SHIPPED | OUTPATIENT
Start: 2022-10-24

## 2022-10-24 NOTE — PROGRESS NOTES
"Chief Complaint  Follow-up and Hypertension    Subjective        Juany Carlin presents to [facility]  History of Present Illness     Hypertension - stable.  Patient taking medication as prescribed.  Denies chest pain, shortness of breath, headache, lower extremity edema.  Patient is taking losartan.     She is on chronic anticoagulant therapy for her history of pulmonary embolism, DVT, saddle pulmonary embolus.  Takes Eliquis 2.5 mg twice daily.  Tolerating without any excessive bleeding or melena.  Of note, she had a negative and hypercoagulable state work-up per her last primary care doctor.    With neurosurgery due to lumbar spondylosis and spondylolisthesis lumbar.  Note reviewed from Dr. Maddox from 10/4/2022.  They are recommending epidural steroid injections for her back pain.  According to the note, if she gets temporary results but still has significant issues into the right hip concerning for L2 radicular symptoms, she may need to see a spinal subspecialist given the amount of deformity in her spine from the idiopathic scoliosis.  She is excited about the injections as she wishes to avoid surgery.  She is on a sharon gear cane currently.  Wrote her for a handicap placard.    She had a dramatic response to SNRI (cymbalta) which caused 174/110 which was prescribed by her psych.  She stopped the medication and it appears she had improved after stopping.  Advised no more norepi based antidepressant.      Objective   Vital Signs:  /80   Pulse 75   Temp 98 °F (36.7 °C)   Resp 19   Ht 175.3 cm (69\")   Wt 74.8 kg (165 lb)   SpO2 97%   BMI 24.37 kg/m²   Estimated body mass index is 24.37 kg/m² as calculated from the following:    Height as of this encounter: 175.3 cm (69\").    Weight as of this encounter: 74.8 kg (165 lb).    BMI is within normal parameters. No other follow-up for BMI required.      Physical Exam  Vitals and nursing note reviewed.   Constitutional:       " General: She is not in acute distress.     Appearance: Normal appearance.   Cardiovascular:      Rate and Rhythm: Normal rate and regular rhythm.      Heart sounds: Normal heart sounds. No murmur heard.  Pulmonary:      Effort: Pulmonary effort is normal.      Breath sounds: Normal breath sounds.   Neurological:      Mental Status: She is alert.        Result Review :  The following data was reviewed by: Warren Pizarro MD on 10/24/2022:  Common labs    Common Labs 4/18/22 4/18/22 4/18/22 6/22/22 6/22/22    0900 0900 0900 1606 1606   Glucose  97   96   BUN  19   5 (A)   Creatinine  0.72   0.69   Sodium  143   138   Potassium  4.8   4.8   Chloride  107 (A)   101   Calcium  9.8   9.7   Total Protein  6.1   6.5   Albumin  4.3   4.6   Total Bilirubin  0.4   0.6   Alkaline Phosphatase  90   93   AST (SGOT)  20   18   ALT (SGPT)  18   14   WBC 5.5   7.1    Hemoglobin 12.0   13.4    Hematocrit 37.4   40.9    Platelets 232   248    Total Cholesterol   212 (A)     Triglycerides   50     HDL Cholesterol   92     LDL Cholesterol    111 (A)     (A) Abnormal value                      Assessment and Plan   Diagnoses and all orders for this visit:    1. HTN (hypertension), benign (Primary)    2. Anticoagulant long-term use    3. Hx pulmonary embolism    4. History of DVT (deep vein thrombosis)    5. Lumbar spondylosis    6. Spondylolisthesis of lumbar region    7. Other idiopathic scoliosis, lumbar region    8. Medication side effect, sequela    9. Encounter for screening mammogram for malignant neoplasm of breast  -     Mammo screening digital tomosynthesis bilateral w CAD; Future    10. Postmenopausal  -     DEXA Bone Density Axial; Future             Follow Up   Return in about 6 months (around 4/24/2023).  Patient was given instructions and counseling regarding her condition or for health maintenance advice. Please see specific information pulled into the AVS if appropriate.

## 2022-10-24 NOTE — PATIENT INSTRUCTIONS
Stay away from any antidepressant that acts through norepinephrine uptake inhibitor.    No SNRI or NDRI.

## 2022-10-25 ENCOUNTER — TELEPHONE (OUTPATIENT)
Dept: INTERNAL MEDICINE | Facility: CLINIC | Age: 72
End: 2022-10-25

## 2022-10-25 NOTE — TELEPHONE ENCOUNTER
Caller: Juany Carlin    Relationship to patient: Self    Best call back number: [phone]    Patient is needing: THE PATIENT WOULD LIKE TO LET HER PCP KNOW THAT SHE IS GOING TO WAIT TO HAVE HER BONE DENSITY SCAN AND MAMMOGRAM DONE UNTIL January, 2023. THE PATIENT HAS DECIDED TO HAVE THIS DONE WITH HER GYNECOLOGIST INSTEAD.

## 2022-10-27 ENCOUNTER — TELEPHONE (OUTPATIENT)
Dept: INTERNAL MEDICINE | Facility: CLINIC | Age: 72
End: 2022-10-27

## 2022-10-27 NOTE — TELEPHONE ENCOUNTER
Caller: Juany Carlin    Relationship: Self    Best call back number: [phone]    What is the best time to reach you: ANY     Who are you requesting to speak with (clinical staff, provider,  specific staff member): CLINICAL STAFF     What was the call regarding: WAS SEEN AT [facility], IN ORDER TO GET AN EPIDURAL IN HER BACK, SHE WILL NEED A RELEASE FROM DR WESTBROOK TO HAVE THE PROCEDURE AND GET OFF OF ELIQUIS FOR 7 DAYS. THEY SHOULD BE SENDING A FORM OVER TO THE OFFICE.     Do you require a callback: YES

## 2022-10-27 NOTE — TELEPHONE ENCOUNTER
Caller: Juany Carlin    Relationship: Self    Best call back number: [phone]    What is the best time to reach you: ANY     Who are you requesting to speak with (clinical staff, provider,  specific staff member): CLINICAL STAFF     What was the call regarding: PATIENT IS CALLING ASKING FOR REFERRAL FOR MAMMOGRAM AND BONE DENSITY TO BE CANCELLED. SHE WAS ABLE TO SEE HER GYN OFFICE AND THEY SCHEDULED HER TO HAVE ONE AT [facility]     Do you require a callback: NO

## 2022-12-14 RX ORDER — LOSARTAN POTASSIUM 100 MG/1
TABLET ORAL
Qty: 90 TABLET | Refills: 3 | Status: SHIPPED | OUTPATIENT
Start: 2022-12-14

## 2022-12-20 ENCOUNTER — TELEMEDICINE (OUTPATIENT)
Dept: INTERNAL MEDICINE | Facility: CLINIC | Age: 72
End: 2022-12-20

## 2022-12-20 DIAGNOSIS — J40 BRONCHITIS: Primary | ICD-10-CM

## 2022-12-20 PROCEDURE — 99213 OFFICE O/P EST LOW 20 MIN: CPT

## 2022-12-20 RX ORDER — ALBUTEROL SULFATE 90 UG/1
2 AEROSOL, METERED RESPIRATORY (INHALATION) EVERY 4 HOURS PRN
Qty: 8 G | Refills: 2 | Status: SHIPPED | OUTPATIENT
Start: 2022-12-20

## 2022-12-20 RX ORDER — DEXTROMETHORPHAN HYDROBROMIDE AND PROMETHAZINE HYDROCHLORIDE 15; 6.25 MG/5ML; MG/5ML
5 SYRUP ORAL 4 TIMES DAILY PRN
Qty: 240 ML | Refills: 1 | Status: SHIPPED | OUTPATIENT
Start: 2022-12-20

## 2022-12-20 RX ORDER — AZITHROMYCIN 250 MG/1
TABLET, FILM COATED ORAL
Qty: 6 TABLET | Refills: 0 | Status: SHIPPED | OUTPATIENT
Start: 2022-12-20

## 2022-12-20 NOTE — PATIENT INSTRUCTIONS
Start azithromycin and take as directed. Take promethazine-DM 5 mL as needed 4 times a day for cough. Recommend at night before bed. 2 puffs albuterol inhaler as needed for wheezing every 4 hours. Take regular guaifenesin (Mucinex) as directed over-the-counter. Stay hydrated with water. Rest.

## 2022-12-20 NOTE — PROGRESS NOTES
"Chief Complaint  No chief complaint on file.    Subjective        Juany Carlin presents to [facility]  History of Present Illness  72 y.o. female presenting via video visit with cough, congestion, post-COVID. Pt of Warren Pizarro MD. Tested positive for COVID on 12/6. Treated with Tessalon perles and Medrol dose pack from urgent care. Also taking amoxicillin from dental procedure prescription. States congested cough in throat from post-nasal drip. Also has pressure below ears and seems to run down jaw line bilaterally. States pressure in between eyes as well. Some wheezing as well. Denies fevers but having night sweats and chills. States \"chest sounds clear\". Denies shortness of breath or difficulty breathing.       Objective   Vital Signs:  There were no vitals taken for this visit.  Estimated body mass index is 24.37 kg/m² as calculated from the following:    Height as of 10/24/22: 175.3 cm (69\").    Weight as of 10/24/22: 74.8 kg (165 lb).    BMI is within normal parameters. No other follow-up for BMI required.      Physical Exam  Constitutional:       Appearance: Normal appearance.   Pulmonary:      Comments: Productive cough  Neurological:      General: No focal deficit present.      Mental Status: She is alert and oriented to person, place, and time.   Psychiatric:         Mood and Affect: Mood normal.         Behavior: Behavior normal.         Thought Content: Thought content normal.         Judgment: Judgment normal.        Result Review :    Common labs    Common Labs 4/18/22 4/18/22 4/18/22 6/22/22 6/22/22    0900 0900 0900 1606 1606   Glucose  97   96   BUN  19   5 (A)   Creatinine  0.72   0.69   Sodium  143   138   Potassium  4.8   4.8   Chloride  107 (A)   101   Calcium  9.8   9.7   Total Protein  6.1   6.5   Albumin  4.3   4.6   Total Bilirubin  0.4   0.6   Alkaline Phosphatase  90   93   AST (SGOT)  20   18   ALT (SGPT)  18   14   WBC 5.5   7.1    Hemoglobin 12.0   " "13.4    Hematocrit 37.4   40.9    Platelets 232   248    Total Cholesterol   212 (A)     Triglycerides   50     HDL Cholesterol   92     LDL Cholesterol    111 (A)     (A) Abnormal value            Current Outpatient Medications on File Prior to Visit   Medication Sig Dispense Refill   • acetaminophen (TYLENOL) 650 MG 8 hr tablet Take 650 mg by mouth Every 8 (Eight) Hours As Needed for Mild Pain .     • B Complex-Biotin-FA (B-100 COMPLEX PO) Take 1 tablet by mouth Daily. PT HOLDING FOR SURGERY     • DULoxetine (CYMBALTA) 30 MG capsule      • Eliquis 2.5 MG tablet tablet TAKE ONE TABLET BY MOUTH EVERY 12 HOURS 180 tablet 1   • famotidine (PEPCID) 10 MG tablet Take 20 mg by mouth Every Night.     • losartan (COZAAR) 100 MG tablet TAKE ONE TABLET BY MOUTH DAILY 90 tablet 3   • Magnesium 250 MG tablet Take 2 tablets by mouth Every Night. \"FOR LEG CRAMPS\"     • Multiple Vitamins-Minerals (MULTIVITAMIN PO) Take 1 tablet by mouth Every Night. PT HOLDING FOR SURGERY     • traZODone (DESYREL) 50 MG tablet        Current Facility-Administered Medications on File Prior to Visit   Medication Dose Route Frequency Provider Last Rate Last Admin   • Chlorhexidine Gluconate Cloth 2 % pads 1 each  1 each Apply externally Take As Directed Rashawn Hernández MD                     Assessment and Plan   Diagnoses and all orders for this visit:    1. Bronchitis (Primary)  -     azithromycin (Zithromax Z-Angel) 250 MG tablet; Take 2 tablets by mouth on day 1, then 1 tablet daily on days 2-5  Dispense: 6 tablet; Refill: 0  -     albuterol sulfate  (90 Base) MCG/ACT inhaler; Inhale 2 puffs Every 4 (Four) Hours As Needed for Wheezing.  Dispense: 8 g; Refill: 2  -     promethazine-dextromethorphan (PROMETHAZINE-DM) 6.25-15 MG/5ML syrup; Take 5 mL by mouth 4 (Four) Times a Day As Needed for Cough.  Dispense: 240 mL; Refill: 1      Patient Instructions   Start azithromycin and take as directed. Take promethazine-DM 5 mL as needed 4 times a " day for cough. Recommend at night before bed. 2 puffs albuterol inhaler as needed for wheezing every 4 hours. Take regular guaifenesin (Mucinex) as directed over-the-counter. Stay hydrated with water. Rest.           I spent 18 minutes caring for Juany on this date of service. This time includes time spent by me in the following activities:preparing for the visit, reviewing tests, obtaining and/or reviewing a separately obtained history, counseling and educating the patient/family/caregiver, ordering medications, tests, or procedures and documenting information in the medical record  Follow Up   No follow-ups on file.  Patient was given instructions and counseling regarding her condition or for health maintenance advice. Please see specific information pulled into the AVS if appropriate.     You have chosen to receive care through a telehealth visit.  Do you consent to use a video/audio connection for your medical care today? Yes    During visit, patient was at her residence and provider at medical practice office.

## 2022-12-26 ENCOUNTER — DOCUMENTATION (OUTPATIENT)
Dept: INTERNAL MEDICINE | Facility: CLINIC | Age: 72
End: 2022-12-26

## 2022-12-26 NOTE — PROGRESS NOTES
This patient notified on call provider about experiencing productive cough— patient recently treated with antibiotics and steroid pack. Patient reports she coughed up mildly blood tinged sputum. Denies current SOA or CP. Patient advised if more bloody sputum occurs or patient develops SOA or CP to go to ER. Patient also advised to call office back on 12/27/22 at 8 AM to schedule appointment. Patient verbalized understanding.

## 2023-04-20 ENCOUNTER — TELEPHONE (OUTPATIENT)
Dept: INTERNAL MEDICINE | Facility: CLINIC | Age: 73
End: 2023-04-20

## 2023-04-20 RX ORDER — APIXABAN 2.5 MG/1
TABLET, FILM COATED ORAL
Qty: 180 TABLET | Refills: 1 | Status: SHIPPED | OUTPATIENT
Start: 2023-04-20

## 2023-04-21 ENCOUNTER — TELEPHONE (OUTPATIENT)
Dept: INTERNAL MEDICINE | Facility: CLINIC | Age: 73
End: 2023-04-21
Payer: MEDICARE

## 2023-04-21 NOTE — TELEPHONE ENCOUNTER
HUB OKAY TO READ    Let's give it some time to see if it corrects and see her at her upcoming appointment.  Limit salty foods, plenty of fluids, and rest.  Limit caffeine and other stimulants.  No cold medications with stimulants since you mentioned she was sick.

## 2023-04-26 ENCOUNTER — OFFICE VISIT (OUTPATIENT)
Dept: INTERNAL MEDICINE | Facility: CLINIC | Age: 73
End: 2023-04-26
Payer: MEDICARE

## 2023-04-26 VITALS
SYSTOLIC BLOOD PRESSURE: 149 MMHG | HEIGHT: 69 IN | WEIGHT: 174.8 LBS | OXYGEN SATURATION: 97 % | TEMPERATURE: 97.8 F | HEART RATE: 79 BPM | DIASTOLIC BLOOD PRESSURE: 91 MMHG | BODY MASS INDEX: 25.89 KG/M2

## 2023-04-26 DIAGNOSIS — Z86.718 HISTORY OF DVT (DEEP VEIN THROMBOSIS): Chronic | ICD-10-CM

## 2023-04-26 DIAGNOSIS — Z79.01 ANTICOAGULANT LONG-TERM USE: Chronic | ICD-10-CM

## 2023-04-26 DIAGNOSIS — Z86.711 HX PULMONARY EMBOLISM: Chronic | ICD-10-CM

## 2023-04-26 DIAGNOSIS — G43.109 MIGRAINE WITH AURA AND WITHOUT STATUS MIGRAINOSUS, NOT INTRACTABLE: Chronic | ICD-10-CM

## 2023-04-26 DIAGNOSIS — Z00.00 MEDICARE ANNUAL WELLNESS VISIT, SUBSEQUENT: Primary | ICD-10-CM

## 2023-04-26 DIAGNOSIS — I10 HTN (HYPERTENSION), BENIGN: Chronic | ICD-10-CM

## 2023-04-26 LAB
ALBUMIN SERPL-MCNC: 4.3 G/DL (ref 3.5–5.2)
ALBUMIN/GLOB SERPL: 2.2 G/DL
ALP SERPL-CCNC: 81 U/L (ref 39–117)
ALT SERPL-CCNC: 22 U/L (ref 1–33)
AST SERPL-CCNC: 18 U/L (ref 1–32)
BILIRUB SERPL-MCNC: 0.6 MG/DL (ref 0–1.2)
BUN SERPL-MCNC: 10 MG/DL (ref 8–23)
BUN/CREAT SERPL: 13.2 (ref 7–25)
CALCIUM SERPL-MCNC: 10.2 MG/DL (ref 8.6–10.5)
CHLORIDE SERPL-SCNC: 102 MMOL/L (ref 98–107)
CHOLEST SERPL-MCNC: 185 MG/DL (ref 0–200)
CO2 SERPL-SCNC: 28.9 MMOL/L (ref 22–29)
CREAT SERPL-MCNC: 0.76 MG/DL (ref 0.57–1)
EGFRCR SERPLBLD CKD-EPI 2021: 82.9 ML/MIN/1.73
ERYTHROCYTE [DISTWIDTH] IN BLOOD BY AUTOMATED COUNT: 12.2 % (ref 12.3–15.4)
GLOBULIN SER CALC-MCNC: 2 GM/DL
GLUCOSE SERPL-MCNC: 100 MG/DL (ref 65–99)
HCT VFR BLD AUTO: 42 % (ref 34–46.6)
HDLC SERPL-MCNC: 69 MG/DL (ref 40–60)
HGB BLD-MCNC: 13.6 G/DL (ref 12–15.9)
LDLC SERPL CALC-MCNC: 96 MG/DL (ref 0–100)
LDLC/HDLC SERPL: 1.35 {RATIO}
MCH RBC QN AUTO: 29 PG (ref 26.6–33)
MCHC RBC AUTO-ENTMCNC: 32.4 G/DL (ref 31.5–35.7)
MCV RBC AUTO: 89.6 FL (ref 79–97)
PLATELET # BLD AUTO: 270 10*3/MM3 (ref 140–450)
POTASSIUM SERPL-SCNC: 4.5 MMOL/L (ref 3.5–5.2)
PROT SERPL-MCNC: 6.3 G/DL (ref 6–8.5)
RBC # BLD AUTO: 4.69 10*6/MM3 (ref 3.77–5.28)
SODIUM SERPL-SCNC: 139 MMOL/L (ref 136–145)
TRIGL SERPL-MCNC: 113 MG/DL (ref 0–150)
VLDLC SERPL CALC-MCNC: 20 MG/DL (ref 5–40)
WBC # BLD AUTO: 7.43 10*3/MM3 (ref 3.4–10.8)

## 2023-04-26 RX ORDER — AMLODIPINE BESYLATE 2.5 MG/1
2.5 TABLET ORAL DAILY
Qty: 90 TABLET | Refills: 1 | Status: SHIPPED | OUTPATIENT
Start: 2023-04-26

## 2023-04-26 NOTE — PROGRESS NOTES
The ABCs of the Annual Wellness Visit  Subsequent Medicare Wellness Visit    Subjective    Juany Carlin is a 73 y.o. female who presents for a Subsequent Medicare Wellness Visit.    The following portions of the patient's history were reviewed and   updated as appropriate: allergies, current medications, past family history, past medical history, past social history, past surgical history and problem list.    Compared to one year ago, the patient feels her physical   health is the same.    Compared to one year ago, the patient feels her mental   health is the same.    Recent Hospitalizations:  She was not admitted to the hospital during the last year.       Current Medical Providers:  Patient Care Team:  Warren Pizarro MD as PCP - General (Family Medicine)  Andrea Farmer MD as Consulting Physician (Orthopedic Surgery)  Anitha Daugherty MD as Consulting Physician (Obstetrics and Gynecology)  Gera Rausch MD as Consulting Physician (Ophthalmology)  Joe Aaron MD as Surgeon (Vascular Surgery)  Ansley Rios MD as Consulting Physician (Otolaryngology)  Dada Lewis MD as Surgeon (General Surgery)  Warren Pizarro MD as Referring Physician (Family Medicine)  Siva Bates II, MD as Consulting Physician (Hematology and Oncology)    Outpatient Medications Prior to Visit   Medication Sig Dispense Refill   • acetaminophen (TYLENOL) 650 MG 8 hr tablet Take 1 tablet by mouth Every 8 (Eight) Hours As Needed for Mild Pain.     • albuterol sulfate  (90 Base) MCG/ACT inhaler Inhale 2 puffs Every 4 (Four) Hours As Needed for Wheezing. 8 g 2   • B Complex-Biotin-FA (B-100 COMPLEX PO) Take 1 tablet by mouth Daily. PT HOLDING FOR SURGERY     • Eliquis 2.5 MG tablet tablet TAKE ONE TABLET BY MOUTH EVERY 12 HOURS 180 tablet 1   • famotidine (PEPCID) 10 MG tablet Take 2 tablets by mouth Every Night.     • losartan (COZAAR) 100 MG tablet TAKE ONE TABLET BY MOUTH DAILY 90 tablet 3   •  "Magnesium 250 MG tablet Take 2 tablets by mouth Every Night. \"FOR LEG CRAMPS\"     • Multiple Vitamins-Minerals (MULTIVITAMIN PO) Take 1 tablet by mouth Every Night. PT HOLDING FOR SURGERY     • promethazine-dextromethorphan (PROMETHAZINE-DM) 6.25-15 MG/5ML syrup Take 5 mL by mouth 4 (Four) Times a Day As Needed for Cough. 240 mL 1   • traZODone (DESYREL) 50 MG tablet      • azithromycin (Zithromax Z-Angel) 250 MG tablet Take 2 tablets by mouth on day 1, then 1 tablet daily on days 2-5 6 tablet 0   • DULoxetine (CYMBALTA) 30 MG capsule        Facility-Administered Medications Prior to Visit   Medication Dose Route Frequency Provider Last Rate Last Admin   • Chlorhexidine Gluconate Cloth 2 % pads 1 each  1 each Apply externally Take As Directed Rashawn Hernández MD           No opioid medication identified on active medication list. I have reviewed chart for other potential  high risk medication/s and harmful drug interactions in the elderly.          Aspirin is not on active medication list.  Aspirin use is contraindicated for this patient due to: current use of Eliquis.      Patient Active Problem List   Diagnosis   • HTN (hypertension), benign   • Vitamin D deficiency   • Urge incontinence   • GERD without esophagitis   • Cervical spinal stenosis   • Saddle pulmonary embolus   • Family history of thrombosis   • Degeneration of cervical intervertebral disc   • Adrenal nodule   • Health care maintenance   • Localized osteoarthritis of left shoulder   • Primary osteoarthritis of both knees   • Hypomagnesemia   • Migraine with aura and without status migrainosus, not intractable   • Snoring   • Arthritis of right hip   • Anticoagulant long-term use   • Hx pulmonary embolism   • History of DVT (deep vein thrombosis)   • Lumbar spondylosis   • Other idiopathic scoliosis, lumbar region   • Spondylolisthesis of lumbar region   • Medication side effect, sequela     Advance Care Planning   Advance Care Planning     Advance " "Directive is not on file.  ACP discussion was held with the patient during this visit. Patient does not have an advance directive, information provided.     Objective    Vitals:    23 0803   BP: 149/91   BP Location: Left arm   Patient Position: Sitting   Cuff Size: Large Adult   Pulse: 79   Temp: 97.8 °F (36.6 °C)   TempSrc: Oral   SpO2: 97%   Weight: 79.3 kg (174 lb 12.8 oz)   Height: 175.3 cm (69\")   PainSc:   7   PainLoc: Head     Estimated body mass index is 25.81 kg/m² as calculated from the following:    Height as of this encounter: 175.3 cm (69\").    Weight as of this encounter: 79.3 kg (174 lb 12.8 oz).    BMI is within normal parameters. No other follow-up for BMI required.      Does the patient have evidence of cognitive impairment? No          HEALTH RISK ASSESSMENT    Smoking Status:  Social History     Tobacco Use   Smoking Status Former   • Packs/day: 0.50   • Years: 10.00   • Pack years: 5.00   • Types: Cigarettes   • Start date: 1966   • Quit date: 1976   • Years since quittin.3   Smokeless Tobacco Never     Alcohol Consumption:  Social History     Substance and Sexual Activity   Alcohol Use Not Currently    Comment: rarely     Fall Risk Screen:    YASEMIN Fall Risk Assessment was completed, and patient is at LOW risk for falls.Assessment completed on:2023    Depression Screenin/26/2023     7:56 AM   PHQ-2/PHQ-9 Depression Screening   Little Interest or Pleasure in Doing Things 0-->not at all   Feeling Down, Depressed or Hopeless 2-->more than half the days   Trouble Falling or Staying Asleep, or Sleeping Too Much 2-->more than half the days   Feeling Tired or Having Little Energy 3-->nearly every day   Poor Appetite or Overeating 1-->several days   Feeling Bad about Yourself - or that You are a Failure or Have Let Yourself or Your Family Down 1-->several days   Trouble Concentrating on Things, Such as Reading the Newspaper or Watching Television 0-->not at all "   Moving or Speaking So Slowly that Other People Could Have Noticed? Or the Opposite - Being So Fidgety 1-->several days   Thoughts that You Would be Better Off Dead or of Hurting Yourself in Some Way 0-->not at all   PHQ-9: Brief Depression Severity Measure Score 10   If You Checked Off Any Problems, How Difficult Have These Problems Made It For You to Do Your Work, Take Care of Things at Home, or Get Along with Other People? somewhat difficult       Health Habits and Functional and Cognitive Screenin/26/2023     7:59 AM   Functional & Cognitive Status   Do you have difficulty preparing food and eating? No   Do you have difficulty bathing yourself, getting dressed or grooming yourself? No   Do you have difficulty using the toilet? No   Do you have difficulty moving around from place to place? No   Do you have trouble with steps or getting out of a bed or a chair? No   Current Diet Well Balanced Diet   Dental Exam Not up to date   Eye Exam Up to date   Exercise (times per week) 0 times per week   Current Exercises Include No Regular Exercise   Do you need help using the phone?  No   Are you deaf or do you have serious difficulty hearing?  No   Do you need help with transportation? No   Do you need help shopping? No   Do you need help preparing meals?  No   Do you need help with housework?  No   Do you need help with laundry? No   Do you need help taking your medications? No   Do you need help managing money? No   Do you ever drive or ride in a car without wearing a seat belt? No   Have you felt unusual stress, anger or loneliness in the last month? Yes   Who do you live with? Spouse   If you need help, do you have trouble finding someone available to you? No   Have you been bothered in the last four weeks by sexual problems? No   Do you have difficulty concentrating, remembering or making decisions? No       Age-appropriate Screening Schedule:  Refer to the list below for future screening recommendations  based on patient's age, sex and/or medical conditions. Orders for these recommended tests are listed in the plan section. The patient has been provided with a written plan.    Health Maintenance   Topic Date Due   • INFLUENZA VACCINE  08/01/2023   • COLORECTAL CANCER SCREENING  11/07/2023   • ANNUAL WELLNESS VISIT  04/26/2024   • MAMMOGRAM  03/29/2025   • DXA SCAN  03/29/2025   • TDAP/TD VACCINES (3 - Td or Tdap) 11/26/2029   • HEPATITIS C SCREENING  Completed   • COVID-19 Vaccine  Completed   • Pneumococcal Vaccine 65+  Completed   • ZOSTER VACCINE  Completed                  CMS Preventative Services Quick Reference  Risk Factors Identified During Encounter  Chronic Pain: she is following with neurosurgery  The above risks/problems have been discussed with the patient.  Pertinent information has been shared with the patient in the After Visit Summary.  An After Visit Summary and PPPS were made available to the patient.    Follow Up:   Next Medicare Wellness visit to be scheduled in 1 year.       Additional E&M Note during same encounter follows:  Patient has multiple medical problems which are significant and separately identifiable that require additional work above and beyond the Medicare Wellness Visit.      Chief Complaint  Medicare Wellness-subsequent and Hypertension    Subjective        HPI  Juany Carlin is also being seen today for 6 month follow-up for hypertension and chronic anticoagulant therapy.      Hypertension -mildly elevated in the office today.  Patient taking medication as prescribed.    Denies any side effects. Patient is taking losartan 100 mg. She has been having headaches and higher pressures lately.  No changes in diet but she is working through home stressors with a counselor.     She is on chronic anticoagulant therapy for her history of pulmonary embolism, DVT, saddle pulmonary embolus.  Takes Eliquis 2.5 mg twice daily.  Tolerating without any excessive bleeding or melena.  Of note,  "she had a negative and hypercoagulable state work-up per her last primary care doctor    She was asking of something she could take for her intermittent migraines with aura.  It sounds like she was on something similar to maxalt-MLT in the past.           Objective   Vital Signs:  /91 (BP Location: Left arm, Patient Position: Sitting, Cuff Size: Large Adult)   Pulse 79   Temp 97.8 °F (36.6 °C) (Oral)   Ht 175.3 cm (69\")   Wt 79.3 kg (174 lb 12.8 oz)   SpO2 97%   BMI 25.81 kg/m²     Physical Exam  Vitals and nursing note reviewed.   Constitutional:       General: She is not in acute distress.     Appearance: Normal appearance.   Cardiovascular:      Rate and Rhythm: Normal rate and regular rhythm.      Heart sounds: Normal heart sounds. No murmur heard.  Pulmonary:      Effort: Pulmonary effort is normal.      Breath sounds: Normal breath sounds.   Neurological:      Mental Status: She is alert.          The following data was reviewed by: Warren Pizarro MD on 04/26/2023:  Common labs        6/22/2022    16:06   Common Labs   Glucose 96     BUN 5     Creatinine 0.69     Sodium 138     Potassium 4.8     Chloride 101     Calcium 9.7     Total Protein 6.5     Albumin 4.6     Total Bilirubin 0.6     Alkaline Phosphatase 93     AST (SGOT) 18     ALT (SGPT) 14     WBC 7.1     Hemoglobin 13.4     Hematocrit 40.9     Platelets 248                  Assessment and Plan   Diagnoses and all orders for this visit:    1. Medicare annual wellness visit, subsequent (Primary)    2. HTN (hypertension), benign  -     CBC (No Diff)  -     Comprehensive Metabolic Panel  -     Lipid Panel With LDL / HDL Ratio  -     amLODIPine (NORVASC) 2.5 MG tablet; Take 1 tablet by mouth Daily.  Dispense: 90 tablet; Refill: 1    3. Anticoagulant long-term use  -     CBC (No Diff)  -     Comprehensive Metabolic Panel    4. History of DVT (deep vein thrombosis)  -     CBC (No Diff)  -     Comprehensive Metabolic Panel    5. Hx pulmonary " embolism  -     CBC (No Diff)  -     Comprehensive Metabolic Panel        HTN uncontrolled - will add amlodipine 2.5mg with the losartan and have her monitor for now.  Clinically stable anticoagulant therapy.  Continue all medications as above.  Labs reviewed/ordered as above.    I will give her some samples of Nurtec ODT to try for the migraines as needed for abortive therapy as she does not get the headaches often.         Follow Up   Return in about 6 months (around 10/26/2023) for Next scheduled follow up.  Patient was given instructions and counseling regarding her condition or for health maintenance advice. Please see specific information pulled into the AVS if appropriate.

## 2023-04-26 NOTE — PATIENT INSTRUCTIONS
Monitor blood pressure.  Goal under 140/90 consistently.  If still having headaches and high pressures over the next few weeks, please let Dr. Pizarro know.

## 2023-10-18 ENCOUNTER — TELEPHONE (OUTPATIENT)
Dept: ORTHOPEDIC SURGERY | Facility: CLINIC | Age: 73
End: 2023-10-18

## 2023-10-18 ENCOUNTER — OFFICE VISIT (OUTPATIENT)
Dept: ORTHOPEDIC SURGERY | Facility: CLINIC | Age: 73
End: 2023-10-18
Payer: MEDICARE

## 2023-10-18 VITALS — TEMPERATURE: 97.1 F | BODY MASS INDEX: 26.31 KG/M2 | WEIGHT: 173.6 LBS | HEIGHT: 68 IN

## 2023-10-18 DIAGNOSIS — I10 HTN (HYPERTENSION), BENIGN: Chronic | ICD-10-CM

## 2023-10-18 DIAGNOSIS — M17.12 ARTHRITIS OF LEFT KNEE: ICD-10-CM

## 2023-10-18 DIAGNOSIS — R52 PAIN: Primary | ICD-10-CM

## 2023-10-18 DIAGNOSIS — M65.9 SYNOVITIS OF LEFT KNEE: ICD-10-CM

## 2023-10-18 RX ORDER — METHYLPREDNISOLONE ACETATE 80 MG/ML
80 INJECTION, SUSPENSION INTRA-ARTICULAR; INTRALESIONAL; INTRAMUSCULAR; SOFT TISSUE
Status: COMPLETED | OUTPATIENT
Start: 2023-10-18 | End: 2023-10-18

## 2023-10-18 RX ORDER — LIDOCAINE HYDROCHLORIDE 10 MG/ML
2 INJECTION, SOLUTION EPIDURAL; INFILTRATION; INTRACAUDAL; PERINEURAL
Status: COMPLETED | OUTPATIENT
Start: 2023-10-18 | End: 2023-10-18

## 2023-10-18 RX ADMIN — LIDOCAINE HYDROCHLORIDE 2 ML: 10 INJECTION, SOLUTION EPIDURAL; INFILTRATION; INTRACAUDAL; PERINEURAL at 09:50

## 2023-10-18 RX ADMIN — METHYLPREDNISOLONE ACETATE 80 MG: 80 INJECTION, SUSPENSION INTRA-ARTICULAR; INTRALESIONAL; INTRAMUSCULAR; SOFT TISSUE at 09:50

## 2023-10-18 NOTE — TELEPHONE ENCOUNTER
Hub staff attempted to follow warm transfer process and was unsuccessful     Caller: Juany Carlin    Relationship to patient: Self    Best call back number: [phone]    Patient is needing: HAD INJECTION IN LEFT KNEE THIS AM AND WANTS TO KNOW IF SHE NEEDS TO WAIT TO HAVE COVID VACCINE  - CAN LVM

## 2023-10-18 NOTE — TELEPHONE ENCOUNTER
Spoke with patient, let her know Dr Hernández would advise she wait for 2 weeks before getting a vaccine following her injection. Patient appreciative of call and verbalized understanding

## 2023-10-18 NOTE — TELEPHONE ENCOUNTER
Caller: Juany Carlin NICO    Relationship: Self    Best call back number: [phone]     Requested Prescriptions:   Requested Prescriptions     Pending Prescriptions Disp Refills    amLODIPine (NORVASC) 2.5 MG tablet 90 tablet 1     Sig: Take 1 tablet by mouth Daily.        Pharmacy where request should be sent: [facility] [id number]  [facility] ([facility])  [phone] [facility] [phone] FX     Last office visit with prescribing clinician: 4/26/2023   Last telemedicine visit with prescribing clinician: Visit date not found   Next office visit with prescribing clinician: 10/31/2023     Additional details provided by patient: PATIENT STATES THAT SHE HAS 4 DAYS OF MEDICATION REMAINING    Does the patient have less than a 3 day supply:  [] Yes  [x] No    Would you like a call back once the refill request has been completed: [] Yes [x] No    If the office needs to give you a call back, can they leave a voicemail: [] Yes [x] No    Abdias Melchor Rep   10/18/23 10:22 EDT

## 2023-10-18 NOTE — PROGRESS NOTES
"Patient Name: Juany Carlin   YOB: 1950  Referring Primary Care Physician: Warren Pizarro MD  BMI: Body mass index is 26.4 kg/m².    Chief Complaint:    Chief Complaint   Patient presents with    Left Knee - Initial Evaluation        HPI:     Juany Carlin is a 73 y.o. female who presents today for evaluation of   Chief Complaint   Patient presents with    Left Knee - Initial Evaluation   .  Juany is seen today complaining of some left knee pain.  Just started getting slowly worse and it feels tight hard to bend hard to go up and down stairs.  She denies any kind of trauma.  It is achy.  Is tried inflammation control measures without success      Subjective   Medications:   Home Medications:  Current Outpatient Medications on File Prior to Visit   Medication Sig    acetaminophen (TYLENOL) 650 MG 8 hr tablet Take 1 tablet by mouth Every 8 (Eight) Hours As Needed for Mild Pain.    albuterol sulfate  (90 Base) MCG/ACT inhaler Inhale 2 puffs Every 4 (Four) Hours As Needed for Wheezing.    amLODIPine (NORVASC) 2.5 MG tablet Take 1 tablet by mouth Daily.    Eliquis 2.5 MG tablet tablet TAKE ONE TABLET BY MOUTH EVERY 12 HOURS    famotidine (PEPCID) 10 MG tablet Take 2 tablets by mouth Every Night.    losartan (COZAAR) 100 MG tablet TAKE ONE TABLET BY MOUTH DAILY    Magnesium 250 MG tablet Take 2 tablets by mouth Every Night. \"FOR LEG CRAMPS\"    Multiple Vitamins-Minerals (MULTIVITAMIN PO) Take 1 tablet by mouth Every Night. PT HOLDING FOR SURGERY    B Complex-Biotin-FA (B-100 COMPLEX PO) Take 1 tablet by mouth Daily. PT HOLDING FOR SURGERY (Patient not taking: Reported on 10/18/2023)    promethazine-dextromethorphan (PROMETHAZINE-DM) 6.25-15 MG/5ML syrup Take 5 mL by mouth 4 (Four) Times a Day As Needed for Cough. (Patient not taking: Reported on 10/18/2023)    Rimegepant Sulfate (NURTEC) 75 MG tablet dispersible tablet Take 1 tablet by mouth Daily As Needed (migraine headache). Take at the onset " of headache. (Patient not taking: Reported on 10/18/2023)    traZODone (DESYREL) 50 MG tablet  (Patient not taking: Reported on 10/18/2023)     Current Facility-Administered Medications on File Prior to Visit   Medication    Chlorhexidine Gluconate Cloth 2 % pads 1 each     Current Medications:  Scheduled Meds:  Continuous Infusions:No current facility-administered medications for this visit.    PRN Meds:.    I have reviewed the patient's medical history in detail and updated the computerized patient record.  Review and summarization of old records includes:    Past Medical History:   Diagnosis Date    Acute cystitis without hematuria 04/10/2020    Acute right ankle pain 2020    Adrenal nodule     Arthritis     BPPV (benign paroxysmal positional vertigo) 05/10/2016    Cancer     Basil cell    Cervical stenosis of spine     Clotting disorder 2016    Eliquis for DVT. PE.    Depression     NO MEDS    DVT, lower extremity 2016    right    GERD without esophagitis 05/10/2016    H/O varicose vein stripping     Headache 1994    Aura headaches, in clusters    Hip pain     History of migraine     History of skin cancer     History of stress incontinence     Hyperplastic colon polyp 2013    Hypertension     Low back pain     PONV (postoperative nausea and vomiting)     Pregnancy     , miscarrige x1    Pulmonary emboli     bilateral extensive PE with saddle emboli.Negative hypercoagulable state w/u    Pulmonary embolism with acute cor pulmonale 2017    Scoliosis     Shoulder pain     Urge incontinence 05/10/2016    Vitamin D deficiency 05/10/2016        Past Surgical History:   Procedure Laterality Date    CARDIAC CATHETERIZATION N/A 11/10/2016    Procedure: Right Heart Cath;  Surgeon: Johnna Agrawal MD;  Location[facility] CATH INVASIVE LOCATION;  Service:     CARDIAC CATHETERIZATION N/A 11/10/2016    Procedure: Thrombolytic Therapy;  Surgeon: Johnna Agrawal MD;  Location[facility] CATH [facility]VASIVE LOC[facility];   Service:     CATARACT EXTRACTION Bilateral     COLONOSCOPY W/ BIOPSIES  2013    hyperplastic polyp, Dr.Russel Lewis    CYST REMOVAL      VAGINAL CYST    DILATATION AND CURETTAGE      EPIDURAL BLOCK      HEMORROIDECTOMY  2013    INTERVENTIONAL RADIOLOGY PROCEDURE Bilateral 11/10/2016    Procedure: Sp [facility] Cath Place Pulmonary Art;  Surgeon: Johnna Agrawal MD;  Location:  [facility] CATH INVASIVE LOCATION;  Service:     JOINT REPLACEMENT      Total hip right    SKIN CANCER EXCISION      TONSILLECTOMY      TOTAL HIP ARTHROPLASTY Right 10/25/2021    Procedure: Posterior RIGHT TOTAL HIP ARTHROPLASTY GAVIN NAVIGATION;  Surgeon: Rashawn Hernández MD;  [facility];  Service: Orthopedics;  Laterality: Right;    TOTAL LAPAROSCOPIC HYSTERECTOMY  2001    VEIN SURGERY Left [id number]    leg,     VENTRAL HERNIA REPAIR          Social History     Occupational History    Occupation:      Employer: RETIRED     Comment: [employer]   Tobacco Use    Smoking status: Former     Packs/day: 0.50     Years: 10.00     Additional pack years: 0.00     Total pack years: 5.00     Types: Cigarettes     Start date: 1966     Quit date: 1976     Years since quittin.8    Smokeless tobacco: Never   Vaping Use    Vaping Use: Never used   Substance and Sexual Activity    Alcohol use: Not Currently     Comment: rarely    Drug use: No    Sexual activity: Yes     Partners: Male     Birth control/protection: Post-menopausal, None, Hysterectomy      Social History     Social History Narrative    LIVES WITH SPOUSE        Family History   Problem Relation Age of Onset    Macular degeneration Mother     Deep vein thrombosis Mother     Hypertension Mother     Skin cancer Mother     Arthritis Mother     Hearing loss Mother     Heart disease Mother     Hyperlipidemia Mother     Kidney disease Mother     Miscarriages / Stillbirths Mother         Mother one miscarriage    Vision loss Mother         Macular  degeneration, columbar    Heart failure Father     Deep vein thrombosis Father     Cancer Father     Hypertension Father     Heart disease Father     Hyperlipidemia Father     Stroke Father         TIA    Chiari malformation Sister     Heart disease Maternal Grandfather     Heart disease Paternal Grandfather     Deep vein thrombosis Sister     Heart failure Sister     Pancreatic cancer Sister     Thyroid disease Daughter     Hypertension Brother     Other Brother         PE and DVT    Malig Hyperthermia Neg Hx        ROS: 14 point review of systems was performed and all other systems were reviewed and are negative except for documented findings in HPI and today's encounter.     Allergies:   Allergies   Allergen Reactions    Cymbalta [Duloxetine Hcl] Other (See Comments)     Patient had severe range blood pressures.  Cautioned to avoid nor-epi anti-depressant    Ciprocinonide [Fluocinolone] Other (See Comments)     Achilles tendonitis    Suprax [Cefixime] Diarrhea    Contrast Dye (Echo Or Unknown Ct/Mr) Rash     Rash many years ago when shell-fish derived contrast dye was used    Nickel Rash     Possible nickel allergy     Constitutional:  Denies fever, shaking or chills   Eyes:  Denies change in visual acuity   HENT:  Denies nasal congestion or sore throat   Respiratory:  Denies cough or shortness of breath   Cardiovascular:  Denies chest pain or severe LE edema   GI:  Denies abdominal pain, nausea, vomiting, bloody stools or diarrhea   Musculoskeletal:  Numbness, tingling, pain, or loss of motor function only as noted above in history of present illness.  : Denies painful urination or hematuria  Integument:  Denies rash, lesion or ulceration   Neurologic:  Denies headache or focal weakness  Endocrine:  Denies lymphadenopathy  Psych:  Denies confusion or change in mental status   Hem:  Denies active bleeding    OBJECTIVE:  Physical Exam: 73 y.o. female  Wt Readings from Last 3 Encounters:   10/18/23 78.7 kg (173  "lb 9.6 oz)   04/26/23 79.3 kg (174 lb 12.8 oz)   10/24/22 74.8 kg (165 lb)     Ht Readings from Last 1 Encounters:   10/18/23 172.7 cm (68\")     Body mass index is 26.4 kg/m².  Vitals:    10/18/23 0918   Temp: 97.1 °F (36.2 °C)     Vital signs reviewed.     General Appearance:    Alert, cooperative, in no acute distress                  Eyes: conjunctiva clear  ENT: external ears and nose atraumatic  CV: no peripheral edema  Resp: normal respiratory effort  Skin: no rashes or wounds; normal turgor  Psych: mood and affect appropriate  Lymph: no nodes appreciated  Neuro: gross sensation intact  Vascular:  Palpable peripheral pulse in noted extremity  Musculoskeletal Extremities: Exam today shows crepitation synovitis and swelling of the knee with some joint line tenderness she has a Baker's cyst her calf is soft and it appears synovitic.    Radiology:   AP lateral 40 degree PA x-ray left knee taken the office today for complaints of pain without comparison views readily available show arthritis        Assessment:     ICD-10-CM ICD-9-CM   1. Pain  R52 780.96   2. Arthritis of left knee  M17.12 716.96   3. Synovitis of left knee  M65.9 727.09        MDM/Plan:   The diagnosis(es), natural history, pathophysiology and treatment for diagnosis(es) were discussed. Opportunity given and questions answered.  Biomechanics of pertinent body areas discussed.  When appropriate, the use of ambulatory aids discussed.    Biomechanics of pertinent body areas discussed.  When appropriate, the use of ambulatory aids discussed.  BMI:  The concept of BMI body mass index and its importance and implications discussed.    EXERCISES:  Advice on benefits of, and types of regular/moderate exercise pertaining to orthopedic diagnosis(es).  Inflammation/pain control; with cold, heat, elevation and/or liniments discussed as appropriate  Cortisone Injection. See procedure note.  MEDICAL RECORDS reviewed from other provider(s) for past and current " medical history pertinent to this complaint.  Over the different treatments and answered questions try cortisone injection    10/18/2023    Dictated utilizing Dragon dictation        Large Joint Arthrocentesis: L knee  Date/Time: 10/18/2023 9:50 AM  Consent given by: patient  Site marked: site marked  Timeout: Immediately prior to procedure a time out was called to verify the correct patient, procedure, equipment, support staff and site/side marked as required   Supporting Documentation  Indications: pain   Procedure Details  Location: knee - L knee  Preparation: Patient was prepped and draped in the usual sterile fashion  Needle gauge: 21g.  Approach: anterolateral  Medications administered: 80 mg methylPREDNISolone acetate 80 MG/ML; 2 mL lidocaine PF 1% 1 %  Patient tolerance: patient tolerated the procedure well with no immediate complications

## 2023-10-19 RX ORDER — AMLODIPINE BESYLATE 2.5 MG/1
2.5 TABLET ORAL DAILY
Qty: 90 TABLET | Refills: 1 | Status: SHIPPED | OUTPATIENT
Start: 2023-10-19

## 2023-10-19 RX ORDER — APIXABAN 2.5 MG/1
TABLET, FILM COATED ORAL
Qty: 180 TABLET | Refills: 1 | Status: SHIPPED | OUTPATIENT
Start: 2023-10-19

## 2023-10-30 ENCOUNTER — TELEPHONE (OUTPATIENT)
Dept: INTERNAL MEDICINE | Facility: CLINIC | Age: 73
End: 2023-10-30
Payer: MEDICARE

## 2023-12-08 ENCOUNTER — TELEPHONE (OUTPATIENT)
Dept: INTERNAL MEDICINE | Facility: CLINIC | Age: 73
End: 2023-12-08

## 2023-12-08 NOTE — TELEPHONE ENCOUNTER
Caller: Juany Carlin    Relationship: Self    Best call back number: [phone]    What form or medical record are you requesting: APPROVAL LETTER    Who is requesting this form or medical record from you: [facility]    How would you like to receive the form or medical records (pick-up, mail, fax): FAX  If fax, what is the fax number: [phone]    Timeframe paperwork needed: AS SOON AS AVAILABLE    Additional notes: PATIENT STATED THAT THEY HAD BEEN SCHEDULED TO GO FOR AN EPIDURAL TODAY BUT HAD NOT BEEN OFF OF HER ELIQUIS FOR THE 3 DAYS TIME FRAME. STATED THAT DR. WESTBROOK HAD DONE THIS FOR HER PREVIOUSLY BUT THAT THE FORM HAD . STATED THAT [facility] WAS GOING TO FAX OVER A REQUEST FOR THIS AND THAT THEY WANTED TO MAKE SURE THAT WE GOT IT. PLEASE CALL AND ADVISE IF NECESSARY

## 2023-12-11 DIAGNOSIS — I10 HTN (HYPERTENSION), BENIGN: Primary | Chronic | ICD-10-CM

## 2023-12-12 RX ORDER — LOSARTAN POTASSIUM 100 MG/1
TABLET ORAL
Qty: 90 TABLET | Refills: 1 | Status: SHIPPED | OUTPATIENT
Start: 2023-12-12

## 2023-12-12 NOTE — TELEPHONE ENCOUNTER
Rx Refill Note  Requested Prescriptions     Pending Prescriptions Disp Refills    losartan (COZAAR) 100 MG tablet [Pharmacy Med Name: LOSARTAN POTASSIUM 100 MG TAB] 90 tablet 0     Sig: TAKE ONE TABLET BY MOUTH DAILY      Last office visit with prescribing clinician: 4/26/2023   Last telemedicine visit with prescribing clinician: Visit date not found   Next office visit with prescribing clinician: 1/8/2024

## 2023-12-18 NOTE — TELEPHONE ENCOUNTER
Please advise I have not seen these papers and they are not in her chart looks like pt took herself off 12/15

## 2023-12-18 NOTE — TELEPHONE ENCOUNTER
Tried to call office to give VO for her to stop her eliquis unable to get through VM left and pt notified that papers have not been received

## 2023-12-18 NOTE — TELEPHONE ENCOUNTER
PATIENT CALLED NEEDING APPROVAL TO STOP ELIQUIS FOR 72 HOURS BEFORE HER EPIDURAL TOMORROW AT PAIN MANAGEMENT.     THEY HAVE SENT SEVERAL TIMES BUT HAVE NOT RECEIVED ANYTHING FROM THE OFFICE.     [facility]. [phone] SHAMAR FOR DR. VERDUZCO  FAX NUMBER [phone]    CALL BACK  NUMBER [phone]     SHE TOOK HERSELF OF ELIQUIS 12/15/23 IN THE MORNING.

## 2023-12-18 NOTE — TELEPHONE ENCOUNTER
It sounds good but I have not seen any papers from that office to sign anything.  I don't know if someone is using the incorrect fax number or what is happening.

## 2023-12-29 ENCOUNTER — TELEPHONE (OUTPATIENT)
Dept: INTERNAL MEDICINE | Facility: CLINIC | Age: 73
End: 2023-12-29
Payer: MEDICARE

## 2023-12-29 NOTE — TELEPHONE ENCOUNTER
Caller: Juany Carlin    Relationship: Self    Best call back number: [phone]    What is the medical concern/diagnosis: BACK PAIN    What specialty or service is being requested: ORTHOPEDIC SURGEON    What is the provider, practice or medical service name: DR JIMMY DIAZ    What is the office location: [facility] [address] [place] [address]    What is the office phone number: [phone]    Any additional details: PATIENT IS CALLING IN TO INFORM DR WESTBROOK THAT SHE IS HAVING BACK PAIN AND WANTS TO SEE THE PROVIDER LISTED ABOVE BUT WAS TOLD THAT SHE NEEDS TO HAVE A REFERRAL FIRST.  PATIENT WANTS TO BE CALLED ONCE THE REFERRAL IS COMPLETED.

## 2023-12-29 NOTE — TELEPHONE ENCOUNTER
Patient states the doctor that did her hip surgery told her she has curved spin and recommended she see an orthopedic. Advised Dr. Pizarro is out of office so we will follow-up next week    Advised to also go to the ER if pain worsens

## 2023-12-29 NOTE — TELEPHONE ENCOUNTER
Caller: Juany Carlin    Relationship: Self    Best call back number: [phone]     What was the call regarding: PATIENT CALLED TO MAKE SURE SHE RECEIVED A FOLLOW UP WHEN REFERRAL WAS SENT IN    Is it okay if the provider responds through MyChart: YES

## 2024-01-01 NOTE — TELEPHONE ENCOUNTER
You would need to make her an appointment to discuss.  They will not see her without an evaluation by PCP unless her hip doctor wants to do the referral.

## 2024-01-08 ENCOUNTER — OFFICE VISIT (OUTPATIENT)
Dept: INTERNAL MEDICINE | Facility: CLINIC | Age: 74
End: 2024-01-08
Payer: MEDICARE

## 2024-01-08 ENCOUNTER — TELEPHONE (OUTPATIENT)
Dept: INTERNAL MEDICINE | Facility: CLINIC | Age: 74
End: 2024-01-08

## 2024-01-08 VITALS
HEART RATE: 88 BPM | BODY MASS INDEX: 26.61 KG/M2 | TEMPERATURE: 98.3 F | SYSTOLIC BLOOD PRESSURE: 137 MMHG | HEIGHT: 68 IN | DIASTOLIC BLOOD PRESSURE: 92 MMHG | OXYGEN SATURATION: 98 % | WEIGHT: 175.6 LBS

## 2024-01-08 DIAGNOSIS — Z86.718 HISTORY OF DVT (DEEP VEIN THROMBOSIS): Chronic | ICD-10-CM

## 2024-01-08 DIAGNOSIS — I10 HTN (HYPERTENSION), BENIGN: Primary | Chronic | ICD-10-CM

## 2024-01-08 DIAGNOSIS — J32.9 BACTERIAL SINUSITIS: ICD-10-CM

## 2024-01-08 DIAGNOSIS — Z79.01 ANTICOAGULANT LONG-TERM USE: Chronic | ICD-10-CM

## 2024-01-08 DIAGNOSIS — Z86.711 HX PULMONARY EMBOLISM: Chronic | ICD-10-CM

## 2024-01-08 DIAGNOSIS — B96.89 BACTERIAL SINUSITIS: ICD-10-CM

## 2024-01-08 PROCEDURE — 3080F DIAST BP >= 90 MM HG: CPT | Performed by: FAMILY MEDICINE

## 2024-01-08 PROCEDURE — 1159F MED LIST DOCD IN RCRD: CPT | Performed by: FAMILY MEDICINE

## 2024-01-08 PROCEDURE — G2211 COMPLEX E/M VISIT ADD ON: HCPCS | Performed by: FAMILY MEDICINE

## 2024-01-08 PROCEDURE — 1160F RVW MEDS BY RX/DR IN RCRD: CPT | Performed by: FAMILY MEDICINE

## 2024-01-08 PROCEDURE — 3075F SYST BP GE 130 - 139MM HG: CPT | Performed by: FAMILY MEDICINE

## 2024-01-08 PROCEDURE — 99214 OFFICE O/P EST MOD 30 MIN: CPT | Performed by: FAMILY MEDICINE

## 2024-01-08 RX ORDER — VIBEGRON 75 MG/1
TABLET, FILM COATED ORAL
COMMUNITY
Start: 2023-10-16

## 2024-01-08 RX ORDER — DEXTROMETHORPHAN HYDROBROMIDE AND PROMETHAZINE HYDROCHLORIDE 15; 6.25 MG/5ML; MG/5ML
5 SYRUP ORAL 4 TIMES DAILY PRN
Qty: 240 ML | Refills: 0 | Status: SHIPPED | OUTPATIENT
Start: 2024-01-08

## 2024-01-08 RX ORDER — AMOXICILLIN 500 MG/1
1000 CAPSULE ORAL 3 TIMES DAILY
Qty: 60 CAPSULE | Refills: 0 | Status: SHIPPED | OUTPATIENT
Start: 2024-01-08

## 2024-01-08 NOTE — PATIENT INSTRUCTIONS
"MyChart/Telephone call/Lab results Tips:    MyChart and telephone calls are a useful part of our patient care program and is a way for us to communicate lab results and for you to request refills.  Not all medical questions are appropriate for MyChart such as new medical issues that require taking a history, performing an exam, getting labs/studies or researching medical questions needed to be addressed in the office.  Similarly, telephone calls should not be used for new problems requiring an evaluation as well.    Examples of medical issues that are APPROPRIATE for MyChart and telephone calls:  -Follow-up on problems we have already addressed in a visit such as home testing results, blood pressure readings, glucose readings  -Questions that can be answered with a simple \"yes\" or \"no\"    Communication that is NOT APPROPRIATE for MyChart:  -New problems, serious problems or urgent problems.  Urgent matters should be addressed by phone for advice, in the office, urgent care or the ER.  -Requesting Covid or bacterial infection treatments: These types of problems require an evaluation.    -[employer] messages are not email.  Staff will check messages each weekday.  We strive for a 48-hour turnaround on messaging.    -[employer]olt is not for private issues.  Messages are received first by our office staff.    Please allow up to 7 business days for lab results to be sent through [employer] to you before contacting your provider.  Sometimes we are waiting for results to get back from the lab and also your provider needs time to analyze them thoroughly before making recommendations.  While the internet has great resources, it is not a substitute for interpreting lab results.    We also ask that you not send [employer] messages and telephone calls regarding the same issue simultaneously.  This slows down the process of returning your call/message and confuses staff.    Be mindful that MyChart messages and telephone calls become part of " your permanent medical record.    Your provider cannot access your [employer].  It is a service which communicates with the EHR (Electronic Health Record).  Sometimes there are errors in [employer] that staff and providers cannot see and these errors are not part of your EHR.  Vaccines and screening reminders have been frequently incorrect in [employer].    Per Dr. Pizarro, when sending messages via [employer], please be as concise and accurate as possible.  Much of our time each day is utilized looking up information for patients only to find out that it was another practice or pharmacy responsible for the issue.  I will be the first to admit that I am not fond of [employer].  Much information is inaccurate or difficult/time consuming to look up in our system.  When using the messaging, please use it for short and simple questions.  Anything further than that should go through the phone system or better yet addressed in a visit.      We appreciate your respect for the limitations and boundaries of [employer] and the telephone answering service.    Thank you,  Dr. Pizarro

## 2024-01-08 NOTE — TELEPHONE ENCOUNTER
Caller: Juany Carlin     Best call back number: [phone]     What is your medication concern? PATIENT JUST PICKED UP HER PRESCRIPTION FOR AMOXICILLIN. THE DIRECTIONS STATE TAKE 2 CAPSULES 3 TIMES A DAY 500MG TABLETS    PATIENT JUST WANTED TO MAKE SURE THAT THIS IS CORRECT DOSAGE BEFORE SHE TAKES THE MEDICATION    PLEASE CALL TO ADVISE

## 2024-02-16 RX ORDER — APIXABAN 2.5 MG/1
TABLET, FILM COATED ORAL
Qty: 180 TABLET | Refills: 0 | Status: SHIPPED | OUTPATIENT
Start: 2024-02-16

## 2024-04-25 ENCOUNTER — TELEPHONE (OUTPATIENT)
Dept: ORTHOPEDIC SURGERY | Facility: CLINIC | Age: 74
End: 2024-04-25

## 2024-04-25 NOTE — TELEPHONE ENCOUNTER
Caller: PATIENT    Relationship to patient: SELF     Best call back number: [phone]    Chief complaint: RIGHT KNEE PAIN    Type of visit: NEW PROBLEM    Requested date: NEXT AVAILABLE APPT AT [facility]     Additional notes: PATIENT WAS CALLING TO SCHEDULE AN APPT WITH DR. BECK FOR RIGHT KNEE PAIN. PATIENT LAST SEEN DR. BECK FOR THE RIGHT KNEE ON 05.17.2019. PATIENT IS NOW ESTABLISHED WITH DR. BLOOD AND HE TREATS KNEE PAIN. PATIENT WOULD LIKE TO SEE IF SHE COULD SCHEDULE AN APPT WITH DR. BECK AT [facility]. PATIENT WOULD LIKE A CALL BACK TO DISCUSS THIS MATTER. THANK YOU!

## 2024-04-29 ENCOUNTER — OFFICE VISIT (OUTPATIENT)
Dept: INTERNAL MEDICINE | Facility: CLINIC | Age: 74
End: 2024-04-29
Payer: MEDICARE

## 2024-04-29 VITALS
SYSTOLIC BLOOD PRESSURE: 132 MMHG | HEART RATE: 80 BPM | BODY MASS INDEX: 26.52 KG/M2 | OXYGEN SATURATION: 98 % | WEIGHT: 175 LBS | HEIGHT: 68 IN | RESPIRATION RATE: 18 BRPM | DIASTOLIC BLOOD PRESSURE: 88 MMHG

## 2024-04-29 DIAGNOSIS — Z12.11 SCREEN FOR COLON CANCER: ICD-10-CM

## 2024-04-29 DIAGNOSIS — I10 HTN (HYPERTENSION), BENIGN: ICD-10-CM

## 2024-04-29 DIAGNOSIS — G43.109 MIGRAINE WITH AURA AND WITHOUT STATUS MIGRAINOSUS, NOT INTRACTABLE: Chronic | ICD-10-CM

## 2024-04-29 DIAGNOSIS — Z00.00 MEDICARE ANNUAL WELLNESS VISIT, SUBSEQUENT: Primary | ICD-10-CM

## 2024-04-29 LAB
ALBUMIN SERPL-MCNC: 4.3 G/DL (ref 3.5–5.2)
ALBUMIN/GLOB SERPL: 2.4 G/DL
ALP SERPL-CCNC: 72 U/L (ref 39–117)
ALT SERPL-CCNC: 15 U/L (ref 1–33)
AST SERPL-CCNC: 17 U/L (ref 1–32)
BILIRUB SERPL-MCNC: 0.5 MG/DL (ref 0–1.2)
BUN SERPL-MCNC: 14 MG/DL (ref 8–23)
BUN/CREAT SERPL: 20.3 (ref 7–25)
CALCIUM SERPL-MCNC: 9.6 MG/DL (ref 8.6–10.5)
CHLORIDE SERPL-SCNC: 103 MMOL/L (ref 98–107)
CHOLEST SERPL-MCNC: 209 MG/DL (ref 0–200)
CO2 SERPL-SCNC: 27 MMOL/L (ref 22–29)
CREAT SERPL-MCNC: 0.69 MG/DL (ref 0.57–1)
EGFRCR SERPLBLD CKD-EPI 2021: 91.2 ML/MIN/1.73
ERYTHROCYTE [DISTWIDTH] IN BLOOD BY AUTOMATED COUNT: 12.4 % (ref 12.3–15.4)
GLOBULIN SER CALC-MCNC: 1.8 GM/DL
GLUCOSE SERPL-MCNC: 100 MG/DL (ref 65–99)
HCT VFR BLD AUTO: 40.2 % (ref 34–46.6)
HDLC SERPL-MCNC: 97 MG/DL (ref 40–60)
HGB BLD-MCNC: 13.3 G/DL (ref 12–15.9)
LDLC SERPL CALC-MCNC: 102 MG/DL (ref 0–100)
LDLC/HDLC SERPL: 1.04 {RATIO}
MCH RBC QN AUTO: 30.2 PG (ref 26.6–33)
MCHC RBC AUTO-ENTMCNC: 33.1 G/DL (ref 31.5–35.7)
MCV RBC AUTO: 91.4 FL (ref 79–97)
PLATELET # BLD AUTO: 224 10*3/MM3 (ref 140–450)
POTASSIUM SERPL-SCNC: 5.3 MMOL/L (ref 3.5–5.2)
PROT SERPL-MCNC: 6.1 G/DL (ref 6–8.5)
RBC # BLD AUTO: 4.4 10*6/MM3 (ref 3.77–5.28)
SODIUM SERPL-SCNC: 139 MMOL/L (ref 136–145)
TRIGL SERPL-MCNC: 54 MG/DL (ref 0–150)
VLDLC SERPL CALC-MCNC: 10 MG/DL (ref 5–40)
WBC # BLD AUTO: 5.99 10*3/MM3 (ref 3.4–10.8)

## 2024-04-29 PROCEDURE — 1160F RVW MEDS BY RX/DR IN RCRD: CPT | Performed by: FAMILY MEDICINE

## 2024-04-29 PROCEDURE — 1159F MED LIST DOCD IN RCRD: CPT | Performed by: FAMILY MEDICINE

## 2024-04-29 PROCEDURE — 3079F DIAST BP 80-89 MM HG: CPT | Performed by: FAMILY MEDICINE

## 2024-04-29 PROCEDURE — 3075F SYST BP GE 130 - 139MM HG: CPT | Performed by: FAMILY MEDICINE

## 2024-04-29 PROCEDURE — 1170F FXNL STATUS ASSESSED: CPT | Performed by: FAMILY MEDICINE

## 2024-04-29 PROCEDURE — G0439 PPPS, SUBSEQ VISIT: HCPCS | Performed by: FAMILY MEDICINE

## 2024-04-29 PROCEDURE — 96160 PT-FOCUSED HLTH RISK ASSMT: CPT | Performed by: FAMILY MEDICINE

## 2024-04-29 NOTE — PROGRESS NOTES
The ABCs of the Annual Wellness Visit  Subsequent Medicare Wellness Visit    Subjective      Juany Carlin is a 74 y.o. female who presents for a Subsequent Medicare Wellness Visit.    The following portions of the patient's history were reviewed and   updated as appropriate: allergies, current medications, past family history, past medical history, past social history, past surgical history, and problem list.    Compared to one year ago, the patient feels her physical   health is the same.  She mentions some recurring neck pain which is likely triggering migraines.  I asked that she discuss with her current pain doctor and I can get her referral if she needs.    Compared to one year ago, the patient feels her mental   health is the same.    Recent Hospitalizations:  She was not admitted to the hospital during the last year.       Current Medical Providers:  Patient Care Team:  Warren Pizarro MD as PCP - General (Family Medicine)  Andrea Farmer MD as Consulting Physician (Orthopedic Surgery)  Anitha Daugherty MD as Consulting Physician (Obstetrics and Gynecology)  Gear Rausch MD as Consulting Physician (Ophthalmology)  Joe Aaron MD as Surgeon (Vascular Surgery)  Ansley Rios MD as Consulting Physician (Otolaryngology)  Dismore Wrinkles, Milka WALSH MD as Consulting Physician (Pain Medicine)  Lulu Wyman APRN (Nurse Practitioner)    Outpatient Medications Prior to Visit   Medication Sig Dispense Refill    acetaminophen (TYLENOL) 650 MG 8 hr tablet Take 1 tablet by mouth Every 8 (Eight) Hours As Needed for Mild Pain.      albuterol sulfate  (90 Base) MCG/ACT inhaler Inhale 2 puffs Every 4 (Four) Hours As Needed for Wheezing. 8 g 2    amLODIPine (NORVASC) 2.5 MG tablet Take 1 tablet by mouth Daily. 90 tablet 1    amoxicillin (AMOXIL) 500 MG capsule Take 2 capsules by mouth 3 (Three) Times a Day. 60 capsule 0    apixaban (Eliquis) 2.5 MG tablet tablet TAKE ONE TABLET BY  "MOUTH EVERY 12 HOURS 180 tablet 0    famotidine (PEPCID) 10 MG tablet Take 2 tablets by mouth Every Night.      Gemtesa 75 MG tablet       losartan (COZAAR) 100 MG tablet TAKE ONE TABLET BY MOUTH DAILY 90 tablet 1    Magnesium 250 MG tablet Take 2 tablets by mouth Every Night. \"FOR LEG CRAMPS\"      Multiple Vitamins-Minerals (MULTIVITAMIN PO) Take 1 tablet by mouth Every Night. PT HOLDING FOR SURGERY      promethazine-dextromethorphan (PROMETHAZINE-DM) 6.25-15 MG/5ML syrup Take 5 mL by mouth 4 (Four) Times a Day As Needed for Cough. 240 mL 0    Rimegepant Sulfate (NURTEC) 75 MG tablet dispersible tablet Take 1 tablet by mouth Daily As Needed (migraine headache). Take at the onset of headache. 4 tablet 0    traZODone (DESYREL) 50 MG tablet        Facility-Administered Medications Prior to Visit   Medication Dose Route Frequency Provider Last Rate Last Admin    Chlorhexidine Gluconate Cloth 2 % pads 1 each  1 each Apply externally Take As Directed Rashawn Hernández MD           No opioid medication identified on active medication list. I have reviewed chart for other potential  high risk medication/s and harmful drug interactions in the elderly.        Aspirin is not on active medication list.  Aspirin use is not indicated based on review of current medical condition/s. Risk of harm outweighs potential benefits.  .    Patient Active Problem List   Diagnosis    HTN (hypertension), benign    Vitamin D deficiency    Urge incontinence    GERD without esophagitis    Cervical spinal stenosis    Saddle pulmonary embolus    Family history of thrombosis    Degeneration of cervical intervertebral disc    Adrenal nodule    Health care maintenance    Localized osteoarthritis of left shoulder    Primary osteoarthritis of both knees    Hypomagnesemia    Migraine with aura and without status migrainosus, not intractable    Snoring    Arthritis of right hip    Anticoagulant long-term use    Hx pulmonary embolism    History of DVT " "(deep vein thrombosis)    Lumbar spondylosis    Other idiopathic scoliosis, lumbar region    Spondylolisthesis of lumbar region    Medication side effect, sequela     Advance Care Planning   Advance Care Planning     Advance Directive is not on file.  ACP discussion was held with the patient during this visit. Patient has an advance directive (not in EMR), copy requested.     Objective    Vitals:    24 0802   BP: 132/88   BP Location: Left arm   Patient Position: Sitting   Cuff Size: Small Adult   Pulse: 80   Resp: 18   SpO2: 98%   Weight: 79.4 kg (175 lb)   Height: 172.7 cm (68\")     Estimated body mass index is 26.61 kg/m² as calculated from the following:    Height as of this encounter: 172.7 cm (68\").    Weight as of this encounter: 79.4 kg (175 lb).    BMI is >= 25 and <30. (Overweight) The following options were offered after discussion;: exercise counseling/recommendations and nutrition counseling/recommendations    Physical Exam  Vitals and nursing note reviewed.   Constitutional:       General: She is not in acute distress.     Appearance: Normal appearance.   Cardiovascular:      Rate and Rhythm: Normal rate and regular rhythm.      Heart sounds: Normal heart sounds. No murmur heard.  Pulmonary:      Effort: Pulmonary effort is normal.      Breath sounds: Normal breath sounds.   Neurological:      Mental Status: She is alert.           Does the patient have evidence of cognitive impairment?   No            HEALTH RISK ASSESSMENT    Smoking Status:  Social History     Tobacco Use   Smoking Status Former    Current packs/day: 0.00    Average packs/day: 0.5 packs/day for 10.0 years (5.0 ttl pk-yrs)    Types: Cigarettes    Start date: 1966    Quit date: 1976    Years since quittin.3   Smokeless Tobacco Never     Alcohol Consumption:  Social History     Substance and Sexual Activity   Alcohol Use Not Currently    Comment: rarely     Fall Risk Screen:    YASEMIN Fall Risk Assessment was " completed, and patient is at LOW risk for falls.Assessment completed on:2024    Depression Screenin/29/2024     8:04 AM   PHQ-2/PHQ-9 Depression Screening   Little Interest or Pleasure in Doing Things 0-->not at all   Feeling Down, Depressed or Hopeless 1-->several days   PHQ-9: Brief Depression Severity Measure Score 1       Health Habits and Functional and Cognitive Screenin/29/2024     8:03 AM   Functional & Cognitive Status   Do you have difficulty preparing food and eating? No   Do you have difficulty bathing yourself, getting dressed or grooming yourself? No   Do you have difficulty using the toilet? No   Do you have difficulty moving around from place to place? No   Do you have trouble with steps or getting out of a bed or a chair? No   Current Diet Well Balanced Diet   Dental Exam Up to date   Eye Exam Up to date   Exercise (times per week) 3 times per week   Current Exercises Include Yard Work;Walking   Do you need help using the phone?  No   Are you deaf or do you have serious difficulty hearing?  Yes   Do you need help to go to places out of walking distance? No   Do you need help shopping? No   Do you need help preparing meals?  No   Do you need help with housework?  No   Do you need help with laundry? No   Do you need help taking your medications? No   Do you need help managing money? No   Do you ever drive or ride in a car without wearing a seat belt? No   Who do you live with? Spouse   If you need help, do you have trouble finding someone available to you? No   Have you been bothered in the last four weeks by sexual problems? No   Do you have difficulty concentrating, remembering or making decisions? No       Age-appropriate Screening Schedule:  Refer to the list below for future screening recommendations based on patient's age, sex and/or medical conditions. Orders for these recommended tests are listed in the plan section. The patient has been provided with a written  plan.    Health Maintenance   Topic Date Due    RSV Vaccine - Adults (1 - 1-dose 60+ series) Never done    COLORECTAL CANCER SCREENING  11/07/2023    ANNUAL WELLNESS VISIT  04/26/2024    INFLUENZA VACCINE  08/01/2024    MAMMOGRAM  03/29/2025    DXA SCAN  03/29/2025    BMI FOLLOWUP  04/29/2025    TDAP/TD VACCINES (3 - Td or Tdap) 11/26/2029    HEPATITIS C SCREENING  Completed    COVID-19 Vaccine  Completed    Pneumococcal Vaccine 65+  Completed    ZOSTER VACCINE  Completed                    CMS Preventative Services Quick Reference  Risk Factors Identified During Encounter:    Immunizations Discussed/Encouraged: Prevnar 20 (Pneumococcal 20-valent conjugate), Shingrix, and COVID19    The above risks/problems have been discussed with the patient.  Pertinent information has been shared with the patient in the After Visit Summary.    Diagnoses and all orders for this visit:    1. Medicare annual wellness visit, subsequent (Primary)    2. HTN (hypertension), benign  -     CBC (No Diff)  -     Comprehensive Metabolic Panel  -     Lipid Panel With LDL / HDL Ratio    3. Screen for colon cancer  -     Ambulatory Referral to Gastroenterology        Follow Up:   Next Medicare Wellness visit to be scheduled in 1 year.      An After Visit Summary and PPPS were made available to the patient.

## 2024-04-29 NOTE — PATIENT INSTRUCTIONS
"MyChart Tips:    [employer] can be a useful part of our patient care program and is a way for us to communicate lab results and for you to request refills.  Here is some information regarding the best way to use [employer] for communication.       Examples of medical issues that are APPROPRIATE for [employer]:  -Follow-up on problems we have already addressed in a visit such as home testing results, blood pressure readings, glucose readings  -Questions that can be answered with a simple \"yes\" or \"no\"  -Please keep communication concise and to the point.  All other types of communication should be held for an office visit.    Communication that is NOT APPROPRIATE for [employer]:  -New problems, serious problems or urgent problems.  Urgent matters should be addressed by phone for advice, in the office, urgent care or the ER.  -Requesting Paxlovid or Antibiotics: These types of problems require an evaluation unless your provider approved this previously.    -[employer] messages are not email.  Staff will check messages each weekday.  We strive for a 48-hour turnaround on messaging.    -[employer] is not for private issues.  Messages are received first by our office staff.    Please be mindful that sometimes it may take longer to receive a response on [employer].  Many times of the year we have high volumes of messages coming into physician inboxes.      Please also be mindful that [employer] messages become part of your permanent medical record.    We appreciate your respect for the limitations and boundaries of [employer].      Lab Results:  Please allow up to 7 business days for lab results to be sent through [employer] to you before contacting your provider.  Sometimes we are waiting for results to get back from the lab and also your provider needs time to analyze them thoroughly before making recommendations.  Also, providers may be out of the office on vacation.      While the internet has great resources, it is not a substitute for " interpreting lab results.

## 2024-04-30 ENCOUNTER — OFFICE VISIT (OUTPATIENT)
Dept: ORTHOPEDIC SURGERY | Facility: CLINIC | Age: 74
End: 2024-04-30
Payer: MEDICARE

## 2024-04-30 VITALS — TEMPERATURE: 98 F | WEIGHT: 175 LBS | BODY MASS INDEX: 25.05 KG/M2 | HEIGHT: 70 IN

## 2024-04-30 DIAGNOSIS — R52 PAIN: Primary | ICD-10-CM

## 2024-04-30 DIAGNOSIS — M17.0 PRIMARY OSTEOARTHRITIS OF BOTH KNEES: ICD-10-CM

## 2024-04-30 DIAGNOSIS — M17.11 PRIMARY OSTEOARTHRITIS OF RIGHT KNEE: ICD-10-CM

## 2024-05-01 RX ORDER — METHYLPREDNISOLONE ACETATE 80 MG/ML
80 INJECTION, SUSPENSION INTRA-ARTICULAR; INTRALESIONAL; INTRAMUSCULAR; SOFT TISSUE
Status: COMPLETED | OUTPATIENT
Start: 2024-05-01 | End: 2024-05-01

## 2024-05-01 RX ADMIN — METHYLPREDNISOLONE ACETATE 80 MG: 80 INJECTION, SUSPENSION INTRA-ARTICULAR; INTRALESIONAL; INTRAMUSCULAR; SOFT TISSUE at 10:28

## 2024-06-04 DIAGNOSIS — I10 HTN (HYPERTENSION), BENIGN: Chronic | ICD-10-CM

## 2024-06-05 ENCOUNTER — TELEPHONE (OUTPATIENT)
Dept: INTERNAL MEDICINE | Facility: CLINIC | Age: 74
End: 2024-06-05
Payer: MEDICARE

## 2024-06-05 RX ORDER — LOSARTAN POTASSIUM 100 MG/1
100 TABLET ORAL DAILY
Qty: 90 TABLET | Refills: 1 | Status: SHIPPED | OUTPATIENT
Start: 2024-06-05

## 2024-06-05 NOTE — TELEPHONE ENCOUNTER
Caller: Juany Alejo    Relationship: Self    Best call back number: [phone]    What was the call regarding: STATED THAT THEY WOULD LIKE TO MAKE SURE THAT SHE AND HER , MEGHAN ALEJO, ARE ABLE TO GET WITH JAMARI SANCHEZ MD WHEN SHE COMES IN TO THE OFFICE. STATED THAT THE LETTER THEY RECEIVED TOLD THEM TO CALL AND LET THE OFFICE KNOW. PLEASE CALL AND ADVISE

## 2024-06-11 ENCOUNTER — PRIOR AUTHORIZATION (OUTPATIENT)
Dept: INTERNAL MEDICINE | Facility: CLINIC | Age: 74
End: 2024-06-11
Payer: MEDICARE

## 2024-06-11 NOTE — TELEPHONE ENCOUNTER
"PA submitted and denied for [facility]  Key: [id number]    Denial reason:   \"We cover this drug when our criteria are met. The   unmet criteria are: has tried or cannot use Ubrelvy.\"  "

## 2024-07-12 RX ORDER — APIXABAN 2.5 MG/1
TABLET, FILM COATED ORAL
Qty: 180 TABLET | Refills: 0 | Status: SHIPPED | OUTPATIENT
Start: 2024-07-12

## 2024-10-14 DIAGNOSIS — I10 HTN (HYPERTENSION), BENIGN: Chronic | ICD-10-CM

## 2024-10-14 RX ORDER — AMLODIPINE BESYLATE 2.5 MG/1
2.5 TABLET ORAL DAILY
Qty: 90 TABLET | Refills: 0 | Status: SHIPPED | OUTPATIENT
Start: 2024-10-14

## 2024-10-14 NOTE — TELEPHONE ENCOUNTER
Rx Refill Note  Requested Prescriptions     Pending Prescriptions Disp Refills    apixaban (Eliquis) 2.5 MG tablet tablet 180 tablet 0     Sig: Take 1 tablet by mouth Every 12 (Twelve) Hours.      Last office visit with prescribing clinician: Visit date not found   Last telemedicine visit with prescribing clinician: Visit date not found   Next office visit with prescribing clinician: Visit date not found                         Would you like a call back once the refill request has been completed: [] Yes [] No    If the office needs to give you a call back, can they leave a voicemail: [] Yes [] No    Anat Guardado  10/14/24, 14:19 EDT

## 2024-10-23 ENCOUNTER — PATIENT ROUNDING (BHMG ONLY) (OUTPATIENT)
Dept: INTERNAL MEDICINE | Facility: CLINIC | Age: 74
End: 2024-10-23
Payer: MEDICARE

## 2024-10-23 ENCOUNTER — OFFICE VISIT (OUTPATIENT)
Dept: INTERNAL MEDICINE | Facility: CLINIC | Age: 74
End: 2024-10-23
Payer: MEDICARE

## 2024-10-23 VITALS
WEIGHT: 174.8 LBS | BODY MASS INDEX: 25.03 KG/M2 | DIASTOLIC BLOOD PRESSURE: 90 MMHG | SYSTOLIC BLOOD PRESSURE: 142 MMHG | HEIGHT: 70 IN | OXYGEN SATURATION: 97 % | TEMPERATURE: 96.2 F | HEART RATE: 84 BPM

## 2024-10-23 DIAGNOSIS — Z86.711 HX PULMONARY EMBOLISM: ICD-10-CM

## 2024-10-23 DIAGNOSIS — Z86.718 HISTORY OF DVT (DEEP VEIN THROMBOSIS): ICD-10-CM

## 2024-10-23 DIAGNOSIS — F33.0 MILD EPISODE OF RECURRENT MAJOR DEPRESSIVE DISORDER: ICD-10-CM

## 2024-10-23 DIAGNOSIS — I10 HTN (HYPERTENSION), BENIGN: Primary | ICD-10-CM

## 2024-10-23 PROCEDURE — 3078F DIAST BP <80 MM HG: CPT | Performed by: STUDENT IN AN ORGANIZED HEALTH CARE EDUCATION/TRAINING PROGRAM

## 2024-10-23 PROCEDURE — 1125F AMNT PAIN NOTED PAIN PRSNT: CPT | Performed by: STUDENT IN AN ORGANIZED HEALTH CARE EDUCATION/TRAINING PROGRAM

## 2024-10-23 PROCEDURE — 1160F RVW MEDS BY RX/DR IN RCRD: CPT | Performed by: STUDENT IN AN ORGANIZED HEALTH CARE EDUCATION/TRAINING PROGRAM

## 2024-10-23 PROCEDURE — 1159F MED LIST DOCD IN RCRD: CPT | Performed by: STUDENT IN AN ORGANIZED HEALTH CARE EDUCATION/TRAINING PROGRAM

## 2024-10-23 PROCEDURE — 3074F SYST BP LT 130 MM HG: CPT | Performed by: STUDENT IN AN ORGANIZED HEALTH CARE EDUCATION/TRAINING PROGRAM

## 2024-10-23 PROCEDURE — 99214 OFFICE O/P EST MOD 30 MIN: CPT | Performed by: STUDENT IN AN ORGANIZED HEALTH CARE EDUCATION/TRAINING PROGRAM

## 2024-10-23 PROCEDURE — G2211 COMPLEX E/M VISIT ADD ON: HCPCS | Performed by: STUDENT IN AN ORGANIZED HEALTH CARE EDUCATION/TRAINING PROGRAM

## 2024-10-23 RX ORDER — DULOXETIN HYDROCHLORIDE 20 MG/1
20 CAPSULE, DELAYED RELEASE ORAL DAILY
COMMUNITY
Start: 2024-10-15

## 2024-10-23 NOTE — PROGRESS NOTES
"Chief Complaint  Establish Care and Hypertension    Subjective        Juany Carlin presents to [facility]  History of Present Illness    Presents to clinic as a new patient to establish care, was previously following with Dr. Pizarro    She has no acute complaints or concerns today.  Medical history is notable for hypertension, she is currently on losartan and amlodipine.  She reports somewhat labile blood pressures, readings at home are largely elevated at around 140/90 however she has had low readings and has some orthostatic symptoms.    She also is established with a therapist who recently started Cymbalta due to some increased life stressors and depression.  She does report improvement in mood since initiation of this    She has a history of DVT and PE, it was recommended she be on lifelong anticoagulation however she is on reduced dose    Objective   Vital Signs:  /90   Pulse 84   Temp 96.2 °F (35.7 °C) (Temporal)   Ht 177.8 cm (70\")   Wt 79.3 kg (174 lb 12.8 oz)   SpO2 97%   BMI 25.08 kg/m²   Estimated body mass index is 25.08 kg/m² as calculated from the following:    Height as of this encounter: 177.8 cm (70\").    Weight as of this encounter: 79.3 kg (174 lb 12.8 oz).            Physical Exam  Vitals reviewed.   Constitutional:       General: She is not in acute distress.     Appearance: Normal appearance. She is not toxic-appearing.   HENT:      Head: Normocephalic and atraumatic.   Eyes:      General: No scleral icterus.     Conjunctiva/sclera: Conjunctivae normal.   Skin:     General: Skin is warm and dry.   Neurological:      Mental Status: She is alert and oriented to person, place, and time.   Psychiatric:         Mood and Affect: Mood normal. Mood is depressed. Affect is tearful.         Behavior: Behavior normal.         Thought Content: Thought content does not include homicidal or suicidal ideation.        Result Review :                Assessment and Plan "   Diagnoses and all orders for this visit:    1. HTN (hypertension), benign (Primary)  -     Comprehensive Metabolic Panel; Future    2. History of DVT (deep vein thrombosis)    3. Hx pulmonary embolism    4. Mild episode of recurrent major depressive disorder      Reviewed previous PCP office visit, labs dating back to 2023    Hypertension is chronic, slightly above goal today in clinic.  She also reports elevated readings at home of around 1 40-1 50s systolics.  I recommended she check her blood pressure more frequently at home and if persistently above 140/90 will plan to change regimen. Reports leg swelling with amlodipine, so may need to try alternative    On lifelong AC, tolerating eliquis well    Continue f/u with therapist for adjustment disorder/MDD    RTC in 3mo for BP check or sooner prn, advised to message/call if BP is high at home and iwll make adjustments prior to that                   Follow Up   Return in about 3 months (around 1/23/2025).  Patient was given instructions and counseling regarding her condition or for health maintenance advice. Please see specific information pulled into the AVS if appropriate.

## 2024-10-23 NOTE — PROGRESS NOTES
October 23, 2024    Hello, may I speak with Juany Carlin?    My name is FRANCISCO       I am  with [facility]  [address]  [place] [address].    Before we get started may I verify your date of birth? 1950    I am calling to officially welcome you to our practice and ask about your recent visit. Is this a good time to talk? yes    Tell me about your visit with us. What things went well?  VISIT  WENT GREAT        We're always looking for ways to make our patients' experiences even better. Do you have recommendations on ways we may improve?  no    Overall were you satisfied with your first visit to our practice? yes       I appreciate you taking the time to speak with me today. Is there anything else I can do for you? no      Thank you, and have a great day.

## 2024-11-05 RX ORDER — APIXABAN 2.5 MG/1
2.5 TABLET, FILM COATED ORAL EVERY 12 HOURS
Qty: 180 TABLET | Refills: 0 | Status: SHIPPED | OUTPATIENT
Start: 2024-11-05

## 2024-12-17 DIAGNOSIS — I10 HTN (HYPERTENSION), BENIGN: Chronic | ICD-10-CM

## 2024-12-17 RX ORDER — LOSARTAN POTASSIUM 100 MG/1
100 TABLET ORAL DAILY
Qty: 90 TABLET | Refills: 1 | Status: SHIPPED | OUTPATIENT
Start: 2024-12-17

## 2024-12-17 NOTE — TELEPHONE ENCOUNTER
Caller: Juany Carlin    Relationship: Self    Best call back number: [phone] (Mobile)     Requested Prescriptions:   losartan (COZAAR) 100 MG tablet  100 mg, Daily 1 ordered                             Pharmacy where request should be sent:  [facility] [id number] [place], [place] - [address]  AT [address] ([facility]) - [phone]  - [phone]     Last office visit with prescribing clinician: 10/23/2024   Last telemedicine visit with prescribing clinician: Visit date not found   Next office visit with prescribing clinician: 1/27/2025     Additional details provided by patient: PATIENT CALLED TO REQUEST A MEDICATION REFILL ON MEDICATION. PATIENT HAS A 7 DAY SUPPLY LEFT.      Does the patient have less than a 3 day supply:  [] Yes  [x] No    Would you like a call back once the refill request has been completed: [] Yes [] No    If the office needs to give you a call back, can they leave a voicemail: [] Yes [] No    Abdias Bagley Rep   12/17/24 10:05 EST         THANKS

## 2025-01-21 DIAGNOSIS — I10 HTN (HYPERTENSION), BENIGN: Chronic | ICD-10-CM

## 2025-01-21 RX ORDER — AMLODIPINE BESYLATE 2.5 MG/1
2.5 TABLET ORAL DAILY
Qty: 90 TABLET | Refills: 1 | Status: SHIPPED | OUTPATIENT
Start: 2025-01-21

## 2025-01-21 NOTE — TELEPHONE ENCOUNTER
Caller: Juany Carlin    Relationship: Self    Best call back number:[phone]     Requested Prescriptions: amLODIPine (NORVASC) 2.5 MG tablet      Pharmacy where request should be sent: [facility] [id number] - [place], [place] - [address] [facility] [address] [facility] [address] ([facility]) - [phone]  - [phone]     Last office visit with prescribing clinician: 10/23/2024   Last telemedicine visit with prescribing clinician: Visit date not found   Next office visit with prescribing clinician: 1/27/2025     Additional details provided by patient: PATIENT HAS 2 DAYS LEFT    Does the patient have less than a 3 day supply:  [x] Yes  [] No    Abdias Leon Rep   01/21/25 08:08 EST

## 2025-01-27 ENCOUNTER — OFFICE VISIT (OUTPATIENT)
Dept: INTERNAL MEDICINE | Facility: CLINIC | Age: 75
End: 2025-01-27
Payer: MEDICARE

## 2025-01-27 VITALS
BODY MASS INDEX: 25.77 KG/M2 | SYSTOLIC BLOOD PRESSURE: 138 MMHG | WEIGHT: 180 LBS | DIASTOLIC BLOOD PRESSURE: 92 MMHG | HEIGHT: 70 IN | OXYGEN SATURATION: 99 % | HEART RATE: 89 BPM

## 2025-01-27 DIAGNOSIS — M43.16 SPONDYLOLISTHESIS OF LUMBAR REGION: Chronic | ICD-10-CM

## 2025-01-27 DIAGNOSIS — F43.21 GRIEF: ICD-10-CM

## 2025-01-27 DIAGNOSIS — R19.5 LOOSE STOOLS: ICD-10-CM

## 2025-01-27 DIAGNOSIS — F32.9 REACTIVE DEPRESSION: Primary | ICD-10-CM

## 2025-01-27 DIAGNOSIS — I10 HTN (HYPERTENSION), BENIGN: ICD-10-CM

## 2025-01-27 DIAGNOSIS — M50.30 DEGENERATION OF CERVICAL INTERVERTEBRAL DISC: ICD-10-CM

## 2025-01-27 DIAGNOSIS — Z86.718 HISTORY OF DVT (DEEP VEIN THROMBOSIS): ICD-10-CM

## 2025-01-27 DIAGNOSIS — Z79.01 ANTICOAGULANT LONG-TERM USE: ICD-10-CM

## 2025-01-27 PROCEDURE — 3079F DIAST BP 80-89 MM HG: CPT | Performed by: STUDENT IN AN ORGANIZED HEALTH CARE EDUCATION/TRAINING PROGRAM

## 2025-01-27 PROCEDURE — 1159F MED LIST DOCD IN RCRD: CPT | Performed by: STUDENT IN AN ORGANIZED HEALTH CARE EDUCATION/TRAINING PROGRAM

## 2025-01-27 PROCEDURE — 3074F SYST BP LT 130 MM HG: CPT | Performed by: STUDENT IN AN ORGANIZED HEALTH CARE EDUCATION/TRAINING PROGRAM

## 2025-01-27 PROCEDURE — 1160F RVW MEDS BY RX/DR IN RCRD: CPT | Performed by: STUDENT IN AN ORGANIZED HEALTH CARE EDUCATION/TRAINING PROGRAM

## 2025-01-27 PROCEDURE — 99214 OFFICE O/P EST MOD 30 MIN: CPT | Performed by: STUDENT IN AN ORGANIZED HEALTH CARE EDUCATION/TRAINING PROGRAM

## 2025-01-27 PROCEDURE — G2211 COMPLEX E/M VISIT ADD ON: HCPCS | Performed by: STUDENT IN AN ORGANIZED HEALTH CARE EDUCATION/TRAINING PROGRAM

## 2025-01-27 PROCEDURE — 1125F AMNT PAIN NOTED PAIN PRSNT: CPT | Performed by: STUDENT IN AN ORGANIZED HEALTH CARE EDUCATION/TRAINING PROGRAM

## 2025-01-27 RX ORDER — DULOXETIN HYDROCHLORIDE 30 MG/1
CAPSULE, DELAYED RELEASE ORAL
COMMUNITY
Start: 2025-01-08

## 2025-01-27 NOTE — PROGRESS NOTES
"Chief Complaint  Diarrhea and Hypertension    Subjective        Juany Carlin presents to [facility]  History of Present Illness    Presents to clinic today for follow-up    Since last visit, she is unfortunately had worsening depression and grief due to the passing of her daughter.  She has been having to be the primary caretaker for her granddaughter which is added significant amount of stress.  She currently follows with a therapist as well as psychiatrist and recently had an increase in her Cymbalta.  She denies any SI or HI however does report worsening mood and stress.    She recently had an increase in her Cymbalta about 2 weeks ago and does report she has had some GI side effects including looser stools and diarrhea since initiation.    Objective   Vital Signs:  /92   Pulse 89   Ht 177.8 cm (70\")   Wt 81.6 kg (180 lb)   SpO2 99%   BMI 25.83 kg/m²   Estimated body mass index is 25.83 kg/m² as calculated from the following:    Height as of this encounter: 177.8 cm (70\").    Weight as of this encounter: 81.6 kg (180 lb).            Physical Exam  Vitals reviewed.   Constitutional:       General: She is not in acute distress.     Appearance: Normal appearance. She is not toxic-appearing.   HENT:      Head: Normocephalic and atraumatic.   Eyes:      General: No scleral icterus.     Conjunctiva/sclera: Conjunctivae normal.   Skin:     General: Skin is warm and dry.   Neurological:      Mental Status: She is alert and oriented to person, place, and time.   Psychiatric:         Mood and Affect: Mood is depressed. Affect is tearful.         Behavior: Behavior normal. Behavior is slowed.         Thought Content: Thought content does not include homicidal or suicidal ideation.        Result Review :                Assessment and Plan   Diagnoses and all orders for this visit:    1. Reactive depression (Primary)    2. Grief    3. Loose stools    4. HTN (hypertension), " benign    5. History of DVT (deep vein thrombosis)    6. Anticoagulant long-term use    7. Degeneration of cervical intervertebral disc    8. Spondylolisthesis of lumbar region      Reports worsening grief and depression since the unfortunate passing of her daughter and increase stress primary caretaker for her granddaughter. She is currently following with therapist and psychiatrist and had recent increase in cymbalta. Denies SI/HI, I do recommend she continue to see these providers but if loose stools persist which I suspect is side effect of cymbalta increase she may need adjustment which is done by her psychiatrist    Blood pressure is slightly elevated reports elevated home readings.  She has been checking her blood pressure at home prior to taking blood pressure medications however would like for her to check after taking losartan and/or amlodipine for more accurate measurements.  For now we will continue current regimen    She is currently in the process of seeing pain medicine where she gets epidurals and SI joint injections however reports her insurance may not cover her current pain medicine doctor.  I advised her if she would like to establish with our pain medicine clinic to reach out and can place referral but for now she will continue to see them    Return to clinic in 4 months for wellness visit or sooner as needed         Follow Up   Return in about 4 months (around 5/27/2025) for Medicare Wellness.  Patient was given instructions and counseling regarding her condition or for health maintenance advice. Please see specific information pulled into the AVS if appropriate.

## 2025-02-19 ENCOUNTER — OFFICE VISIT (OUTPATIENT)
Dept: INTERNAL MEDICINE | Facility: CLINIC | Age: 75
End: 2025-02-19
Payer: MEDICARE

## 2025-02-19 VITALS
WEIGHT: 178.2 LBS | HEIGHT: 70 IN | SYSTOLIC BLOOD PRESSURE: 138 MMHG | BODY MASS INDEX: 25.51 KG/M2 | DIASTOLIC BLOOD PRESSURE: 88 MMHG

## 2025-02-19 DIAGNOSIS — J06.9 UPPER RESPIRATORY TRACT INFECTION, UNSPECIFIED TYPE: Primary | ICD-10-CM

## 2025-02-19 PROCEDURE — 3079F DIAST BP 80-89 MM HG: CPT | Performed by: STUDENT IN AN ORGANIZED HEALTH CARE EDUCATION/TRAINING PROGRAM

## 2025-02-19 PROCEDURE — 1125F AMNT PAIN NOTED PAIN PRSNT: CPT | Performed by: STUDENT IN AN ORGANIZED HEALTH CARE EDUCATION/TRAINING PROGRAM

## 2025-02-19 PROCEDURE — 3075F SYST BP GE 130 - 139MM HG: CPT | Performed by: STUDENT IN AN ORGANIZED HEALTH CARE EDUCATION/TRAINING PROGRAM

## 2025-02-19 PROCEDURE — 87428 SARSCOV & INF VIR A&B AG IA: CPT | Performed by: STUDENT IN AN ORGANIZED HEALTH CARE EDUCATION/TRAINING PROGRAM

## 2025-02-19 PROCEDURE — 99213 OFFICE O/P EST LOW 20 MIN: CPT | Performed by: STUDENT IN AN ORGANIZED HEALTH CARE EDUCATION/TRAINING PROGRAM

## 2025-02-19 RX ORDER — AZITHROMYCIN 250 MG/1
TABLET, FILM COATED ORAL
Qty: 6 TABLET | Refills: 0 | Status: SHIPPED | OUTPATIENT
Start: 2025-02-19 | End: 2025-02-24

## 2025-02-19 NOTE — PROGRESS NOTES
"Chief Complaint  Cough and Nasal Congestion    Subjective        Juany Carlin presents to [facility]IMARY [facility]  History of Present Illness  #URI  Symptoms began 3w ago, noticed cough, congestion, subjective fevers.  States most of her symptoms have improved, has nagging cough.  Has been using her 's albuterol which has helped her respiratory status quite a bit.  Having persistent facial pain now  Objective   Vital Signs:  /88 (BP Location: Left arm, Patient Position: Sitting)   Ht 177.8 cm (70\")   Wt 80.8 kg (178 lb 3.2 oz)   BMI 25.57 kg/m²   Estimated body mass index is 25.57 kg/m² as calculated from the following:    Height as of this encounter: 177.8 cm (70\").    Weight as of this encounter: 80.8 kg (178 lb 3.2 oz).            Physical Exam  Constitutional:       General: She is not in acute distress.     Appearance: She is ill-appearing. She is not toxic-appearing.   HENT:      Nose: Congestion and rhinorrhea present.      Mouth/Throat:      Pharynx: Posterior oropharyngeal erythema present.   Eyes:      Extraocular Movements: Extraocular movements intact.      Pupils: Pupils are equal, round, and reactive to light.   Cardiovascular:      Rate and Rhythm: Regular rhythm. Tachycardia present.   Pulmonary:      Breath sounds: Wheezing present.   Abdominal:      General: Abdomen is flat.      Palpations: Abdomen is soft.   Skin:     General: Skin is warm.   Neurological:      General: No focal deficit present.        Result Review :                Assessment and Plan   Diagnoses and all orders for this visit:    1. Upper respiratory tract infection, unspecified type (Primary)  -     azithromycin (ZITHROMAX) 250 MG tablet; Take 2 tablets the first day, then 1 tablet daily for 4 days. (Take with food)  Dispense: 6 tablet; Refill: 0      Zpack for sinusitis/uri. Optimistic she will continue to improve. Rtc with new worsened symptoms.        Follow Up   No follow-ups on " file.  Patient was given instructions and counseling regarding her condition or for health maintenance advice. Please see specific information pulled into the AVS if appropriate.

## 2025-03-31 RX ORDER — APIXABAN 2.5 MG/1
2.5 TABLET, FILM COATED ORAL
Qty: 180 TABLET | Refills: 0 | OUTPATIENT
Start: 2025-03-31

## 2025-05-28 ENCOUNTER — OFFICE VISIT (OUTPATIENT)
Dept: INTERNAL MEDICINE | Facility: CLINIC | Age: 75
End: 2025-05-28
Payer: MEDICARE

## 2025-05-28 VITALS
WEIGHT: 181.6 LBS | SYSTOLIC BLOOD PRESSURE: 154 MMHG | HEART RATE: 87 BPM | BODY MASS INDEX: 26 KG/M2 | HEIGHT: 70 IN | DIASTOLIC BLOOD PRESSURE: 78 MMHG | OXYGEN SATURATION: 98 %

## 2025-05-28 DIAGNOSIS — F32.9 REACTIVE DEPRESSION: ICD-10-CM

## 2025-05-28 DIAGNOSIS — I10 HTN (HYPERTENSION), BENIGN: ICD-10-CM

## 2025-05-28 DIAGNOSIS — Z00.00 MEDICARE ANNUAL WELLNESS VISIT, SUBSEQUENT: Primary | ICD-10-CM

## 2025-05-28 DIAGNOSIS — Z79.01 ANTICOAGULANT LONG-TERM USE: ICD-10-CM

## 2025-05-28 DIAGNOSIS — Z86.711 HX PULMONARY EMBOLISM: ICD-10-CM

## 2025-05-28 DIAGNOSIS — E55.9 VITAMIN D DEFICIENCY: Chronic | ICD-10-CM

## 2025-05-28 DIAGNOSIS — Z86.718 HISTORY OF DVT (DEEP VEIN THROMBOSIS): ICD-10-CM

## 2025-05-28 DIAGNOSIS — E78.5 HYPERLIPIDEMIA, UNSPECIFIED HYPERLIPIDEMIA TYPE: ICD-10-CM

## 2025-05-28 RX ORDER — LOSARTAN POTASSIUM 100 MG/1
100 TABLET ORAL DAILY
Qty: 90 TABLET | Refills: 1 | Status: SHIPPED | OUTPATIENT
Start: 2025-05-28 | End: 2025-05-28

## 2025-05-28 RX ORDER — LOSARTAN POTASSIUM 100 MG/1
100 TABLET ORAL DAILY
Qty: 90 TABLET | Refills: 1 | Status: SHIPPED | OUTPATIENT
Start: 2025-05-28

## 2025-05-28 RX ORDER — APIXABAN 2.5 MG/1
2.5 TABLET, FILM COATED ORAL
Qty: 180 TABLET | Refills: 1 | OUTPATIENT
Start: 2025-05-28

## 2025-05-28 NOTE — PROGRESS NOTES
" Subjective   The ABCs of the Annual Wellness Visit  Medicare Wellness Visit      Juany Carlin is a 75 y.o. patient who presents for a Medicare Wellness Visit.    The following portions of the patient's history were reviewed and   updated as appropriate: allergies, current medications, past family history, past medical history, past social history, past surgical history, and problem list.    Compared to one year ago, the patient's physical   health is the same.  Compared to one year ago, the patient's mental   health is worse.    Recent Hospitalizations:  She was not admitted to the hospital during the last year.     Current Medical Providers:  Patient Care Team:  Damian Bowers MD as PCP - General (Internal Medicine)  Andrea Farmer MD as Consulting Physician (Orthopedic Surgery)  Anitha Daugherty MD as Consulting Physician (Obstetrics and Gynecology)  Gera Rausch MD as Consulting Physician (Ophthalmology)  Joe Aaron MD as Surgeon (Vascular Surgery)  Ansley Rios MD as Consulting Physician (Otolaryngology)  Judith Sheridan, Milka WALSH MD as Consulting Physician (Pain Medicine)  Lulu Wyman APRN (Nurse Practitioner)    Outpatient Medications Prior to Visit   Medication Sig Dispense Refill    acetaminophen (TYLENOL) 650 MG 8 hr tablet Take 1 tablet by mouth Every 8 (Eight) Hours As Needed for Mild Pain.      amLODIPine (NORVASC) 2.5 MG tablet Take 1 tablet by mouth Daily. 90 tablet 1    famotidine (PEPCID) 10 MG tablet Take 2 tablets by mouth Every Night.      Gemtesa 75 MG tablet       Magnesium 250 MG tablet Take 2 tablets by mouth Every Night. \"FOR LEG CRAMPS\"      Multiple Vitamins-Minerals (MULTIVITAMIN PO) Take 1 tablet by mouth Every Night. PT HOLDING FOR SURGERY      Eliquis 2.5 MG tablet tablet TAKE ONE TABLET BY MOUTH EVERY 12 HOURS 180 tablet 0    losartan (COZAAR) 100 MG tablet Take 1 tablet by mouth Daily. 90 tablet 1    DULoxetine (CYMBALTA) 30 MG capsule    " "    Facility-Administered Medications Prior to Visit   Medication Dose Route Frequency Provider Last Rate Last Admin    Chlorhexidine Gluconate Cloth 2 % pads 1 each  1 each Apply externally Take As Directed Rashawn Hernández MD         No opioid medication identified on active medication list. I have reviewed chart for other potential  high risk medication/s and harmful drug interactions in the elderly.      Aspirin is not on active medication list.  Aspirin use is contraindicated for this patient due to: current use of Eliquis.  .    Patient Active Problem List   Diagnosis    HTN (hypertension), benign    Vitamin D deficiency    Urge incontinence    GERD without esophagitis    Cervical spinal stenosis    Saddle pulmonary embolus    Family history of thrombosis    Degeneration of cervical intervertebral disc    Adrenal nodule    Health care maintenance    Localized osteoarthritis of left shoulder    Primary osteoarthritis of both knees    Hypomagnesemia    Migraine with aura and without status migrainosus, not intractable    Snoring    Arthritis of right hip    Anticoagulant long-term use    Hx pulmonary embolism    History of DVT (deep vein thrombosis)    Lumbar spondylosis    Other idiopathic scoliosis, lumbar region    Spondylolisthesis of lumbar region    Medication side effect, sequela     Advance Care Planning Advance Directive is not on file.  ACP discussion was held with the patient during this visit. Patient does not have an advance directive, information provided. Meeting with  today to obtain advanced directive             Objective   Vitals:    05/28/25 0932   BP: 154/78   Pulse: 87   SpO2: 98%   Weight: 82.4 kg (181 lb 9.6 oz)   Height: 177.8 cm (70\")   PainSc: 0-No pain       Estimated body mass index is 26.06 kg/m² as calculated from the following:    Height as of this encounter: 177.8 cm (70\").    Weight as of this encounter: 82.4 kg (181 lb 9.6 oz).    BMI is >= 25 and <30. (Overweight) The " following options were offered after discussion;: exercise counseling/recommendations and nutrition counseling/recommendations           Does the patient have evidence of cognitive impairment? No                                                                                                Health  Risk Assessment    Smoking Status:  Social History     Tobacco Use   Smoking Status Former    Current packs/day: 0.00    Average packs/day: 0.5 packs/day for 10.0 years (5.0 ttl pk-yrs)    Types: Cigarettes    Start date: 1966    Quit date: 1976    Years since quittin.4    Passive exposure: Past   Smokeless Tobacco Never     Alcohol Consumption:  Social History     Substance and Sexual Activity   Alcohol Use Not Currently    Comment: rarely       Fall Risk Screen  STEADI Fall Risk Assessment was completed, and patient is at LOW risk for falls.Assessment completed on:2025    Depression Screening   Little interest or pleasure in doing things? Several days   Feeling down, depressed, or hopeless? Several days   PHQ-2 Total Score 2   Trouble falling or staying asleep, or sleeping too much? Several days   Feeling tired or having little energy? Not at all   Poor appetite or overeating? Not at all   Feeling bad about yourself - or that you are a failure or have let yourself or your family down? Not at all   Trouble concentrating on things, such as reading the newspaper or watching television? Several days   Moving or speaking so slowly that other people could have noticed? Or the opposite - being so fidgety or restless that you have been moving around a lot more than usual? Not at all     Thoughts that you would be better off dead, or of hurting yourself in some way? Not at all   PHQ-9 Total Score 4   If you checked off any problems, how difficult have these problems made it for you to do your work, take care of things at home, or get along with other people? Very difficult        Health Habits and Functional and  Cognitive Screenin/21/2025     9:08 AM   Functional & Cognitive Status   Do you have difficulty preparing food and eating? No   Do you have difficulty bathing yourself, getting dressed or grooming yourself? No   Do you have difficulty using the toilet? No   Do you have difficulty moving around from place to place? No   Do you have trouble with steps or getting out of a bed or a chair? No   Current Diet Well Balanced Diet   Dental Exam Up to date   Eye Exam Up to date   Exercise (times per week) 3 times per week   Current Exercises Include Gardening;House Cleaning;Yard Work   Do you need help using the phone?  No   Are you deaf or do you have serious difficulty hearing?  No   Do you need help to go to places out of walking distance? No   Do you need help shopping? No   Do you need help preparing meals?  No   Do you need help with housework?  No   Do you need help with laundry? No   Do you need help taking your medications? No   Do you need help managing money? No   Do you ever drive or ride in a car without wearing a seat belt? No   Have you felt unusual stress, anger or loneliness in the last month? No   Who do you live with? Spouse   If you need help, do you have trouble finding someone available to you? No   Have you been bothered in the last four weeks by sexual problems? No   Do you have difficulty concentrating, remembering or making decisions? No           Age-appropriate Screening Schedule:  Refer to the list below for future screening recommendations based on patient's age, sex and/or medical conditions. Orders for these recommended tests are listed in the plan section. The patient has been provided with a written plan.    Health Maintenance List  Health Maintenance   Topic Date Due    COVID-19 Vaccine (10 - 2024-25 season) 2025    DXA SCAN  2025    ANNUAL WELLNESS VISIT  2025    LIPID PANEL  2025    INFLUENZA VACCINE  2025    COLORECTAL CANCER SCREENING  2027     TDAP/TD VACCINES (3 - Td or Tdap) 11/26/2029    HEPATITIS C SCREENING  Completed    RSV Vaccine - Adults  Completed    Pneumococcal Vaccine 50+  Completed    ZOSTER VACCINE  Completed    MAMMOGRAM  Discontinued                                                                                                                                                CMS Preventative Services Quick Reference  Risk Factors Identified During Encounter  Chronic Pain:  follows with pain medicine  Depression/Dysphoria: Suicide Risk Assessment performed and Elevated PHQ score reflective of other stress or illness, not depression  Immunizations Discussed/Encouraged: COVID19  Inadequate Social Support, Isolation, Loneliness, Lack of Transportation, Financial Difficulties, or Caregiver Stress: continues to have significant grief and depression for passing of daughter  Dental Screening Recommended  Vision Screening Recommended    The above risks/problems have been discussed with the patient.  Pertinent information has been shared with the patient in the After Visit Summary.  An After Visit Summary and PPPS were made available to the patient.    Follow Up:   Next Medicare Wellness visit to be scheduled in 1 year.         Additional E&M Note during same encounter follows:  Patient has additional, significant, and separately identifiable condition(s)/problem(s) that require work above and beyond the Medicare Wellness Visit     Chief Complaint  Medicare Wellness-subsequent    Subjective   HPI  Juany is also being seen today for additional medical problem/s.    Follow-up of chronic medical conditions    Overall she reports she is doing relatively well however she continues to have significant grief and depression regarding the passing of her daughter over 6 months ago.  She does report she has a good support system with grief counseling and has a therapist that she sees once weekly. she has stopped Cymbalta as it is not significantly helping and  "causing side effects and is not on anything for MDD                  Objective   Vital Signs:  /78   Pulse 87   Ht 177.8 cm (70\")   Wt 82.4 kg (181 lb 9.6 oz)   SpO2 98%   BMI 26.06 kg/m²   Physical Exam  Vitals reviewed.   Constitutional:       General: She is not in acute distress.     Appearance: Normal appearance. She is not toxic-appearing.   HENT:      Head: Normocephalic and atraumatic.   Eyes:      General: No scleral icterus.     Conjunctiva/sclera: Conjunctivae normal.   Cardiovascular:      Rate and Rhythm: Normal rate and regular rhythm.      Heart sounds: Normal heart sounds.   Pulmonary:      Effort: Pulmonary effort is normal. No respiratory distress.      Breath sounds: Normal breath sounds.   Skin:     General: Skin is warm and dry.   Neurological:      Mental Status: She is alert and oriented to person, place, and time.   Psychiatric:         Mood and Affect: Mood normal.         Behavior: Behavior normal.              Assessment and Plan         Medicare annual wellness visit, subsequent    Reactive depression    Anticoagulant long-term use    Hx pulmonary embolism    HTN (hypertension), benign    History of DVT (deep vein thrombosis)    Hyperlipidemia, unspecified hyperlipidemia type     Vitamin D deficiency    Diagnoses and all orders for this visit:    1. Medicare annual wellness visit, subsequent (Primary)    2. Reactive depression    3. Anticoagulant long-term use  -     CBC (No Diff); Future    4. Hx pulmonary embolism  -     apixaban (Eliquis) 2.5 MG tablet tablet; Take 1 tablet by mouth Every 12 (Twelve) Hours.  Dispense: 180 tablet; Refill: 1  -     CBC (No Diff); Future    5. HTN (hypertension), benign  Assessment & Plan:      Orders:  -     losartan (COZAAR) 100 MG tablet; Take 1 tablet by mouth Daily.  Dispense: 90 tablet; Refill: 1  -     Comprehensive Metabolic Panel; Future    6. History of DVT (deep vein thrombosis)  -     apixaban (Eliquis) 2.5 MG tablet tablet; Take 1 " tablet by mouth Every 12 (Twelve) Hours.  Dispense: 180 tablet; Refill: 1  -     CBC (No Diff); Future    7. Hyperlipidemia, unspecified hyperlipidemia type  -     Lipid Panel With / Chol / HDL Ratio; Future    8. Vitamin D deficiency  -     Vitamin D,25-Hydroxy; Future        Continue follow with grief counseling and therapist, grief is at baseline. Not interested in starting other medication for now    BP above goal in office suspect white coat  HTN component as reports home readings largely all <130/80 so continue losartan and amlodipine    On lifelong eliquis for PE at reduced dose    RTC in 6mo, labs prior        New Medications Ordered This Visit   Medications    apixaban (Eliquis) 2.5 MG tablet tablet     Sig: Take 1 tablet by mouth Every 12 (Twelve) Hours.     Dispense:  180 tablet     Refill:  1    losartan (COZAAR) 100 MG tablet     Sig: Take 1 tablet by mouth Daily.     Dispense:  90 tablet     Refill:  1          Follow Up   Return in about 6 months (around 11/28/2025) for Lab before FU.  Patient was given instructions and counseling regarding her condition or for health maintenance advice. Please see specific information pulled into the AVS if appropriate.

## 2025-07-04 DIAGNOSIS — I10 HTN (HYPERTENSION), BENIGN: Chronic | ICD-10-CM

## 2025-07-07 RX ORDER — AMLODIPINE BESYLATE 2.5 MG/1
2.5 TABLET ORAL DAILY
Qty: 90 TABLET | Refills: 1 | Status: SHIPPED | OUTPATIENT
Start: 2025-07-07

## (undated) DEVICE — SYS CLS SKIN PREMIERPRO EXOFINFUSION 22CM

## (undated) DEVICE — SYR LUERLOK 30CC

## (undated) DEVICE — GLV SURG PREMIERPRO ORTHO LTX PF SZ8.5 BRN

## (undated) DEVICE — PENCL E/S ULTRAVAC TELESCP NOSE HOLSTR 10FT

## (undated) DEVICE — DRSNG TELFA PAD NONADH STR 1S 3X4IN

## (undated) DEVICE — COVER,MAYO STAND,STERILE: Brand: MEDLINE

## (undated) DEVICE — GLV SURG SENSICARE POLYISPRN W/ALOE PF LF 9 GRN STRL

## (undated) DEVICE — SUT VIC 3/0 PS2 27IN J427H

## (undated) DEVICE — APPL CHLORAPREP HI/LITE 26ML ORNG

## (undated) DEVICE — DRSNG SURESITE WNDW 4X4.5

## (undated) DEVICE — ORTHOLOCK(R) EX-PIN 4 MM X 150 MM

## (undated) DEVICE — OPTIFOAM GENTLE SA, POSTOP, 4X8: Brand: MEDLINE

## (undated) DEVICE — HANDPIECE SET WITH COAXIAL HIGH FLOW TIP AND SUCTION TUBE: Brand: INTERPULSE

## (undated) DEVICE — PREMIUM WET SKIN PREP TRAY: Brand: MEDLINE INDUSTRIES, INC.

## (undated) DEVICE — NEEDLE, QUINCKE, 18GX3.5": Brand: MEDLINE

## (undated) DEVICE — SUT VICRYL 1 CT1 27IN  JJ40G

## (undated) DEVICE — GLV SURG SENSICARE W/ALOE PF LF 9 STRL

## (undated) DEVICE — TRAP FLD MINIVAC MEGADYNE 100ML

## (undated) DEVICE — DUAL CUT SAGITTAL BLADE

## (undated) DEVICE — SOL BETADINE 4OZ

## (undated) DEVICE — INSTRUMENT BATTERY

## (undated) DEVICE — PK HIP TOTL 40